# Patient Record
Sex: FEMALE | Race: OTHER | Employment: OTHER | URBAN - METROPOLITAN AREA
[De-identification: names, ages, dates, MRNs, and addresses within clinical notes are randomized per-mention and may not be internally consistent; named-entity substitution may affect disease eponyms.]

---

## 2017-11-07 ENCOUNTER — PATIENT OUTREACH (OUTPATIENT)
Dept: CASE MANAGEMENT | Age: 72
End: 2017-11-07
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
## 2017-11-08 ENCOUNTER — PATIENT OUTREACH (OUTPATIENT)
Dept: CASE MANAGEMENT | Age: 72
End: 2017-11-08

## 2017-11-08 NOTE — PROGRESS NOTES
Ambulatory Care Coordination  Social Work Assessment   Referral from which TIKA CM: January Zafar   Previously referred? If so, reason and brief outcome No   Reason for current referral: Any assistance on diabetes medication costs   Income information (if needed): Pt reports personally making $4,100/month with her half-way/SSI but also lives with her daughter and son-in-law who work as well. Unsure of collective income but pt did not want to apply for rx assistance as most want the household income amount and pt felt this was too intrusive. Sources of Support: Daughter, sister, son in law; very active in grandchildrens lives as well   ACP set up? Needs information? Did not assess   Medication Cost assistance needed? See above. Pt reports the most expensive are Novolog, Eliquis, and newly prescribed Soliqua. Pt has samples for Soliqua, plans to start Eliquis back in January but has stopped now b/c of price as is in donut hole. Pt is paying for novolog regardless b/c she feels she will die if she stops this. Novolog is costing her $215/month due to donut hole. Referral to [facility]TC/Medicaid needed? Would not qualify due to income levels   Referral to Medicare Extra Help/LIS program needed? n/a   Small home repair needed? no   MOW referral? No   Any other concerns/questions? Not at this time. Next steps: Pt does not wish to pursue any assistance programs, and may not qualify based on her and her childrens collective income levels. Pt assists in paying for the household bills and feels she shouldnt have to spend so much on her medication. Pt did save JEFFERSON Shankar contact number and was encouraged to call if changes mind. JEFFERSON MADRID updated RN [facility] as well. This note will not be viewable in [address]

## 2017-11-14 ENCOUNTER — PATIENT OUTREACH (OUTPATIENT)
Dept: CASE MANAGEMENT | Age: 72
End: 2017-11-14

## 2017-11-14 NOTE — PROGRESS NOTES
This note will not be viewable in [address] RNCM attempted to f/u w/ pt regarding CCM services. Pt didn't wish to pursue  medication assistance, per SW \"Unsure of collective income but pt did not want to apply for rx assistance as most want the household income amount and pt felt this was too intrusive\". Pt states she will continue paying for insulin out of pocket. Pt and daughter also decline home visits from [place] [address] for education. RNCM will continue attempts to f/u and offer visits at PCP office if pt is agreeable.

## 2017-11-20 ENCOUNTER — TELEPHONE (OUTPATIENT)
Dept: DIABETES SERVICES | Age: 72
End: 2017-11-20

## 2017-11-21 ENCOUNTER — PATIENT OUTREACH (OUTPATIENT)
Dept: CASE MANAGEMENT | Age: 72
End: 2017-11-21

## 2017-11-21 NOTE — Clinical Note
ERICA for Dr. Ashtyn Galarza, Ms. Sage declined needs including DM education. I encouraged her to save our contact information in case anything changes in the future.   Thank you, Erica

## 2017-11-21 NOTE — PROGRESS NOTES
This note will not be viewable in [address] RNCM spoke w/ pt regarding CCM services, pt declines need for services at this time, including DM education. RNCM encouraged pt to save contact information for CCMgrs in the event we are needed in the future. Pt agrees to do so. Will notify PCP.

## 2018-01-12 PROBLEM — F33.9 RECURRENT DEPRESSION (HCC): Status: ACTIVE | Noted: 2018-01-12

## 2018-01-12 PROBLEM — E11.40 TYPE 2 DIABETES MELLITUS WITH DIABETIC NEUROPATHY (HCC): Status: ACTIVE | Noted: 2018-01-12

## 2018-08-06 PROBLEM — E66.01 SEVERE OBESITY (BMI 35.0-39.9): Status: ACTIVE | Noted: 2018-08-06

## 2018-08-31 PROCEDURE — 99283 EMERGENCY DEPT VISIT LOW MDM: CPT | Performed by: EMERGENCY MEDICINE

## 2018-08-31 PROCEDURE — 96375 TX/PRO/DX INJ NEW DRUG ADDON: CPT | Performed by: EMERGENCY MEDICINE

## 2018-08-31 PROCEDURE — 96374 THER/PROPH/DIAG INJ IV PUSH: CPT | Performed by: EMERGENCY MEDICINE

## 2018-08-31 PROCEDURE — 94762 N-INVAS EAR/PLS OXIMTRY CONT: CPT | Performed by: EMERGENCY MEDICINE

## 2018-09-01 ENCOUNTER — HOSPITAL ENCOUNTER (EMERGENCY)
Age: 73
Discharge: HOME | End: 2018-09-01
Attending: EMERGENCY MEDICINE
Payer: MEDICARE

## 2018-09-01 ENCOUNTER — APPOINTMENT (OUTPATIENT)
Dept: GENERAL RADIOLOGY | Age: 73
End: 2018-09-01
Attending: EMERGENCY MEDICINE
Payer: MEDICARE

## 2018-09-01 ENCOUNTER — APPOINTMENT (OUTPATIENT)
Dept: ULTRASOUND IMAGING | Age: 73
End: 2018-09-01
Attending: EMERGENCY MEDICINE
Payer: MEDICARE

## 2018-09-01 VITALS
OXYGEN SATURATION: 98 % | RESPIRATION RATE: 16 BRPM | SYSTOLIC BLOOD PRESSURE: 156 MMHG | TEMPERATURE: 98.2 F | BODY MASS INDEX: 36.32 KG/M2 | DIASTOLIC BLOOD PRESSURE: 88 MMHG | WEIGHT: 205 LBS | HEART RATE: 81 BPM | HEIGHT: 63 IN

## 2018-09-01 DIAGNOSIS — M79.605 LEFT LEG PAIN: Primary | ICD-10-CM

## 2018-09-01 LAB
ANION GAP SERPL CALC-SCNC: 8 MMOL/L (ref 7–16)
BASOPHILS # BLD: 0 K/UL (ref 0–0.2)
BASOPHILS NFR BLD: 1 % (ref 0–2)
BUN SERPL-MCNC: 20 MG/DL (ref 8–23)
CALCIUM SERPL-MCNC: 9 MG/DL (ref 8.3–10.4)
CHLORIDE SERPL-SCNC: 103 MMOL/L (ref 98–107)
CO2 SERPL-SCNC: 30 MMOL/L (ref 21–32)
CREAT SERPL-MCNC: 0.87 MG/DL (ref 0.6–1)
DIFFERENTIAL METHOD BLD: ABNORMAL
EOSINOPHIL # BLD: 0.1 K/UL (ref 0–0.8)
EOSINOPHIL NFR BLD: 1 % (ref 0.5–7.8)
ERYTHROCYTE [DISTWIDTH] IN BLOOD BY AUTOMATED COUNT: 13.6 %
GLUCOSE SERPL-MCNC: 295 MG/DL (ref 65–100)
HCT VFR BLD AUTO: 41.4 % (ref 35.8–46.3)
HGB BLD-MCNC: 12.6 G/DL (ref 11.7–15.4)
IMM GRANULOCYTES # BLD: 0 K/UL (ref 0–0.5)
IMM GRANULOCYTES NFR BLD AUTO: 1 % (ref 0–5)
LYMPHOCYTES # BLD: 1.7 K/UL (ref 0.5–4.6)
LYMPHOCYTES NFR BLD: 33 % (ref 13–44)
MCH RBC QN AUTO: 25 PG (ref 26.1–32.9)
MCHC RBC AUTO-ENTMCNC: 30.4 G/DL (ref 31.4–35)
MCV RBC AUTO: 82.1 FL (ref 79.6–97.8)
MONOCYTES # BLD: 0.4 K/UL (ref 0.1–1.3)
MONOCYTES NFR BLD: 8 % (ref 4–12)
NEUTS SEG # BLD: 2.9 K/UL (ref 1.7–8.2)
NEUTS SEG NFR BLD: 56 % (ref 43–78)
NRBC # BLD: 0 K/UL (ref 0–0.2)
PLATELET # BLD AUTO: 293 K/UL (ref 150–450)
PMV BLD AUTO: 9.2 FL (ref 9.4–12.3)
POTASSIUM SERPL-SCNC: 3.9 MMOL/L (ref 3.5–5.1)
RBC # BLD AUTO: 5.04 M/UL (ref 4.05–5.2)
SODIUM SERPL-SCNC: 141 MMOL/L (ref 136–145)
WBC # BLD AUTO: 5.1 K/UL (ref 4.3–11.1)

## 2018-09-01 PROCEDURE — 73502 X-RAY EXAM HIP UNI 2-3 VIEWS: CPT

## 2018-09-01 PROCEDURE — 96374 THER/PROPH/DIAG INJ IV PUSH: CPT | Performed by: EMERGENCY MEDICINE

## 2018-09-01 PROCEDURE — 74011250636 HC RX REV CODE- 250/636: Performed by: EMERGENCY MEDICINE

## 2018-09-01 PROCEDURE — 93971 EXTREMITY STUDY: CPT

## 2018-09-01 PROCEDURE — 80048 BASIC METABOLIC PNL TOTAL CA: CPT

## 2018-09-01 PROCEDURE — 96375 TX/PRO/DX INJ NEW DRUG ADDON: CPT | Performed by: EMERGENCY MEDICINE

## 2018-09-01 PROCEDURE — 72100 X-RAY EXAM L-S SPINE 2/3 VWS: CPT

## 2018-09-01 PROCEDURE — 85025 COMPLETE CBC W/AUTO DIFF WBC: CPT

## 2018-09-01 RX ORDER — HYDROMORPHONE HYDROCHLORIDE 2 MG/ML
0.5 INJECTION, SOLUTION INTRAMUSCULAR; INTRAVENOUS; SUBCUTANEOUS
Status: DISCONTINUED | OUTPATIENT
Start: 2018-09-01 | End: 2018-09-01

## 2018-09-01 RX ORDER — ONDANSETRON 2 MG/ML
4 INJECTION INTRAMUSCULAR; INTRAVENOUS
Status: DISCONTINUED | OUTPATIENT
Start: 2018-09-01 | End: 2018-09-01

## 2018-09-01 RX ORDER — HYDROMORPHONE HYDROCHLORIDE 2 MG/ML
0.5 INJECTION, SOLUTION INTRAMUSCULAR; INTRAVENOUS; SUBCUTANEOUS
Status: COMPLETED | OUTPATIENT
Start: 2018-09-01 | End: 2018-09-01

## 2018-09-01 RX ORDER — ONDANSETRON 2 MG/ML
4 INJECTION INTRAMUSCULAR; INTRAVENOUS
Status: COMPLETED | OUTPATIENT
Start: 2018-09-01 | End: 2018-09-01

## 2018-09-01 RX ADMIN — ONDANSETRON 4 MG: 2 INJECTION INTRAMUSCULAR; INTRAVENOUS at 02:29

## 2018-09-01 RX ADMIN — HYDROMORPHONE HYDROCHLORIDE 0.5 MG: 2 INJECTION, SOLUTION INTRAMUSCULAR; INTRAVENOUS; SUBCUTANEOUS at 02:29

## 2018-09-01 NOTE — ED TRIAGE NOTES
Pt reports mild lower back pain since last evening,  Pt states that she is now having L LE pain, poss ganglion  cyst to posterior knee.

## 2018-09-01 NOTE — ED PROVIDER NOTES
HPI Comments: Patient is a 67 yo female who presents with left leg pain and \"swelling\" behind her left upper leg. States pain for the past 2 days. States history of blood clots and concerned about one in her leg due to the lump. She is on eloquis from prior DVT to upper extremity and lower extremities. No chest pain, no SOB, no nausea or vomiting. States pain radiates up her left leg and to her left hip. No back pain, no weakness of her legs, no fevers or chills, no further concerns. Patient is a 68 y.o. female presenting with leg pain. The history is provided by the patient. No  was used. Leg Pain Pertinent negatives include no back pain and no neck pain. Past Medical History:  
Diagnosis Date  Allergic rhinitis  Anemia  Anxiety  Asthma  Cataracts, bilateral   
 Depression  Diabetes ([address]  GERD (gastroesophageal reflux disease)  History of DVT (deep vein thrombosis)  History of pulmonary embolus (PE)  Hypercholesterolemia  Hyperlipidemia  Hypertension  Insomnia  Internal hemorrhoids without mention of complication  Osteoarthritis  Peripheral neuropathy  Personal history of colonic polyps  Rectocele  Stroke ([address] Past Surgical History:  
Procedure Laterality Date  ENDOSCOPY, COLON, DIAGNOSTIC  [address]--no polyps--5 year recall  HX APPENDECTOMY    
 OPEN  
 HX  SECTION    
 x2  
 HX CHOLECYSTECTOMY    
 OPEN  
 HX ORTHOPAEDIC    
 BACK X3  
 HX PARTIAL HYSTERECTOMY Family History:  
Problem Relation Age of Onset  Heart Disease Mother  Diabetes Mother  Heart Disease Father  Diabetes Brother Social History Social History  Marital status:  Spouse name: N/A  
 Number of children: N/A  
 Years of education: N/A Occupational History  Not on file. Social History Main Topics  Smoking status: Never Smoker  Smokeless tobacco: Never Used  Alcohol use No  
 Drug use: No  
 Sexual activity: Not on file Other Topics Concern  Not on file Social History Narrative ALLERGIES: Codeine and Morphine Review of Systems Constitutional: Negative for chills and fever. HENT: Negative for rhinorrhea and sore throat. Eyes: Negative for visual disturbance. Respiratory: Negative for cough and shortness of breath. Cardiovascular: Negative for chest pain and leg swelling. Gastrointestinal: Negative for abdominal pain, diarrhea, nausea and vomiting. Genitourinary: Negative for dysuria. Musculoskeletal: Positive for arthralgias and myalgias. Negative for back pain and neck pain. Skin: Negative for rash. Neurological: Negative for weakness and headaches. Psychiatric/Behavioral: The patient is not nervous/anxious. Vitals:  
 08/31/18 2346 09/01/18 0033 BP: 140/72 Pulse: 81 Resp: 18 Temp:  98.2 °F (36.8 °C) SpO2: 96% Weight: 93 kg (205 lb) Height: 5' 3\" (1.6 m) Physical Exam  
Constitutional: She is oriented to person, place, and time. She appears well-developed and well-nourished. HENT:  
Head: Normocephalic. Right Ear: External ear normal.  
Left Ear: External ear normal.  
Eyes: Conjunctivae and EOM are normal. Pupils are equal, round, and reactive to light. Neck: Normal range of motion. Neck supple. No tracheal deviation present. Cardiovascular: Normal rate, regular rhythm, normal heart sounds and intact distal pulses. No murmur heard. Pulmonary/Chest: Effort normal and breath sounds normal. No respiratory distress. Abdominal: Soft. There is no tenderness. Musculoskeletal: Normal range of motion. She exhibits tenderness (to palpation of posterior leg just above the knee. No rash noted. ). She exhibits no edema. Non-tender to palpation of C, T and L spine.   No stepoffs or deformities noted.  Strength 5/5 in all extremities, pulses intact in all extremities distally Neurological: She is alert and oriented to person, place, and time. No cranial nerve deficit. Skin: No rash noted. Nursing note and vitals reviewed. MDM Number of Diagnoses or Management Options Left leg pain: new and requires workup Amount and/or Complexity of Data Reviewed Clinical lab tests: ordered and reviewed Tests in the radiology section of CPT®: reviewed and ordered Review and summarize past medical records: yes Risk of Complications, Morbidity, and/or Mortality Presenting problems: high Diagnostic procedures: high Management options: high Patient Progress Patient progress: stable ED Course Procedures Recent Results (from the past 12 hour(s)) CBC WITH AUTOMATED DIFF Collection Time: 09/01/18  1:39 AM  
Result Value Ref Range WBC 5.1 4.3 - 11.1 K/uL  
 RBC 5.04 4.05 - 5.2 M/uL  
 HGB 12.6 11.7 - 15.4 g/dL HCT 41.4 35.8 - 46.3 % MCV 82.1 79.6 - 97.8 FL  
 MCH 25.0 (L) 26.1 - 32.9 PG  
 MCHC 30.4 (L) 31.4 - 35.0 g/dL  
 RDW 13.6 % PLATELET 783 364 - 475 K/uL MPV 9.2 (L) 9.4 - 12.3 FL ABSOLUTE NRBC 0.00 0.0 - 0.2 K/uL  
 DF AUTOMATED NEUTROPHILS 56 43 - 78 % LYMPHOCYTES 33 13 - 44 % MONOCYTES 8 4.0 - 12.0 % EOSINOPHILS 1 0.5 - 7.8 % BASOPHILS 1 0.0 - 2.0 % IMMATURE GRANULOCYTES 1 0.0 - 5.0 %  
 ABS. NEUTROPHILS 2.9 1.7 - 8.2 K/UL  
 ABS. LYMPHOCYTES 1.7 0.5 - 4.6 K/UL  
 ABS. MONOCYTES 0.4 0.1 - 1.3 K/UL  
 ABS. EOSINOPHILS 0.1 0.0 - 0.8 K/UL  
 ABS. BASOPHILS 0.0 0.0 - 0.2 K/UL  
 ABS. IMM. GRANS. 0.0 0.0 - 0.5 K/UL METABOLIC PANEL, BASIC Collection Time: 09/01/18  1:39 AM  
Result Value Ref Range Sodium 141 136 - 145 mmol/L Potassium 3.9 3.5 - 5.1 mmol/L Chloride 103 98 - 107 mmol/L  
 CO2 30 21 - 32 mmol/L Anion gap 8 7 - 16 mmol/L Glucose 295 (H) 65 - 100 mg/dL  BUN 20 8 - 23 MG/DL  
 Creatinine 0.87 0.6 - 1.0 MG/DL  
 GFR est AA >60 >60 ml/min/1.73m2 GFR est non-AA >60 >60 ml/min/1.73m2 Calcium 9.0 8.3 - 10.4 MG/DL Xr Spine Lumb 2 Or 3 V Result Date: 9/1/2018 EXAM:  XR SPINE LUMB 2 OR 3 V INDICATION:   LEFT LEG PAIN COMPARISON: None. FINDINGS: AP, lateral and spot lateral views of the lumbar spine demonstrate normal alignment. Diffuse spondylosis with diffuse disc space narrowing. Posterior bone stimulator at L3-4. Facet joints are intact. There is no fracture, subluxation or other abnormality. IMPRESSION:  Diffuse spondylosis. Xr Hip Lt W Or Wo Pelv 2-3 Vws Result Date: 9/1/2018 EXAM:  XR HIP LT W OR WO PELV 2-3 VWS INDICATION:   leg pain COMPARISON: None. FINDINGS: An AP view of the pelvis and a frogleg lateral view of the left hip demonstrate no fracture, dislocation or other abnormality. IMPRESSION:  Normal left hip. Duplex Lower Ext Venous Left Result Date: 9/1/2018 LEFT LOWER EXTREMITY VENOUS DUPLEX ULTRASOUND. HISTORY:  Pain. TECHNIQUE: Direct skin-contact high resolution grayscale images, color-flow Doppler and duplex waveform analysis. FINDINGS: There is compressibility, color-flow assignment and augmentation of the venous waveform with calf compression in the common femoral, superficial femoral and popliteal veins. Upper calf veins are unremarkable. In the posterior thigh there is a well-defined hypoechoic subcutaneous mass measuring 1.7 is 1.6 x 0.3 cm. With central vascularity. IMPRESSION: Negative for sonographic evidence of deep venous thrombosis left lower extremity. Nonspecific posterior thigh subcutaneous mass. This is nonspecific in its appearance. Clinical follow-up and possible biopsy recommended if it does not resolve. 67 yo female with leg pain: 
 
  
Patient is VERY well appearing, pain resolved in ED with pain medications.   Does have non-specific mass noted on US which we discussed importance of follow up for further workup however this appears to be subcutaneous and not related to bone on US and not visualized on xray. She is to return with any return or worsening pain or swelling, any fevers or chills or any further concerns.

## 2018-09-01 NOTE — DISCHARGE INSTRUCTIONS
AS WE DISCUSSED, YOU NEED TO SEE YOUR PRIMARY CARE PROVIDER FOR FURTHER IMAGING OF A MASS IN YOUR LEG. IF YOUR PAIN WORSENS OR IF YOU DEVELOP ANY FEVERS OR CHILLS, NAUSEA OR VOMITING OR ANY FURTHER CONCERNS THEN PLEASE RETURN IMMEDIATELY TO THE EMERGENCY DEPARTMENT. Leg Pain: Care Instructions  Your Care Instructions  Many things can cause leg pain. Too much exercise or overuse can cause a muscle cramp (or charley horse). You can get leg cramps from not eating a balanced diet that has enough potassium, calcium, and other minerals. If you do not drink enough fluids or are taking certain medicines, you may develop leg cramps. Other causes of leg pain include injuries, blood flow problems, nerve damage, and twisted and enlarged veins (varicose veins). You can usually ease pain with self-care. Your doctor may recommend that you rest your leg and keep it elevated. Follow-up care is a key part of your treatment and safety. Be sure to make and go to all appointments, and call your doctor if you are having problems. It's also a good idea to know your test results and keep a list of the medicines you take. How can you care for yourself at home? · Take pain medicines exactly as directed. ¨ If the doctor gave you a prescription medicine for pain, take it as prescribed. ¨ If you are not taking a prescription pain medicine, ask your doctor if you can take an over-the-counter medicine. · Take any other medicines exactly as prescribed. Call your doctor if you think you are having a problem with your medicine. · Rest your leg while you have pain, and avoid standing for long periods of time. · Prop up your leg at or above the level of your heart when possible. · Make sure you are eating a balanced diet that is rich in calcium, potassium, and magnesium, especially if you are pregnant. · If directed by your doctor, put ice or a cold pack on the area for 10 to 20 minutes at a time.  Put a thin cloth between the ice and your skin. · Your leg may be in a splint, a brace, or an elastic bandage, and you may have crutches to help you walk. Follow your doctor's directions about how long to wear supports and how to use the crutches. When should you call for help? Call 911 anytime you think you may need emergency care. For example, call if:    · You have sudden chest pain and shortness of breath, or you cough up blood.     · Your leg is cool or pale or changes color.    Call your doctor now or seek immediate medical care if:    · You have increasing or severe pain.     · Your leg suddenly feels weak and you cannot move it.     · You have signs of a blood clot, such as:  ¨ Pain in your calf, back of the knee, thigh, or groin. ¨ Redness and swelling in your leg or groin.     · You have signs of infection, such as:  ¨ Increased pain, swelling, warmth, or redness. ¨ Red streaks leading from the sore area. ¨ Pus draining from a place on your leg. ¨ A fever.     · You cannot bear weight on your leg.    Watch closely for changes in your health, and be sure to contact your doctor if:    · You do not get better as expected. Where can you learn more? Go to http://nila-alyssa.info/. Enter Y021 in the search box to learn more about \"Leg Pain: Care Instructions. \"  Current as of: November 20, 2017  Content Version: 11.7  © 1855-7923 [employer]. Care instructions adapted under license by [employer] (which disclaims liability or warranty for this information). If you have questions about a medical condition or this instruction, always ask your healthcare professional. [place] [address] any warranty or liability for your use of this information.

## 2018-09-01 NOTE — ED NOTES
I have reviewed discharge instructions with the patient. The patient verbalized understanding. Patient left ED via Discharge Method: ambulatory to Home with (insert name of family/friend, self, transport Son). Opportunity for questions and clarification provided. Patient given 0 scripts. To continue your aftercare when you leave the hospital, you may receive an automated call from our care team to check in on how you are doing. This is a free service and part of our promise to provide the best care and service to meet your aftercare needs.  If you have questions, or wish to unsubscribe from this service please call [phone]. Thank you for Choosing our [facility] [employer] [facility] Emergency Department.

## 2018-09-17 ENCOUNTER — HOSPITAL ENCOUNTER (OUTPATIENT)
Dept: MRI IMAGING | Age: 73
Discharge: HOME | End: 2018-09-17
Attending: NURSE PRACTITIONER

## 2018-09-17 DIAGNOSIS — R22.42 MASS OF LEFT LOWER EXTREMITY: ICD-10-CM

## 2018-09-17 DIAGNOSIS — M79.605 PAIN OF LEFT LOWER EXTREMITY: ICD-10-CM

## 2018-09-17 NOTE — PROGRESS NOTES
Pt has some sort of wire (stimulator?) that she did not know about. Pt has had lumbar surgeries in the past but no stimulators. Per radiologist recommendation, the patient can not have an MRI due to the risk of heating. We have no information on the wires and can not safely scan the patient. Will notify office.

## 2018-09-19 PROBLEM — R22.42 MASS OF LEFT LOWER EXTREMITY: Status: ACTIVE | Noted: 2018-09-19

## 2019-03-08 ENCOUNTER — APPOINTMENT (OUTPATIENT)
Dept: CT IMAGING | Age: 74
DRG: 066 | End: 2019-03-08
Attending: EMERGENCY MEDICINE
Payer: MEDICARE

## 2019-03-08 ENCOUNTER — HOSPITAL ENCOUNTER (INPATIENT)
Age: 74
LOS: 1 days | Discharge: HOME | DRG: 066 | End: 2019-03-12
Attending: EMERGENCY MEDICINE | Admitting: HOSPITALIST
Payer: MEDICARE

## 2019-03-08 DIAGNOSIS — H49.02 THIRD NERVE PALSY, LEFT: Primary | ICD-10-CM

## 2019-03-08 DIAGNOSIS — R51.9 HEADACHE ABOVE THE EYE REGION: ICD-10-CM

## 2019-03-08 PROBLEM — H49.00 THIRD NERVE PALSY: Status: ACTIVE | Noted: 2019-03-08

## 2019-03-08 PROBLEM — I63.9 CVA (CEREBRAL VASCULAR ACCIDENT) (HCC): Status: ACTIVE | Noted: 2019-03-08

## 2019-03-08 LAB
ALBUMIN SERPL-MCNC: 3.6 G/DL (ref 3.2–4.6)
ALBUMIN/GLOB SERPL: 0.8 {RATIO} (ref 1.2–3.5)
ALP SERPL-CCNC: 127 U/L (ref 50–136)
ALT SERPL-CCNC: 34 U/L (ref 12–65)
ANION GAP SERPL CALC-SCNC: 6 MMOL/L (ref 7–16)
AST SERPL-CCNC: 25 U/L (ref 15–37)
BASOPHILS # BLD: 0.1 K/UL (ref 0–0.2)
BASOPHILS NFR BLD: 1 % (ref 0–2)
BILIRUB SERPL-MCNC: 0.3 MG/DL (ref 0.2–1.1)
BUN SERPL-MCNC: 19 MG/DL (ref 8–23)
CALCIUM SERPL-MCNC: 9.2 MG/DL (ref 8.3–10.4)
CHLORIDE SERPL-SCNC: 104 MMOL/L (ref 98–107)
CO2 SERPL-SCNC: 31 MMOL/L (ref 21–32)
CREAT SERPL-MCNC: 0.84 MG/DL (ref 0.6–1)
DIFFERENTIAL METHOD BLD: ABNORMAL
EOSINOPHIL # BLD: 0.1 K/UL (ref 0–0.8)
EOSINOPHIL NFR BLD: 1 % (ref 0.5–7.8)
ERYTHROCYTE [DISTWIDTH] IN BLOOD BY AUTOMATED COUNT: 13.9 % (ref 11.9–14.6)
GLOBULIN SER CALC-MCNC: 4.3 G/DL (ref 2.3–3.5)
GLUCOSE BLD STRIP.AUTO-MCNC: 142 MG/DL (ref 65–100)
GLUCOSE SERPL-MCNC: 59 MG/DL (ref 65–100)
HCT VFR BLD AUTO: 44.7 % (ref 35.8–46.3)
HGB BLD-MCNC: 13.8 G/DL (ref 11.7–15.4)
IMM GRANULOCYTES # BLD AUTO: 0 K/UL (ref 0–0.5)
IMM GRANULOCYTES NFR BLD AUTO: 1 % (ref 0–5)
LYMPHOCYTES # BLD: 3.3 K/UL (ref 0.5–4.6)
LYMPHOCYTES NFR BLD: 40 % (ref 13–44)
MCH RBC QN AUTO: 25.6 PG (ref 26.1–32.9)
MCHC RBC AUTO-ENTMCNC: 30.9 G/DL (ref 31.4–35)
MCV RBC AUTO: 82.8 FL (ref 79.6–97.8)
MONOCYTES # BLD: 0.5 K/UL (ref 0.1–1.3)
MONOCYTES NFR BLD: 6 % (ref 4–12)
NEUTS SEG # BLD: 4.2 K/UL (ref 1.7–8.2)
NEUTS SEG NFR BLD: 52 % (ref 43–78)
NRBC # BLD: 0 K/UL (ref 0–0.2)
PLATELET # BLD AUTO: 393 K/UL (ref 150–450)
PMV BLD AUTO: 9.2 FL (ref 9.4–12.3)
POTASSIUM SERPL-SCNC: 3.9 MMOL/L (ref 3.5–5.1)
PROT SERPL-MCNC: 7.9 G/DL (ref 6.3–8.2)
RBC # BLD AUTO: 5.4 M/UL (ref 4.05–5.2)
SODIUM SERPL-SCNC: 141 MMOL/L (ref 136–145)
WBC # BLD AUTO: 8.2 K/UL (ref 4.3–11.1)

## 2019-03-08 PROCEDURE — 74011636320 HC RX REV CODE- 636/320: Performed by: EMERGENCY MEDICINE

## 2019-03-08 PROCEDURE — 82962 GLUCOSE BLOOD TEST: CPT

## 2019-03-08 PROCEDURE — 80053 COMPREHEN METABOLIC PANEL: CPT

## 2019-03-08 PROCEDURE — 74011000250 HC RX REV CODE- 250: Performed by: EMERGENCY MEDICINE

## 2019-03-08 PROCEDURE — 96374 THER/PROPH/DIAG INJ IV PUSH: CPT | Performed by: EMERGENCY MEDICINE

## 2019-03-08 PROCEDURE — 70496 CT ANGIOGRAPHY HEAD: CPT

## 2019-03-08 PROCEDURE — 85025 COMPLETE CBC W/AUTO DIFF WBC: CPT

## 2019-03-08 PROCEDURE — 70450 CT HEAD/BRAIN W/O DYE: CPT

## 2019-03-08 PROCEDURE — 65270000029 HC RM PRIVATE

## 2019-03-08 PROCEDURE — 74011000258 HC RX REV CODE- 258: Performed by: EMERGENCY MEDICINE

## 2019-03-08 PROCEDURE — 99284 EMERGENCY DEPT VISIT MOD MDM: CPT | Performed by: EMERGENCY MEDICINE

## 2019-03-08 RX ORDER — SODIUM CHLORIDE 0.9 % (FLUSH) 0.9 %
5-40 SYRINGE (ML) INJECTION AS NEEDED
Status: DISCONTINUED | OUTPATIENT
Start: 2019-03-08 | End: 2019-03-12 | Stop reason: HOSPADM

## 2019-03-08 RX ORDER — DEXTROSE 50 % IN WATER (D50W) INTRAVENOUS SYRINGE
50
Status: COMPLETED | OUTPATIENT
Start: 2019-03-08 | End: 2019-03-08

## 2019-03-08 RX ORDER — SODIUM CHLORIDE 0.9 % (FLUSH) 0.9 %
5-40 SYRINGE (ML) INJECTION EVERY 8 HOURS
Status: DISCONTINUED | OUTPATIENT
Start: 2019-03-08 | End: 2019-03-12 | Stop reason: HOSPADM

## 2019-03-08 RX ORDER — SODIUM CHLORIDE 0.9 % (FLUSH) 0.9 %
10 SYRINGE (ML) INJECTION
Status: COMPLETED | OUTPATIENT
Start: 2019-03-08 | End: 2019-03-08

## 2019-03-08 RX ADMIN — Medication 5 ML: at 21:59

## 2019-03-08 RX ADMIN — SODIUM CHLORIDE 100 ML: 900 INJECTION, SOLUTION INTRAVENOUS at 23:06

## 2019-03-08 RX ADMIN — DEXTROSE MONOHYDRATE 25 G: 25 INJECTION, SOLUTION INTRAVENOUS at 21:59

## 2019-03-08 RX ADMIN — Medication 10 ML: at 23:06

## 2019-03-08 RX ADMIN — IOPAMIDOL 80 ML: 755 INJECTION, SOLUTION INTRAVENOUS at 23:06

## 2019-03-09 ENCOUNTER — APPOINTMENT (OUTPATIENT)
Dept: CT IMAGING | Age: 74
DRG: 066 | End: 2019-03-09
Attending: PSYCHIATRY & NEUROLOGY
Payer: MEDICARE

## 2019-03-09 PROBLEM — R51.9 HEADACHE ABOVE THE EYE REGION: Status: ACTIVE | Noted: 2019-03-09

## 2019-03-09 LAB
APPEARANCE UR: CLEAR
BACTERIA URNS QL MICRO: ABNORMAL /HPF
BILIRUB UR QL: NEGATIVE
CASTS URNS QL MICRO: 3 /LPF
COLOR UR: YELLOW
CRYSTALS URNS QL MICRO: 0 /LPF
EPI CELLS #/AREA URNS HPF: 9 /HPF
GLUCOSE BLD STRIP.AUTO-MCNC: 108 MG/DL (ref 65–100)
GLUCOSE BLD STRIP.AUTO-MCNC: 218 MG/DL (ref 65–100)
GLUCOSE BLD STRIP.AUTO-MCNC: 243 MG/DL (ref 65–100)
GLUCOSE BLD STRIP.AUTO-MCNC: 487 MG/DL (ref 65–100)
GLUCOSE BLD STRIP.AUTO-MCNC: 60 MG/DL (ref 65–100)
GLUCOSE BLD STRIP.AUTO-MCNC: 95 MG/DL (ref 65–100)
GLUCOSE UR STRIP.AUTO-MCNC: 100 MG/DL
HGB UR QL STRIP: NEGATIVE
KETONES UR QL STRIP.AUTO: NEGATIVE MG/DL
LEUKOCYTE ESTERASE UR QL STRIP.AUTO: ABNORMAL
NITRITE UR QL STRIP.AUTO: NEGATIVE
PH UR STRIP: 7 [PH] (ref 5–9)
PROT UR STRIP-MCNC: NEGATIVE MG/DL
RBC #/AREA URNS HPF: 2 /HPF
SP GR UR REFRACTOMETRY: 1.01 (ref 1–1.02)
UROBILINOGEN UR QL STRIP.AUTO: 0.2 EU/DL (ref 0.2–1)
WBC URNS QL MICRO: 8 /HPF

## 2019-03-09 PROCEDURE — 74011636637 HC RX REV CODE- 636/637: Performed by: HOSPITALIST

## 2019-03-09 PROCEDURE — 82962 GLUCOSE BLOOD TEST: CPT

## 2019-03-09 PROCEDURE — 77030020263 HC SOL INJ SOD CL0.9% LFCR 1000ML

## 2019-03-09 PROCEDURE — 97165 OT EVAL LOW COMPLEX 30 MIN: CPT

## 2019-03-09 PROCEDURE — 74011250637 HC RX REV CODE- 250/637: Performed by: NURSE PRACTITIONER

## 2019-03-09 PROCEDURE — 70450 CT HEAD/BRAIN W/O DYE: CPT

## 2019-03-09 PROCEDURE — 81003 URINALYSIS AUTO W/O SCOPE: CPT

## 2019-03-09 PROCEDURE — 74011000250 HC RX REV CODE- 250: Performed by: HOSPITALIST

## 2019-03-09 PROCEDURE — 81015 MICROSCOPIC EXAM OF URINE: CPT

## 2019-03-09 PROCEDURE — 99218 HC RM OBSERVATION: CPT

## 2019-03-09 PROCEDURE — 74011250637 HC RX REV CODE- 250/637: Performed by: HOSPITALIST

## 2019-03-09 PROCEDURE — 74011250636 HC RX REV CODE- 250/636: Performed by: HOSPITALIST

## 2019-03-09 PROCEDURE — 92610 EVALUATE SWALLOWING FUNCTION: CPT

## 2019-03-09 PROCEDURE — C8929 TTE W OR WO FOL WCON,DOPPLER: HCPCS

## 2019-03-09 PROCEDURE — 99223 1ST HOSP IP/OBS HIGH 75: CPT | Performed by: PSYCHIATRY & NEUROLOGY

## 2019-03-09 PROCEDURE — 97162 PT EVAL MOD COMPLEX 30 MIN: CPT

## 2019-03-09 RX ORDER — ASPIRIN 325 MG
325 TABLET ORAL DAILY
Status: DISCONTINUED | OUTPATIENT
Start: 2019-03-09 | End: 2019-03-10

## 2019-03-09 RX ORDER — INSULIN GLARGINE 100 [IU]/ML
30 INJECTION, SOLUTION SUBCUTANEOUS
Status: DISCONTINUED | OUTPATIENT
Start: 2019-03-09 | End: 2019-03-10

## 2019-03-09 RX ORDER — TRAMADOL HYDROCHLORIDE 50 MG/1
50 TABLET ORAL
Status: DISCONTINUED | OUTPATIENT
Start: 2019-03-09 | End: 2019-03-09

## 2019-03-09 RX ORDER — DEXTROSE 50 % IN WATER (D50W) INTRAVENOUS SYRINGE
25-50 AS NEEDED
Status: DISCONTINUED | OUTPATIENT
Start: 2019-03-09 | End: 2019-03-12 | Stop reason: HOSPADM

## 2019-03-09 RX ORDER — LEVOTHYROXINE SODIUM 50 UG/1
25 TABLET ORAL
Status: DISCONTINUED | OUTPATIENT
Start: 2019-03-09 | End: 2019-03-12 | Stop reason: HOSPADM

## 2019-03-09 RX ORDER — SODIUM CHLORIDE 9 MG/ML
100 INJECTION, SOLUTION INTRAVENOUS CONTINUOUS
Status: DISPENSED | OUTPATIENT
Start: 2019-03-09 | End: 2019-03-10

## 2019-03-09 RX ORDER — KETOROLAC TROMETHAMINE 30 MG/ML
15 INJECTION, SOLUTION INTRAMUSCULAR; INTRAVENOUS
Status: COMPLETED | OUTPATIENT
Start: 2019-03-09 | End: 2019-03-09

## 2019-03-09 RX ORDER — LORAZEPAM 1 MG/1
1 TABLET ORAL
Status: DISCONTINUED | OUTPATIENT
Start: 2019-03-09 | End: 2019-03-10

## 2019-03-09 RX ORDER — ACETAMINOPHEN 325 MG/1
650 TABLET ORAL
Status: DISCONTINUED | OUTPATIENT
Start: 2019-03-09 | End: 2019-03-12 | Stop reason: HOSPADM

## 2019-03-09 RX ORDER — ALBUTEROL SULFATE 0.83 MG/ML
2.5 SOLUTION RESPIRATORY (INHALATION)
Status: DISCONTINUED | OUTPATIENT
Start: 2019-03-09 | End: 2019-03-12 | Stop reason: HOSPADM

## 2019-03-09 RX ORDER — ATORVASTATIN CALCIUM 10 MG/1
20 TABLET, FILM COATED ORAL DAILY
Status: DISCONTINUED | OUTPATIENT
Start: 2019-03-09 | End: 2019-03-10

## 2019-03-09 RX ORDER — SODIUM CHLORIDE 0.9 % (FLUSH) 0.9 %
5-40 SYRINGE (ML) INJECTION EVERY 8 HOURS
Status: DISCONTINUED | OUTPATIENT
Start: 2019-03-09 | End: 2019-03-10

## 2019-03-09 RX ORDER — INSULIN LISPRO 100 [IU]/ML
INJECTION, SOLUTION INTRAVENOUS; SUBCUTANEOUS
Status: DISCONTINUED | OUTPATIENT
Start: 2019-03-09 | End: 2019-03-12 | Stop reason: HOSPADM

## 2019-03-09 RX ORDER — FLUTICASONE PROPIONATE 50 MCG
2 SPRAY, SUSPENSION (ML) NASAL DAILY
Status: DISCONTINUED | OUTPATIENT
Start: 2019-03-09 | End: 2019-03-12 | Stop reason: HOSPADM

## 2019-03-09 RX ORDER — DEXTROSE 40 %
15 GEL (GRAM) ORAL AS NEEDED
Status: DISCONTINUED | OUTPATIENT
Start: 2019-03-09 | End: 2019-03-12 | Stop reason: HOSPADM

## 2019-03-09 RX ORDER — POLYETHYLENE GLYCOL 3350 17 G/17G
17 POWDER, FOR SOLUTION ORAL DAILY
Status: DISCONTINUED | OUTPATIENT
Start: 2019-03-09 | End: 2019-03-11 | Stop reason: SDUPTHER

## 2019-03-09 RX ORDER — SODIUM CHLORIDE 0.9 % (FLUSH) 0.9 %
5-40 SYRINGE (ML) INJECTION AS NEEDED
Status: DISCONTINUED | OUTPATIENT
Start: 2019-03-09 | End: 2019-03-11 | Stop reason: SDUPTHER

## 2019-03-09 RX ORDER — INSULIN LISPRO 100 [IU]/ML
20 INJECTION, SOLUTION INTRAVENOUS; SUBCUTANEOUS
Status: DISCONTINUED | OUTPATIENT
Start: 2019-03-09 | End: 2019-03-10

## 2019-03-09 RX ORDER — SAME BUTANEDISULFONATE/BETAINE 400-600 MG
250 POWDER IN PACKET (EA) ORAL 2 TIMES DAILY
Status: DISCONTINUED | OUTPATIENT
Start: 2019-03-09 | End: 2019-03-09

## 2019-03-09 RX ORDER — ONDANSETRON 2 MG/ML
4 INJECTION INTRAMUSCULAR; INTRAVENOUS
Status: DISCONTINUED | OUTPATIENT
Start: 2019-03-09 | End: 2019-03-12 | Stop reason: HOSPADM

## 2019-03-09 RX ORDER — OXYCODONE HYDROCHLORIDE 5 MG/1
10 TABLET ORAL
Status: DISCONTINUED | OUTPATIENT
Start: 2019-03-09 | End: 2019-03-12 | Stop reason: HOSPADM

## 2019-03-09 RX ORDER — TRAMADOL HYDROCHLORIDE 50 MG/1
50-100 TABLET ORAL
Status: DISCONTINUED | OUTPATIENT
Start: 2019-03-09 | End: 2019-03-12 | Stop reason: HOSPADM

## 2019-03-09 RX ADMIN — INSULIN GLARGINE 30 UNITS: 100 INJECTION, SOLUTION SUBCUTANEOUS at 16:42

## 2019-03-09 RX ADMIN — INSULIN LISPRO 20 UNITS: 100 INJECTION, SOLUTION INTRAVENOUS; SUBCUTANEOUS at 16:44

## 2019-03-09 RX ADMIN — KETOROLAC TROMETHAMINE 15 MG: 30 INJECTION, SOLUTION INTRAMUSCULAR; INTRAVENOUS at 02:53

## 2019-03-09 RX ADMIN — ACETAMINOPHEN 650 MG: 325 TABLET, FILM COATED ORAL at 02:53

## 2019-03-09 RX ADMIN — Medication 5 ML: at 15:35

## 2019-03-09 RX ADMIN — INSULIN LISPRO 4 UNITS: 100 INJECTION, SOLUTION INTRAVENOUS; SUBCUTANEOUS at 11:59

## 2019-03-09 RX ADMIN — APIXABAN 5 MG: 5 TABLET, FILM COATED ORAL at 08:37

## 2019-03-09 RX ADMIN — LEVOTHYROXINE SODIUM 25 MCG: 50 TABLET ORAL at 05:21

## 2019-03-09 RX ADMIN — INSULIN LISPRO 10 UNITS: 100 INJECTION, SOLUTION INTRAVENOUS; SUBCUTANEOUS at 16:43

## 2019-03-09 RX ADMIN — TRAMADOL HYDROCHLORIDE 50 MG: 50 TABLET, FILM COATED ORAL at 15:35

## 2019-03-09 RX ADMIN — LORAZEPAM 1 MG: 1 TABLET ORAL at 18:26

## 2019-03-09 RX ADMIN — FLUTICASONE PROPIONATE 2 SPRAY: 50 SPRAY, METERED NASAL at 08:37

## 2019-03-09 RX ADMIN — MULTIPLE VITAMINS W/ MINERALS TAB 1 TABLET: TAB at 08:37

## 2019-03-09 RX ADMIN — PERFLUTREN 1 ML: 6.52 INJECTION, SUSPENSION INTRAVENOUS at 15:00

## 2019-03-09 RX ADMIN — Medication 10 ML: at 01:50

## 2019-03-09 RX ADMIN — ACETAMINOPHEN 650 MG: 325 TABLET, FILM COATED ORAL at 08:37

## 2019-03-09 RX ADMIN — INSULIN LISPRO 4 UNITS: 100 INJECTION, SOLUTION INTRAVENOUS; SUBCUTANEOUS at 08:07

## 2019-03-09 RX ADMIN — INSULIN GLARGINE 30 UNITS: 100 INJECTION, SOLUTION SUBCUTANEOUS at 08:06

## 2019-03-09 RX ADMIN — ACETAMINOPHEN 650 MG: 325 TABLET, FILM COATED ORAL at 19:32

## 2019-03-09 RX ADMIN — POLYETHYLENE GLYCOL 3350 17 G: 17 POWDER, FOR SOLUTION ORAL at 08:37

## 2019-03-09 RX ADMIN — SODIUM CHLORIDE 100 ML/HR: 900 INJECTION, SOLUTION INTRAVENOUS at 02:00

## 2019-03-09 RX ADMIN — ACETAMINOPHEN 650 MG: 325 TABLET, FILM COATED ORAL at 15:35

## 2019-03-09 RX ADMIN — TRAMADOL HYDROCHLORIDE 50 MG: 50 TABLET, FILM COATED ORAL at 22:42

## 2019-03-09 RX ADMIN — Medication 10 ML: at 21:55

## 2019-03-09 RX ADMIN — APIXABAN 5 MG: 5 TABLET, FILM COATED ORAL at 18:24

## 2019-03-09 RX ADMIN — ASPIRIN 325 MG ORAL TABLET 325 MG: 325 PILL ORAL at 08:37

## 2019-03-09 RX ADMIN — INSULIN LISPRO 20 UNITS: 100 INJECTION, SOLUTION INTRAVENOUS; SUBCUTANEOUS at 08:09

## 2019-03-09 RX ADMIN — ATORVASTATIN CALCIUM 20 MG: 10 TABLET, FILM COATED ORAL at 08:37

## 2019-03-09 RX ADMIN — ONDANSETRON 4 MG: 2 INJECTION INTRAMUSCULAR; INTRAVENOUS at 19:32

## 2019-03-09 RX ADMIN — INSULIN LISPRO 20 UNITS: 100 INJECTION, SOLUTION INTRAVENOUS; SUBCUTANEOUS at 11:59

## 2019-03-09 NOTE — ED TRIAGE NOTES
Taken off blood thinner for dental work. Blood thinner taken for clots and CVA x 2. Headache x 1 week. Seen by NP 3/4/2019. Left eye drooping at 1030 when waking on 3/7/2019. Woke today and unable to open left eye.    Dr. Iron Rubalcava in triage on arrival.

## 2019-03-09 NOTE — PROGRESS NOTES
Problem: Falls - Risk of  Goal: *Absence of Falls  Document Howie Fall Risk and appropriate interventions in the flowsheet.   Outcome: Progressing Towards Goal  Fall Risk Interventions:  Mobility Interventions: Bed/chair exit alarm, Communicate number of staff needed for ambulation/transfer, OT consult for ADLs, Patient to call before getting OOB, PT Consult for mobility concerns, PT Consult for assist device competence         Medication Interventions: Bed/chair exit alarm, Evaluate medications/consider consulting pharmacy, Patient to call before getting OOB    Elimination Interventions: Bed/chair exit alarm, Call light in reach, Patient to call for help with toileting needs

## 2019-03-09 NOTE — ED NOTES
TRANSFER - OUT REPORT:    Verbal report given to Juan Whitaker RN(name) on Wei Corea  being transferred to 1(unit) for routine progression of care       Report consisted of patients Situation, Background, Assessment and   Recommendations(SBAR). Information from the following report(s) SBAR and ED Summary was reveiwed with the receiving nurse. Opportunity for questions and clarification was provided.       Ischemic Stroke without Activase/TIA    BP Parameters: Less Than 220/120 for 24 hours, then consult MD for parameters    Controlled With: None    Dysphagia Screen Completed: Yes: Pass        NIH Stroke Scale Complete: Yes: 1    Frequency of Vital Signs: Every 15 minutes for 2 hours    Frequency of Neuro Checks: Every 15 minutes for 2 hours

## 2019-03-09 NOTE — PROGRESS NOTES
TRANSFER - IN REPORT:    Verbal report received from TIKA Delgadillo on [address]  being received from ED for routine progression of care      Report consisted of patients Situation, Background, Assessment and   Recommendations(SBAR). Information from the following report(s) SBAR, ED Summary and MAR was reviewed with the receiving nurse. Opportunity for questions and clarification was provided. Assessment completed upon patients arrival to unit and care assumed.

## 2019-03-09 NOTE — PROGRESS NOTES
Hospitalist Progress Note    Subjective:   Daily Progress Note: 3/9/2019 1650    Patient with constant left side headache with varying intensity x 1 week accompanied by facial edema presented to ER 3/8 for same. She also describes pressure behind her left eye with ptosis also beginning a couple of days ago. Seen by PHP prior to onset of secondary symptoms. Reports double vision if both eyes open without change in acuity. No pupillary abnormality. She is a diabetic with an A1C of 10.2, was 14. On eliquis for history of DVT and PE, had stopped eliquis for dental procedure, but restarted 3/6/19. Admitted with stroke workup. 3/9:   SLP in, recommendations for regular diet with thin liquids, speech normal. Dr Román Fraser in for Neurology consult, agrees with diabetic third nerve palsy. Concern for temporal arteritis, inflammatory markers pending, may need bx. Unable to have MRI due to retained internal wires. Repeat Head CT is WNL, anticoagulation resumed per Dr Román Fraser. PT/OT in. Cane and furniture walking at baseline.      ADDITIONAL HISTORY:  IDDM II with A1c:  10.2, anemia, anxiety, cataracts, depression, GERD, DVT and PE on eliquis, hyperlipidemia, hypertension, peripheral neuropathy, stroke    Current Facility-Administered Medications   Medication Dose Route Frequency    albuterol (PROVENTIL VENTOLIN) nebulizer solution 2.5 mg  2.5 mg Inhalation Q6H PRN    aspirin tablet 325 mg  325 mg Oral DAILY    atorvastatin (LIPITOR) tablet 20 mg  20 mg Oral DAILY    fluticasone (FLONASE) 50 mcg/actuation nasal spray 2 Spray  2 Spray Both Nostrils DAILY    insulin lispro (HUMALOG) injection 20 Units  20 Units SubCUTAneous AC&HS    insulin glargine (LANTUS) injection 30 Units  30 Units SubCUTAneous ACB&D    levothyroxine (SYNTHROID) tablet 25 mcg  25 mcg Oral ACB    LORazepam (ATIVAN) tablet 1 mg  1 mg Oral QHS PRN    multivitamin, tx-iron-ca-min (THERA-M w/ IRON) tablet 1 Tab  1 Tab Oral DAILY    dextrose 40% (GLUTOSE) oral gel 1 Tube  15 g Oral PRN    glucagon (GLUCAGEN) injection 1 mg  1 mg IntraMUSCular PRN    dextrose (D50W) injection syrg 12.5-25 g  25-50 mL IntraVENous PRN    insulin lispro (HUMALOG) injection   SubCUTAneous AC&HS    sodium chloride (NS) flush 5-40 mL  5-40 mL IntraVENous Q8H    sodium chloride (NS) flush 5-40 mL  5-40 mL IntraVENous PRN    0.9% sodium chloride infusion  100 mL/hr IntraVENous CONTINUOUS    ondansetron (ZOFRAN) injection 4 mg  4 mg IntraVENous Q6H PRN    acetaminophen (TYLENOL) tablet 650 mg  650 mg Oral Q4H PRN    polyethylene glycol (MIRALAX) packet 17 g  17 g Oral DAILY    apixaban (ELIQUIS) tablet 5 mg  5 mg Oral BID    sodium chloride (NS) flush 5-40 mL  5-40 mL IntraVENous Q8H    sodium chloride (NS) flush 5-40 mL  5-40 mL IntraVENous PRN        Review of Systems  A comprehensive review of systems was negative except for that written in the HPI. Objective:     Visit Vitals  /79 (BP 1 Location: Right arm, BP Patient Position: At rest)   Pulse 78   Temp 98.1 °F (36.7 °C)   Resp 16   Ht 5' 3\" (1.6 m)   Wt 96.6 kg (213 lb)   SpO2 93%   BMI 37.73 kg/m²      O2 Device: Room air    Temp (24hrs), Av.9 °F (36.6 °C), Min:97.7 °F (36.5 °C), Max:98.1 °F (36.7 °C)    General appearance: Oriented and alert. Obese. Continued left eyelid droop. Head: Normocephalic, without obvious abnormality, atraumatic. Left cheek edematous and with mild erythema. Left temple tender to to touch. Eyes: Right conjunctivae/cornea clear. Left eye with edema, excessive tearing. Lid swollen almost shut. Denies visual loss, but has double vision when both eyes open. .   Throat: Lips, mucosa, and tongue normal. Teeth and gums normal  Neck: supple, symmetrical, trachea midline, no adenopathy, thyroid: not enlarged, symmetric, no tenderness/mass/nodules, no carotid bruit and no JVD  Lungs: clear to auscultation bilaterally  Heart: regular rate and rhythm, S1, S2 normal, no murmur, click, rub or gallop  Abdomen: soft, non-tender. Bowel sounds normal. No masses,  no organomegaly  Extremities: extremities normal, atraumatic, no cyanosis or edema  Skin: Skin color, texture, turgor normal. No rashes or lesions  Neurologic: Grossly normal    Additional comments: Notes,orders, test results, vitals reviewed    Data Review  Recent Results (from the past 24 hour(s))   CBC WITH AUTOMATED DIFF    Collection Time: 03/08/19  8:25 PM   Result Value Ref Range    WBC 8.2 4.3 - 11.1 K/uL    RBC 5.40 (H) 4.05 - 5.2 M/uL    HGB 13.8 11.7 - 15.4 g/dL    HCT 44.7 35.8 - 46.3 %    MCV 82.8 79.6 - 97.8 FL    MCH 25.6 (L) 26.1 - 32.9 PG    MCHC 30.9 (L) 31.4 - 35.0 g/dL    RDW 13.9 11.9 - 14.6 %    PLATELET 632 191 - 349 K/uL    MPV 9.2 (L) 9.4 - 12.3 FL    ABSOLUTE NRBC 0.00 0.0 - 0.2 K/uL    DF AUTOMATED      NEUTROPHILS 52 43 - 78 %    LYMPHOCYTES 40 13 - 44 %    MONOCYTES 6 4.0 - 12.0 %    EOSINOPHILS 1 0.5 - 7.8 %    BASOPHILS 1 0.0 - 2.0 %    IMMATURE GRANULOCYTES 1 0.0 - 5.0 %    ABS. NEUTROPHILS 4.2 1.7 - 8.2 K/UL    ABS. LYMPHOCYTES 3.3 0.5 - 4.6 K/UL    ABS. MONOCYTES 0.5 0.1 - 1.3 K/UL    ABS. EOSINOPHILS 0.1 0.0 - 0.8 K/UL    ABS. BASOPHILS 0.1 0.0 - 0.2 K/UL    ABS. IMM. GRANS. 0.0 0.0 - 0.5 K/UL   METABOLIC PANEL, COMPREHENSIVE    Collection Time: 03/08/19  8:25 PM   Result Value Ref Range    Sodium 141 136 - 145 mmol/L    Potassium 3.9 3.5 - 5.1 mmol/L    Chloride 104 98 - 107 mmol/L    CO2 31 21 - 32 mmol/L    Anion gap 6 (L) 7 - 16 mmol/L    Glucose 59 (L) 65 - 100 mg/dL    BUN 19 8 - 23 MG/DL    Creatinine 0.84 0.6 - 1.0 MG/DL    GFR est AA >60 >60 ml/min/1.73m2    GFR est non-AA >60 >60 ml/min/1.73m2    Calcium 9.2 8.3 - 10.4 MG/DL    Bilirubin, total 0.3 0.2 - 1.1 MG/DL    ALT (SGPT) 34 12 - 65 U/L    AST (SGOT) 25 15 - 37 U/L    Alk.  phosphatase 127 50 - 136 U/L    Protein, total 7.9 6.3 - 8.2 g/dL    Albumin 3.6 3.2 - 4.6 g/dL    Globulin 4.3 (H) 2.3 - 3.5 g/dL    A-G Ratio 0.8 (L) 1.2 - 3.5     GLUCOSE, POC    Collection Time: 03/08/19 10:43 PM   Result Value Ref Range    Glucose (POC) 142 (H) 65 - 100 mg/dL   URINALYSIS W/ RFLX MICROSCOPIC    Collection Time: 03/09/19 12:26 AM   Result Value Ref Range    Color YELLOW      Appearance CLEAR      Specific gravity 1.015 1.001 - 1.023      pH (UA) 7.0 5.0 - 9.0      Protein NEGATIVE  NEG mg/dL    Glucose 100 mg/dL    Ketone NEGATIVE  NEG mg/dL    Bilirubin NEGATIVE  NEG      Blood NEGATIVE  NEG      Urobilinogen 0.2 0.2 - 1.0 EU/dL    Nitrites NEGATIVE  NEG      Leukocyte Esterase SMALL AMOUNT (A) NEG     URINE MICROSCOPIC    Collection Time: 03/09/19 12:26 AM   Result Value Ref Range    WBC 8 (H) 0 /hpf    RBC 2 (H) 0 /hpf    Epithelial cells 9 (H) 0 /hpf    Bacteria TRACE 0 /hpf    Casts 3 (H) 0 /lpf    Crystals, urine 0 0 /LPF   GLUCOSE, POC    Collection Time: 03/09/19  1:32 AM   Result Value Ref Range    Glucose (POC) 108 (H) 65 - 100 mg/dL   GLUCOSE, POC    Collection Time: 03/09/19  6:57 AM   Result Value Ref Range    Glucose (POC) 218 (H) 65 - 100 mg/dL     3/8:  CT HEAD:   *BRAIN:  -  There are no early signs of territorial or lacunar infarction by CT.   -  Symmetric supratentorial atrophy. -  For patient's age, the scattered areas of white matter hypodensities may represent a chronic small vessel white matter ischemia. However this is nonspecific. *VISUALIZED PARANASAL SINUSES: Well aerated. *MASTOIDS:  Clear. *CALVARIUM AND SCALP: Unremarkable. IMPRESSION:  No acute intracranial abnormalities. 3/8:  CTA HEAD:  The vertebral arteries are codominant. Moderate-to-severe severe stenosis of the V4 segment on the left. The basilar artery and its branches are normal. Extensive atherosclerotic changes of the right ICA cavernous sinus region. Severe stenosis of the suprasellar distal ICA. The right MCA appears patent. 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 At least a moderate stenosis of the distal left ICA.  The MCA and DELMER are patent. . . . Fetal origin of the left PCA. There is also high-grade stenosis of the proximal left PCA at the origin. Carleenanisceli Lobstein IMPRESSION:  Extensive atherosclerotic changes of the intracranial vessels, notably and the cavernous right ICA and also the proximal left PCA. No obvious acute large vessel occlusion. .    3/9:  CT HEAD:  FINDINGS: No areas of abnormal attenuation are seen in the brain. There is no CT evidence of acute hemorrhage or infarction. The ventricles are normal in size. There are no extra-axial fluid collections. No masses are seen. The sinuses are clear. There are no bony lesions. MPRESSION: No CT evidence of acute intracranial abnormality. Assessment/Plan: Third cranial nerve palsy   Neurology on board   PT/OT   Will need Opthomology follow up on discharge  IDDM:  A1C;  10.4   Will get DM edu and management on Monday if            patient still here.  IF not, needs OP teaching   Could benefit from Endocrine follow up   Continue lantus 30 units bid   Continue SSI   Continue ac 20 units humalog ac and HS  Chronic, recurrent  DVT on eliquis  Obesity  HTN:  Old records indicate patient has not been on blood pressure meds in the past.   Will need to begin po antihypertensives  Ativan dependent:  Do not stop abruptly   Wean to different antianxiety med  Hypothyroidism   Check TSH, T4              Continue synthroid  History of noncompliance: Need to investigate  Recurrent depression : as above  Headache above the eye region: Ultram prn    Care Plan discussed with: Patient and Nurse    Signed By: Arie Monte NP     March 9, 2019

## 2019-03-09 NOTE — PROGRESS NOTES
Problem: Self Care Deficits Care Plan (Adult)  Goal: *Acute Goals and Plan of Care (Insert Text)  1. Patient will complete total body bathing and dressing with supervision and adaptive equipment as needed. 2. Patient will complete toileting with supervision. 3. Patient will tolerate 30 minutes of OT treatment with 2-3 rest breaks to increase activity tolerance for ADLs. 4. Patient will complete functional transfers with supervision and adaptive equipment as needed. 5. Patient will complete functional mobility for household distances with appropriate head turns and ability to maneuver through environment with supervision and minimal verbal cues. 6. Patient will participate in 10 minutes of visual exercises/activities to improve awareness of her environment for ADL/functional transfers. Timeframe: 7 visits        OCCUPATIONAL THERAPY: Initial Assessment and AM 3/9/2019  INPATIENT:    [place] / Plan[place] / Product Type: Medicare /      NAME/AGE/GENDER: Delight Hodgkin is a 68 y.o. female   PRIMARY DIAGNOSIS:  CVA (cerebral vascular accident) ([facility] [address] [I63.9]  Third nerve palsy [H49.00] CVA (cerebral vascular accident) ([facility] [address] CVA (cerebral vascular accident) ([facility] [address]       ICD-10: Treatment Diagnosis:    · Generalized Muscle Weakness (M62.81)  · Other lack of cordination (R27.8)  · History of falling (Z91.81)   Precautions/Allergies:     Codeine and Morphine      ASSESSMENT:     Ms. Bernie Cardenas presents to the hospital with L sided headache, facial droop, and L ptosis due to third nerve palsy. Pt has hx of multiple CVAs in the past. Pt reports that typically she lives with multiple family members and is home alone from 7:30-3:30. Pt uses a cane or a rollator for functional mobility. Pt states she loses her balance posteriorly when standing up. Pt completes all ADL with modified independence.   Pt is sitting on the edge of the bed upon arrival. Pt is very pleasant and talkative this am. Pt has c/o headache along the L side of her neck and head. Pt is alert and oriented x 4. Pt has swelling to L side of the face and drooping of L eye. Pt c/o double vision when opening the L eye. Pt has limited visual fields bilaterally and has difficulty tracking medially with the L eye. Pt normally wears reading glasses at baseline but does not have them bedside. Pt's upper body AROM/strength appear functional with some slight decreased strength/coordination in her L non-dominant UE. Pt stood with CGA with initial lean posteriorly. Pt completed functional mobility in the room with minimal assistance using a SPC. Pt has some difficulty mobilizing in environment due to visual deficits. Overall pt demonstrates deficits in vision and dynamic balance impacting her independence with ADL/functional mobility. Pt will benefit from OT services to address stated goals and plan of care. This section established at most recent assessment   PROBLEM LIST (Impairments causing functional limitations):  1. Decreased ADL/Functional Activities  2. Decreased Transfer Abilities  3. Decreased Ambulation Ability/Technique  4. Decreased Balance  5. Decreased Activity Tolerance  6. Decreased [place] with Home Exercise Program  7. Visual deficits   INTERVENTIONS PLANNED: (Benefits and precautions of occupational therapy have been discussed with the patient.)  1. Activities of daily living training  2. Adaptive equipment training  3. Balance training  4. Clothing management  5. Cognitive training  6. Neuromuscular re-eduation  7. Therapeutic activity  8. Therapeutic exercise     TREATMENT PLAN: Frequency/Duration: Follow patient 3 times per week to address above goals.   Rehabilitation Potential For Stated Goals: Excellent     RECOMMENDED REHABILITATION/EQUIPMENT: (at time of discharge pending progress): Due to the probability of continued deficits (see above) this patient will likely need continued skilled occupational therapy after discharge. Equipment:    TBD              OCCUPATIONAL PROFILE AND HISTORY:   History of Present Injury/Illness (Reason for Referral):  See H&P  Past Medical History/Comorbidities:   Ms. Monae Mosquera  has a past medical history of Allergic rhinitis, Anemia, Anxiety, Asthma, Cataracts, bilateral, Depression, Diabetes ([facility] [address] GERD (gastroesophageal reflux disease), History of DVT (deep vein thrombosis), History of pulmonary embolus (PE), Hypercholesterolemia, Hyperlipidemia, Hypertension, Insomnia, Internal hemorrhoids without mention of complication, Osteoarthritis, Peripheral neuropathy, Personal history of colonic polyps, Rectocele, and Stroke ([facility] [address] Ms. Monae Mosquera  has a past surgical history that includes hx orthopaedic; hx  section; hx appendectomy; hx cholecystectomy; hx partial hysterectomy; and endoscopy, colon, diagnostic (13). Social History/Living Environment:   Home Environment: Private residence  # Steps to Enter: 3  One/Two Story Residence: One story  Living Alone: Yes  Support Systems: Child(kanchan), Family member(s)  Patient Expects to be Discharged to[de-identified] Patient room  Current DME Used/Available at Home: Cane, quad, Walker  Tub or Shower Type: Shower  Prior Level of Function/Work/Activity:  Pt reports that typically she lives with multiple family members and is home alone from 7:30-3:30. Pt uses a cane or a rollator for functional mobility. Pt states she loses her balance posteriorly when standing up. Pt completes all ADL with modified independence.   Personal Factors:          Social Background:  Home alone during the day        Other factors that influence how disability is experienced by the patient:  Multiple co-morbidities   Number of Personal Factors/Comorbidities that affect the Plan of Care: Expanded review of therapy/medical records (1-2):  MODERATE COMPLEXITY   ASSESSMENT OF OCCUPATIONAL PERFORMANCE[de-identified]   Activities of Daily Living:   Basic ADLs (From Assessment) Complex ADLs (From Assessment)   Feeding: Stand-by assistance  Oral Facial Hygiene/Grooming: Stand-by assistance  Bathing: Moderate assistance  Upper Body Dressing: Minimum assistance  Lower Body Dressing: Moderate assistance  Toileting: Minimum assistance Instrumental ADL  Meal Preparation: Maximum assistance  Homemaking: Maximum assistance   Grooming/Bathing/Dressing Activities of Daily Living     Cognitive Retraining  Safety/Judgement: Fall prevention;Home safety                       Bed/Mat Mobility  Sit to Stand: Contact guard assistance  Bed to Chair: Contact guard assistance       Most Recent Physical Functioning:   Gross Assessment:  AROM: Within functional limits  Strength: Generally decreased, functional(4+/5 L non-dominant UE; R UE 5/5)  Coordination: Generally decreased, functional(slowed on L side )  Tone: Normal  Sensation: Intact(B UE)               Posture:     Balance:  Sitting: Intact  Standing: Impaired  Standing - Static: Fair  Standing - Dynamic : Fair Bed Mobility:     Wheelchair Mobility:     Transfers:  Sit to Stand: Contact guard assistance  Stand to Sit: Contact guard assistance  Bed to Chair: Contact guard assistance            Patient Vitals for the past 6 hrs:   BP BP Patient Position SpO2 Pulse   03/09/19 0800 153/88 At rest 96 % 79   03/09/19 1200 155/82 At rest 93 % 73       Mental Status  Neurologic State: Alert  Orientation Level: Oriented X4  Cognition: Follows commands  Perception: Appears intact  Perseveration: No perseveration noted  Safety/Judgement: Fall prevention, Home safety                          Physical Skills Involved:  1. Balance  2. Strength  3. Activity Tolerance  4. Vision  5. Pain (acute) Cognitive Skills Affected (resulting in the inability to perform in a timely and safe manner):  1. Perception Psychosocial Skills Affected:  1.  Habits/Routines   Number of elements that affect the Plan of Care: 5+:  HIGH COMPLEXITY   CLINICAL DECISION MAKING:   [employer] AM-PAC 3 Clicks   Daily Activity Inpatient Short Form  How much help from another person does the patient currently need. .. Total A Lot A Little None   1. Putting on and taking off regular lower body clothing? [] 1   [x] 2   [] 3   [] 4   2. Bathing (including washing, rinsing, drying)? [] 1   [x] 2   [] 3   [] 4   3. Toileting, which includes using toilet, bedpan or urinal?   [] 1   [] 2   [x] 3   [] 4   4. Putting on and taking off regular upper body clothing? [] 1   [] 2   [x] 3   [] 4   5. Taking care of personal grooming such as brushing teeth? [] 1   [] 2   [x] 3   [] 4   6. Eating meals? [] 1   [] 2   [x] 3   [] 4   © 2007, Trustees of [address] [place] [address], under license to [employer]. All rights reserved      Score:  Initial: 16 Most Recent: X (Date: -- )    Interpretation of Tool:  Represents activities that are increasingly more difficult (i.e. Bed mobility, Transfers, Gait). Medical Necessity:     · Patient demonstrates good rehab potential due to higher previous functional level. Reason for Services/Other Comments:  · Patient continues to require skilled intervention due to decreased independence and safety with ADL/functional transfers. Use of outcome tool(s) and clinical judgement create a POC that gives a: LOW COMPLEXITY         TREATMENT:   (In addition to Assessment/Re-Assessment sessions the following treatments were rendered)     Pre-treatment Symptoms/Complaints:    Pain: Initial:   Pain Intensity 1: 5  Pain Location 1: Head  Pain Intervention(s) 1: Repositioned  Post Session:  same     Assessment/Reassessment only, no treatment provided today    Braces/Orthotics/Lines/Etc:   · O2 Device: Room air  Treatment/Session Assessment:    · Response to Treatment:  Eval only  · Interdisciplinary Collaboration:   o Physical Therapist  o Occupational Therapist  o Registered Nurse  · After treatment position/precautions:   o Working with PT   · Compliance with Program/Exercises:  Will assess as treatment progresses. · Recommendations/Intent for next treatment session: \"Next visit will focus on advancements to more challenging activities and reduction in assistance provided\".   Total Treatment Duration:  OT Patient Time In/Time Out  Time In: 1113  Time Out: 8077 NANCY Joe

## 2019-03-09 NOTE — PROGRESS NOTES
CM met with patient to discuss consult. Patient alert and oriented x4. Patient verified information no changes needed. Patient states she lives in a one level home with her family and three steps to enter into the home. Patient states she's independent with her ADL's and do have DME's in the home. Patient states she do plan on returning back home with no needs identified at this time but is willing to do whatever the MD recommend. Patient has declined [facility] services at this time. CM will revisit [facility] with patient again before d/c because patient could benefit form the service. CM will continue to follow.     Care Management Interventions  PCP Verified by CM: Yes(Joan, Reny Meth,)  Mode of Transport at Discharge: (Family)  Transition of Care Consult (CM Consult): Discharge Planning  Discharge Durable Medical Equipment: No  Physical Therapy Consult: Yes  Occupational Therapy Consult: Yes  Speech Therapy Consult: Yes  Current Support Network: Own Home(Patient children and grandchildren lives with her)  Confirm Follow Up Transport: Family  Plan discussed with Pt/Family/Caregiver: Yes  Freedom of Choice Offered: Yes  [place] Resource Information Provided?: No  Discharge Location  Discharge Placement: Home

## 2019-03-09 NOTE — H&P
Hospitalist H&P/Consult Note     Admit Date:  3/8/2019  9:36 PM   Name:  Kannan Cabrera   Age:  68 y.o.  :  1945   MRN:  [id number]   PCP:  Deena Perez NP  Treatment Team: Attending Provider: Mac Mcbride MD    HPI:   Patient is a very pleasant 69 y/o female with hx diabetes, stroke, HTN, HLD, hypercoagulable state, DVT and PE, on eliquis who presents to ED with diplopia, left eye ptosis, left temporal headache. She has a third nerve palsy. Has had headache for about a week and she is not prone to headaches. Had stopped her eliquis for about a week because of a dental procedure she had but restarted it 2 days ago. Saw her PCP a few days ago and had the headache then but not the eyelid droop. Has no pain in her eye. Initial head CT is negative. CTA \"wet read\" with stenosis of origin of left PCOM but no aneurysm. Moderate stenosis of intracavernous right ICA. She states she cannot have an MRI due to retained wires from a previous procedure. Will admit for further workup for possible CVA. 10 systems reviewed and negative except as noted in HPI.   Past Medical History:   Diagnosis Date    Allergic rhinitis     Anemia     Anxiety     Asthma     Cataracts, bilateral     Depression     Diabetes (HCC)     GERD (gastroesophageal reflux disease)     History of DVT (deep vein thrombosis)     History of pulmonary embolus (PE)     Hypercholesterolemia     Hyperlipidemia     Hypertension     Insomnia     Internal hemorrhoids without mention of complication     Osteoarthritis     Peripheral neuropathy     Personal history of colonic polyps     Rectocele     Stroke [facility])       Past Surgical History:   Procedure Laterality Date    ENDOSCOPY, COLON, DIAGNOSTIC  13    Laurent--no polyps--5 year recall    HX APPENDECTOMY      OPEN    HX  SECTION      x2    HX CHOLECYSTECTOMY      OPEN    HX ORTHOPAEDIC      BACK X3    HX PARTIAL HYSTERECTOMY        Prior to Admission Medications   Prescriptions Last Dose Informant Patient Reported? Taking? B.infantis-B.ani-B.long-B.bifi (PROBIOTIC 4X) 10-15 mg TbEC   Yes No   Sig: Take  by mouth. Blood-Glucose Meter monitoring kit   No No   Sig: Use as directed   Insulin Needles, Disposable, (NOVOFINE 30) 30 gauge x 1/3\"   No No   Sig: Pen needle to test 6 times daily with insulin   Insulin Syringe-Needle U-100 (BD INSULIN SYRINGE ULTRA-FINE) 1 mL 31 gauge x 5/16 syrg   No No   Sig: Use 6 times daily  DXE11.9   LORazepam (ATIVAN) 1 mg tablet   No No   Sig: Take 1 Tab by mouth nightly as needed for Anxiety. Max Daily Amount: 1 mg. Syringe with Needle, Disp, (Comenta TV ALLERGY SYRINGE) 1 mL 27 x 1/2\" syrg   No No   Sicc 27G x 1/2\" qod #1 box   albuterol (PROVENTIL HFA, VENTOLIN HFA, PROAIR HFA) 90 mcg/actuation inhaler   No No   Sig: Take 1 Puff by inhalation every six (6) hours as needed for Wheezing. apixaban (ELIQUIS) 5 mg tablet   No No   Sig: Take 1 Tab by mouth two (2) times a day. aspirin (ASPIRIN) 325 mg tablet   Yes No   Sig: Take 325 mg by mouth daily. atorvastatin (LIPITOR) 20 mg tablet   No No   Sig: Take 1 Tab by mouth daily. fluticasone (FLONASE) 50 mcg/actuation nasal spray   No No   Si puff in each nostril twice daily   glucose blood VI test strips (ONETOUCH VERIO) strip   No No   Sig: Check blood sugar BID Dx:E11.65   insulin aspart U-100 (NOVOLOG FLEXPEN U-100 INSULIN) 100 unit/mL inpn   No No   Si Units by SubCUTAneous route Before breakfast, lunch, dinner and at bedtime. insulin detemir U-100 (LEVEMIR FLEXTOUCH U-100 INSULN) 100 unit/mL (3 mL) inpn   No No   Si Units by SubCUTAneous route Before breakfast and dinner. levothyroxine (SYNTHROID) 25 mcg tablet   No No   Sig: Take 1 Tab by mouth Daily (before breakfast). multivitamin (ONE A DAY) tablet   Yes No   Sig: Take 1 Tab by mouth daily. omega-3 fatty acids-vitamin e (FISH OIL) 1,000 mg cap   Yes No   Sig: Take 1 Cap by mouth. Facility-Administered Medications: None     Allergies   Allergen Reactions    Codeine Nausea and Vomiting    Morphine Nausea and Vomiting      Social History     Tobacco Use    Smoking status: Never Smoker    Smokeless tobacco: Never Used   Substance Use Topics    Alcohol use: No      Family History   Problem Relation Age of Onset    Heart Disease Mother     Diabetes Mother     Heart Disease Father     Diabetes Brother       Immunization History   Administered Date(s) Administered    Influenza High Dose Vaccine PF 11/13/2018    Influenza Vaccine 09/25/2014, 10/22/2015    Influenza Vaccine (Quad) Mdck Pf 11/03/2017    Influenza Vaccine (Quad) PF 10/11/2016    Pneumococcal Conjugate (PCV-13) 01/24/2017    Pneumococcal Polysaccharide (PPSV-23) 03/04/2019    Tdap 03/04/2019       Objective:     Patient Vitals for the past 24 hrs:   Temp Pulse Resp BP SpO2   03/09/19 0111 98 °F (36.7 °C) 67 16 155/80 96 %   03/09/19 0027 -- -- -- 135/61 --   03/08/19 2258 -- -- -- 166/77 98 %   03/08/19 2157 -- 82 -- 170/84 98 %   03/08/19 2011 97.7 °F (36.5 °C) 95 16 148/78 96 %     Oxygen Therapy  O2 Sat (%): 96 % (03/09/19 0111)  Pulse via Oximetry: 72 beats per minute (03/08/19 2258)  O2 Device: Room air (03/08/19 2200)  No intake or output data in the 24 hours ending 03/09/19 0355    Physical Exam:  General:    Well nourished. Alert. Speech clear   Eyes:   Left cheek puffy. Left eyelid ptosis. No facial droop. Tender left temporal area. Tearing left eye  Diplopia when both eyes open. Anisocoria. ENT:  Normocephalic, atraumatic. Moist mucous membranes  CV:   RRR. No murmur, rub, or gallop. Lungs:  CTAB. No wheezing, rhonchi, or rales. Abdomen: Soft, nontender, nondistended. Bowel sounds normal.   Extremities: Warm and dry. No cyanosis or edema. Neurologic: CN II-XII grossly intact. Sensation intact. Skin:     No rashes or jaundice. No wounds. Psych:  Normal mood and affect.     I reviewed the labs, imaging, EKGs, telemetry, and other studies done this admission. Data Review:   Recent Results (from the past 24 hour(s))   CBC WITH AUTOMATED DIFF    Collection Time: 03/08/19  8:25 PM   Result Value Ref Range    WBC 8.2 4.3 - 11.1 K/uL    RBC 5.40 (H) 4.05 - 5.2 M/uL    HGB 13.8 11.7 - 15.4 g/dL    HCT 44.7 35.8 - 46.3 %    MCV 82.8 79.6 - 97.8 FL    MCH 25.6 (L) 26.1 - 32.9 PG    MCHC 30.9 (L) 31.4 - 35.0 g/dL    RDW 13.9 11.9 - 14.6 %    PLATELET 938 456 - 594 K/uL    MPV 9.2 (L) 9.4 - 12.3 FL    ABSOLUTE NRBC 0.00 0.0 - 0.2 K/uL    DF AUTOMATED      NEUTROPHILS 52 43 - 78 %    LYMPHOCYTES 40 13 - 44 %    MONOCYTES 6 4.0 - 12.0 %    EOSINOPHILS 1 0.5 - 7.8 %    BASOPHILS 1 0.0 - 2.0 %    IMMATURE GRANULOCYTES 1 0.0 - 5.0 %    ABS. NEUTROPHILS 4.2 1.7 - 8.2 K/UL    ABS. LYMPHOCYTES 3.3 0.5 - 4.6 K/UL    ABS. MONOCYTES 0.5 0.1 - 1.3 K/UL    ABS. EOSINOPHILS 0.1 0.0 - 0.8 K/UL    ABS. BASOPHILS 0.1 0.0 - 0.2 K/UL    ABS. IMM. GRANS. 0.0 0.0 - 0.5 K/UL   METABOLIC PANEL, COMPREHENSIVE    Collection Time: 03/08/19  8:25 PM   Result Value Ref Range    Sodium 141 136 - 145 mmol/L    Potassium 3.9 3.5 - 5.1 mmol/L    Chloride 104 98 - 107 mmol/L    CO2 31 21 - 32 mmol/L    Anion gap 6 (L) 7 - 16 mmol/L    Glucose 59 (L) 65 - 100 mg/dL    BUN 19 8 - 23 MG/DL    Creatinine 0.84 0.6 - 1.0 MG/DL    GFR est AA >60 >60 ml/min/1.73m2    GFR est non-AA >60 >60 ml/min/1.73m2    Calcium 9.2 8.3 - 10.4 MG/DL    Bilirubin, total 0.3 0.2 - 1.1 MG/DL    ALT (SGPT) 34 12 - 65 U/L    AST (SGOT) 25 15 - 37 U/L    Alk.  phosphatase 127 50 - 136 U/L    Protein, total 7.9 6.3 - 8.2 g/dL    Albumin 3.6 3.2 - 4.6 g/dL    Globulin 4.3 (H) 2.3 - 3.5 g/dL    A-G Ratio 0.8 (L) 1.2 - 3.5     GLUCOSE, POC    Collection Time: 03/08/19 10:43 PM   Result Value Ref Range    Glucose (POC) 142 (H) 65 - 100 mg/dL   URINALYSIS W/ RFLX MICROSCOPIC    Collection Time: 03/09/19 12:26 AM   Result Value Ref Range    Color YELLOW      Appearance CLEAR Specific gravity 1.015 1.001 - 1.023      pH (UA) 7.0 5.0 - 9.0      Protein NEGATIVE  NEG mg/dL    Glucose 100 mg/dL    Ketone NEGATIVE  NEG mg/dL    Bilirubin NEGATIVE  NEG      Blood NEGATIVE  NEG      Urobilinogen 0.2 0.2 - 1.0 EU/dL    Nitrites NEGATIVE  NEG      Leukocyte Esterase SMALL AMOUNT (A) NEG     URINE MICROSCOPIC    Collection Time: 03/09/19 12:26 AM   Result Value Ref Range    WBC 8 (H) 0 /hpf    RBC 2 (H) 0 /hpf    Epithelial cells 9 (H) 0 /hpf    Bacteria TRACE 0 /hpf    Casts 3 (H) 0 /lpf    Crystals, urine 0 0 /LPF   GLUCOSE, POC    Collection Time: 03/09/19  1:32 AM   Result Value Ref Range    Glucose (POC) 108 (H) 65 - 100 mg/dL       Imaging Studies:  CXR Results  (Last 48 hours)    None        CT Results  (Last 48 hours)               03/08/19 2311  CTA HEAD Preliminary result    03/08/19 2137  CT HEAD WO CONT Final result    Impression:  IMPRESSION:       No acute intracranial abnormalities. Date of Dictation: 3/8/2019 9:43 PM           Narrative:  CT HEAD WITHOUT CONTRAST        HISTORY:  Headache. COMPARISON: None       TECHNIQUE: Axial imaging was performed without intravenous contrast utilizing   5mm slice thickness. Sagittal and coronal reformats were performed. Radiation   dose reduction techniques were used for this study. Our CT scanner uses one or   all of the following:       Automated exposure control, adjustment of the MAS or KUB according to patient's   size and iterative reconstruction. FINDINGS:           *BRAIN:       -  There are no early signs of territorial or lacunar infarction by CT.      -  Symmetric supratentorial atrophy. -  For patient's age, the scattered areas of white matter hypodensities may   represent a chronic small vessel white matter ischemia. However this is   nonspecific. *VISUALIZED PARANASAL SINUSES: Well aerated. *MASTOIDS:  Clear. *CALVARIUM AND SCALP: Unremarkable.                      Assessment and Plan: Hospital Problems as of 3/9/2019 Date Reviewed: 3/4/2019          Codes Class Noted - Resolved POA    * (Principal) CVA (cerebral vascular accident) ([facility] ICD-10-CM: I63.9  ICD-9-CM: 434.91  3/8/2019 - Present Yes        Third nerve palsy ICD-10-CM: H49.00  ICD-9-CM: 378.51  3/8/2019 - Present Yes        Headache above the eye region ICD-10-CM: R51  ICD-9-CM: 784.0  3/9/2019 - Present Yes        Severe obesity with body mass index (BMI) of 35.0 to 39.9 with serious comorbidity ([facility] ICD-10-CM: E66.01  ICD-9-CM: 278.01  8/6/2018 - Present Yes        Recurrent depression ([facility] ICD-10-CM: F33.9  ICD-9-CM: 296.30  1/12/2018 - Present Yes        DM (diabetes mellitus) ([facility] ICD-10-CM: E11.9  ICD-9-CM: 250.00  11/21/2013 - Present Yes        DVT, recurrent, lower extremity, chronic ([facility] ICD-10-CM: I82.509  ICD-9-CM: 453.50  11/21/2013 - Present Yes        HTN (hypertension) ICD-10-CM: I10  ICD-9-CM: 401.9  11/21/2013 - Present Yes        Anxiety ICD-10-CM: F41.9  ICD-9-CM: 300.00  11/21/2013 - Present Yes              PLAN:  · Admit to remote telemetry with possible CVA as cause for third nerve palsy  · She had been off her eliquis for about a week prior to a dental procedure but restarted it 2 days ago. She tells me she has a hypercoagulable state  · \"wet read\" of CTA head with stenosis of origin of left PCOM but no aneurysm. Moderate stenosis of intracavernous right ICA  · She states she cannot have an MRI due to residual wires in her back from a prior procedure  · Echocardiogram in am  · Will need to optimize her glucose control, BP and lipids  · Neurology consult in am or teleneurology  · Check sed rate as she has tenderness in left temporal area  · Trial toradol x 1 IV for headache  · PT/OT and speech evaluations  · eliquis for anticoagulation.  Continue asa  · Discussed with patients daughter at bedside      Estimated LOS: 2 midnights     Signed:  Juanpablo James MD

## 2019-03-09 NOTE — ED PROVIDER NOTES
Antonella [place] is a 68 y.o. female seen on 3/8/2019 at 8:15 PM in the [facility] EMERGENCY DEPT in room Room/bed info not found. Chief Complaint   Patient presents with    Facial Droop       HPI:  52-year-old female presenting to the emergency department for left eyelid droop. She states her symptoms began about 5 days ago. She has a history of\"blood clots\" as well as 2 previous strokes and she takes aliquots. She has a significant past medical history notable forHaving stopped her eliquis in anticipation of a dental procedure/surgery. She states that she's been having left-sided headaches and that she began having weakness of her left eyelid. She was seen by her primary care doctor about 3 days ago while she was having the pain but the eyelid droop was not significantly is significant at this time. She also denies any blurred vision, pain in her eye. She has not had any drooling she has not noticed any facial asymmetry or slurred speech. No weakness in extremities. She does note that in the past when she's had strokes, she's had weakness on the left side of her face. The patient also notes that she is not able to have an MRI of the brain due to old leads from a previous device. Historian: Patient    REVIEW OF SYSTEMS     Review of Systems   Constitutional: Negative for fever. HENT: Negative. Eyes: Negative. Respiratory: Negative for cough, chest tightness, shortness of breath and wheezing. Cardiovascular: Negative for chest pain. Gastrointestinal: Negative for abdominal distention, abdominal pain, constipation, diarrhea and vomiting. Endocrine: Negative. Genitourinary: Negative for dysuria, flank pain, frequency and urgency. Neurological: Positive for headaches. Negative for dizziness and syncope. Psychiatric/Behavioral: Negative. All other systems reviewed and are negative.       PAST MEDICAL HISTORY     Past Medical History: Diagnosis Date    Allergic rhinitis     Anemia     Anxiety     Asthma     Cataracts, bilateral     Depression     Diabetes (HCC)     GERD (gastroesophageal reflux disease)     History of DVT (deep vein thrombosis)     History of pulmonary embolus (PE)     Hypercholesterolemia     Hyperlipidemia     Hypertension     Insomnia     Internal hemorrhoids without mention of complication     Osteoarthritis     Peripheral neuropathy     Personal history of colonic polyps     Rectocele     Stroke [facility])      Past Surgical History:   Procedure Laterality Date    ENDOSCOPY, COLON, DIAGNOSTIC  13    [facility]rent--no polyps--5 year recall    HX APPENDECTOMY      OPEN    HX  SECTION      x2    HX CHOLECYSTECTOMY      OPEN    HX ORTHOPAEDIC      BACK X3    HX PARTIAL HYSTERECTOMY       Social History     Socioeconomic History    Marital status:      Spouse name: Not on file    Number of children: Not on file    Years of education: Not on file    Highest education level: Not on file   Tobacco Use    Smoking status: Never Smoker    Smokeless tobacco: Never Used   Substance and Sexual Activity    Alcohol use: No    Drug use: No     Cannot display prior to admission medications because the patient has not been admitted in this contact. Allergies   Allergen Reactions    Codeine Nausea and Vomiting    Morphine Nausea and Vomiting        PHYSICAL EXAM       Vitals:    19   BP: 148/78   Pulse: 95   Resp: 16   Temp: 97.7 °F (36.5 °C)   SpO2: 96%    Vital signs were reviewed. Physical Exam   Constitutional: She is oriented to person, place, and time. She appears well-developed and well-nourished. No distress. HENT:   Head: Normocephalic and atraumatic. Eyes: Pupils are equal, round, and reactive to light.    Complete ptosis of L eyelid, tearing of left eye is present, R pupil is 2-3 cm, left pupil is 4-5mm, equal, reactive, L eye rests abducted and slightly downward, unable to gaze medially (adduction) and gaze superiorly   Neck: Normal range of motion. Neck supple. Cardiovascular: Normal rate, regular rhythm, normal heart sounds and intact distal pulses. Exam reveals no gallop and no friction rub. No murmur heard. Pulmonary/Chest: Effort normal and breath sounds normal. No stridor. No respiratory distress. She has no wheezes. Abdominal: Soft. Bowel sounds are normal. She exhibits no distension and no mass. There is no tenderness. There is no rebound and no guarding. Musculoskeletal: Normal range of motion. She exhibits no edema, tenderness or deformity. Neurological: She is alert and oriented to person, place, and time. No sensory deficit. Coordination normal.   L eye ptosis, no asymmetry of frontalis muscles, no facial asymmetry, sensation of face is equal and symmetric, equal  strength bilaterally   Skin: Skin is warm and dry. Capillary refill takes less than 2 seconds. No rash noted. She is not diaphoretic. No erythema. Psychiatric: She has a normal mood and affect. Her behavior is normal.   Vitals reviewed. MEDICAL DECISION MAKING     Differential Diagnosis: Stroke, third nerve palsy, Moraima syndrome, Myasthenia gravis    MDM  Procedures    ED Course: During exam, pt does note diplopia when her L eyelid is retracted. Pt exam consistent with 3rd nerve palsy. Will obtain CTA head (pt unable to have MRI) to evaluate for aneurysmal lesion    11:48 PM  CT wo neg for cva. CTA shows no aneurysm. Will admit to hospitalist service. Disposition:  Admit to hospitalist service. Diagnosis:  Third nerve palsy  ____________________________________________________________________  A portion of this note was generated using voice recognition dictation software. While the note has been reviewed for accuracy, please note certain words and phrasses may not be transcribed as intended that some grammatical and/or typographical errors may be present.

## 2019-03-09 NOTE — PROGRESS NOTES
Ischemic Stroke without Activase/TIA    VTE Prophylaxis: No    Antiplatelet: Yes: Eliquis    Statin if LDL Greater Than or Equal to100: Yes: Lipitor    BP Parameters: Less Than 220/120 for 24 hours, then consult MD for parameters    Controlled With: None    Dysphagia Screen Completed: Yes: Pass  Dysphagia Screening  Vocal Quality/Secretions: Normal  History of Dysphagia: No  O2 Saturation: Normal  Alertness: Normal  Pre-Swallow Assessment Score: 0  Purees: No difficulty noted  Water by Cup: No difficulty noted  Water by Straw: No difficulty noted    Patient has PEG, NG Tube, Feeding Tube: No    Medication orders per above route: Yes    Nutrition Status: PO    NIH Stroke Scale Complete: Yes: 1    Frequency of Vital Signs: Every 4 hours     Frequency of Neuro Checks: Every 4 hours    Daily Education/Care Plan Updated: Yes    Sanam Moran

## 2019-03-09 NOTE — CONSULTS
Consult    Patient: Misty Garcia MRN: [id number]     YOB: 1945  Age: 68 y.o. Sex: female      Subjective:      Misty Garcia is a 68 y.o. female who is being seen for third nerve palsy. The patient states that she has had a headache for approximately 1 week. It is left-sided. It is associated with some facial swelling. Severity is at times 5/6. Headache is described as constant but has episodes of worsening. She feels as though something is behind her eye putting pressure on her eye. It was temporarily made better with Toradol but this only lasted about 1 hour. The patient developed an acute third nerve palsy. She has drastic ptosis on the left side and her eyes down and out. This progressed over a period of about 48 hours. She now has difficulty opening her eyelids. If both eyes are open and she has double vision. No change in visual acuity. No pain with extraocular movements. No contralateral weakness. No sensory changes. No changes in speech. No involvement of other cranial nerves. She has a history of diabetes with her last A1c greater than 10.   Her pupil is normal.    Past Medical History:   Diagnosis Date    Allergic rhinitis     Anemia     Anxiety     Asthma     Cataracts, bilateral     Depression     Diabetes (HCC)     GERD (gastroesophageal reflux disease)     History of DVT (deep vein thrombosis)     History of pulmonary embolus (PE)     Hypercholesterolemia     Hyperlipidemia     Hypertension     Insomnia     Internal hemorrhoids without mention of complication     Osteoarthritis     Peripheral neuropathy     Personal history of colonic polyps     Rectocele     Stroke [facility])      Past Surgical History:   Procedure Laterality Date    ENDOSCOPY, COLON, DIAGNOSTIC  13    Laurent--no polyps--5 year recall    HX APPENDECTOMY      OPEN    HX  SECTION      x2    HX CHOLECYSTECTOMY      OPEN    HX ORTHOPAEDIC      BACK X3    HX PARTIAL HYSTERECTOMY        Family History   Problem Relation Age of Onset    Heart Disease Mother     Diabetes Mother     Heart Disease Father     Diabetes Brother      Social History     Tobacco Use    Smoking status: Never Smoker    Smokeless tobacco: Never Used   Substance Use Topics    Alcohol use: No      Current Facility-Administered Medications   Medication Dose Route Frequency Provider Last Rate Last Dose    albuterol (PROVENTIL VENTOLIN) nebulizer solution 2.5 mg  2.5 mg Inhalation Q6H PRN Juvencio Nieves MD        aspirin tablet 325 mg  325 mg Oral DAILY Juvencio Nieves MD   325 mg at 03/09/19 8351    atorvastatin (LIPITOR) tablet 20 mg  20 mg Oral DAILY Juvencio Nieves MD   20 mg at 03/09/19 0837    fluticasone (FLONASE) 50 mcg/actuation nasal spray 2 Spray  2 Spray Both Nostrils DAILY Juvencio Nieves MD   2 [place] at 03/09/19 0837    insulin lispro (HUMALOG) injection 20 Units  20 Units SubCUTAneous AC&HS Juvencio Nieves MD   20 Units at 03/09/19 0809    insulin glargine (LANTUS) injection 30 Units  30 Units SubCUTAneous ACB&D Juvenico Nieves MD   30 Units at 03/09/19 9434    levothyroxine (SYNTHROID) tablet 25 mcg  25 mcg Oral ACB Juvencio Nieves MD   25 mcg at 03/09/19 2844    LORazepam (ATIVAN) tablet 1 mg  1 mg Oral QHS PRN Juvencio Nieves MD        multivitamin, tx-iron-ca-min (THERA-M w/ IRON) tablet 1 Tab  1 Tab Oral DAILY Juvencio Nieves MD   1 Tab at 03/09/19 9633    dextrose 40% (GLUTOSE) oral gel 1 Tube  15 g Oral PRN Juvencio Nieves MD        glucagon [facility]) injection 1 mg  1 mg IntraMUSCular PRN Juvencio Nieves MD        dextrose (D50W) injection syrg 12.5-25 g  25-50 mL IntraVENous PRN Juvencio Nieves MD        insulin lispro (HUMALOG) injection   SubCUTAneous AC&HS Juvencio Nieves MD   4 Units at 03/09/19 0807    sodium chloride (NS) flush 5-40 mL  5-40 mL IntraVENous Rosa Evans MD   Stopped at 03/09/19 0600    sodium chloride (NS) flush 5-40 mL  5-40 mL IntraVENous PRN Desi Novoa MD        0.9% sodium chloride infusion  100 mL/hr IntraVENous CONTINUOUS Desi Novoa  mL/hr at 03/09/19 0200 100 mL/hr at 03/09/19 0200    ondansetron (ZOFRAN) injection 4 mg  4 mg IntraVENous Q6H PRN Desi Novoa MD        acetaminophen (TYLENOL) tablet 650 mg  650 mg Oral Q4H PRN Desi Novoa MD   650 mg at 03/09/19 5229    polyethylene glycol (MIRALAX) packet 17 g  17 g Oral DAILY Desi Novoa MD   17 g at 03/09/19 7967    apixaban (ELIQUIS) tablet 5 mg  5 mg Oral BID Desi Novoa MD   5 mg at 03/09/19 1134    sodium chloride (NS) flush 5-40 mL  5-40 mL IntraVENous Q8H Ashley Mckeon MD   5 mL at 03/08/19 2159    sodium chloride (NS) flush 5-40 mL  5-40 mL IntraVENous PRN Ashley Mckeon MD            Allergies   Allergen Reactions    Codeine Nausea and Vomiting    Morphine Nausea and Vomiting       Review of Systems:  CONSTITUTIONAL: No weight loss, fever, chills, weakness or fatigue. HEENT: Eyes: No visual loss, blurred vision, but positive double vision. Ears, Nose, Throat: No hearing loss, sneezing, congestion, runny nose or sore throat. SKIN: No rash or itching. CARDIOVASCULAR: No chest pain, chest pressure or chest discomfort. No palpitations or edema. RESPIRATORY: No shortness of breath, cough or sputum. GASTROINTESTINAL: No anorexia, nausea, vomiting or diarrhea. No abdominal pain or blood. GENITOURINARY: no burning with urination. NEUROLOGICAL: positive headache, but no dizziness, syncope, paralysis, ataxia, numbness or tingling in the extremities. No change in bowel or bladder control. MUSCULOSKELETAL: No muscle, back pain, joint pain or stiffness. HEMATOLOGIC: No anemia, bleeding or bruising. LYMPHATICS: No enlarged nodes. No history of splenectomy. PSYCHIATRIC: No history of depression or anxiety.   ENDOCRINOLOGIC: No reports of sweating, cold or heat intolerance. No polyuria or polydipsia. ALLERGIES: No history of asthma, hives, eczema or rhinitis. Objective:     Vitals:    03/09/19 0027 03/09/19 0111 03/09/19 0400 03/09/19 0800   BP: 135/61 155/80 168/79 153/88   Pulse:  67 78 79   Resp:  16 16 17   Temp:  98 °F (36.7 °C) 98.1 °F (36.7 °C) 98.3 °F (36.8 °C)   SpO2:  96% 93% 96%   Weight:       Height:            Physical Exam:  General - Well developed, well nourished, in no apparent distress. Pleasant and conversant. HEENT - Normocephalic, atraumatic. Conjunctiva, tympanic membranes, and oropharynx are clear. Neck - Supple without masses, no bruits   Cardiovascular - Regular rate and rhythm. Normal S1, S2 without murmurs, rubs, or gallops. Lungs - Clear to auscultation. Abdomen - Soft, nontender with normal bowel sounds. Extremities - Peripheral pulses intact. No edema and no rashes. Neurological examination - Comprehension, attention , memory and reasoning are intact. Language and speech are normal. On cranial nerve examination pupils are equal round and reactive to light. She has ptosis of the left eyelid. Fundoscopic examination is normal. Visual acuity is adequate. Visual fields are full to finger confrontation. Extraocular motility is limited in the left eye with upgaze and abduction, consistent with a third nerve palsy. Eyes are disconjugate at rest. Face is symmetric and sensation is intact to light touch. Hearing is intact to finger rustle bilaterally. Motor examination - There is normal muscle tone and bulk. Power is full throughout. Muscle stretch reflexes 2+ at the brachial radialis bilaterally and 0+ at the patella and Achilles. Sensation his decreased intact to light touch, pinprick, vibration and proprioception in the distal bilateral lower limbs. Sensation is normal in bilateral upper limbs. . Cerebellar examination is normal. Gait and stance not assessed.       Lab Results   Component Value Date/Time    Cholesterol, total 154 03/04/2019 11:43 AM    Cholesterol (POC) 257 05/29/2014 10:00 AM    HDL Cholesterol 43 03/04/2019 11:43 AM    HDL Cholesterol (POC) 47.7 05/29/2014 10:00 AM    LDL Cholesterol (POC) 181 05/29/2014 10:00 AM    LDL, calculated 86 03/04/2019 11:43 AM    VLDL, calculated 25 03/04/2019 11:43 AM    Triglyceride 127 03/04/2019 11:43 AM    Triglycerides (POC) 141 05/29/2014 10:00 AM        Lab Results   Component Value Date/Time    Hemoglobin A1c 10.2 (H) 03/04/2019 11:43 AM    Hemoglobin A1c (POC) 14 (A) 11/03/2017 02:13 PM            Assessment:     Third nerve palsy on the left: 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 The pupil is spared so there is little concern for compression of the third nerve such as from tumors or aneurysms. This is likely ischemia to the small vessels supplying the third nerve. A stroke is very unlikely. The motor nucleus of the third nerve is in close proximity to the cortical spinal tract so typically patients get contralateral weakness with an ipsilateral third nerve palsy (Schroeder syndrome) or involvement of other cranial nerves. Headache: This is not a migraine. Given her age, temporal arteritis will need to be ruled out. Inflammatory markers are pending. If she has a significant elevation in CRP or sedimentation rate she will likely need a temporal artery biopsy. She may need to be started on steroids; however, this will not be easy in the setting of uncontrolled diabetes, especially with a new diagnosis of a diabetic third nerve palsy. She does not have other classic symptoms of temporal arteritis such as jaw claudication or pain with palpating the temporal region; however, these signs are not overly reliable. Other considerations for headache include occipital neuralgia. She has reproducible pain with palpating the occipital nerve as it exits the occipital ridge.   If inflammatory markers are not elevated and I will perform a nerve block of the left occipital nerve.  Otherwise, hemicrania continua or other trigeminal autonomic cephalgia as can be considered. In regards to anticoagulation. I will repeat a CT scan of the head as the sensitivity is low within the first 24 hours. If CT scan of the head looks okay then resume anticoagulation at once. Plan:     As above    Signed By: Celi Kimble, DO     March 9, 2019      I spent 70 minutes with this patient, over 50% of that time was counseling the patient on his or her medical condition/neurological condition.

## 2019-03-09 NOTE — PROGRESS NOTES
Problem: Mobility Impaired (Adult and Pediatric)  Goal: *Acute Goals and Plan of Care (Insert Text)    LTG:  (1.)Ms. Sage will move from supine to sit and sit to supine , scoot up and down and roll side to side in bed with STAND BY ASSIST within 7 treatment day(s). (2.)Ms. Sage will transfer from bed to chair and chair to bed with SUPERVISION using the least restrictive device within 7 treatment day(s). (3.)Ms. Sage will ambulate with SUPERVISION for 150+ feet with the least restrictive device within 7 treatment day(s). ________________________________________________________________________________________________      PHYSICAL THERAPY: Initial Assessment and AM 3/9/2019  INPATIENT:    [place] / [place] / Product Type: Medicare /       NAME/AGE/GENDER: Francine Diehl is a 68 y.o. female   PRIMARY DIAGNOSIS: CVA (cerebral vascular accident) ([facility] [address] [I63.9]  Third nerve palsy [H49.00] CVA (cerebral vascular accident) ([facility] [address] CVA (cerebral vascular accident) ([facility] [address]       ICD-10: Treatment Diagnosis:    · Generalized Muscle Weakness (M62.81)  · Difficulty in walking, Not elsewhere classified (R26.2)   Precaution/Allergies:  Codeine and Morphine      ASSESSMENT:     Ms. Dada Cervantes presents sitting at EOB at arrival finishing with OT. She has L eye lid closed leaving vision thru R eye only. Her SPC she uses she reports is too tall because she likes it that way. She also states deficits with balance. Romberg stance indicates weak balance post/L. She is able to ambulate and when she uses her cane she also furniture walks. She used RW for 40ft and initially she was uncomfortable, but adjusted with progress. She has rollator and it was suggested that she is safer using RW she might want to use rollator rather than SPC to improve safety. Her impairments include decreased functional mobility, decreased balance, decreased strength, decreased safety awareness, increased risk for falls.  Pt would benefit from further PT while in house to address these impairments to help improve to prior level of independence. This section established at most recent assessment   PROBLEM LIST (Impairments causing functional limitations):  1. Decreased Strength  2. Decreased ADL/Functional Activities  3. Decreased Transfer Abilities  4. Decreased Ambulation Ability/Technique  5. Decreased Balance  6. Decreased Activity Tolerance  7. Decreased Pacing Skills  8. decreased vsion   INTERVENTIONS PLANNED: (Benefits and precautions of physical therapy have been discussed with the patient.)  1. Balance Exercise  2. Bed Mobility  3. Gait Training  4. Neuromuscular Re-education/Strengthening  5. Range of Motion (ROM)  6. Therapeutic Activites  7. Therapeutic Exercise/Strengthening  8. Transfer Training     TREATMENT PLAN: Frequency/Duration: 3 times a week for duration of hospital stay  Rehabilitation Potential For Stated Goals: Good     RECOMMENDED REHABILITATION/EQUIPMENT: (at time of discharge pending progress): Due to the probability of continued deficits (see above) this patient will likely need continued skilled physical therapy after discharge. Equipment:    None at this time              HISTORY:   History of Present Injury/Illness (Reason for Referral):  68 y.o. female who is being seen for third nerve palsy.     The patient states that she has had a headache for approximately 1 week. It is left-sided. It is associated with some facial swelling. Severity is at times 5/6. Headache is described as constant but has episodes of worsening. She feels as though something is behind her eye putting pressure on her eye. It was temporarily made better with Toradol but this only lasted about 1 hour.     The patient developed an acute third nerve palsy. She has drastic ptosis on the left side and her eyes down and out. This progressed over a period of about 48 hours. She now has difficulty opening her eyelids.   If both eyes are open and she has double vision. No change in visual acuity. No pain with extraocular movements. No contralateral weakness. No sensory changes. No changes in speech. No involvement of other cranial nerves. She has a history of diabetes with her last A1c greater than 10      Past Medical History/Comorbidities:   Ms. Yelitza Belcher  has a past medical history of Allergic rhinitis, Anemia, Anxiety, Asthma, Cataracts, bilateral, Depression, Diabetes ([address] GERD (gastroesophageal reflux disease), History of DVT (deep vein thrombosis), History of pulmonary embolus (PE), Hypercholesterolemia, Hyperlipidemia, Hypertension, Insomnia, Internal hemorrhoids without mention of complication, Osteoarthritis, Peripheral neuropathy, Personal history of colonic polyps, Rectocele, and Stroke ([address] Ms. Yelitza Belcher  has a past surgical history that includes hx orthopaedic; hx  section; hx appendectomy; hx cholecystectomy; hx partial hysterectomy; and endoscopy, colon, diagnostic (13). Social History/Living Environment:   Home Environment: Private residence  # Steps to Enter: 3  One/Two Story Residence: One story  Living Alone: Yes  Support Systems: Child(kanchan)  Patient Expects to be Discharged to[de-identified] Private residence  Current DME Used/Available at Home: Bisi Hedges, straight, Walker, rollator  Tub or Shower Type: Shower  Prior Level of Function/Work/Activity:  Pt lives with multiple family members and 3 small dogs. She mostly uses tall SPC, but has rollator she rarely uses. She does not drive.  ADLs are IND     Number of Personal Factors/Comorbidities that affect the Plan of Care: 1-2: MODERATE COMPLEXITY   EXAMINATION:   Most Recent Physical Functioning:   Gross Assessment:  AROM: Generally decreased, functional  Strength: Generally decreased, functional  Coordination: Within functional limits  Tone: Normal  Sensation: Impaired               Posture:     Balance:  Sitting: Intact  Standing: Impaired  Standing - Static: Fair  Standing - Dynamic : Fair Bed Mobility:     Wheelchair Mobility:     Transfers:  Sit to Stand: Contact guard assistance  Stand to Sit: Contact guard assistance  Gait:     Distance (ft): 40 Feet (ft)  Assistive Device: Walker, rolling  Ambulation - Level of Assistance: Minimal assistance      Body Structures Involved:  1. Nerves  2. Eyes and Ears  3. Muscles Body Functions Affected:  1. Mental  2. Sensory/Pain  3. Voice and Speech  4. Neuromusculoskeletal  5. Movement Related Activities and Participation Affected:  1. General Tasks and Demands  2. Mobility  3. Self Care  4. Domestic Life  5. Community, Social and [place]   Number of elements that affect the Plan of Care: 3: MODERATE COMPLEXITY   CLINICAL PRESENTATION:   Presentation: Evolving clinical presentation with changing clinical characteristics: MODERATE COMPLEXITY   CLINICAL DECISION MAKIN [facility] Inpatient Short Form  How much difficulty does the patient currently have. .. Unable A Lot A Little None   1. Turning over in bed (including adjusting bedclothes, sheets and blankets)? [] 1   [] 2   [x] 3   [] 4   2. Sitting down on and standing up from a chair with arms ( e.g., wheelchair, bedside commode, etc.)   [] 1   [] 2   [x] 3   [] 4   3. Moving from lying on back to sitting on the side of the bed? [] 1   [] 2   [x] 3   [] 4   How much help from another person does the patient currently need. .. Total A Lot A Little None   4. Moving to and from a bed to a chair (including a wheelchair)? [] 1   [] 2   [x] 3   [] 4   5. Need to walk in hospital room? [] 1   [] 2   [x] 3   [] 4   6. Climbing 3-5 steps with a railing? [] 1   [] 2   [x] 3   [] 4   © , Trustees of [facility] [employer], under license to [employer].  All rights reserved      Score:  Initial: 18 Most Recent: X (Date: -- )    Interpretation of Tool:  Represents activities that are increasingly more difficult (i.e. Bed mobility, Transfers, Gait). Medical Necessity:     · Skilled intervention continues to be required due to decreased function. Reason for Services/Other Comments:  · Patient continues to require skilled intervention due to medical complications and patient unable to attend/participate in therapy as expected. Use of outcome tool(s) and clinical judgement create a POC that gives a: Questionable prediction of patient's progress: MODERATE COMPLEXITY            TREATMENT:   (In addition to Assessment/Re-Assessment sessions the following treatments were rendered)   Pre-treatment Symptoms/Complaints:  none  Pain: Initial:   Pain Intensity 1: 0  Post Session:  0     Assessment/Reassessment only, no treatment provided today    Braces/Orthotics/Lines/Etc:   · IV  · O2 Device: Room air  Treatment/Session Assessment:    · Response to Treatment:  See above  · Interdisciplinary Collaboration:   o Physical Therapist  o Registered Nurse  · After treatment position/precautions:   o Up in chair  o Call light within reach  o RN notified   · Compliance with Program/Exercises: Will assess as treatment progresses  · Recommendations/Intent for next treatment session: \"Next visit will focus on advancements to more challenging activities and reduction in assistance provided\".   Total Treatment Duration:  PT Patient Time In/Time Out  Time In: 1130  Time Out: [facility]

## 2019-03-09 NOTE — PROGRESS NOTES
03/09/19 0118   Dual Skin Pressure Injury Assessment   Dual Skin Pressure Injury Assessment WDL   Second Care Provider (Based on [address] [place] [address]) Bam, RN   Skin Integumentary   Skin Integumentary (WDL) X   Skin Color Flushed  (L side of face)   Skin Condition/Temp Warm;Dry   Skin Integrity Scars (comment)  (abdomen)   Turgor Non-tenting     Dual skin assessment performed by primary RN and BAM RN. Scars noted to abdomen. Heels slightly boggy. Redness to left side of face. No other skin abnormalities noted.

## 2019-03-09 NOTE — PROGRESS NOTES
Problem: Dysphagia (Adult)  Goal: *Acute Goals and Plan of Care (Insert Text)  Goals    None         Outcome: Resolved/Met Date Met: 03/09/19  Speech language pathology: bedside swallow note: Initial Assessment and Discharge    NAME/AGE/GENDER: Elda Rubalcava is a 68 y.o. female  DATE: 3/9/2019  PRIMARY DIAGNOSIS: CVA (cerebral vascular accident) (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 Umm Garcia presents with speech and swallow function at baseline. Mildly slowed mastication due to pain on left side of face. Speech WNL. Recommend continue regular diet/thin liquids, medication whole with thin liquids. No further acute skilled speech therapy indicated at this time. Re-consult as indicated. ?????? ? ? This section established at most recent assessment??????????  PROBLEM LIST (Impairments causing functional limitations):  1. ?CVA    PLAN OF CARE:   Patient will benefit from skilled intervention to address the following impairments. RECOMMENDATIONS AND PLANNED INTERVENTIONS (Benefits and precautions of therapy have been discussed with the patient.):  · continue prescribed diet  ·    MEDICATIONS:  · With liquid  ASPIRATION PRECAUTIONS:  · Slow rate of intake  · Small bites/sips  · Upright at 90 degrees during meal  COMPENSATORY STRATEGIES/MODIFICATIONS INCLUDING:  · Alternate liquids/solids  OTHER RECOMMENDATIONS (including follow up treatment recommendations):   · Patient education  RECOMMENDED DIET MODIFICATIONS DISCUSSED WITH:  · Nursing  · Patient  FREQUENCY/DURATION: No further speech therapy indicated at this time as oropharyngeal swallow function is within normal limits. RECOMMENDED REHABILITATION/EQUIPMENT: (at time of discharge pending progress): Due to the probability of continued deficits (see above) this patient will not likely need continued skilled speech therapy after discharge. SUBJECTIVE:   \"If I can get this headache to go away I will be ok\"  History of Present Injury/Illness: Ms. Sunday Larkin  has a past medical history of Allergic rhinitis, Anemia, Anxiety, Asthma, Cataracts, bilateral, Depression, Diabetes ([address] GERD (gastroesophageal reflux disease), History of DVT (deep vein thrombosis), History of pulmonary embolus (PE), Hypercholesterolemia, Hyperlipidemia, Hypertension, Insomnia, Internal hemorrhoids without mention of complication, Osteoarthritis, Peripheral neuropathy, Personal history of colonic polyps, Rectocele, and Stroke ([address] .  She also  has a past surgical history that includes hx orthopaedic; hx  section; hx appendectomy; hx cholecystectomy; hx partial hysterectomy; and endoscopy, colon, diagnostic (13). Present Symptoms:    Pain Scale 1: Numeric (0 - 10)  Pain Intensity 1: 6  Pain Location 1: Head  Pain Intervention(s) 1: Nurse notified  Current Medications:   No current facility-administered medications on file prior to encounter. Current Outpatient Medications on File Prior to Encounter   Medication Sig Dispense Refill    levothyroxine (SYNTHROID) 25 mcg tablet Take 1 Tab by mouth Daily (before breakfast). 90 Tab 1    insulin detemir U-100 (LEVEMIR FLEXTOUCH U-100 INSULN) 100 unit/mL (3 mL) inpn 30 Units by SubCUTAneous route Before breakfast and dinner. 18 Adjustable Dose Pre-filled Pen Syringe 0    insulin aspart U-100 (NOVOLOG FLEXPEN U-100 INSULIN) 100 unit/mL inpn 20 Units by SubCUTAneous route Before breakfast, lunch, dinner and at bedtime. 24 Adjustable Dose Pre-filled Pen Syringe 1    fluticasone (FLONASE) 50 mcg/actuation nasal spray 1 puff in each nostril twice daily 1 Bottle 4    atorvastatin (LIPITOR) 20 mg tablet Take 1 Tab by mouth daily. 90 Tab 3    apixaban (ELIQUIS) 5 mg tablet Take 1 Tab by mouth two (2) times a day.  180 Tab 1    LORazepam (ATIVAN) 1 mg tablet Take 1 Tab by mouth nightly as needed for Anxiety. Max Daily Amount: 1 mg. 30 Tab 2    albuterol (PROVENTIL HFA, VENTOLIN HFA, PROAIR HFA) 90 mcg/actuation inhaler Take 1 Puff by inhalation every six (6) hours as needed for Wheezing. 1 Inhaler 1    Insulin Needles, Disposable, (NOVOFINE 30) 30 gauge x 1/3\" Pen needle to test 6 times daily with insulin 1 Package 11    glucose blood VI test strips (ONETOUCH VERIO) strip Check blood sugar BID Dx:E11.65 200 Strip 6    Blood-Glucose Meter monitoring kit Use as directed 1 Kit 0    Insulin Syringe-Needle U-100 (BD INSULIN SYRINGE ULTRA-FINE) 1 mL 31 gauge x 5/16 syrg Use 6 times daily  DXE11.9 200 Syringe 5    Syringe with Needle, Disp, ([employer] ALLERGY SYRINGE) 1 mL 27 x 1/2\" syrg 1cc 27G x 1/2\" qod #1 box 100 Each 3    multivitamin (ONE A DAY) tablet Take 1 Tab by mouth daily.  aspirin (ASPIRIN) 325 mg tablet Take 325 mg by mouth daily.  B.infantis-B.ani-B.long-B.bifi (PROBIOTIC 4X) 10-15 mg TbEC Take  by mouth.  omega-3 fatty acids-vitamin e (FISH OIL) 1,000 mg cap Take 1 Cap by mouth.        Current Dietary Status:     Regular/thin  History of reflux:  NO    Reflux medication:n/a  Social History/Home Situation: n/a      OBJECTIVE:   Respiratory Status:  Room air     CXR Results:n/a  CT Results:no acute intracranial abnormality  Oral Motor Structure/Speech:  Oral-Motor Structure/Motor Speech  Labial: Decreased seal(on left from swelling)  Dentition: Intact  Oral Hygiene: good  Lingual: No impairment    Cognitive and Communication Status:  Neurologic State: Alert  Orientation Level: Oriented X4  Cognition: Follows commands  Perception: Appears intact  Perseveration: No perseveration noted  Safety/Judgement: Awareness of environment    BEDSIDE SWALLOW EVALUATION  Oral Assessment:  Oral Assessment  Labial: Decreased seal(on left from swelling)  Dentition: Intact  Oral Hygiene: good  Lingual: No impairment  P.O. Trials:  Patient Position: upright in bed    The patient was given bite/sip amounts of the following:   Consistency Presented: All consistencies; Thin liquid; Solid;Puree;Mixed consistency; Ice chips  How Presented: Self-fed/presented;SLP-fed/presented;Cup/sip;Spoon;Straw;Successive swallows    ORAL PHASE:  Bolus Acceptance: No impairment  Bolus Formation/Control: No impairment  Propulsion: No impairment     Oral Residue: None    PHARYNGEAL PHASE:  Initiation of Swallow: No impairment  Laryngeal Elevation: Functional  Aspiration Signs/Symptoms: None  Vocal Quality: No impairment     Effective Modifications: Alternate liquids/solids     Pharyngeal Phase Characteristics: No impairment, issues, or problems     OTHER OBSERVATIONS:  Rate/bite size: WNL   Endurance: WNL   Comments: Tool Used: Dysphagia Outcome and Severity Scale (NENITA)    Score Comments   Normal Diet  [] 7 With no strategies or extra time needed   Functional Swallow  [x] 6 May have mild oral or pharyngeal delay       Mild Dysphagia    [] 5 Which may require one diet consistency restricted (those who demonstrate penetration which is entirely cleared on MBS would be included)   Mild-Moderate Dysphagia  [] 4 With 1-2 diet consistencies restricted       Moderate Dysphagia  [] 3 With 2 or more diet consistencies restricted       Moderately Severe Dysphagia  [] 2 With partial PO strategies (trials with ST only)       Severe Dysphagia  [] 1 With inability to tolerate any PO safely          Score:  Initial: 6 Most Recent: X (Date: --)   Interpretation of Tool: The Dysphagia Outcome and Severity Scale (NENITA) is a simple, easy-to-use, 7-point scale developed to systematically rate the functional severity of dysphagia based on objective assessment and make recommendations for diet level, independence level, and type of nutrition.        [place] / [place] / Product Type: Medicare /     TREATMENT:    (In addition to Assessment/Re-Assessment sessions the following treatments were rendered)  Assessment/Reassessment only, no treatment provided today  MODALITIES:                                                                    ORAL MOTOR  EXERCISES:                                                                                                                                                                      LARYNGEAL / PHARYNGEAL EXERCISES:                                                                                                                                     __________________________________________________________________________________________________  Safety:   After treatment position/precautions:  · Call light within reach  · RN notified  · Upright in Bed  Treatment Assessment:  Patient actively participated in evaluation. Progression/Medical Necessity:   · discontinue  Compliance with Program/Exercises: Compliant all of the time, Will assess as treatment progresses  Reason for Continuation of Services/Other Comments:  · discontinue  Recommendations/Intent for next treatment session: Discontinue  Total Treatment Duration:  Time In: 0719  Time Out: Saurabh Patiño 1428.  MS Marcelino, CCC-SLP

## 2019-03-10 ENCOUNTER — APPOINTMENT (OUTPATIENT)
Dept: GENERAL RADIOLOGY | Age: 74
DRG: 066 | End: 2019-03-10
Payer: MEDICARE

## 2019-03-10 LAB
CRP SERPL HS-MCNC: 1.8 MG/L
ERYTHROCYTE [SEDIMENTATION RATE] IN BLOOD: 30 MM/HR (ref 0–30)
GLUCOSE BLD STRIP.AUTO-MCNC: 126 MG/DL (ref 65–100)
GLUCOSE BLD STRIP.AUTO-MCNC: 165 MG/DL (ref 65–100)
GLUCOSE BLD STRIP.AUTO-MCNC: 216 MG/DL (ref 65–100)
GLUCOSE BLD STRIP.AUTO-MCNC: 272 MG/DL (ref 65–100)
GLUCOSE BLD STRIP.AUTO-MCNC: 97 MG/DL (ref 65–100)
MAGNESIUM SERPL-MCNC: 2.2 MG/DL (ref 1.8–2.4)
PHOSPHATE SERPL-MCNC: 2.4 MG/DL (ref 2.3–3.7)
T4 FREE SERPL-MCNC: 1.1 NG/DL (ref 0.78–1.46)
TSH SERPL DL<=0.005 MIU/L-ACNC: 2.6 UIU/ML (ref 0.36–3.74)

## 2019-03-10 PROCEDURE — 84100 ASSAY OF PHOSPHORUS: CPT

## 2019-03-10 PROCEDURE — 74011636637 HC RX REV CODE- 636/637: Performed by: HOSPITALIST

## 2019-03-10 PROCEDURE — 74018 RADEX ABDOMEN 1 VIEW: CPT

## 2019-03-10 PROCEDURE — 84439 ASSAY OF FREE THYROXINE: CPT

## 2019-03-10 PROCEDURE — 87086 URINE CULTURE/COLONY COUNT: CPT

## 2019-03-10 PROCEDURE — 77030020263 HC SOL INJ SOD CL0.9% LFCR 1000ML

## 2019-03-10 PROCEDURE — 74011250637 HC RX REV CODE- 250/637: Performed by: INTERNAL MEDICINE

## 2019-03-10 PROCEDURE — 36415 COLL VENOUS BLD VENIPUNCTURE: CPT

## 2019-03-10 PROCEDURE — 74011250636 HC RX REV CODE- 250/636: Performed by: INTERNAL MEDICINE

## 2019-03-10 PROCEDURE — 84443 ASSAY THYROID STIM HORMONE: CPT

## 2019-03-10 PROCEDURE — 86141 C-REACTIVE PROTEIN HS: CPT

## 2019-03-10 PROCEDURE — 82962 GLUCOSE BLOOD TEST: CPT

## 2019-03-10 PROCEDURE — 74011250637 HC RX REV CODE- 250/637: Performed by: HOSPITALIST

## 2019-03-10 PROCEDURE — 85652 RBC SED RATE AUTOMATED: CPT

## 2019-03-10 PROCEDURE — 74011000250 HC RX REV CODE- 250: Performed by: NURSE PRACTITIONER

## 2019-03-10 PROCEDURE — 74011636637 HC RX REV CODE- 636/637: Performed by: NURSE PRACTITIONER

## 2019-03-10 PROCEDURE — 74011250636 HC RX REV CODE- 250/636: Performed by: HOSPITALIST

## 2019-03-10 PROCEDURE — 83735 ASSAY OF MAGNESIUM: CPT

## 2019-03-10 PROCEDURE — 74011250637 HC RX REV CODE- 250/637: Performed by: NURSE PRACTITIONER

## 2019-03-10 PROCEDURE — 99218 HC RM OBSERVATION: CPT

## 2019-03-10 PROCEDURE — 74011250636 HC RX REV CODE- 250/636: Performed by: NURSE PRACTITIONER

## 2019-03-10 RX ORDER — DIPHENHYDRAMINE HCL 25 MG
25 CAPSULE ORAL
Status: DISCONTINUED | OUTPATIENT
Start: 2019-03-10 | End: 2019-03-12 | Stop reason: HOSPADM

## 2019-03-10 RX ORDER — POLYETHYLENE GLYCOL 3350 17 G/17G
17 POWDER, FOR SOLUTION ORAL DAILY
Status: DISCONTINUED | OUTPATIENT
Start: 2019-03-11 | End: 2019-03-12 | Stop reason: HOSPADM

## 2019-03-10 RX ORDER — INSULIN LISPRO 100 [IU]/ML
25 INJECTION, SOLUTION INTRAVENOUS; SUBCUTANEOUS
Status: DISCONTINUED | OUTPATIENT
Start: 2019-03-10 | End: 2019-03-11

## 2019-03-10 RX ORDER — MAGNESIUM CITRATE
296 SOLUTION, ORAL ORAL
Status: DISCONTINUED | OUTPATIENT
Start: 2019-03-10 | End: 2019-03-12 | Stop reason: HOSPADM

## 2019-03-10 RX ORDER — FACIAL-BODY WIPES
10 EACH TOPICAL DAILY PRN
Status: DISCONTINUED | OUTPATIENT
Start: 2019-03-10 | End: 2019-03-12 | Stop reason: HOSPADM

## 2019-03-10 RX ORDER — LORAZEPAM 2 MG/ML
1 INJECTION INTRAMUSCULAR
Status: DISCONTINUED | OUTPATIENT
Start: 2019-03-10 | End: 2019-03-12 | Stop reason: HOSPADM

## 2019-03-10 RX ORDER — SODIUM CHLORIDE 9 MG/ML
100 INJECTION, SOLUTION INTRAVENOUS CONTINUOUS
Status: DISCONTINUED | OUTPATIENT
Start: 2019-03-10 | End: 2019-03-12 | Stop reason: HOSPADM

## 2019-03-10 RX ORDER — HYDROMORPHONE HYDROCHLORIDE 1 MG/ML
0.5 INJECTION, SOLUTION INTRAMUSCULAR; INTRAVENOUS; SUBCUTANEOUS
Status: DISCONTINUED | OUTPATIENT
Start: 2019-03-10 | End: 2019-03-12 | Stop reason: HOSPADM

## 2019-03-10 RX ORDER — INSULIN GLARGINE 100 [IU]/ML
32 INJECTION, SOLUTION SUBCUTANEOUS
Status: DISCONTINUED | OUTPATIENT
Start: 2019-03-10 | End: 2019-03-12 | Stop reason: HOSPADM

## 2019-03-10 RX ORDER — ATORVASTATIN CALCIUM 40 MG/1
80 TABLET, FILM COATED ORAL DAILY
Status: DISCONTINUED | OUTPATIENT
Start: 2019-03-10 | End: 2019-03-11

## 2019-03-10 RX ORDER — GUAIFENESIN 100 MG/5ML
81 LIQUID (ML) ORAL DAILY
Status: DISCONTINUED | OUTPATIENT
Start: 2019-03-10 | End: 2019-03-12 | Stop reason: HOSPADM

## 2019-03-10 RX ORDER — HYDRALAZINE HYDROCHLORIDE 20 MG/ML
20 INJECTION INTRAMUSCULAR; INTRAVENOUS ONCE
Status: COMPLETED | OUTPATIENT
Start: 2019-03-10 | End: 2019-03-10

## 2019-03-10 RX ORDER — LORAZEPAM 1 MG/1
1 TABLET ORAL
Status: DISCONTINUED | OUTPATIENT
Start: 2019-03-10 | End: 2019-03-12 | Stop reason: HOSPADM

## 2019-03-10 RX ORDER — KETOROLAC TROMETHAMINE 30 MG/ML
15 INJECTION, SOLUTION INTRAMUSCULAR; INTRAVENOUS
Status: DISCONTINUED | OUTPATIENT
Start: 2019-03-10 | End: 2019-03-12 | Stop reason: HOSPADM

## 2019-03-10 RX ADMIN — POLYETHYLENE GLYCOL 3350 17 G: 17 POWDER, FOR SOLUTION ORAL at 08:08

## 2019-03-10 RX ADMIN — LORAZEPAM 1 MG: 1 TABLET ORAL at 03:38

## 2019-03-10 RX ADMIN — INSULIN GLARGINE 32 UNITS: 100 INJECTION, SOLUTION SUBCUTANEOUS at 18:01

## 2019-03-10 RX ADMIN — SODIUM CHLORIDE 100 ML/HR: 900 INJECTION, SOLUTION INTRAVENOUS at 22:05

## 2019-03-10 RX ADMIN — ACETAMINOPHEN 650 MG: 325 TABLET, FILM COATED ORAL at 08:08

## 2019-03-10 RX ADMIN — Medication 5 ML: at 15:53

## 2019-03-10 RX ADMIN — LEVOTHYROXINE SODIUM 25 MCG: 50 TABLET ORAL at 05:57

## 2019-03-10 RX ADMIN — ATORVASTATIN CALCIUM 20 MG: 10 TABLET, FILM COATED ORAL at 08:08

## 2019-03-10 RX ADMIN — Medication 5 ML: at 22:39

## 2019-03-10 RX ADMIN — ACETAMINOPHEN 650 MG: 325 TABLET, FILM COATED ORAL at 12:33

## 2019-03-10 RX ADMIN — ACETAMINOPHEN 650 MG: 325 TABLET, FILM COATED ORAL at 18:01

## 2019-03-10 RX ADMIN — Medication 10 ML: at 05:57

## 2019-03-10 RX ADMIN — ONDANSETRON 4 MG: 2 INJECTION INTRAMUSCULAR; INTRAVENOUS at 03:15

## 2019-03-10 RX ADMIN — HYDROMORPHONE HYDROCHLORIDE 0.5 MG: 1 INJECTION, SOLUTION INTRAMUSCULAR; INTRAVENOUS; SUBCUTANEOUS at 12:33

## 2019-03-10 RX ADMIN — ACETAMINOPHEN 650 MG: 325 TABLET, FILM COATED ORAL at 03:38

## 2019-03-10 RX ADMIN — ASPIRIN 325 MG ORAL TABLET 325 MG: 325 PILL ORAL at 08:08

## 2019-03-10 RX ADMIN — INSULIN GLARGINE 30 UNITS: 100 INJECTION, SOLUTION SUBCUTANEOUS at 08:00

## 2019-03-10 RX ADMIN — TRAMADOL HYDROCHLORIDE 50 MG: 50 TABLET, FILM COATED ORAL at 15:52

## 2019-03-10 RX ADMIN — DIPHENHYDRAMINE HYDROCHLORIDE 25 MG: 25 CAPSULE ORAL at 00:47

## 2019-03-10 RX ADMIN — OXYCODONE HYDROCHLORIDE 5 MG: 5 TABLET ORAL at 22:04

## 2019-03-10 RX ADMIN — INSULIN LISPRO 6 UNITS: 100 INJECTION, SOLUTION INTRAVENOUS; SUBCUTANEOUS at 07:56

## 2019-03-10 RX ADMIN — INSULIN LISPRO 25 UNITS: 100 INJECTION, SOLUTION INTRAVENOUS; SUBCUTANEOUS at 12:00

## 2019-03-10 RX ADMIN — SODIUM CHLORIDE 100 ML/HR: 900 INJECTION, SOLUTION INTRAVENOUS at 12:33

## 2019-03-10 RX ADMIN — ACETAMINOPHEN 650 MG: 325 TABLET, FILM COATED ORAL at 22:04

## 2019-03-10 RX ADMIN — INSULIN LISPRO 20 UNITS: 100 INJECTION, SOLUTION INTRAVENOUS; SUBCUTANEOUS at 07:56

## 2019-03-10 RX ADMIN — APIXABAN 5 MG: 5 TABLET, FILM COATED ORAL at 08:08

## 2019-03-10 RX ADMIN — LORAZEPAM 1 MG: 1 TABLET ORAL at 15:52

## 2019-03-10 RX ADMIN — APIXABAN 5 MG: 5 TABLET, FILM COATED ORAL at 18:01

## 2019-03-10 RX ADMIN — PROMETHAZINE HYDROCHLORIDE 12.5 MG: 25 INJECTION INTRAMUSCULAR; INTRAVENOUS at 22:39

## 2019-03-10 RX ADMIN — ONDANSETRON 4 MG: 2 INJECTION INTRAMUSCULAR; INTRAVENOUS at 11:01

## 2019-03-10 RX ADMIN — ONDANSETRON 4 MG: 2 INJECTION INTRAMUSCULAR; INTRAVENOUS at 15:53

## 2019-03-10 RX ADMIN — MULTIPLE VITAMINS W/ MINERALS TAB 1 TABLET: TAB at 08:08

## 2019-03-10 RX ADMIN — FLUTICASONE PROPIONATE 2 SPRAY: 50 SPRAY, METERED NASAL at 11:03

## 2019-03-10 RX ADMIN — LORAZEPAM 1 MG: 1 TABLET ORAL at 22:04

## 2019-03-10 RX ADMIN — INSULIN LISPRO 6 UNITS: 100 INJECTION, SOLUTION INTRAVENOUS; SUBCUTANEOUS at 12:00

## 2019-03-10 RX ADMIN — HYDRALAZINE HYDROCHLORIDE 20 MG: 20 INJECTION INTRAMUSCULAR; INTRAVENOUS at 03:08

## 2019-03-10 NOTE — PROGRESS NOTES
Problem: Falls - Risk of  Goal: *Absence of Falls  Document Howie Fall Risk and appropriate interventions in the flowsheet.   Outcome: Progressing Towards Goal  Fall Risk Interventions:  Mobility Interventions: Communicate number of staff needed for ambulation/transfer, OT consult for ADLs, Patient to call before getting OOB, PT Consult for mobility concerns         Medication Interventions: Bed/chair exit alarm, Patient to call before getting OOB, Teach patient to arise slowly    Elimination Interventions: Bed/chair exit alarm, Call light in reach, Patient to call for help with toileting needs, Toilet paper/wipes in reach, Toileting schedule/hourly rounds

## 2019-03-10 NOTE — PROGRESS NOTES
Notified Dr Rudy Hernandez that pt bp is 191/93. Orders for hydralazine. Also notified that pt is having feelings of impending doom and heaviness on chest. Orders to change PRN bedtime ativan to q4 hrs as needed. Will continue to monitor.

## 2019-03-10 NOTE — PROGRESS NOTES
Ischemic Stroke without Activase/TIA    VTE Prophylaxis: Yes: lovenox    Antiplatelet: Yes: elquis    Statin if LDL Greater Than or Equal to100: Yes: lipitor    BP Parameters: Less Than 220/120 for 24 hours, then consult MD for parameters    Controlled With: PRN - PO    Dysphagia Screen Completed: Yes: Pass  Dysphagia Screening  Vocal Quality/Secretions: Normal  History of Dysphagia: No  O2 Saturation: Normal  Alertness: Normal  Pre-Swallow Assessment Score: 0  Purees: No difficulty noted  Water by Cup: No difficulty noted  Water by Straw: No difficulty noted    Patient has PEG, NG Tube, Feeding Tube: No    Medication orders per above route: Yes    Nutrition Status: PO    NIH Stroke Scale Complete: Yes: done    Frequency of Vital Signs: Every 4 hours     Frequency of Neuro Checks: Every 4 hours    Daily Education/Care Plan Updated: No    Ronda Vargas RN

## 2019-03-11 LAB
GLUCOSE BLD STRIP.AUTO-MCNC: 143 MG/DL (ref 65–100)
GLUCOSE BLD STRIP.AUTO-MCNC: 79 MG/DL (ref 65–100)
GLUCOSE BLD STRIP.AUTO-MCNC: 85 MG/DL (ref 65–100)
GLUCOSE BLD STRIP.AUTO-MCNC: 97 MG/DL (ref 65–100)

## 2019-03-11 PROCEDURE — 65660000000 HC RM CCU STEPDOWN

## 2019-03-11 PROCEDURE — 74011250637 HC RX REV CODE- 250/637: Performed by: NURSE PRACTITIONER

## 2019-03-11 PROCEDURE — 74011250637 HC RX REV CODE- 250/637: Performed by: HOSPITALIST

## 2019-03-11 PROCEDURE — 99218 HC RM OBSERVATION: CPT

## 2019-03-11 PROCEDURE — 74011250637 HC RX REV CODE- 250/637: Performed by: PSYCHIATRY & NEUROLOGY

## 2019-03-11 PROCEDURE — 82962 GLUCOSE BLOOD TEST: CPT

## 2019-03-11 PROCEDURE — 74011636637 HC RX REV CODE- 636/637: Performed by: NURSE PRACTITIONER

## 2019-03-11 PROCEDURE — 74011250637 HC RX REV CODE- 250/637: Performed by: INTERNAL MEDICINE

## 2019-03-11 PROCEDURE — 74011250636 HC RX REV CODE- 250/636: Performed by: NURSE PRACTITIONER

## 2019-03-11 RX ORDER — ATORVASTATIN CALCIUM 40 MG/1
40 TABLET, FILM COATED ORAL DAILY
Status: DISCONTINUED | OUTPATIENT
Start: 2019-03-12 | End: 2019-03-12 | Stop reason: HOSPADM

## 2019-03-11 RX ORDER — INSULIN LISPRO 100 [IU]/ML
18 INJECTION, SOLUTION INTRAVENOUS; SUBCUTANEOUS
Status: DISCONTINUED | OUTPATIENT
Start: 2019-03-11 | End: 2019-03-12 | Stop reason: HOSPADM

## 2019-03-11 RX ADMIN — ATORVASTATIN CALCIUM 80 MG: 40 TABLET, FILM COATED ORAL at 08:38

## 2019-03-11 RX ADMIN — APIXABAN 5 MG: 5 TABLET, FILM COATED ORAL at 08:38

## 2019-03-11 RX ADMIN — OXYCODONE HYDROCHLORIDE 10 MG: 5 TABLET ORAL at 12:23

## 2019-03-11 RX ADMIN — ACETAMINOPHEN 650 MG: 325 TABLET, FILM COATED ORAL at 21:37

## 2019-03-11 RX ADMIN — INSULIN GLARGINE 32 UNITS: 100 INJECTION, SOLUTION SUBCUTANEOUS at 08:39

## 2019-03-11 RX ADMIN — POLYETHYLENE GLYCOL 3350 17 G: 17 POWDER, FOR SOLUTION ORAL at 08:40

## 2019-03-11 RX ADMIN — INSULIN GLARGINE 32 UNITS: 100 INJECTION, SOLUTION SUBCUTANEOUS at 17:03

## 2019-03-11 RX ADMIN — POLYETHYLENE GLYCOL 3350 17 G: 17 POWDER, FOR SOLUTION ORAL at 08:43

## 2019-03-11 RX ADMIN — FLUTICASONE PROPIONATE 2 SPRAY: 50 SPRAY, METERED NASAL at 08:51

## 2019-03-11 RX ADMIN — DIPHENHYDRAMINE HYDROCHLORIDE 25 MG: 25 CAPSULE ORAL at 21:37

## 2019-03-11 RX ADMIN — LORAZEPAM 1 MG: 1 TABLET ORAL at 21:37

## 2019-03-11 RX ADMIN — APIXABAN 5 MG: 5 TABLET, FILM COATED ORAL at 17:06

## 2019-03-11 RX ADMIN — OXYCODONE HYDROCHLORIDE 10 MG: 5 TABLET ORAL at 21:37

## 2019-03-11 RX ADMIN — LEVOTHYROXINE SODIUM 25 MCG: 50 TABLET ORAL at 04:26

## 2019-03-11 RX ADMIN — SODIUM CHLORIDE 100 ML/HR: 900 INJECTION, SOLUTION INTRAVENOUS at 21:37

## 2019-03-11 RX ADMIN — MULTIPLE VITAMINS W/ MINERALS TAB 1 TABLET: TAB at 08:38

## 2019-03-11 RX ADMIN — INSULIN LISPRO 18 UNITS: 100 INJECTION, SOLUTION INTRAVENOUS; SUBCUTANEOUS at 17:04

## 2019-03-11 RX ADMIN — INSULIN LISPRO 25 UNITS: 100 INJECTION, SOLUTION INTRAVENOUS; SUBCUTANEOUS at 08:39

## 2019-03-11 RX ADMIN — ASPIRIN 81 MG 81 MG: 81 TABLET ORAL at 08:38

## 2019-03-11 RX ADMIN — Medication 10 ML: at 04:24

## 2019-03-11 RX ADMIN — Medication 10 ML: at 14:00

## 2019-03-11 RX ADMIN — SODIUM CHLORIDE 100 ML/HR: 900 INJECTION, SOLUTION INTRAVENOUS at 04:28

## 2019-03-11 RX ADMIN — INSULIN LISPRO 25 UNITS: 100 INJECTION, SOLUTION INTRAVENOUS; SUBCUTANEOUS at 12:13

## 2019-03-11 NOTE — PROGRESS NOTES
Problem: Interdisciplinary Rounds  Goal: Interdisciplinary Rounds  Outcome: Progressing Towards Goal  Interdisciplinary team rounds were held 3/11/2019 with the following team members:Care Management, Nursing, Nurse Practitioner, Physical Therapy and . Anticipate discharge home today or tomorrow. Plan of care discussed. See clinical pathway and/or care plan for interventions and desired outcomes.

## 2019-03-11 NOTE — PROGRESS NOTES
Progress Note    3/11/2019  Admit Date: 3/8/2019  9:36 PM   NAME: Elda Rubalcava   :  1945   MRN:  [id number]   Attending: Mohan Ny MD  PCP:  Jose Gill NP  Treatment Team: Attending Provider: Mohan Ny MD; Consulting Provider: Chan Garcia DO; Utilization Review: Jhoan Jennings RN; Charge Nurse: Travis Mccall; Occupational Therapist: Odilia Kirk OTR/L; Physical Therapy Assistant: Sandrine Powers PTA; Care Manager: Eleonora Lara    Full Code   SUBJECTIVE:   Mr. Juan Luis Fowler is a 67 yo female with PMH of DM II A1C 10.2, anemia, anxiety, cataracts, depression, GERD, DVT/PE on eliquis, hyperlipidemia, HTN, peripheral neuropathy, CVA who presented with c/o left sided HA X1 week with facial edema and pressure behind left eye with ptosis a few days prior to admission. Was evaluated by PCP. Had stopped eliquis for dental procedure however restarted 3/6/19. Neurology consulted, pt dx with 3rd nerve palsy secondary to uncontrolled DM. Inflammatory markers normal to r/o temporal arteritis. CT head without acute findings. Unable to have MRI due to retained internal wires. Had repeat head CT 3/9 without acute findings. CTA head/neck showed extensive atherosclerotic changes of the intracranial vessels, notably and the cavernous right ICA and also the proximal left PCA. Echo with EF 55-60%, appeared to be no shunt. Diabetes educator met with pt today, pt not following diabetic diet and non compliant at times with insulin. Left facial edema improved, still unable to open left eye. Continued double vision with both eyes open. Denies CP, SOB, n/v/d, abd pain.          Past Medical History:   Diagnosis Date    Allergic rhinitis     Anemia     Anxiety     Asthma     Cataracts, bilateral     Depression     Diabetes (HCC)     GERD (gastroesophageal reflux disease)     History of DVT (deep vein thrombosis)     History of pulmonary embolus (PE)     Hypercholesterolemia  Hyperlipidemia     Hypertension     Insomnia     Internal hemorrhoids without mention of complication     Osteoarthritis     Peripheral neuropathy     Personal history of colonic polyps     Rectocele     Stroke [facility])      Recent Results (from the past 24 hour(s))   GLUCOSE, POC    Collection Time: 03/10/19  3:31 PM   Result Value Ref Range    Glucose (POC) 126 (H) 65 - 100 mg/dL   GLUCOSE, POC    Collection Time: 03/10/19 10:01 PM   Result Value Ref Range    Glucose (POC) 97 65 - 100 mg/dL   GLUCOSE, POC    Collection Time: 03/11/19  7:28 AM   Result Value Ref Range    Glucose (POC) 97 65 - 100 mg/dL   GLUCOSE, POC    Collection Time: 03/11/19 11:55 AM   Result Value Ref Range    Glucose (POC) 79 65 - 100 mg/dL     Allergies   Allergen Reactions    Codeine Nausea and Vomiting    Morphine Nausea and Vomiting     Current Facility-Administered Medications   Medication Dose Route Frequency Provider Last Rate Last Dose    insulin lispro (HUMALOG) injection 18 Units  18 Units SubCUTAneous TIDAC Kaz BASHIR NP        diphenhydrAMINE (BENADRYL) capsule 25 mg  25 mg Oral QHS PRN Lizz Jolly MD   25 mg at 03/10/19 0047    LORazepam (ATIVAN) tablet 1 mg  1 mg Oral Q4H PRN Lizz Jolly MD   1 mg at 03/10/19 1552    aspirin chewable tablet 81 mg  81 mg Oral DAILY Satish Menendez A, DO   81 mg at 03/11/19 0838    atorvastatin (LIPITOR) tablet 80 mg  80 mg Oral DAILY Jared Frank, DO   80 mg at 03/11/19 0838    ketorolac (TORADOL) injection 15 mg  15 mg IntraVENous Q6H PRN Blas Hinton NP        promethazine (PHENERGAN) with saline injection 12.5 mg  12.5 mg IntraVENous Q4H PRN Ana Hinton NP   12.5 mg at 03/10/19 2239    HYDROmorphone (PF) (DILAUDID) injection 0.5 mg  0.5 mg IntraVENous Q4H PRN Ana Hinton NP   0.5 mg at 03/10/19 1233    LORazepam (ATIVAN) tablet 1 mg  1 mg Oral QHS Ana Hinton NP   1 mg at 03/10/19 2204    LORazepam (ATIVAN) injection 1 mg  1 mg IntraVENous Q4H PRN Carlton Campos NP        0.9% sodium chloride infusion  100 mL/hr IntraVENous CONTINUOUS Ana Hinton  mL/hr at 03/11/19 0428 100 mL/hr at 03/11/19 0428    insulin glargine (LANTUS) injection 32 Units  32 Units SubCUTAneous ACB&D Misbah Hinton NP   32 Units at 03/11/19 0839    polyethylene glycol (MIRALAX) packet 17 g  17 g Oral DAILY Ana Hinton NP   17 g at 03/11/19 0843    magnesium citrate solution 296 mL  296 mL Oral DAILY PRN Misbah Hinton NP        bisacodyl (DULCOLAX) suppository 10 mg  10 mg Rectal DAILY PRN Misbah Hinton NP        albuterol (PROVENTIL VENTOLIN) nebulizer solution 2.5 mg  2.5 mg Inhalation Q6H PRN Juvencio Nieves MD        fluticasone [facility]) 50 mcg/actuation nasal spray 2 Spray  2 Spray Both Nostrils DAILY Juvencio Nieves MD   2 [place] at 03/11/19 2532    levothyroxine (SYNTHROID) tablet 25 mcg  25 mcg Oral ACB Juvencio Nieves MD   25 mcg at 03/11/19 0426    multivitamin, tx-iron-ca-min (THERA-M w/ IRON) tablet 1 Tab  1 Tab Oral DAILY Juvencio Nieves MD   1 Tab at 03/11/19 0409    dextrose 40% (GLUTOSE) oral gel 1 Tube  15 g Oral PRN Juvencio Nieves MD        glucagon [facility]) injection 1 mg  1 mg IntraMUSCular PRN Juvencio Nieves MD        dextrose (D50W) injection syrg 12.5-25 g  25-50 mL IntraVENous PRN Juvencio Nieves MD        insulin lispro (HUMALOG) injection   SubCUTAneous AC&HS Juvencio Nieves MD   Stopped at 03/10/19 1630    ondansetron (ZOFRAN) injection 4 mg  4 mg IntraVENous Q6H PRN Juvencio Nieves MD   4 mg at 03/10/19 1553    acetaminophen (TYLENOL) tablet 650 mg  650 mg Oral Q4H PRN Juvencio Nieves MD   650 mg at 03/10/19 2204    apixaban (ELIQUIS) tablet 5 mg  5 mg Oral BID Juvencio Nieves MD   5 mg at 03/11/19 4228    oxyCODONE IR (ROXICODONE) tablet 10 mg  10 mg Oral Q4H PRN Misbah Hinton NP   10 mg at 03/11/19 122    traMADol (ULTRAM) tablet  mg   mg Oral Q6H PRN [place], Rollo Cranker, NP   50 mg at 03/10/19 1552    sodium chloride (NS) flush 5-40 mL  5-40 mL IntraVENous Q8H Simeon Khan MD   10 mL at 19 0424    sodium chloride (NS) flush 5-40 mL  5-40 mL IntraVENous PRN Simeon Khan MD           Review of Systems negative with exception of pertinent positives noted above  PHYSICAL EXAM     Visit Vitals  /74 (BP 1 Location: Right arm, BP Patient Position: At rest)   Pulse 73   Temp 98.9 °F (37.2 °C)   Resp 18   Ht 5' 3\" (1.6 m)   Wt 96.6 kg (213 lb)   SpO2 98%   BMI 37.73 kg/m²      Temp (24hrs), Av.3 °F (36.8 °C), Min:96.5 °F (35.8 °C), Max:99.3 °F (37.4 °C)    Oxygen Therapy  O2 Sat (%): 98 % (19 1200)  Pulse via Oximetry: 72 beats per minute (19 2258)  O2 Device: Room air (19 2200)    Intake/Output Summary (Last 24 hours) at 3/11/2019 1345  Last data filed at 3/11/2019 3039  Gross per 24 hour   Intake 240 ml   Output 900 ml   Net -660 ml      General: No acute distress    Eyes:  Left eye with: ptosis, PERRLA, excessive tearing, edema. Right eye without edema, able to open, PERRLA. Lungs: CTA bilaterally. Resp even and nonlabored  Heart:  S1S2 present without murmurs rubs gallops. RRR. No edema  Abdomen: Soft, non tender, non distended. BS present  Extremities: Moves ext spontaneously. No cyanosis. Neurologic:  A/O X4. Sensation intact. Facial expressions symmetrical. Bilateral UE/LE strength 5/5 no drift.       Results summary of Diagnostic Studies/Procedures copied from within [facility] EMR:        Sam Castellanos 96 Problems    Diagnosis Date Noted    Headache above the eye region 2019    Third nerve palsy 2019    CVA (cerebral vascular accident) ([address] 2019    Severe obesity with body mass index (BMI) of 35.0 to 39.9 with serious comorbidity ([facility] 2018    Recurrent depression ([facility] 2018    DM (diabetes mellitus) ([facility] 11/21/2013    DVT, recurrent, lower extremity, chronic ([facility] 11/21/2013    HTN (hypertension) 11/21/2013    Anxiety 11/21/2013     Plan:    3rd nerve palsy  Secondary to uncontrolled DM  Neurology following  CT head X2 without acute findings  Unable to have MRI due to retained wires  Echo with EF 55-60%, no shunt  Will need Ophthalmology on DC  No driving until cleared by Neurology/Opthalmology  CRP normal, no need for steroids currently    DM II, uncontrolled  A1C 10.2  Diabetes educator following  BGL more controlled  Decrease premeal insulin  Continue Lantus  Continue SSI    Recurrent DVT/PE  Continue eliquis    Hypothyroidism  TSH 1.1  Continue synthroid    Hyperlipidemia  Decrease lipitor from 80 mg to 40 mg  Lipid panel ok on home dose of 20 mg. No evidence of CVA.       Notes, labs, VS, diagnostic testing reviewed  Time spent with pt 20 min      DVT Prophylaxis: SCDs  Plan of Care Discussed with: Supervising MD Dr. Abdirashid Meng, care team, pt      Armando Wallace, TENISHA

## 2019-03-11 NOTE — PROGRESS NOTES
Ischemic Stroke without Activase/TIA    VTE Prophylaxis: Yes: Eliquis    Antiplatelet: Yes: Aspirin    Statin if LDL Greater Than or Equal to100: Yes: Lipitor    BP Parameters: Less Than 220/120     Controlled With: None    Dysphagia Screen Completed: Yes: Pass  Dysphagia Screening  Vocal Quality/Secretions: Normal  History of Dysphagia: No  O2 Saturation: Normal  Alertness: Normal  Pre-Swallow Assessment Score: 0  Purees: No difficulty noted  Water by Cup: No difficulty noted  Water by Straw: No difficulty noted    Patient has PEG, NG Tube, Feeding Tube: No    Medication orders per above route: Yes    Nutrition Status: PO    NIH Stroke Scale Complete: Yes: 1    Frequency of Vital Signs: Every 4 hours     Frequency of Neuro Checks: Every 4 hours    Daily Education/Care Plan Updated: Yes     Bedside Report to Noelle Comer RN

## 2019-03-11 NOTE — PROGRESS NOTES
Very pleasant lady to visit with  Provided help with IV beeping - said it was causing a headache  Encouraged  No family present    Elizabeth Ying, staff [address], [id number] [facility]  /   [email or web address]

## 2019-03-11 NOTE — PROGRESS NOTES
Problem: Falls - Risk of  Goal: *Absence of Falls  Document Howie Fall Risk and appropriate interventions in the flowsheet.   Outcome: Progressing Towards Goal  Fall Risk Interventions:  Mobility Interventions: OT consult for ADLs, Patient to call before getting OOB, PT Consult for mobility concerns, PT Consult for assist device competence         Medication Interventions: Bed/chair exit alarm, Evaluate medications/consider consulting pharmacy, Patient to call before getting OOB    Elimination Interventions: Call light in reach, Patient to call for help with toileting needs

## 2019-03-11 NOTE — PROGRESS NOTES
Ischemic Stroke without Activase/TIA    VTE Prophylaxis: Yes: eliquis    Antiplatelet: yes    Statin if LDL Greater Than or Equal to100: Yes: luiz    BP Parameters: Less Than 220/120 for 24 hours, then consult MD for parameters    Controlled With: None    Dysphagia Screen Completed: Yes: Pass  Dysphagia Screening  Vocal Quality/Secretions: Normal  History of Dysphagia: No  O2 Saturation: Normal  Alertness: Normal  Pre-Swallow Assessment Score: 0  Purees: No difficulty noted  Water by Cup: No difficulty noted  Water by Straw: No difficulty noted    Patient has PEG, NG Tube, Feeding Tube: No    Medication orders per above route: Yes    Nutrition Status: PO    NIH Stroke Scale Complete: Yes: yes    Frequency of Vital Signs: Every 4 hours     Frequency of Neuro Checks: Every 4 hours    Daily Education/Care Plan Updated: Yes    Noelle Brewer

## 2019-03-11 NOTE — DIABETES MGMT
Patient admitted with CVA, seen by diabetes educator. Patient voices a positive family history of diabetes. Patient reports she had gestational diabetes and was diagnosed as a type 2 about 35 years ago. Patient has a history of uncontrolled diabetes. 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 Patient reports she lives with her daughter who is a nurse, and does light house work as well as caring for her [facility]. \" I get up at seven and see everybody off, I eat some breakfast and then I take a nap. \" Patient reports that she has been on insulin for about three years. Patient states she uses insulin pens and her daughter often gives her her insulin. Patient states she is compliant with her regimen of Levemir 30 units BID and Novolog 20 units with meals. Patient states she loves her daughter's cooking and may not always eat like she should. Patient reports she checks her blood glucose BID and more if she is on the low side. Patient reports hypoglycemia about three times a month on her current regimen that usually occurs when she doesn't eat a big enough breakfast. \" I can tell if it's low because I get all wobbly. \" Patient was able to verbalize adequate treatment of hypoglycemia. Patient voices that she often uses Atkins products and keto shakes to \"normalize her blood sugar at home\". Blood glucose 59 on admission. A1c 10.2 (eA). Blood glucose ranged  yesterday with patient receiving Lantus 62 units and Humalog 65 units. Patient did not receive her prandial dose with supper. Patient reports poor appetite yesterday especially with supper. Blood glucose 97 this morning. Reviewed with patient. Patient states she has n/v over the weekend and that this morning is her first meal and she seems to be tolerating it so far. Reviewed current regimen of Lantus 32 units BID with meals, Humalog 25 units with meals, and Humalog SSI.  Patient may benefit from slight reduction in Lantus dosing to avoid AM hypoglycemia risk given reported decreased appetite. Patient voices dissatisfaction with meal trays. \"They have too many carbs on them and they mess up my sugar. I don't eat much off of them. \" Educated patient that if she does not like the options offered she can ask to see what else is available. \" Jasmine Cordova the girl over the weekend was helping me with that some. \" Patient also reports that she sleep well for the first time in two nights. Patient reports she still has the headache that began last week but that it stays a dull ache around one and a half on a 1-10 pain scale. \"Tylenol doesn't touch it, but I don't want anything stronger and get addicted to it I have enough problems. \"    Patient reports she has attended diabetic classes in the past. Patient voices no further questions regarding diabetes management at this time.

## 2019-03-11 NOTE — PHYSICIAN ADVISORY
Letter of Determination: Upgrade from Observation to Inpatient Status    This patient was originally hospitalized as Outpatient Status with Observation Services on 3/8/2019 for evaluation of cerebral vascular accident. This patient now meets for Inpatient Admission in accordance with CMS regulation Section 43 .3. Specifically, patient's stay is now over Two Midnights and was medically necessary. The patient's stay was medically necessary based on blood pressure of 191/93 mmHg, with continued neurologic symptoms after observation services. Consistent with CMS guidelines, patient meets for inpatient status. It is our recommendation that this patient's hospitalization status should be upgraded from OBSERVATION to INPATIENT status.      The final decision regarding the patient's hospitalization status depends on the attending physician's judgement.     Evelyn Pena MD, DOC,   Physician Abdirizak Mendieta.

## 2019-03-11 NOTE — PROGRESS NOTES
Hospitalist Progress Note    Subjective:   Daily Progress Note: 3/10/2019 8:21 PM    Patient with constant left side headache with varying intensity x 1 week accompanied by facial edema presented to ER 3/8 for same. She also describes pressure behind her left eye with ptosis also beginning a couple of days ago. Seen by PHP prior to onset of secondary symptoms. Reports double vision if both eyes open without change in acuity. No pupillary abnormality. She is a diabetic with an A1C of 10.2, was 14. On eliquis for history of DVT and PE, had stopped eliquis for dental procedure, but restarted 3/6/19. Admitted with stroke workup. 3/9:   SLP in, recommendations for regular diet with thin liquids, speech normal. Dr Manuel Berry in for Neurology consult, agrees with diabetic third nerve palsy. Concern for temporal arteritis, inflammatory markers pending, may need bx. Unable to have MRI due to retained internal wires. Repeat Head CT is WNL, anticoagulation resumed per Dr Manuel Berry. PT/OT in. No steroids for now. Cane and furniture walking at baseline.      3/10:  Improved left facial edema and eyelid edema, continued droop. Feeling better in general.  Glucose remains erratic. Will get DM edu and management, nutritionist in am. Continued double vision when left eye open. Complains of nausea, vomited x 1, bloating and constipation. No BM x 3 days. History of ileus. KUB as below.     ADDITIONAL HISTORY:  IDDM II with A1c:  10.2, anemia, anxiety, cataracts, depression, GERD, DVT and PE on eliquis, hyperlipidemia, hypertension, peripheral neuropathy, stroke     Current Facility-Administered Medications   Medication Dose Route Frequency    diphenhydrAMINE (BENADRYL) capsule 25 mg  25 mg Oral QHS PRN    LORazepam (ATIVAN) tablet 1 mg  1 mg Oral Q4H PRN    aspirin chewable tablet 81 mg  81 mg Oral DAILY    atorvastatin (LIPITOR) tablet 80 mg  80 mg Oral DAILY    ketorolac (TORADOL) injection 15 mg  15 mg IntraVENous Q6H PRN    promethazine (PHENERGAN) with saline injection 12.5 mg  12.5 mg IntraVENous Q4H PRN    HYDROmorphone (PF) (DILAUDID) injection 0.5 mg  0.5 mg IntraVENous Q4H PRN    LORazepam (ATIVAN) tablet 1 mg  1 mg Oral QHS    LORazepam (ATIVAN) injection 1 mg  1 mg IntraVENous Q4H PRN    0.9% sodium chloride infusion  100 mL/hr IntraVENous CONTINUOUS    insulin lispro (HUMALOG) injection 25 Units  25 Units SubCUTAneous TIDAC    insulin glargine (LANTUS) injection 32 Units  32 Units SubCUTAneous ACB&D    albuterol (PROVENTIL VENTOLIN) nebulizer solution 2.5 mg  2.5 mg Inhalation Q6H PRN    fluticasone (FLONASE) 50 mcg/actuation nasal spray 2 Spray  2 Spray Both Nostrils DAILY    levothyroxine (SYNTHROID) tablet 25 mcg  25 mcg Oral ACB    multivitamin, tx-iron-ca-min (THERA-M w/ IRON) tablet 1 Tab  1 Tab Oral DAILY    dextrose 40% (GLUTOSE) oral gel 1 Tube  15 g Oral PRN    glucagon (GLUCAGEN) injection 1 mg  1 mg IntraMUSCular PRN    dextrose (D50W) injection syrg 12.5-25 g  25-50 mL IntraVENous PRN    insulin lispro (HUMALOG) injection   SubCUTAneous AC&HS    sodium chloride (NS) flush 5-40 mL  5-40 mL IntraVENous Q8H    sodium chloride (NS) flush 5-40 mL  5-40 mL IntraVENous PRN    ondansetron (ZOFRAN) injection 4 mg  4 mg IntraVENous Q6H PRN    acetaminophen (TYLENOL) tablet 650 mg  650 mg Oral Q4H PRN    polyethylene glycol (MIRALAX) packet 17 g  17 g Oral DAILY    apixaban (ELIQUIS) tablet 5 mg  5 mg Oral BID    oxyCODONE IR (ROXICODONE) tablet 10 mg  10 mg Oral Q4H PRN    traMADol (ULTRAM) tablet  mg   mg Oral Q6H PRN    sodium chloride (NS) flush 5-40 mL  5-40 mL IntraVENous Q8H    sodium chloride (NS) flush 5-40 mL  5-40 mL IntraVENous PRN        Review of Systems  A comprehensive review of systems was negative except for that written in the HPI.     Objective:     Visit Vitals  /64 (BP 1 Location: Right arm, BP Patient Position: At rest)   Pulse 70   Temp 98.2 °F (36.8 °C)   Resp 16   Ht 5' 3\" (1.6 m)   Wt 96.6 kg (213 lb)   SpO2 95%   BMI 37.73 kg/m²      O2 Device: Room air    Temp (24hrs), Av.1 °F (36.7 °C), Min:97.9 °F (36.6 °C), Max:98.2 °F (36.8 °C)    701 - 03/10 1900  In: 360 [P.O.:360]  Out: -     General appearance: Oriented and alert. Feeling better. Continued left eyelid droop. Head: Normocephalic, without obvious abnormality, atraumatic. Left cheek edematous and with mild erythema, however improved from yesterday. Left temple tender to to touch. Eyes: Right conjunctivae/cornea clear. Left eye with edema, excessive tearing, however is improved. Lid remains edematous. Denies visual loss, but has double vision when both eyes open. .   Throat: Lips, mucosa, and tongue normal. Teeth and gums normal  Neck: supple, symmetrical, trachea midline, no adenopathy, thyroid: not enlarged, symmetric, no tenderness/mass/nodules, no carotid bruit and no JVD  Lungs: clear to auscultation bilaterally  Heart: regular rate and rhythm, S1, S2 normal, no murmur, click, rub or gallop  Abdomen: soft, non-tender.  Bowel sounds normal. No masses,  no organomegaly  Extremities: extremities normal, atraumatic, no cyanosis or edema  Skin: Skin color, texture, turgor normal. No rashes or lesions  Neurologic: Grossly normal     Additional comments: Notes,orders, test results, vitals reviewed    Data Review  Recent Results (from the past 24 hour(s))   GLUCOSE, POC    Collection Time: 19  9:19 PM   Result Value Ref Range    Glucose (POC) 60 (L) 65 - 100 mg/dL   GLUCOSE, POC    Collection Time: 19 10:32 PM   Result Value Ref Range    Glucose (POC) 95 65 - 100 mg/dL   GLUCOSE, POC    Collection Time: 03/10/19  3:46 AM   Result Value Ref Range    Glucose (POC) 165 (H) 65 - 100 mg/dL   SED RATE, AUTOMATED    Collection Time: 03/10/19  5:00 AM   Result Value Ref Range    Sed rate, automated 30 0 - 30 mm/hr   CRP, HIGH SENSITIVITY    Collection Time: 03/10/19  5:00 AM Result Value Ref Range    CRP, High sensitivity 1.8 mg/L   TSH 3RD GENERATION    Collection Time: 03/10/19  5:00 AM   Result Value Ref Range    TSH 2.600 0.358 - 3.740 uIU/mL   T4, FREE    Collection Time: 03/10/19  5:00 AM   Result Value Ref Range    T4, Free 1.1 0.78 - 1.46 NG/DL   MAGNESIUM    Collection Time: 03/10/19  5:00 AM   Result Value Ref Range    Magnesium 2.2 1.8 - 2.4 mg/dL   PHOSPHORUS    Collection Time: 03/10/19  5:00 AM   Result Value Ref Range    Phosphorus 2.4 2.3 - 3.7 MG/DL   GLUCOSE, POC    Collection Time: 03/10/19  7:05 AM   Result Value Ref Range    Glucose (POC) 272 (H) 65 - 100 mg/dL   GLUCOSE, POC    Collection Time: 03/10/19 11:49 AM   Result Value Ref Range    Glucose (POC) 216 (H) 65 - 100 mg/dL   GLUCOSE, POC    Collection Time: 03/10/19  3:31 PM   Result Value Ref Range    Glucose (POC) 126 (H) 65 - 100 mg/dL      3/8:  CT HEAD:   *BRAIN:  -  There are no early signs of territorial or lacunar infarction by CT.   -  Symmetric supratentorial atrophy. -  For patient's age, the scattered areas of white matter hypodensities may represent a chronic small vessel white matter ischemia.  However this is nonspecific. *VISUALIZED PARANASAL SINUSES: Well aerated. *MASTOIDS:  Clear. *CALVARIUM AND SCALP: Unremarkable. IMPRESSION:  No acute intracranial abnormalities.    3/8:  CTA HEAD:  The vertebral arteries are codominant. Moderate-to-severe severe stenosis of the V4 segment on the left. The basilar artery and its branches are normal. Extensive atherosclerotic changes of the right ICA cavernous sinus region. Severe stenosis of the suprasellar distal ICA. The right MCA appears patent. 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 At least a moderate stenosis of the distal left ICA. The MCA and DELMER are patent. . . . Fetal origin of the left PCA. There is also high-grade stenosis of the proximal left PCA at the origin. Peggy Salmeron   IMPRESSION:  Extensive atherosclerotic changes of the intracranial vessels, notably and the cavernous right ICA and also the proximal left PCA. No obvious acute large vessel occlusion. .     3/9:  CT HEAD:  FINDINGS: No areas of abnormal attenuation are seen in the brain. There is no CT evidence of acute hemorrhage or infarction. The ventricles are normal in size. There are no extra-axial fluid collections. No masses are seen. The sinuses are clear. There are no bony lesions. MPRESSION: No CT evidence of acute intracranial abnormality. 3/10:  KUB: Supine views of the abdomen were obtained. There is diffuse prominence of stool pattern. There is no small bowel distention. There are no kidney stones. There are cholecystectomy changes. 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              Will need to begin po antihypertensives  Ativan dependent:  Do not stop abruptly              Wean to different antianxiety med  Hypothyroidism              TSH, T4 WNL              Continue synthroid  History of noncompliance: Need to investigate  Recurrent depression : as above  Headache above the eye region: Ultram prn  Nausea, vomited x 1, bloating and Constipation with               History of ileus                Restart normal saline   KUB with constipation only   miralax daily   Mag citrate and dulcolax R/S prn    Discharge when ok with Dr Ramona Harris and stable with HH    No labs needed tomorrow.     Care Plan discussed with: Patient and Nurse    Signed By: Shamir Lynne NP     March 10, 2019

## 2019-03-12 ENCOUNTER — PATIENT OUTREACH (OUTPATIENT)
Dept: CASE MANAGEMENT | Age: 74
End: 2019-03-12

## 2019-03-12 VITALS
SYSTOLIC BLOOD PRESSURE: 191 MMHG | DIASTOLIC BLOOD PRESSURE: 75 MMHG | TEMPERATURE: 97.8 F | RESPIRATION RATE: 20 BRPM | WEIGHT: 213 LBS | OXYGEN SATURATION: 97 % | BODY MASS INDEX: 37.74 KG/M2 | HEART RATE: 64 BPM | HEIGHT: 63 IN

## 2019-03-12 LAB
GLUCOSE BLD STRIP.AUTO-MCNC: 129 MG/DL (ref 65–100)
GLUCOSE BLD STRIP.AUTO-MCNC: 86 MG/DL (ref 65–100)

## 2019-03-12 PROCEDURE — 74011636637 HC RX REV CODE- 636/637: Performed by: NURSE PRACTITIONER

## 2019-03-12 PROCEDURE — 74011250637 HC RX REV CODE- 250/637: Performed by: INTERNAL MEDICINE

## 2019-03-12 PROCEDURE — 82962 GLUCOSE BLOOD TEST: CPT

## 2019-03-12 PROCEDURE — 74011250637 HC RX REV CODE- 250/637: Performed by: HOSPITALIST

## 2019-03-12 PROCEDURE — 74011250637 HC RX REV CODE- 250/637: Performed by: PSYCHIATRY & NEUROLOGY

## 2019-03-12 PROCEDURE — 74011250637 HC RX REV CODE- 250/637: Performed by: NURSE PRACTITIONER

## 2019-03-12 PROCEDURE — 97530 THERAPEUTIC ACTIVITIES: CPT

## 2019-03-12 RX ORDER — AMLODIPINE BESYLATE 5 MG/1
5 TABLET ORAL DAILY
Qty: 30 TAB | Refills: 0 | Status: SHIPPED | OUTPATIENT
Start: 2019-03-13 | End: 2020-11-09 | Stop reason: SDUPTHER

## 2019-03-12 RX ORDER — AMLODIPINE BESYLATE 5 MG/1
5 TABLET ORAL DAILY
Status: DISCONTINUED | OUTPATIENT
Start: 2019-03-12 | End: 2019-03-12 | Stop reason: HOSPADM

## 2019-03-12 RX ORDER — NALOXONE HYDROCHLORIDE 4 MG/.1ML
SPRAY NASAL
Qty: 1 EACH | Refills: 1 | Status: SHIPPED | OUTPATIENT
Start: 2019-03-12 | End: 2021-04-28

## 2019-03-12 RX ORDER — OXYCODONE HYDROCHLORIDE 10 MG/1
10 TABLET ORAL
Qty: 12 TAB | Refills: 0 | Status: SHIPPED | OUTPATIENT
Start: 2019-03-12 | End: 2019-03-15

## 2019-03-12 RX ORDER — INSULIN LISPRO 100 [IU]/ML
18 INJECTION, SOLUTION INTRAVENOUS; SUBCUTANEOUS
Qty: 1 VIAL | Refills: 0 | Status: SHIPPED
Start: 2019-03-12 | End: 2019-04-01

## 2019-03-12 RX ORDER — INSULIN GLARGINE 100 [IU]/ML
32 INJECTION, SOLUTION SUBCUTANEOUS 2 TIMES DAILY
Qty: 1 VIAL | Refills: 0 | Status: SHIPPED | OUTPATIENT
Start: 2019-03-12 | End: 2019-04-01

## 2019-03-12 RX ADMIN — INSULIN LISPRO 18 UNITS: 100 INJECTION, SOLUTION INTRAVENOUS; SUBCUTANEOUS at 08:26

## 2019-03-12 RX ADMIN — AMLODIPINE BESYLATE 5 MG: 5 TABLET ORAL at 08:28

## 2019-03-12 RX ADMIN — LORAZEPAM 1 MG: 1 TABLET ORAL at 06:02

## 2019-03-12 RX ADMIN — FLUTICASONE PROPIONATE 2 SPRAY: 50 SPRAY, METERED NASAL at 08:28

## 2019-03-12 RX ADMIN — ATORVASTATIN CALCIUM 40 MG: 40 TABLET, FILM COATED ORAL at 08:27

## 2019-03-12 RX ADMIN — POLYETHYLENE GLYCOL 3350 17 G: 17 POWDER, FOR SOLUTION ORAL at 08:27

## 2019-03-12 RX ADMIN — OXYCODONE HYDROCHLORIDE 10 MG: 5 TABLET ORAL at 06:02

## 2019-03-12 RX ADMIN — INSULIN LISPRO 18 UNITS: 100 INJECTION, SOLUTION INTRAVENOUS; SUBCUTANEOUS at 12:01

## 2019-03-12 RX ADMIN — OXYCODONE HYDROCHLORIDE 10 MG: 5 TABLET ORAL at 11:56

## 2019-03-12 RX ADMIN — ASPIRIN 81 MG 81 MG: 81 TABLET ORAL at 08:28

## 2019-03-12 RX ADMIN — APIXABAN 5 MG: 5 TABLET, FILM COATED ORAL at 08:28

## 2019-03-12 RX ADMIN — ACETAMINOPHEN 650 MG: 325 TABLET, FILM COATED ORAL at 06:02

## 2019-03-12 RX ADMIN — LEVOTHYROXINE SODIUM 25 MCG: 50 TABLET ORAL at 06:02

## 2019-03-12 RX ADMIN — MULTIPLE VITAMINS W/ MINERALS TAB 1 TABLET: TAB at 08:27

## 2019-03-12 RX ADMIN — INSULIN GLARGINE 32 UNITS: 100 INJECTION, SOLUTION SUBCUTANEOUS at 08:26

## 2019-03-12 NOTE — PROGRESS NOTES
Ischemic Stroke without Activase/TIA     VTE Prophylaxis: Yes: Eliquis     Antiplatelet: Yes: Aspirin     Statin if LDL Greater Than or Equal to100: Yes: Lipitor     BP Parameters: Less Than 220/120      Controlled With: None     Dysphagia Screen Completed: Yes: Pass  Dysphagia Screening  Vocal Quality/Secretions: Normal  History of Dysphagia: No  O2 Saturation: Normal  Alertness: Normal  Pre-Swallow Assessment Score: 0  Purees: No difficulty noted  Water by Cup: No difficulty noted  Water by Straw: No difficulty noted     Patient has PEG, NG Tube, Feeding Tube: No     Medication orders per above route: Yes     Nutrition Status: PO     NIH Stroke Scale Complete: Yes: 1     Frequency of Vital Signs: Every 4 hours     Frequency of Neuro Checks: Every 4 hours     Daily Education/Care Plan Updated:  Yes      Bedside Report to [facility]

## 2019-03-12 NOTE — DISCHARGE INSTRUCTIONS
No driving until cleared by Neurology/Opthalmology    Diamonder blood pressure twice daily and keep a log and take to follow up appointment    Kath Pope blood sugars three times daily, keep a log and take to follow up appointment      Stroke: After Your Visit     Your Care Instructions     You have had a stroke. Risk factors for stroke include being overweight, smoking, and sedentary lifestyle. This means that the blood flow to a part of your brain was blocked for some time, which damages the nerve cells in that part of the brain. The part of your body controlled by that part of your brain may not function properly now. The brain is an amazing organ that can heal itself to some degree. The stroke you had damaged part of your brain, but other parts of your brain may take over in some way for the damaged areas. You have already started this process. Going home may be hard for you and your family. The more you can try to do for yourself, the better. Remember to take each day one at a time. Follow-up care is a key part of your treatment and safety. Be sure to make and go to all appointments, and call your doctor if you are having problems. Its also a good idea to know your test results and keep a list of the medicines you take. How can you care for yourself at home? Enter a stroke rehabilitation (rehab) program, if your doctor recommends it. Physical, speech, and occupational therapies can help you manage bathing, dressing, eating, and other basics of daily living. Eat a heart-healthy diet that is low in cholesterol, saturated fat, and salt. Eat lots of fresh fruits and vegetables and foods high in fiber. Increase your activities slowly. Take short rest breaks when you get tired. Gradually increase the amount you walk. Start out by walking a little more than you did the day before. Do not drive until your doctor says it is okay. It is normal to feel sad or depressed after a stroke.  If the blues last, talk to your doctor. If you are having problems with urine leakage, go to the bathroom at regular times, including when you first wake up and at bedtime. Also, limit fluids after dinner. If you are constipated, drink plenty of fluids, enough so that your urine is light yellow or clear like water. If you have kidney, heart, or liver disease and have to limit fluids, talk with your doctor before you increase the amount of fluids you drink. Set up a regular time for using the toilet. If you continue to have constipation, your doctor may suggest using a bulking agent, such as Metamucil, or a stool softener, laxative, or enema. Medicines  Take your medicines exactly as prescribed. Call your doctor if you think you are having a problem with your medicine. You may be taking several medicines. ACE (angiotensin-converting enzyme) inhibitors, angiotensin II receptor blockers (ARBs), beta-blockers, diuretics (water pills), and calcium channel blockers control your blood pressure. Statins help lower cholesterol. Your doctor may also prescribe medicines for depression, pain, sleep problems, anxiety, or agitation. If your doctor has given you medicine that prevents blood clots, such as warfarin (Coumadin), aspirin combined with extended-release dipyridamole (Aggrenox), clopidogrel (Plavix), or aspirin to prevent another stroke, you should:  Tell your dentist, pharmacist, and other health professionals that you take these medicines. Watch for unusual bruising or bleeding, such as blood in your urine, red or black stools, or bleeding from your nose or gums. Get regular blood tests to check your clotting time if you are taking Coumadin. Wear medical alert jewelry that says you take blood thinners. You can buy this at most [place].   Do not take any over-the-counter medicines or herbal products without talking to your doctor first.  If you take birth control pills or hormone replacement therapy, talk to your doctor about whether they are right for you. For family members and caregivers  Make the home safe. Set up a room so that your loved one does not have to climb stairs. Be sure the bathroom is on the same floor. Move throw rugs and furniture that could cause falls, and make sure that the lighting is good. Put grab bars and seats in tubs and showers. Find out what your loved one can do and what he or she needs help with. Try not to do things for your loved one that your loved one can do on his or her own. Help him or her learn and practice new skills. Visit and talk with your loved one often. Try doing activities together that you both enjoy, such as playing cards or board games. Keep in touch with your loved one's friends as much as you can, and encourage them to visit. Take care of yourself. Do not try to do everything yourself. Ask other family members to help. Eat well, get enough rest, and take time to do things that you enjoy. Keep up with your own doctor visits, and make sure to take your medicines regularly. Get out of the house as much as you can. Join a local support group. Find out if you qualify for home health care visits to help with rehab or for adult day care. When should you call for help? Call 911 anytime you think you may need emergency care. For example, call if:  You have signs of another stroke. These may include:  Sudden numbness, paralysis, or weakness in your face, arm, or leg, especially on only one side of your body. New problems with walking or balance. Sudden vision changes. Drooling or slurred speech. New problems speaking or understanding simple statements, or you feel confused. A sudden, severe headache that is different from past headaches. Call 911 even if these symptoms go away in a few minutes. You cough up blood. You vomit blood or what looks like coffee grounds. You pass maroon or very bloody stools.     Call your doctor now or seek immediate medical care if:  You have new bruises or blood spots under your skin. You have a nosebleed. Your gums bleed when you brush your teeth. You have blood in your urine. Your stools are black and tarlike or have streaks of blood. You have vaginal bleeding when you are not having your period, or heavy period bleeding. You have new symptoms that may be related to your stroke, such as falls or trouble swallowing. Watch closely for changes in your health, and be sure to contact your doctor if you have any problems. Where can you learn more? Go to "[employer].be    Enter C294  in the search box to learn more about \"Stroke: After Your Visit\". © 5367-0816 [employer]wise, [employer]. Care instructions adapted under license by [employer] (which disclaims liability or warranty for this information). This care instruction is for use with your licensed healthcare professional. If you have questions about a medical condition or this instruction, always ask your healthcare professional. [place] any warranty or liability for your use of this information. Patient Education        High Blood Pressure: Care Instructions  Overview    It's normal for blood pressure to go up and down throughout the day. But if it stays up, you have high blood pressure. Another name for high blood pressure is hypertension. Despite what a lot of people think, high blood pressure usually doesn't cause headaches or make you feel dizzy or lightheaded. It usually has no symptoms. But it does increase your risk of stroke, heart attack, and other problems. You and your doctor will talk about your risks of these problems based on your blood pressure. Your doctor will give you a goal for your blood pressure. Your goal will be based on your health and your age. Lifestyle changes, such as eating healthy and being active, are always important to help lower blood pressure.  You might also take medicine to reach your blood pressure goal.  Follow-up care is a key part of your treatment and safety. Be sure to make and go to all appointments, and call your doctor if you are having problems. It's also a good idea to know your test results and keep a list of the medicines you take. How can you care for yourself at home? Medical treatment  · If you stop taking your medicine, your blood pressure will go back up. You may take one or more types of medicine to lower your blood pressure. Be safe with medicines. Take your medicine exactly as prescribed. Call your doctor if you think you are having a problem with your medicine. · Talk to your doctor before you start taking aspirin every day. Aspirin can help certain people lower their risk of a heart attack or stroke. But taking aspirin isn't right for everyone, because it can cause serious bleeding. · See your doctor regularly. You may need to see the doctor more often at first or until your blood pressure comes down. · If you are taking blood pressure medicine, talk to your doctor before you take decongestants or anti-inflammatory medicine, such as ibuprofen. Some of these medicines can raise blood pressure. · Learn how to check your blood pressure at home. Lifestyle changes  · Stay at a healthy weight. This is especially important if you put on weight around the waist. Losing even 10 pounds can help you lower your blood pressure. · If your doctor recommends it, get more exercise. Walking is a good choice. Bit by bit, increase the amount you walk every day. Try for at least 30 minutes on most days of the week. You also may want to swim, bike, or do other activities. · Avoid or limit alcohol. Talk to your doctor about whether you can drink any alcohol. · Try to limit how much sodium you eat to less than 2,300 milligrams (mg) a day. Your doctor may ask you to try to eat less than 1,500 mg a day.   · Eat plenty of fruits (such as bananas and oranges), vegetables, legumes, whole grains, and low-fat dairy products. · Lower the amount of saturated fat in your diet. Saturated fat is found in animal products such as milk, cheese, and meat. Limiting these foods may help you lose weight and also lower your risk for heart disease. · Do not smoke. Smoking increases your risk for heart attack and stroke. If you need help quitting, talk to your doctor about stop-smoking programs and medicines. These can increase your chances of quitting for good. When should you call for help? Call 911 anytime you think you may need emergency care. This may mean having symptoms that suggest that your blood pressure is causing a serious heart or blood vessel problem. Your blood pressure may be over 180/120.   For example, call 911 if:    · You have symptoms of a heart attack. These may include:  ? Chest pain or pressure, or a strange feeling in the chest.  ? Sweating. ? Shortness of breath. ? Nausea or vomiting. ? Pain, pressure, or a strange feeling in the back, neck, jaw, or upper belly or in one or both shoulders or arms. ? Lightheadedness or sudden weakness. ? A fast or irregular heartbeat.     · You have symptoms of a stroke. These may include:  ? Sudden numbness, tingling, weakness, or loss of movement in your face, arm, or leg, especially on only one side of your body. ? Sudden vision changes. ? Sudden trouble speaking. ? Sudden confusion or trouble understanding simple statements. ? Sudden problems with walking or balance. ? A sudden, severe headache that is different from past headaches.     · You have severe back or belly pain.    Do not wait until your blood pressure comes down on its own.  Get help right away.   Call your doctor now or seek immediate care if:    · Your blood pressure is much higher than normal (such as 180/120 or higher), but you don't have symptoms.     · You think high blood pressure is causing symptoms, such as:  ? Severe headache.  ? Blurry vision.    Watch closely for changes in your health, and be sure to contact your doctor if:    · Your blood pressure measures higher than your doctor recommends at least 2 times. That means the top number is higher or the bottom number is higher, or both.     · You think you may be having side effects from your blood pressure medicine. Where can you learn more? Go to http://nila-alyssa.info/. Enter P990 in the search box to learn more about \"High Blood Pressure: Care Instructions. \"  Current as of: July 22, 2018  Content Version: 11.9  © 0458-2534 [employer]. Care instructions adapted under license by [employer] (which disclaims liability or warranty for this information). If you have questions about a medical condition or this instruction, always ask your healthcare professional. [address] any warranty or liability for your use of this information. Patient Education        Learning About Type 2 Diabetes  What is type 2 diabetes? Insulin is a hormone that helps your body use sugar from your food as energy. Type 2 diabetes happens when your body can't use insulin the right way. Over time, the pancreas can't make enough insulin. If you don't have enough insulin, too much sugar stays in your blood. If you are overweight, get little or no exercise, or have type 2 diabetes in your family, you are more likely to have problems with the way insulin works in your body.  Americans, Hispanics, Native Americans,  Americans, and Pacific Islanders have a higher risk for type 2 diabetes. Type 2 diabetes can be prevented or delayed with a healthy lifestyle, which includes staying at a healthy weight, making smart food choices, and getting regular exercise. What can you expect with type 2 diabetes? Cindy Solares keep hearing about how important it is to keep your blood sugar within a target range. That's because over time, high blood sugar can lead to serious problems.  It can:  · Harm your eyes, nerves, and kidneys. · Damage your blood vessels, leading to heart disease and stroke. · Reduce blood flow and cause nerve damage to parts of your body, especially your feet. This can cause slow healing and pain when you walk. · Make your immune system weak and less able to fight infections. When people hear the word \"diabetes,\" they often think of problems like these. But daily care and treatment can help prevent or delay these problems. The goal is to keep your blood sugar in a target range. That's the best way to reduce your chance of having more problems from diabetes. What are the symptoms? Some people who have type 2 diabetes may not have any symptoms early on. Many people with the disease don't even know they have it at first. But with time, diabetes starts to cause symptoms. You experience most symptoms of type 2 diabetes when your blood sugar is either too high or too low. The most common symptoms of high blood sugar include:  · Thirst.  · Frequent urination. · Weight loss. · Blurry vision. The symptoms of low blood sugar include:  · Sweating. · Shakiness. · Weakness. · Hunger. · Confusion. How can you prevent type 2 diabetes? The best way to prevent or delay type 2 diabetes is to adopt healthy habits, which include:  · Staying at a healthy weight. · Exercising regularly. · Eating healthy foods. How is type 2 diabetes treated? If you have type 2 diabetes, here are the most important things you can do. · Take your diabetes medicines. · Check your blood sugar as often as your doctor recommends. Also, get a hemoglobin A1c test at least every 6 months. · Try to eat a variety of foods and to spread carbohydrate throughout the day. Carbohydrate raises blood sugar higher and more quickly than any other nutrient does. Carbohydrate is found in sugar, breads and cereals, fruit, starchy vegetables such as potatoes and corn, and milk and yogurt.   · Get at least 30 minutes of exercise on most days of the week. Walking is a good choice. You also may want to do other activities, such as running, swimming, cycling, or playing tennis or team sports. If your doctor says it's okay, do muscle-strengthening exercises at least 2 times a week. · See your doctor for checkups and tests on a regular schedule. · If you have high blood pressure or high cholesterol, take the medicines as prescribed by your doctor. · Do not smoke. Smoking can make health problems worse. This includes problems you might have with type 2 diabetes. If you need help quitting, talk to your doctor about stop-smoking programs and medicines. These can increase your chances of quitting for good. Follow-up care is a key part of your treatment and safety. Be sure to make and go to all appointments, and call your doctor if you are having problems. It's also a good idea to know your test results and keep a list of the medicines you take. Where can you learn more? Go to http://nila-alyssa.info/. Enter C702 in the search box to learn more about \"Learning About Type 2 Diabetes. \"  Current as of: July 25, 2018  Content Version: 11.9  © 0809-1715 [employer], [employer]. Care instructions adapted under license by [employer] (which disclaims liability or warranty for this information). If you have questions about a medical condition or this instruction, always ask your healthcare professional. [address] any warranty or liability for your use of this information. DISCHARGE SUMMARY from Nurse    PATIENT INSTRUCTIONS:    After general anesthesia or intravenous sedation, for 24 hours or while taking prescription Narcotics:  · Limit your activities  · Do not drive and operate hazardous machinery  · Do not make important personal or business decisions  · Do  not drink alcoholic beverages  · If you have not urinated within 8 hours after discharge, please contact your surgeon on call.     Report the following to your surgeon:  · Excessive pain, swelling, redness or odor of or around the surgical area  · Temperature over 100.5  · Nausea and vomiting lasting longer than 4 hours or if unable to take medications  · Any signs of decreased circulation or nerve impairment to extremity: change in color, persistent  numbness, tingling, coldness or increase pain  · Any questions    What to do at Home:  Recommended activity: Activity as tolerated,     If you experience any of the following symptoms see discharge instructions, please follow up with primary care. *  Please give a list of your current medications to your Primary Care Provider. *  Please update this list whenever your medications are discontinued, doses are      changed, or new medications (including over-the-counter products) are added. *  Please carry medication information at all times in case of emergency situations. These are general instructions for a healthy lifestyle:    No smoking/ No tobacco products/ Avoid exposure to second hand smoke  Surgeon General's Warning:  Quitting smoking now greatly reduces serious risk to your health. Obesity, smoking, and sedentary lifestyle greatly increases your risk for illness    A healthy diet, regular physical exercise & weight monitoring are important for maintaining a healthy lifestyle    You may be retaining fluid if you have a history of heart failure or if you experience any of the following symptoms:  Weight gain of 3 pounds or more overnight or 5 pounds in a week, increased swelling in our hands or feet or shortness of breath while lying flat in bed. Please call your doctor as soon as you notice any of these symptoms; do not wait until your next office visit.     Recognize signs and symptoms of STROKE:    F-face looks uneven    A-arms unable to move or move unevenly    S-speech slurred or non-existent    T-time-call 911 as soon as signs and symptoms begin-DO NOT go       Back to bed or wait to see if you get better-TIME IS BRAIN. Warning Signs of HEART ATTACK     Call 911 if you have these symptoms:   Chest discomfort. Most heart attacks involve discomfort in the center of the chest that lasts more than a few minutes, or that goes away and comes back. It can feel like uncomfortable pressure, squeezing, fullness, or pain.  Discomfort in other areas of the upper body. Symptoms can include pain or discomfort in one or both arms, the back, neck, jaw, or stomach.  Shortness of breath with or without chest discomfort.  Other signs may include breaking out in a cold sweat, nausea, or lightheadedness. Don't wait more than five minutes to call 911 - MINUTES MATTER! Fast action can save your life. Calling 911 is almost always the fastest way to get lifesaving treatment. Emergency Medical Services staff can begin treatment when they arrive -- up to an hour sooner than if someone gets to the hospital by car. The discharge information has been reviewed with the patient. The patient verbalized understanding. Discharge medications reviewed with the patient and appropriate educational materials and side effects teaching were provided.   ___________________________________________________________________________________________________________________________________

## 2019-03-12 NOTE — PROGRESS NOTES
CM notified by treating OT of patient's need for swivel wheel rolling walker. DME referral submitted to [facility] with request for delivery to patient's home as she is discharging today. Also completed referral to community care manager, Spike Mcgarry, to follow patient's care after discharge.      Care Management Interventions  PCP Verified by CM: Yes(Bill Franco,)  Mode of Transport at Discharge: (Family)  Transition of Care Consult (CM Consult): Discharge Planning  Discharge Durable Medical Equipment: No  Physical Therapy Consult: Yes  Occupational Therapy Consult: Yes  Speech Therapy Consult: Yes  Current Support Network: Own Home(Patient children and grandchildren lives with her)  Confirm Follow Up Transport: Family  Plan discussed with Pt/Family/Caregiver: Yes  Freedom of Choice Offered: Yes   Resource Information Provided?: No  Discharge Location  Discharge Placement: Home

## 2019-03-12 NOTE — PROGRESS NOTES
Discharge instructions  all new medications,medication side effects sheet, follow up appointment and  prescriptions reviewed and explained to the daughter over the phone and also reviewed with pateint in room. Patient/daughter verbalizes understanding of instructions. A copy of discharge instructions and prescriptions  have been given to patient. Opportunity for questions provided.  Daughter states she will be arranging a ride for pateint this afternoon as she is at work

## 2019-03-12 NOTE — PROGRESS NOTES
Patient referred to [facility] for RN CCM services. Per chart patient  Has declined services in the past. In hospital visit with patient this am. Patient resting in bed alert and oriented. RN CCM services discussed with patient. Patient in agreement for RN to outreach upon discharge. RRAT: 26-1 admit and 1 ED since 9/1/18. PCP- Dr. Sandy Luna, NP- [facility]  PMH: CVA, DM, DVT, Obesity, Depression  Plan: Patient to discharge home today with family. Link with RN CCM, Jackson Angela for NOAH LOWRY call and follow up. This note will not be viewable in [address]

## 2019-03-12 NOTE — DISCHARGE SUMMARY
Discharge Summary   Patient ID:  Ivan Linda  [id number]  12 y.o.  1945  Admit date: 3/8/2019  9:36 PM  Discharge date and time: 3/12/2019  Attending: Charlie Saunders MD  PCP:  Marizol Hutchison NP  Treatment Team: Attending Provider: Charlie Saunders MD; Consulting Provider: Whitney Saunders DO; Utilization Review: Rosa Strange RN; Care Manager: Yulisa Eli; Physical Therapist: Ambreen Lemus PTA; Occupational Therapy Assistant: Tariq Holder; Charge Nurse: Diane Pisano  Principal Diagnosis CVA (cerebral vascular accident) [facility])   Principal Problem:    CVA (cerebral vascular accident) ([facility] [address] (3/8/2019)    Active Problems:    DM (diabetes mellitus) ([address] (11/21/2013)      DVT, recurrent, lower extremity, chronic ([address] (11/21/2013)      HTN (hypertension) (11/21/2013)      Anxiety (11/21/2013)      Recurrent depression ([address] (1/12/2018)      Severe obesity with body mass index (BMI) of 35.0 to 39.9 with serious comorbidity ([address] (8/6/2018)      Third nerve palsy (3/8/2019)      Headache above the eye region (3/9/2019)       * Admission Diagnoses: CVA (cerebral vascular accident) ([address] [I63.9]  Third nerve palsy [H49.00]  CVA (cerebral vascular accident) ([address] [I63.9]  CVA (cerebral vascular accident) ([address] [I63.9]  * Discharge Diagnoses:    Hospital Problems as of 3/12/2019 Date Reviewed: 3/4/2019          Codes Class Noted - Resolved POA    Headache above the eye region ICD-10-CM: R51  ICD-9-CM: 784.0  3/9/2019 - Present Yes        Third nerve palsy ICD-10-CM: H49.00  ICD-9-CM: 378.51  3/8/2019 - Present Yes        * (Principal) CVA (cerebral vascular accident) [facility]) ICD-10-CM: I63.9  ICD-9-CM: 434.91  3/8/2019 - Present Yes        Severe obesity with body mass index (BMI) of 35.0 to 39.9 with serious comorbidity ([facility] ICD-10-CM: E66.01  ICD-9-CM: 278.01  8/6/2018 - Present Yes        Recurrent depression ([facility] ICD-10-CM: F33.9  ICD-9-CM: 296.30  1/12/2018 - Present Yes DM (diabetes mellitus) ([facility] ICD-10-CM: E11.9  ICD-9-CM: 250.00  11/21/2013 - Present Yes        DVT, recurrent, lower extremity, chronic ([facility] ICD-10-CM: I82.509  ICD-9-CM: 453.50  11/21/2013 - Present Yes        HTN (hypertension) ICD-10-CM: I10  ICD-9-CM: 401.9  11/21/2013 - Present Yes        Anxiety ICD-10-CM: F41.9  ICD-9-CM: 300.00  11/21/2013 - Present Yes                Hospital Course:    Mr. Taylor Payne is a 67 yo female with PMH of DM II A1C 10.2, anemia, anxiety, cataracts, depression, GERD, DVT/PE on eliquis, hyperlipidemia, HTN, peripheral neuropathy, CVA who presented with c/o left sided HA X1 week with facial edema and pressure behind left eye with ptosis a few days prior to admission. She reports she was evaluated by PCP, however I do not see documentation of this. Had stopped eliquis for dental procedure however restarted 3/6/19. Neurology consulted, pt dx with 3rd nerve palsy secondary to uncontrolled DM. Inflammatory markers normal to r/o temporal arteritis. CT head without acute findings. Unable to have MRI due to retained internal wires. Had repeat head CT 3/9 without acute findings. CTA head/neck showed extensive atherosclerotic changes of the intracranial vessels, notably and the cavernous right ICA and also the proximal left PCA. Echo with EF 55-60%, appeared to be no shunt. Diabetes educator met with pt, pt not following diabetic diet and non compliant at times with insulin. Left facial edema resolved. Continues to c/o HA behind left eye. Denies CP, SOB, n/v/d, abd pain.          Diagnostic Study/Procedure results summary copied from within [facility] EMR:    CT HEAD WITHOUT CONTRAST      HISTORY:  Headache.     COMPARISON: None     TECHNIQUE: Axial imaging was performed without intravenous contrast utilizing  5mm slice thickness. Sagittal and coronal reformats were performed. Radiation  dose reduction techniques were used for this study.  Our CT scanner uses one or  all of the following:     Automated exposure control, adjustment of the MAS or KUB according to patient's  size and iterative reconstruction.     FINDINGS:         *BRAIN:      -  There are no early signs of territorial or lacunar infarction by CT.     -  Symmetric supratentorial atrophy. -  For patient's age, the scattered areas of white matter hypodensities may  represent a chronic small vessel white matter ischemia. However this is  nonspecific.     *VISUALIZED PARANASAL SINUSES: Well aerated. *MASTOIDS:  Clear. *CALVARIUM AND SCALP: Unremarkable.        IMPRESSION  IMPRESSION:     No acute intracranial abnormalities.     Date of Dictation: 3/8/2019 9:43 PM       EXAM:  CTA HEAD     INDICATION:   third nerve palsy. Headache     COMPARISON:  None        TECHNIQUE:   Multislice helical CT angiography of the head was performed during uneventful  rapid bolus intravenous administration of contrast. Coronal and sagittal  reformations and MIP images and 3D shaded surface display renderings were  generated. CT dose reduction was achieved through use of a standardized  protocol tailored for this examination and automatic exposure control for dose  modulation.      FINDINGS:  CTA:     The vertebral arteries are codominant. Moderate-to-severe severe stenosis of the  V4 segment on the left. The basilar artery and its branches are normal.  Extensive atherosclerotic changes of the right ICA cavernous sinus region. Severe stenosis of the suprasellar distal ICA. The right MCA appears patent. 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 At least a moderate stenosis of  the distal left ICA. The MCA and DELMER are patent. . . . Fetal origin of the left  PCA. There is also high-grade stenosis of the proximal left PCA at the origin. .     IMPRESSION  IMPRESSION:  1. Extensive atherosclerotic changes of the intracranial vessels, notably and  the cavernous right ICA and also the proximal left PCA.    2. No obvious acute large vessel occlusion. .           Head CT     INDICATION: 3rd nerve palsy     Multiple axial images obtained through the brain without intravenous contrast.   Radiation dose reduction techniques were used for this study: All CT scans  performed at this facility use one or all of the following: Automated exposure  control, adjustment of the mA and/or kVp according to patient's size, iterative  reconstruction.     FINDINGS: No areas of abnormal attenuation are seen in the brain. There is no CT  evidence of acute hemorrhage or infarction. The ventricles are normal in size. There are no extra-axial fluid collections. No masses are seen. The sinuses are  clear. There are no bony lesions.     IMPRESSION  IMPRESSION: No CT evidence of acute intracranial abnormality.           Labs: Results:       Chemistry No results for input(s): GLU, NA, K, CL, CO2, BUN, CREA, CA, AGAP, BUCR, TBIL, TBILI, GPT, ALT, AP, TP, ALB, GLOB, AGRAT in the last 72 hours. CBC w/Diff No results for input(s): WBC, RBC, HGB, HCT, PLT, GRANS, LYMPH, EOS, HGBEXT, HCTEXT, PLTEXT in the last 72 hours. Cardiac Enzymes No results for input(s): CPK, CKND1, LETICIA in the last 72 hours. No lab exists for component: CKRMB, TROIP   Coagulation No results for input(s): PTP, INR, APTT in the last 72 hours. No lab exists for component: INREXT    Lipid Panel @BRIEFLAB(CHOL,CHOLPOCT,148630,[id number],CHOLX,CHOLP,CHLST,CHOLV,026522,HDL,HDLPOC,HDLPOCT,981399,NHDLCT,[id number],HDLC,HDLP,LDL,LDLPOCT,LDLCPOC,408498,NLDLCT,DLDL,LDLC,DLDLP,267749,VLDLC,VLDL,TGL,TGLX,TRIGL,[id number],TRIGP,TGLPOCT,991690,452188,CHHD,CHHDX)@   BNP No results for input(s): BNPP in the last 72 hours. Liver Enzymes No results for input(s): TP, ALB, TBIL, AP, SGOT, GPT in the last 72 hours.     No lab exists for component: DBIL   Thyroid Studies Lab Results   Component Value Date/Time    T4, Total 6.6 11/03/2017 11:47 AM    TSH 2.600 03/10/2019 05:00 AM            Discharge Exam:  Visit Vitals  /75 (BP 1 Location: Right arm, BP Patient Position: At rest)   Pulse 72   Temp 97.9 °F (36.6 °C)   Resp 18   Ht 5' 3\" (1.6 m)   Wt 96.6 kg (213 lb)   SpO2 96%   BMI 37.73 kg/m²       General:       No acute distress    Eyes:             Left eye with: ptosis, PERRLA, excessive tearing, no edema. Right eye without edema, able to open, PERRLA. Lungs:          CTA bilaterally. Resp even and nonlabored  Heart:            S1S2 present without murmurs rubs gallops. RRR. No edema  Abdomen:    Soft, non tender, non distended. BS present  Extremities: Moves ext spontaneously. No cyanosis. Neurologic:  A/O X4. Sensation intact. Facial expressions symmetrical. Bilateral UE/LE strength 5/5 no drift. Disposition: home  Discharge Condition: stable  Patient Instructions:   Current Discharge Medication List      START taking these medications    Details   insulin lispro (HUMALOG) 100 unit/mL injection 18 Units by SubCUTAneous route Before breakfast, lunch, and dinner. Qty: 1 Vial, Refills: 0      insulin glargine (LANTUS) 100 unit/mL injection 32 Units by SubCUTAneous route two (2) times a day. Qty: 1 Vial, Refills: 0      amLODIPine (NORVASC) 5 mg tablet Take 1 Tab by mouth daily. Qty: 30 Tab, Refills: 0      oxyCODONE IR (ROXICODONE) 10 mg tab immediate release tablet Take 1 Tab by mouth every six (6) hours as needed for Pain for up to 3 days. Max Daily Amount: 40 mg.  Qty: 12 Tab, Refills: 0    Associated Diagnoses: Headache above the eye region      naloxone [facility]) 4 mg/actuation nasal spray Use 1 spray intranasally, then discard. Repeat with new spray every 2 min as needed for opioid overdose symptoms, alternating nostrils. Qty: 1 Each, Refills: 1         CONTINUE these medications which have NOT CHANGED    Details   levothyroxine (SYNTHROID) 25 mcg tablet Take 1 Tab by mouth Daily (before breakfast).   Qty: 90 Tab, Refills: 1    Associated Diagnoses: Acquired hypothyroidism      fluticasone (FLONASE) 50 mcg/actuation nasal spray 1 puff in each nostril twice daily  Qty: 1 Bottle, Refills: 4    Associated Diagnoses: Environmental and seasonal allergies      atorvastatin (LIPITOR) 20 mg tablet Take 1 Tab by mouth daily. Qty: 90 Tab, Refills: 3    Associated Diagnoses: Uncontrolled type 2 diabetes mellitus with hyperglycemia, with long-term current use of insulin ([facility])      apixaban (ELIQUIS) 5 mg tablet Take 1 Tab by mouth two (2) times a day. Qty: 180 Tab, Refills: 1    Associated Diagnoses: Chronic deep vein thrombosis (DVT) of axillary vein of right upper extremity ([facility])      LORazepam (ATIVAN) 1 mg tablet Take 1 Tab by mouth nightly as needed for Anxiety. Max Daily Amount: 1 mg. Qty: 30 Tab, Refills: 2    Associated Diagnoses: Anxiety; Primary insomnia      albuterol (PROVENTIL HFA, VENTOLIN HFA, PROAIR HFA) 90 mcg/actuation inhaler Take 1 Puff by inhalation every six (6) hours as needed for Wheezing. Qty: 1 Inhaler, Refills: 1    Associated Diagnoses: Cough      glucose blood VI test strips (ONETOUCH VERIO) strip Check blood sugar BID Dx:E11.65  Qty: 200 Strip, Refills: 6    Associated Diagnoses: Uncontrolled type 2 diabetes mellitus with hyperglycemia, with long-term current use of insulin ([facility])      Blood-Glucose Meter monitoring kit Use as directed  Qty: 1 Kit, Refills: 0      Syringe with Needle, Disp, (TERUMO ALLERGY SYRINGE) 1 mL 27 x 1/2\" syrg 1cc 27G x 1/2\" qod #1 box  Qty: 100 Each, Refills: 3    Associated Diagnoses: Allergic rhinitis due to pollen; Allergic rhinitis due to animal (cat) (dog) hair and dander      multivitamin (ONE A DAY) tablet Take 1 Tab by mouth daily. B.infantis-B.ani-B.long-B.bifi (PROBIOTIC 4X) 10-15 mg TbEC Take  by mouth.          STOP taking these medications       insulin detemir U-100 (LEVEMIR FLEXTOUCH U-100 INSULN) 100 unit/mL (3 mL) inpn Comments:   Reason for Stopping:         insulin aspart U-100 (NOVOLOG FLEXPEN U-100 INSULIN) 100 unit/mL inpn Comments:   Reason for Stopping:         Insulin Needles, Disposable, (NOVOFINE 30) 30 gauge x 1/3\" Comments:   Reason for Stopping:         Insulin Syringe-Needle U-100 (BD INSULIN SYRINGE ULTRA-FINE) 1 mL 31 gauge x 5/16 syrg Comments:   Reason for Stopping:         aspirin (ASPIRIN) 325 mg tablet Comments:   Reason for Stopping:         omega-3 fatty acids-vitamin e (FISH OIL) 1,000 mg cap Comments:   Reason for Stopping:               Activity: No driving until cleared by Ophthalmology and Neurology  Diet: Cardiac Diet and Diabetic Diet  Wound Care: None needed      Full Code   Surrogate decision maker:  Daughter    Pneumonia and flu vaccine to be administered at discharge per hospital protocol     Follow-up  ·   DC with FU PCP 2-3 days  ·  DC with FU Neuro, spoke to NP and the office will call pt with appt  · Would benefit from Endocrine f/u on DC due to uncontrolled diabetes  · DC with FU with Opthalmology  · Discussed in depth compliance with insulin, diabetic diet  · DC with new rx of lantus and humalog, has had good BGL control while inpatient, pt does not want to change insulin regimen  · DC on norvasc for newly dx HTN  · DC with percocet for pain.  aware search indicated last controlled substance filled was ativan 3/4/19.   Narcan rx given as well  · Discussed no driving until cleared by Ophthalmology/Neurology      Notes, labs, VS, diagnostic testing reviewed  Case discussed with pt, care team, Dr. Tejinder Lozano      Time spent to discharge patient  45 min    Signed:  Salbador Amaya NP  3/12/2019  8:59 AM

## 2019-03-12 NOTE — PROGRESS NOTES
Problem: Patient Education: Go to Patient Education Activity  Goal: Patient/Family Education  Nutrition   Received consult for Diet Education \"diabetic diet education\" (Ciro Almazan NP)    Assessment:  Diet order: CCHO cardiac 2 gm Na  Food/Nutrition and Pertinent History: Pt with h/o DM, prior CVA, and HTN. Pt reports that she recently went on a ketogenic diet to help control her blood sugar at home. She has had some prior CCHO diet education in the past.  We briefly discussed diet guidelines of CCHO, DASH, and mediterranean diet. Pt reports a headache this morning. A1C 10.2 (3/4). Anthropometrics:Height: 5' 3\" (160 cm),  Weight: 96.6 kg (213 lb), Weight Source: Patient stated, Body mass index is 37.73 kg/m². BMI class of overweight for age >65 years. Macronutrient needs:  EER:  0182-8238 kcal /day (15-18 kcal/kg Actual BW)  EPR:   grams protein/day (1-1.2 grams/kg Actual BW)                                                                           Nutrition Diagnosis: Food and nutrition knowledge deficit r/t lack of understanding of previous education and/or food preferences as evidenced by pt with h/o DM, report of attempting a keto diet,and A1C >10. Intervention:   Meals and snacks: continue current diet  Education: Provided patient with written and verbal instruction on CCHO diet. Handouts provided: My Meal Plan CCHO diet, carbohydrate counting, My Plate, quick starts to [facility] diet. I discussed the benefits and risks of a Ketogenic diet. Discussed importance of a CCHO diet for blood sugar control and verbally education pt on benefits of DASH and mediterranean diet. Patient verbalizes understanding of diet. Expect fair compliance.      Katie Awan [address], [address] [place] [address], [address] [place] [address], [phone]

## 2019-03-12 NOTE — PROGRESS NOTES
Problem: Self Care Deficits Care Plan (Adult)  Goal: *Acute Goals and Plan of Care (Insert Text)  1. Patient will complete total body bathing and dressing with supervision and adaptive equipment as needed. 2. Patient will complete toileting with supervision. 3. Patient will tolerate 30 minutes of OT treatment with 2-3 rest breaks to increase activity tolerance for ADLs. 4. Patient will complete functional transfers with supervision and adaptive equipment as needed. 5. Patient will complete functional mobility for household distances with appropriate head turns and ability to maneuver through environment with supervision and minimal verbal cues. 6. Patient will participate in 10 minutes of visual exercises/activities to improve awareness of her environment for ADL/functional transfers. Timeframe: 7 visits        OCCUPATIONAL THERAPY: Daily Note and AM    3/12/2019  INPATIENT: OT Visit Days: 1  [place] / [place] / Product Type: Medicare /      NAME/AGE/GENDER: Lesley Mckoy is a 68 y.o. 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 Ca Cloud presents to the hospital with L sided headache, facial droop, and L ptosis due to third nerve palsy. Pt has hx of multiple CVAs in the past. Pt reports that typically she lives with multiple family members and is home alone from 7:30-3:30. Pt uses a cane or a rollator for functional mobility. Pt states she loses her balance posteriorly when standing up.  Pt completes all ADL with modified independence. 3/12/2019 Pt presents in supine upon arrival and agreeable to treatment. Pt transferred from supine to sit with SBA. Pt began to stand and started losing her balance posteriorly, but able to self correct. Therapist discussed with pt that since this is a pattern that she has as stated by her that she pace herself better during transitional movements. Pt completed functional mobility in the room and in the hallway with a rolling walker and SBA. Pt was somewhat short of breath after above and stated that she needed to rest and is not very active at baseline. Pt returned to supine with SBA. Pt is suppose to d/c today. Therapist discussed with CM ordering pt a swivel wheeled walker. Pt does not want any HH therapies. This section established at most recent assessment   PROBLEM LIST (Impairments causing functional limitations):  1. Decreased ADL/Functional Activities  2. Decreased Transfer Abilities  3. Decreased Ambulation Ability/Technique  4. Decreased Balance  5. Decreased Activity Tolerance  6. Decreased [place] with Home Exercise Program  7. Visual deficits   INTERVENTIONS PLANNED: (Benefits and precautions of occupational therapy have been discussed with the patient.)  1. Activities of daily living training  2. Adaptive equipment training  3. Balance training  4. Clothing management  5. Cognitive training  6. Neuromuscular re-eduation  7. Therapeutic activity  8. Therapeutic exercise     TREATMENT PLAN: Frequency/Duration: Follow patient 3 times per week to address above goals. Rehabilitation Potential For Stated Goals: Excellent     RECOMMENDED REHABILITATION/EQUIPMENT: (at time of discharge pending progress): Due to the probability of continued deficits (see above) this patient will likely need continued skilled occupational therapy after discharge.   Equipment:    TBD              OCCUPATIONAL PROFILE AND HISTORY:   History of Present Injury/Illness (Reason for Referral):  See H&P  Past Medical History/Comorbidities:   Ms. Sunday Larkin  has a past medical history of Allergic rhinitis, Anemia, Anxiety, Asthma, Cataracts, bilateral, Depression, Diabetes ([facility] [address] GERD (gastroesophageal reflux disease), History of DVT (deep vein thrombosis), History of pulmonary embolus (PE), Hypercholesterolemia, Hyperlipidemia, Hypertension, Insomnia, Internal hemorrhoids without mention of complication, Osteoarthritis, Peripheral neuropathy, Personal history of colonic polyps, Rectocele, and Stroke ([facility] [address] Ms. Sunday Larkin  has a past surgical history that includes hx orthopaedic; hx  section; hx appendectomy; hx cholecystectomy; hx partial hysterectomy; and endoscopy, colon, diagnostic (13). Social History/Living Environment:   Home Environment: Private residence  # Steps to Enter: 3  One/Two Story Residence: One story  Living Alone: Yes  Support Systems: Child(kanchan)  Patient Expects to be Discharged to[de-identified] Private residence  Current DME Used/Available at Home: Candelario Michael, straight, Walker, rollator  Tub or Shower Type: Shower  Prior Level of Function/Work/Activity:  Pt reports that typically she lives with multiple family members and is home alone from 7:30-3:30. Pt uses a cane or a rollator for functional mobility. Pt states she loses her balance posteriorly when standing up. Pt completes all ADL with modified independence. Personal Factors:          Social Background:  Home alone during the day        Other factors that influence how disability is experienced by the patient:  Multiple co-morbidities   Number of Personal Factors/Comorbidities that affect the Plan of Care: Expanded review of therapy/medical records (1-2):  MODERATE COMPLEXITY   ASSESSMENT OF OCCUPATIONAL PERFORMANCE[de-identified]   Activities of Daily Living:   Basic ADLs (From Assessment) Complex ADLs (From Assessment)   Feeding: Stand-by assistance  Oral Facial Hygiene/Grooming: Stand-by assistance  Bathing:  Moderate assistance  Upper Body Dressing: Minimum assistance  Lower Body Dressing: Moderate assistance  Toileting: Minimum assistance Instrumental ADL  Meal Preparation: Maximum assistance  Homemaking: Maximum assistance   Grooming/Bathing/Dressing Activities of Daily Living                             Bed/Mat Mobility  Sit to Stand: Contact guard assistance       Most Recent Physical Functioning:   Gross Assessment:                  Posture:     Balance:  Sitting: Intact  Standing: Impaired  Standing - Static: Fair  Standing - Dynamic : Fair Bed Mobility:     Wheelchair Mobility:     Transfers:  Sit to Stand: Contact guard assistance  Stand to Sit: Contact guard assistance            Patient Vitals for the past 6 hrs:   BP BP Patient Position SpO2 Pulse   03/12/19 0800 184/75 At rest 96 % 72       Mental Status  Neurologic State: Alert  Orientation Level: Oriented X4  Cognition: Follows commands  Perception: Appears intact  Perseveration: No perseveration noted  Safety/Judgement: Fall prevention, Home safety                          Physical Skills Involved:  1. Balance  2. Strength  3. Activity Tolerance  4. Vision  5. Pain (acute) Cognitive Skills Affected (resulting in the inability to perform in a timely and safe manner):  1. Perception Psychosocial Skills Affected:  1. Habits/Routines   Number of elements that affect the Plan of Care: 5+:  HIGH COMPLEXITY   CLINICAL DECISION MAKING:   [facility] -[facility]AC[facility] 6 Clicks   Daily Activity Inpatient Short Form  How much help from another person does the patient currently need. .. Total A Lot A Little None   1. Putting on and taking off regular lower body clothing? [] 1   [x] 2   [] 3   [] 4   2. Bathing (including washing, rinsing, drying)? [] 1   [x] 2   [] 3   [] 4   3. Toileting, which includes using toilet, bedpan or urinal?   [] 1   [] 2   [x] 3   [] 4   4. Putting on and taking off regular upper body clothing? [] 1   [] 2   [x] 3   [] 4   5.   Taking care of personal grooming such as brushing teeth? [] 1   [] 2   [x] 3   [] 4   6. Eating meals? [] 1   [] 2   [x] 3   [] 4   © 2007, Trustees of [address] [place] [address] [address] [address], under license to [employer]. All rights reserved      Score:  Initial: 16 Most Recent: X (Date: -- )    Interpretation of Tool:  Represents activities that are increasingly more difficult (i.e. Bed mobility, Transfers, Gait). Medical Necessity:     · Patient demonstrates good rehab potential due to higher previous functional level. Reason for Services/Other Comments:  · Patient continues to require skilled intervention due to decreased independence and safety with ADL/functional transfers. Use of outcome tool(s) and clinical judgement create a POC that gives a: LOW COMPLEXITY         TREATMENT:   (In addition to Assessment/Re-Assessment sessions the following treatments were rendered)     Pre-treatment Symptoms/Complaints:    Pain: Initial:   Pain Location 1: Head  Pain Intervention(s) 1: (RN administered pain medication)  Post Session:  same     Therapeutic Activity: (15 minutes): Therapeutic activities including Bed transfers and static/dynamic standing  to improve mobility, strength and balance. Required SBA to promote static and dynamic balance in standing. Braces/Orthotics/Lines/Etc:   · O2 Device: Room air  Treatment/Session Assessment:    · Response to Treatment:  Decreased activity tolerance  · Interdisciplinary Collaboration:   o Certified Occupational Therapy Assistant  o Registered Nurse  · After treatment position/precautions:   o Supine in bed  o Bed alarm/tab alert on  o Bed/Chair-wheels locked  o Bed in low position  o Call light within reach  o RN notified  o Side rails x 2   · Compliance with Program/Exercises: Will assess as treatment progresses. · Recommendations/Intent for next treatment session: \"Next visit will focus on advancements to more challenging activities and reduction in assistance provided\".   Total Treatment Duration:  OT Patient Time In/Time Out  Time In: 1018  Time Out: 107 [facility]

## 2019-03-12 NOTE — PROGRESS NOTES
CM followed up with patient regarding [facility] recommendations for PT/OT follow up after discharge. Patient declined these services. She reports she will go to stay in her daughter's home after discharge. She also declined OP therapy services stating she does not drive, and does not have anyone available to provide her with consistent transportation. No follow up services have been arranged. CM staffed case with hospitalist, TENISHA Garzon, who informed patient will discharge today.      Care Management Interventions  PCP Verified by CM: Yes(Parveen Franco,)  Mode of Transport at Discharge: (Family)  Transition of Care Consult (CM Consult): Discharge Planning  Discharge Durable Medical Equipment: No  Physical Therapy Consult: Yes  Occupational Therapy Consult: Yes  Speech Therapy Consult: Yes  Current Support Network: Own Home(Patient children and grandchildren lives with her)  Confirm Follow Up Transport: Family  Plan discussed with Pt/Family/Caregiver: Yes  Freedom of Choice Offered: Yes  [place] Resource Information Provided?: No  Discharge Location  Discharge Placement: Home

## 2019-03-12 NOTE — PROGRESS NOTES
Problem: Falls - Risk of  Goal: *Absence of Falls  Document Howie Fall Risk and appropriate interventions in the flowsheet.   Outcome: Progressing Towards Goal  Fall Risk Interventions:  Mobility Interventions: Bed/chair exit alarm, Patient to call before getting OOB, PT Consult for mobility concerns, PT Consult for assist device competence         Medication Interventions: Bed/chair exit alarm, Evaluate medications/consider consulting pharmacy, Patient to call before getting OOB    Elimination Interventions: Call light in reach, Patient to call for help with toileting needs

## 2019-03-13 ENCOUNTER — PATIENT OUTREACH (OUTPATIENT)
Dept: CASE MANAGEMENT | Age: 74
End: 2019-03-13

## 2019-03-13 LAB
BACTERIA SPEC CULT: NORMAL
BACTERIA SPEC CULT: NORMAL
SERVICE CMNT-IMP: NORMAL

## 2019-03-20 ENCOUNTER — PATIENT OUTREACH (OUTPATIENT)
Dept: CASE MANAGEMENT | Age: 74
End: 2019-03-20

## 2019-03-20 NOTE — PROGRESS NOTES
this am. She has kept her BG below 150. A few low readings reported; she is aware of 15-15 rule and uses banana for her \"fast carb. \" Using walker to walk inside the home. She reports being tired frequently, not able to resume normal activities as yet. Headaches continue, now unable to take oxycodone due to nausea. She is now taking Excedrin once a day. Encouraged her to use this with a full stomach. Daughter is assisting her as needed. No falls. Appetite better since stopping oxycodone. Plan: Outreach for [facility] one week.

## 2019-03-27 ENCOUNTER — PATIENT OUTREACH (OUTPATIENT)
Dept: CASE MANAGEMENT | Age: 74
End: 2019-03-27

## 2019-03-27 NOTE — PROGRESS NOTES
[facility] outreach: Mrs. Le reports her headaches are improved. No longer sharp pain, but dull now. Usually not requiring medication. She reports her vision has worsened in one eye. Has an appointment with eye clinic tomorrow which she will attend. Has transportation. She is also aware of her upcoming appointment with PCP. SMBG 114-130 fasting; usually >160 after meals. Plan: Continue to follow for [facility] 60 days. Next outreach one week.

## 2019-04-03 ENCOUNTER — PATIENT OUTREACH (OUTPATIENT)
Dept: CASE MANAGEMENT | Age: 74
End: 2019-04-03

## 2019-04-03 NOTE — PROGRESS NOTES
[facility] outreach post D/C for DKA. Ms. Angel Slater has seen PCP for follow-up. BG readings continue to fluctuate: after meals usually >160, but also running lows occasionally in 60s. She is checking her BG 4x daily and taking 2 basal and 3 correction injections daily. She reports eye sight with on improvement. No new concerns. Plan: follow for [facility] x 60 days through 5/12/19.

## 2019-04-16 ENCOUNTER — PATIENT OUTREACH (OUTPATIENT)
Dept: CASE MANAGEMENT | Age: 74
End: 2019-04-16

## 2019-04-16 NOTE — PROGRESS NOTES
[facility]RP outreach post D/C for DKA. Ms. Maryse Villalba has seen PCP for follow-up. 
  
BG readings continue to fluctuate: after meals usually >160, but also running lows occasionally in 60s. She has a 64 reading this morning following a breakfast of cheese and crackers, and taking her correction dose. 
  
She is checking her BG 4x daily and taking 2 basal and 3 correction injections daily. 
  
She reports her headaches are improved, eye sight with no improvement. 
  
No new concerns. 
  
Plan: follow for [facility] x 60 days through 5/12/19.

## 2019-05-01 ENCOUNTER — PATIENT OUTREACH (OUTPATIENT)
Dept: CASE MANAGEMENT | Age: 74
End: 2019-05-01

## 2019-05-01 NOTE — PROGRESS NOTES
Call returned by Mrs. Sage. She reports her left eye has begun to open some. She has an appointment with neuro tomorrow. Reports cold s/s this week which has increased her BG a bit. Taking mucinix. BG fasting this am 125. Post meals and HS always below 200, but a bit higher this week. Plan: continue to f/u for education on insulins, diet, monitoring. Through 5/27.

## 2019-05-29 ENCOUNTER — PATIENT OUTREACH (OUTPATIENT)
Dept: CASE MANAGEMENT | Age: 74
End: 2019-05-29

## 2019-05-29 NOTE — PROGRESS NOTES
SMBG: fasting range: 107-199  Lows before lunch 68, 69.twice in the last two weeks. None above 200. Knows to eat protein/carb with breakfast.  Approved for CGM    Double vision is improved. PLan: follow-up two weeks; look to discharge soon.

## 2019-06-11 ENCOUNTER — PATIENT OUTREACH (OUTPATIENT)
Dept: CASE MANAGEMENT | Age: 74
End: 2019-06-11

## 2019-06-18 ENCOUNTER — PATIENT OUTREACH (OUTPATIENT)
Dept: CASE MANAGEMENT | Age: 74
End: 2019-06-18

## 2019-06-18 NOTE — Clinical Note
Ernie England,Ms. Sage is running out of strips and hasn't heard anything about her CGM.  How can I assist?Regina

## 2019-06-18 NOTE — PROGRESS NOTES
SMBG: fasting range: 125-160  Lows before lunch 60 today, but she states she didn't eat enough breakfast this am.  None above 200. Knows to eat protein/carb with breakfast.  Approved for CGM but has not heard anything since; note to PCP       Plan: note to PCP to follow-up on CGM. F/U once this is received.

## 2019-08-19 ENCOUNTER — PATIENT OUTREACH (OUTPATIENT)
Dept: CASE MANAGEMENT | Age: 74
End: 2019-08-19

## 2019-08-19 NOTE — Clinical Note
[facility] was unable to process the order for Dexcom 5. Have you tried to send it to [facility] at [facility]?

## 2019-08-19 NOTE — PROGRESS NOTES
HRRP outreach to Ms. Sage. She reports her BG is better controlled. Fasting usually below 100  Afternoon usually around 140-160  Evening: between 100-150    She is using her insulins as ordered. Has not received her CGM as yet. Call placed to [facility] pharmacy, where order was sent. They are unable to process this order. Note sent to PCP.

## 2019-08-30 ENCOUNTER — PATIENT OUTREACH (OUTPATIENT)
Dept: CASE MANAGEMENT | Age: 74
End: 2019-08-30

## 2019-08-30 NOTE — PROGRESS NOTES
CCM follow-up. Chart review of new labs shows A1C reduction from 10.2% to 8.0%. Phone outreach; no answer. Will attempt again one week. Plan: Encourage for better glycemic control, check on CGM.

## 2019-09-01 ENCOUNTER — HOSPITAL ENCOUNTER (INPATIENT)
Age: 74
LOS: 2 days | Discharge: HOME | DRG: 123 | End: 2019-09-05
Attending: EMERGENCY MEDICINE | Admitting: INTERNAL MEDICINE
Payer: MEDICARE

## 2019-09-01 ENCOUNTER — APPOINTMENT (OUTPATIENT)
Dept: CT IMAGING | Age: 74
DRG: 123 | End: 2019-09-01
Attending: EMERGENCY MEDICINE
Payer: MEDICARE

## 2019-09-01 ENCOUNTER — APPOINTMENT (OUTPATIENT)
Dept: CT IMAGING | Age: 74
DRG: 123 | End: 2019-09-01
Attending: INTERNAL MEDICINE
Payer: MEDICARE

## 2019-09-01 DIAGNOSIS — Z79.4 UNCONTROLLED TYPE 2 DIABETES MELLITUS WITH HYPERGLYCEMIA, WITH LONG-TERM CURRENT USE OF INSULIN (HCC): ICD-10-CM

## 2019-09-01 DIAGNOSIS — R51.9 ACUTE FACIAL PAIN: ICD-10-CM

## 2019-09-01 DIAGNOSIS — H49.01 RIGHT OCULOMOTOR NERVE PALSY: ICD-10-CM

## 2019-09-01 DIAGNOSIS — H53.2 MONOCULAR DIPLOPIA OF RIGHT EYE: Primary | ICD-10-CM

## 2019-09-01 DIAGNOSIS — E11.65 UNCONTROLLED TYPE 2 DIABETES MELLITUS WITH HYPERGLYCEMIA, WITH LONG-TERM CURRENT USE OF INSULIN (HCC): ICD-10-CM

## 2019-09-01 PROBLEM — H02.843 SWELLING OF RIGHT EYELID: Status: ACTIVE | Noted: 2019-09-01

## 2019-09-01 LAB
ALBUMIN SERPL-MCNC: 3.5 G/DL (ref 3.2–4.6)
ALBUMIN/GLOB SERPL: 0.8 {RATIO} (ref 1.2–3.5)
ALP SERPL-CCNC: 133 U/L (ref 50–136)
ALT SERPL-CCNC: 31 U/L (ref 12–65)
ANION GAP SERPL CALC-SCNC: 5 MMOL/L (ref 7–16)
AST SERPL-CCNC: 24 U/L (ref 15–37)
ATRIAL RATE: 85 BPM
BASOPHILS # BLD: 0 K/UL (ref 0–0.2)
BASOPHILS NFR BLD: 1 % (ref 0–2)
BILIRUB SERPL-MCNC: 0.6 MG/DL (ref 0.2–1.1)
BUN SERPL-MCNC: 18 MG/DL (ref 8–23)
CALCIUM SERPL-MCNC: 8.9 MG/DL (ref 8.3–10.4)
CALCULATED P AXIS, ECG09: 65 DEGREES
CALCULATED R AXIS, ECG10: -153 DEGREES
CALCULATED T AXIS, ECG11: 54 DEGREES
CHLORIDE SERPL-SCNC: 107 MMOL/L (ref 98–107)
CO2 SERPL-SCNC: 29 MMOL/L (ref 21–32)
CREAT SERPL-MCNC: 0.91 MG/DL (ref 0.6–1)
CRP SERPL-MCNC: <0.3 MG/DL (ref 0–0.9)
DIAGNOSIS, 93000: NORMAL
DIFFERENTIAL METHOD BLD: ABNORMAL
EOSINOPHIL # BLD: 0.1 K/UL (ref 0–0.8)
EOSINOPHIL NFR BLD: 2 % (ref 0.5–7.8)
ERYTHROCYTE [DISTWIDTH] IN BLOOD BY AUTOMATED COUNT: 14.6 % (ref 11.9–14.6)
ERYTHROCYTE [SEDIMENTATION RATE] IN BLOOD: 26 MM/HR (ref 0–30)
EST. AVERAGE GLUCOSE BLD GHB EST-MCNC: 174 MG/DL
GLOBULIN SER CALC-MCNC: 4.3 G/DL (ref 2.3–3.5)
GLUCOSE BLD STRIP.AUTO-MCNC: 114 MG/DL (ref 65–100)
GLUCOSE BLD STRIP.AUTO-MCNC: 94 MG/DL (ref 65–100)
GLUCOSE SERPL-MCNC: 202 MG/DL (ref 65–100)
HBA1C MFR BLD: 7.7 % (ref 4.8–6)
HCT VFR BLD AUTO: 46.4 % (ref 35.8–46.3)
HGB BLD-MCNC: 14.3 G/DL (ref 11.7–15.4)
IMM GRANULOCYTES # BLD AUTO: 0 K/UL (ref 0–0.5)
IMM GRANULOCYTES NFR BLD AUTO: 1 % (ref 0–5)
INR PPP: 1.2
LYMPHOCYTES # BLD: 2.3 K/UL (ref 0.5–4.6)
LYMPHOCYTES NFR BLD: 40 % (ref 13–44)
MCH RBC QN AUTO: 25.7 PG (ref 26.1–32.9)
MCHC RBC AUTO-ENTMCNC: 30.8 G/DL (ref 31.4–35)
MCV RBC AUTO: 83.5 FL (ref 79.6–97.8)
MONOCYTES # BLD: 0.4 K/UL (ref 0.1–1.3)
MONOCYTES NFR BLD: 6 % (ref 4–12)
NEUTS SEG # BLD: 3 K/UL (ref 1.7–8.2)
NEUTS SEG NFR BLD: 51 % (ref 43–78)
NRBC # BLD: 0 K/UL (ref 0–0.2)
P-R INTERVAL, ECG05: 154 MS
PLATELET # BLD AUTO: 344 K/UL (ref 150–450)
PMV BLD AUTO: 9.1 FL (ref 9.4–12.3)
POTASSIUM SERPL-SCNC: 3.9 MMOL/L (ref 3.5–5.1)
PROT SERPL-MCNC: 7.8 G/DL (ref 6.3–8.2)
PROTHROMBIN TIME: 15.1 SEC (ref 11.7–14.5)
Q-T INTERVAL, ECG07: 372 MS
QRS DURATION, ECG06: 82 MS
QTC CALCULATION (BEZET), ECG08: 442 MS
RBC # BLD AUTO: 5.56 M/UL (ref 4.05–5.2)
SODIUM SERPL-SCNC: 141 MMOL/L (ref 136–145)
TROPONIN I SERPL-MCNC: <0.02 NG/ML (ref 0.02–0.05)
VENTRICULAR RATE, ECG03: 85 BPM
WBC # BLD AUTO: 5.8 K/UL (ref 4.3–11.1)

## 2019-09-01 PROCEDURE — 80053 COMPREHEN METABOLIC PANEL: CPT

## 2019-09-01 PROCEDURE — 74011250636 HC RX REV CODE- 250/636: Performed by: HOSPITALIST

## 2019-09-01 PROCEDURE — 74011636637 HC RX REV CODE- 636/637: Performed by: INTERNAL MEDICINE

## 2019-09-01 PROCEDURE — 74011636320 HC RX REV CODE- 636/320: Performed by: INTERNAL MEDICINE

## 2019-09-01 PROCEDURE — 74011000258 HC RX REV CODE- 258: Performed by: INTERNAL MEDICINE

## 2019-09-01 PROCEDURE — 70450 CT HEAD/BRAIN W/O DYE: CPT

## 2019-09-01 PROCEDURE — 83036 HEMOGLOBIN GLYCOSYLATED A1C: CPT

## 2019-09-01 PROCEDURE — 74011250636 HC RX REV CODE- 250/636: Performed by: INTERNAL MEDICINE

## 2019-09-01 PROCEDURE — 84484 ASSAY OF TROPONIN QUANT: CPT

## 2019-09-01 PROCEDURE — 85652 RBC SED RATE AUTOMATED: CPT

## 2019-09-01 PROCEDURE — 77030020263 HC SOL INJ SOD CL0.9% LFCR 1000ML

## 2019-09-01 PROCEDURE — 85025 COMPLETE CBC W/AUTO DIFF WBC: CPT

## 2019-09-01 PROCEDURE — 85610 PROTHROMBIN TIME: CPT

## 2019-09-01 PROCEDURE — 99285 EMERGENCY DEPT VISIT HI MDM: CPT | Performed by: EMERGENCY MEDICINE

## 2019-09-01 PROCEDURE — 93005 ELECTROCARDIOGRAM TRACING: CPT | Performed by: EMERGENCY MEDICINE

## 2019-09-01 PROCEDURE — 74011250637 HC RX REV CODE- 250/637: Performed by: INTERNAL MEDICINE

## 2019-09-01 PROCEDURE — 77030040361 HC SLV COMPR DVT MDII -B

## 2019-09-01 PROCEDURE — 82962 GLUCOSE BLOOD TEST: CPT

## 2019-09-01 PROCEDURE — 99218 HC RM OBSERVATION: CPT

## 2019-09-01 PROCEDURE — 86140 C-REACTIVE PROTEIN: CPT

## 2019-09-01 PROCEDURE — 70496 CT ANGIOGRAPHY HEAD: CPT

## 2019-09-01 RX ORDER — ATORVASTATIN CALCIUM 40 MG/1
40 TABLET, FILM COATED ORAL
Status: DISCONTINUED | OUTPATIENT
Start: 2019-09-01 | End: 2019-09-05 | Stop reason: HOSPADM

## 2019-09-01 RX ORDER — LEVOTHYROXINE SODIUM 50 UG/1
25 TABLET ORAL
Status: DISCONTINUED | OUTPATIENT
Start: 2019-09-02 | End: 2019-09-05 | Stop reason: HOSPADM

## 2019-09-01 RX ORDER — INSULIN GLARGINE 100 [IU]/ML
25 INJECTION, SOLUTION SUBCUTANEOUS
Status: DISCONTINUED | OUTPATIENT
Start: 2019-09-01 | End: 2019-09-05 | Stop reason: HOSPADM

## 2019-09-01 RX ORDER — INSULIN LISPRO 100 [IU]/ML
20 INJECTION, SOLUTION INTRAVENOUS; SUBCUTANEOUS
Status: DISCONTINUED | OUTPATIENT
Start: 2019-09-01 | End: 2019-09-05 | Stop reason: HOSPADM

## 2019-09-01 RX ORDER — FLUTICASONE PROPIONATE 50 MCG
2 SPRAY, SUSPENSION (ML) NASAL DAILY
Status: DISCONTINUED | OUTPATIENT
Start: 2019-09-02 | End: 2019-09-05 | Stop reason: HOSPADM

## 2019-09-01 RX ORDER — GUAIFENESIN 100 MG/5ML
81 LIQUID (ML) ORAL DAILY
Status: DISCONTINUED | OUTPATIENT
Start: 2019-09-02 | End: 2019-09-05 | Stop reason: HOSPADM

## 2019-09-01 RX ORDER — ATORVASTATIN CALCIUM 10 MG/1
20 TABLET, FILM COATED ORAL DAILY
Status: DISCONTINUED | OUTPATIENT
Start: 2019-09-02 | End: 2019-09-01

## 2019-09-01 RX ORDER — SODIUM CHLORIDE 0.9 % (FLUSH) 0.9 %
5-40 SYRINGE (ML) INJECTION AS NEEDED
Status: DISCONTINUED | OUTPATIENT
Start: 2019-09-01 | End: 2019-09-05 | Stop reason: HOSPADM

## 2019-09-01 RX ORDER — ALBUTEROL SULFATE 0.83 MG/ML
2.5 SOLUTION RESPIRATORY (INHALATION)
Status: DISCONTINUED | OUTPATIENT
Start: 2019-09-01 | End: 2019-09-05 | Stop reason: HOSPADM

## 2019-09-01 RX ORDER — VANCOMYCIN/0.9 % SOD CHLORIDE 1.5G/250ML
1500 PLASTIC BAG, INJECTION (ML) INTRAVENOUS EVERY 24 HOURS
Status: DISCONTINUED | OUTPATIENT
Start: 2019-09-01 | End: 2019-09-01

## 2019-09-01 RX ORDER — SODIUM CHLORIDE 0.9 % (FLUSH) 0.9 %
5-40 SYRINGE (ML) INJECTION EVERY 8 HOURS
Status: DISCONTINUED | OUTPATIENT
Start: 2019-09-01 | End: 2019-09-05 | Stop reason: HOSPADM

## 2019-09-01 RX ORDER — ONDANSETRON 2 MG/ML
4 INJECTION INTRAMUSCULAR; INTRAVENOUS
Status: DISCONTINUED | OUTPATIENT
Start: 2019-09-01 | End: 2019-09-05 | Stop reason: HOSPADM

## 2019-09-01 RX ORDER — ACETAMINOPHEN 500 MG
500 TABLET ORAL
Status: DISCONTINUED | OUTPATIENT
Start: 2019-09-01 | End: 2019-09-05 | Stop reason: HOSPADM

## 2019-09-01 RX ORDER — SODIUM CHLORIDE 0.9 % (FLUSH) 0.9 %
10 SYRINGE (ML) INJECTION
Status: COMPLETED | OUTPATIENT
Start: 2019-09-01 | End: 2019-09-01

## 2019-09-01 RX ORDER — VANCOMYCIN/0.9 % SOD CHLORIDE 1.5G/250ML
1500 PLASTIC BAG, INJECTION (ML) INTRAVENOUS EVERY 24 HOURS
Status: DISCONTINUED | OUTPATIENT
Start: 2019-09-02 | End: 2019-09-02

## 2019-09-01 RX ORDER — AMLODIPINE BESYLATE 5 MG/1
5 TABLET ORAL DAILY
Status: DISCONTINUED | OUTPATIENT
Start: 2019-09-02 | End: 2019-09-05 | Stop reason: HOSPADM

## 2019-09-01 RX ORDER — SODIUM CHLORIDE 9 MG/ML
75 INJECTION, SOLUTION INTRAVENOUS CONTINUOUS
Status: DISCONTINUED | OUTPATIENT
Start: 2019-09-01 | End: 2019-09-02

## 2019-09-01 RX ORDER — INSULIN GLARGINE 100 [IU]/ML
35 INJECTION, SOLUTION SUBCUTANEOUS DAILY
Status: DISCONTINUED | OUTPATIENT
Start: 2019-09-02 | End: 2019-09-05 | Stop reason: HOSPADM

## 2019-09-01 RX ORDER — INSULIN LISPRO 100 [IU]/ML
INJECTION, SOLUTION INTRAVENOUS; SUBCUTANEOUS
Status: DISCONTINUED | OUTPATIENT
Start: 2019-09-01 | End: 2019-09-05 | Stop reason: HOSPADM

## 2019-09-01 RX ORDER — TRAMADOL HYDROCHLORIDE 50 MG/1
50 TABLET ORAL
Status: DISCONTINUED | OUTPATIENT
Start: 2019-09-01 | End: 2019-09-05 | Stop reason: HOSPADM

## 2019-09-01 RX ADMIN — ONDANSETRON 4 MG: 2 INJECTION INTRAMUSCULAR; INTRAVENOUS at 22:20

## 2019-09-01 RX ADMIN — VANCOMYCIN HYDROCHLORIDE 2500 MG: 10 INJECTION, POWDER, LYOPHILIZED, FOR SOLUTION INTRAVENOUS at 17:19

## 2019-09-01 RX ADMIN — PIPERACILLIN SODIUM,TAZOBACTAM SODIUM 3.38 G: 3; .375 INJECTION, POWDER, FOR SOLUTION INTRAVENOUS at 22:28

## 2019-09-01 RX ADMIN — IOPAMIDOL 100 ML: 755 INJECTION, SOLUTION INTRAVENOUS at 15:34

## 2019-09-01 RX ADMIN — PIPERACILLIN SODIUM,TAZOBACTAM SODIUM 3.38 G: 3; .375 INJECTION, POWDER, FOR SOLUTION INTRAVENOUS at 16:14

## 2019-09-01 RX ADMIN — APIXABAN 5 MG: 5 TABLET, FILM COATED ORAL at 17:14

## 2019-09-01 RX ADMIN — TRAMADOL HYDROCHLORIDE 50 MG: 50 TABLET, FILM COATED ORAL at 16:14

## 2019-09-01 RX ADMIN — Medication 5 ML: at 22:28

## 2019-09-01 RX ADMIN — Medication 10 ML: at 15:34

## 2019-09-01 RX ADMIN — Medication 10 ML: at 15:01

## 2019-09-01 RX ADMIN — SODIUM CHLORIDE 100 ML: 900 INJECTION, SOLUTION INTRAVENOUS at 15:34

## 2019-09-01 RX ADMIN — INSULIN GLARGINE 25 UNITS: 100 INJECTION, SOLUTION SUBCUTANEOUS at 22:25

## 2019-09-01 RX ADMIN — SODIUM CHLORIDE 75 ML/HR: 900 INJECTION, SOLUTION INTRAVENOUS at 15:01

## 2019-09-01 RX ADMIN — INSULIN LISPRO 20 UNITS: 100 INJECTION, SOLUTION INTRAVENOUS; SUBCUTANEOUS at 19:18

## 2019-09-01 RX ADMIN — ATORVASTATIN CALCIUM 40 MG: 40 TABLET, FILM COATED ORAL at 22:27

## 2019-09-01 NOTE — ED PROVIDER NOTES
Several days she has had a headache. Yesterday she noticed some right eye abnormalities. This is progressed since yesterday morning. She states that she sees double with right eye. Is also some progressive droop to the right eye. On further review she actually feels better when she keeps her right eye closed. She has had a stroke within the last 12 months. Noticed no facial rash. She has no vertigo. No fever with this. No sudden or severe onset of symptoms. The history is provided by the patient. Headache    This is a new problem. The current episode started more than 2 days ago. The problem occurs constantly. The problem has been gradually worsening. The pain is located in the right unilateral region. The pain is moderate. Pertinent negatives include no fever. She has tried nothing for the symptoms.         Past Medical History:   Diagnosis Date    Allergic rhinitis     Anemia     Anxiety     Asthma     Cataracts, bilateral     Depression     Diabetes (HCC)     GERD (gastroesophageal reflux disease)     History of DVT (deep vein thrombosis)     History of pulmonary embolus (PE)     Hypercholesterolemia     Hyperlipidemia     Hypertension     Insomnia     Internal hemorrhoids without mention of complication     Osteoarthritis     Peripheral neuropathy     Personal history of colonic polyps     Rectocele     Stroke [facility] [address]     Stroke [facility])        Past Surgical History:   Procedure Laterality Date    ENDOSCOPY, COLON, DIAGNOSTIC  13    Laurent--no polyps--5 year recall    HX APPENDECTOMY      OPEN    HX  SECTION      x2    HX CHOLECYSTECTOMY      OPEN    HX ORTHOPAEDIC      BACK X3    HX PARTIAL HYSTERECTOMY           Family History:   Problem Relation Age of Onset    Heart Disease Mother     Diabetes Mother     Heart Disease Father     Diabetes Brother        Social History     Socioeconomic History    Marital status:      Spouse name: Not on file    Number of children: Not on file    Years of education: Not on file    Highest education level: Not on file   Occupational History    Not on file   Social Needs    Financial resource strain: Not on file    Food insecurity:     Worry: Not on file     Inability: Not on file    Transportation needs:     Medical: Not on file     Non-medical: Not on file   Tobacco Use    Smoking status: Never Smoker    Smokeless tobacco: Never Used   Substance and Sexual Activity    Alcohol use: No    Drug use: No    Sexual activity: Not on file   Lifestyle    Physical activity:     Days per week: Not on file     Minutes per session: Not on file    Stress: Not on file   Relationships    Social connections:     Talks on phone: Not on file     Gets together: Not on file     Attends Quaker service: Not on file     Active member of club or organization: Not on file     Attends meetings of clubs or organizations: Not on file     Relationship status: Not on file    Intimate partner violence:     Fear of current or ex partner: Not on file     Emotionally abused: Not on file     Physically abused: Not on file     Forced sexual activity: Not on file   Other Topics Concern    Not on file   Social History Narrative    Not on file         ALLERGIES: Codeine; Morphine; and Oxycodone    Review of Systems   Constitutional: Negative for fever. HENT: Negative for sinus pressure and sinus pain. Eyes: Positive for pain and visual disturbance. Negative for discharge and redness. Gastrointestinal: Negative. Neurological: Positive for headaches. All other systems reviewed and are negative. Vitals:    09/01/19 0842   BP: 168/77   Pulse: 98   Resp: 16   Temp: 98.1 °F (36.7 °C)   SpO2: 96%   Weight: 95.3 kg (210 lb)   Height: 5' 3\" (1.6 m)            Physical Exam   Constitutional: She appears well-developed and well-nourished. No distress. HENT:   Head: Atraumatic.    Moderate reactive skin but no overt shingle-like changes Eyes: Pupils are equal, round, and reactive to light. EOM are normal. No scleral icterus. Changes from prior cataract surgery, tends to keep right eye closed but able to open and take through normal range of motion. Most minimally puffy upper lid on the right   Neck: Neck supple. Cardiovascular: Normal rate. Pulmonary/Chest: Effort normal. No respiratory distress. Abdominal: Soft. Musculoskeletal: Normal range of motion. Neurological: She is alert. No sensory deficit. She exhibits normal muscle tone. Skin: Skin is warm and dry. No facial rash   Psychiatric: Thought content normal.   Nursing note and vitals reviewed. MDM  Number of Diagnoses or Management Options  Diagnosis management comments: Unusual case. Right eye abnl and pain V1 no rash or redness but sore to touch there. Will seek admission as not clear and need more complete rule out of neuro. Possible anti-virals in addition to antibiotics       Amount and/or Complexity of Data Reviewed  Clinical lab tests: reviewed and ordered  Tests in the radiology section of CPT®: reviewed  Discuss the patient with other providers: yes  Independent visualization of images, tracings, or specimens: yes    Risk of Complications, Morbidity, and/or Mortality  Presenting problems: high  Diagnostic procedures: low  Management options: moderate           Procedures    Signed Date/Time  Phone Pager   Lillian Going 9/01/2019 10:05 [phone]    Exam Information     Status Exam Begun  Exam Ended    Final [99] 9/01/2019 10:00 9/01/2019 10:03   Study Result     CT HEAD WITHOUT CONTRAST      HISTORY:  Headache.     COMPARISON: 3/9/2019     TECHNIQUE: Axial imaging was performed without intravenous contrast utilizing  5mm slice thickness. Sagittal and coronal reformats were performed. Radiation  dose reduction techniques were used for this study.  Our CT scanner uses one or  all of the following:     Automated exposure control, adjustment of the MAS or KUB according to patient's  size and iterative reconstruction.     FINDINGS:         *BRAIN:      -  There are no early signs of territorial or lacunar infarction by CT.     -  No intracranial mass, hematoma, or hydrocephalus. -  For patient's age, the scattered areas of white matter hypodensities may  represent a chronic small vessel white matter ischemia. However this is  nonspecific.     *VISUALIZED PARANASAL SINUSES: Well aerated. *MASTOIDS:  Clear. *CALVARIUM AND SCALP: Right preseptal orbital soft tissue swelling.        IMPRESSION  IMPRESSION:     No acute intracranial abnormalities.     Right preseptal orbital soft tissue swelling.     Date of Dictation: 9/1/2019 10:04 AM        Recent Results (from the past 12 hour(s))   CBC WITH AUTOMATED DIFF    Collection Time: 09/01/19  8:44 AM   Result Value Ref Range    WBC 5.8 4.3 - 11.1 K/uL    RBC 5.56 (H) 4.05 - 5.2 M/uL    HGB 14.3 11.7 - 15.4 g/dL    HCT 46.4 (H) 35.8 - 46.3 %    MCV 83.5 79.6 - 97.8 FL    MCH 25.7 (L) 26.1 - 32.9 PG    MCHC 30.8 (L) 31.4 - 35.0 g/dL    RDW 14.6 11.9 - 14.6 %    PLATELET 419 766 - 443 K/uL    MPV 9.1 (L) 9.4 - 12.3 FL    ABSOLUTE NRBC 0.00 0.0 - 0.2 K/uL    DF AUTOMATED      NEUTROPHILS 51 43 - 78 %    LYMPHOCYTES 40 13 - 44 %    MONOCYTES 6 4.0 - 12.0 %    EOSINOPHILS 2 0.5 - 7.8 %    BASOPHILS 1 0.0 - 2.0 %    IMMATURE GRANULOCYTES 1 0.0 - 5.0 %    ABS. NEUTROPHILS 3.0 1.7 - 8.2 K/UL    ABS. LYMPHOCYTES 2.3 0.5 - 4.6 K/UL    ABS. MONOCYTES 0.4 0.1 - 1.3 K/UL    ABS. EOSINOPHILS 0.1 0.0 - 0.8 K/UL    ABS. BASOPHILS 0.0 0.0 - 0.2 K/UL    ABS. IMM.  GRANS. 0.0 0.0 - 0.5 K/UL   METABOLIC PANEL, COMPREHENSIVE    Collection Time: 09/01/19  8:44 AM   Result Value Ref Range    Sodium 141 136 - 145 mmol/L    Potassium 3.9 3.5 - 5.1 mmol/L    Chloride 107 98 - 107 mmol/L    CO2 29 21 - 32 mmol/L    Anion gap 5 (L) 7 - 16 mmol/L    Glucose 202 (H) 65 - 100 mg/dL    BUN 18 8 - 23 MG/DL    Creatinine 0.91 0.6 - 1.0 MG/DL    GFR est AA >60 >60 ml/min/1.73m2    GFR est non-AA >60 >60 ml/min/1.73m2    Calcium 8.9 8.3 - 10.4 MG/DL    Bilirubin, total 0.6 0.2 - 1.1 MG/DL    ALT (SGPT) 31 12 - 65 U/L    AST (SGOT) 24 15 - 37 U/L    Alk.  phosphatase 133 50 - 136 U/L    Protein, total 7.8 6.3 - 8.2 g/dL    Albumin 3.5 3.2 - 4.6 g/dL    Globulin 4.3 (H) 2.3 - 3.5 g/dL    A-G Ratio 0.8 (L) 1.2 - 3.5     TROPONIN I    Collection Time: 09/01/19  8:44 AM   Result Value Ref Range    Troponin-I, Qt. <0.02 (L) 0.02 - 0.05 NG/ML   PROTHROMBIN TIME + INR    Collection Time: 09/01/19  8:44 AM   Result Value Ref Range    Prothrombin time 15.1 (H) 11.7 - 14.5 sec    INR 1.2

## 2019-09-01 NOTE — PROGRESS NOTES
Pharmacokinetic Consult to Pharmacist    Mikael Sebastian is a 69 y.o. female being treated for ? R eyelid infection with vancomycin and zosyn. Height: 5' 3\" (160 cm)  Weight: 95.3 kg (210 lb)  Lab Results   Component Value Date/Time    BUN 18 09/01/2019 08:44 AM    Creatinine 0.91 09/01/2019 08:44 AM    WBC 5.8 09/01/2019 08:44 AM      Estimated Creatinine Clearance: 60.5 mL/min (based on SCr of 0.91 mg/dL). Day 1 of vancomycin. Goal trough is 10-20. Dosing started with 2.5g x 1 and then 1.5g q24h  Will continue to follow patient. Thank you,  Thuy Garnica, Pharm. D.   Clinical Pharmacist  [phone]

## 2019-09-01 NOTE — PROGRESS NOTES
TRANSFER - IN REPORT:    Verbal report received from Evan Medina RN(name) on Shira Flynn  being received from ER(unit) for routine progression of care      Report consisted of patients Situation, Background, Assessment and   Recommendations(SBAR). Information from the following report(s) SBAR, Kardex, ED Summary, [facility] and Recent Results was reviewed with the receiving nurse. Opportunity for questions and clarification was provided. Assessment completed upon patients arrival to unit and care assumed.

## 2019-09-01 NOTE — ED NOTES
TRANSFER - OUT REPORT:    Verbal report given to Paulette(name) on Mayte Wang  being transferred to 220(unit) for routine progression of care       Report consisted of patients Situation, Background, Assessment and   Recommendations(SBAR). Information from the following report(s) ED Summary was reviewed with the receiving nurse. Lines:       Opportunity for questions and clarification was provided.       Patient transported with:  Transport

## 2019-09-01 NOTE — H&P
Hospitalist H&P/Consult Note     Admit Date:  2019  9:21 AM   Name:  Edison Boland   Age:  69 y.o.  :  1945   MRN:  [id number]   PCP:  Sonia Meraz NP  Treatment Team: Attending Provider: Renetta Reese MD; Primary Nurse: Vicki Neal RN    HPI:   69years old F with PMH of DM, CVA 10 years ago presented to the hospital complaining of swelling of R eyelid and HA started yesterday. Patient reported HA is on R forehead, non radiated, 5/10 intensity, on and off, alleviated by pain medication. Patient reported having associated R eyelid swelling and drop. Patient also having double vision. Patient denies fever or pain with eye movement. Patient reported having a similar presentation on her L eye early this year. Patient went to eye doctor who told her condition will improve by itself. It took two months for her L eye to be back baseline. Patient reported she has \"Nerve stimulator for back pain\", She was told cannot have MRI. In the ED CT brain showing Right preseptal orbital soft tissue swelling. 10 systems reviewed and negative except as noted in HPI.   Past Medical History:   Diagnosis Date    Allergic rhinitis     Anemia     Anxiety     Asthma     Cataracts, bilateral     Depression     Diabetes (HCC)     GERD (gastroesophageal reflux disease)     History of DVT (deep vein thrombosis)     History of pulmonary embolus (PE)     Hypercholesterolemia     Hyperlipidemia     Hypertension     Insomnia     Internal hemorrhoids without mention of complication     Osteoarthritis     Peripheral neuropathy     Personal history of colonic polyps     Rectocele     Stroke ([facility] [address]     Stroke [facility])       Past Surgical History:   Procedure Laterality Date    ENDOSCOPY, COLON, DIAGNOSTIC  13    Laurent--no polyps--5 year recall    HX APPENDECTOMY      OPEN    HX  SECTION      x2    HX CHOLECYSTECTOMY      OPEN    HX ORTHOPAEDIC      BACK X3    HX PARTIAL HYSTERECTOMY        Prior to Admission Medications   Prescriptions Last Dose Informant Patient Reported? Taking? B.infantis-B.ani-B.long-B.bifi (PROBIOTIC 4X) 10-15 mg TbEC   Yes No   Sig: Take  by mouth. Blood-Glucose Meter monitoring kit   No No   Sig: Use as directed   Blood-Glucose Sensor (DEXCOM G5-G4 SENSOR) jacob   No No   Sig: Change sensor every 7 days   Blood-Glucose Transmitter (DEXCOM G5 TRANSMITTER) jacob   No No   Sig: To monitor bgl continuously to adjust insulin and monitor for hypoglycemia   Insulin Needles, Disposable, 31 gauge x 5/16\" ndle   No No   Si Pen Needle by SubCUTAneous route four (4) times daily. LORazepam (ATIVAN) 1 mg tablet   No No   Sig: Take 1 Tab by mouth nightly as needed for Anxiety. Max Daily Amount: 1 mg. NOVOLOG FLEXPEN U-100 INSULIN 100 unit/mL (3 mL) inpn   No No   Si Units by SubCUTAneous route Before breakfast, lunch, dinner and at bedtime. Adjust per sliding scale as appropriate   Syringe with Needle, Disp, ([employer] ALLERGY SYRINGE) 1 mL 27 x 1/2\" syrg   No No   Sicc 27G x 1/2\" qod #1 box   albuterol (PROVENTIL HFA, VENTOLIN HFA, PROAIR HFA) 90 mcg/actuation inhaler   No No   Sig: Take 1 Puff by inhalation every six (6) hours as needed for Wheezing. amLODIPine (NORVASC) 5 mg tablet   No No   Sig: Take 1 Tab by mouth daily. apixaban (ELIQUIS) 5 mg tablet   No No   Sig: Take 1 Tab by mouth two (2) times a day. atorvastatin (LIPITOR) 20 mg tablet   No No   Sig: Take 1 Tab by mouth daily. fluticasone propionate (FLONASE) 50 mcg/actuation nasal spray   No No   Si puff in each nostril twice daily   glucose blood VI test strips (ONETOUCH VERIO) strip   No No   Sig: Check blood sugar 4 times daily  Dx:E11.65   insulin glargine (LANTUS,BASAGLAR) 100 unit/mL (3 mL) inpn   No No   Si units am; 25 units pm   levothyroxine (SYNTHROID) 25 mcg tablet   No No   Sig: Take 1 Tab by mouth Daily (before breakfast).    multivitamin (ONE A DAY) tablet   Yes No   Sig: Take 1 Tab by mouth daily. naloxone (NARCAN) 4 mg/actuation nasal spray   No No   Sig: Use 1 spray intranasally, then discard. Repeat with new spray every 2 min as needed for opioid overdose symptoms, alternating nostrils. Facility-Administered Medications: None     Allergies   Allergen Reactions    Codeine Nausea and Vomiting    Morphine Nausea and Vomiting    Oxycodone Nausea and Vomiting      Social History     Tobacco Use    Smoking status: Never Smoker    Smokeless tobacco: Never Used   Substance Use Topics    Alcohol use: No      Family History   Problem Relation Age of Onset    Heart Disease Mother     Diabetes Mother     Heart Disease Father     Diabetes Brother       Immunization History   Administered Date(s) Administered    Influenza High Dose Vaccine PF 11/13/2018    Influenza Vaccine 09/25/2014, 10/22/2015    Influenza Vaccine (Quad) Mdck Pf 11/03/2017    Influenza Vaccine (Quad) PF 10/11/2016    Pneumococcal Conjugate (PCV-13) 01/24/2017    Pneumococcal Polysaccharide (PPSV-23) 03/04/2019    Tdap 03/04/2019       Objective:     Patient Vitals for the past 24 hrs:   Temp Pulse Resp BP SpO2   09/01/19 1127  74  151/64 96 %   09/01/19 0842 98.1 °F (36.7 °C) 98 16 168/77 96 %     Oxygen Therapy  O2 Sat (%): 96 % (09/01/19 1127)  Pulse via Oximetry: 74 beats per minute (09/01/19 1127)  O2 Device: Room air (09/01/19 0842)  No intake or output data in the 24 hours ending 09/01/19 1244    Physical Exam:  General:    Well nourished. Alert. Eyes:   R eyelid with minimal swelling, no drainage or drainage no tenderness. ENT:   Moist mucous membranes  CV:   RRR. No murmur, rub, or gallop. Lungs:  CTAB. No wheezing, rhonchi, or rales. Abdomen: Soft, nontender, nondistended. Bowel sounds normal.   Extremities: Warm and dry. No cyanosis or edema. Neurologic: Patient is having diplopia. No loss of vision noted. Sensation intact. Strength 5/5 upper and lower extremities. Skin:     No rashes or jaundice. No wounds. Psych:  Normal mood and affect. I reviewed the labs and other studies done this admission. Data Review:   Recent Results (from the past 24 hour(s))   CBC WITH AUTOMATED DIFF    Collection Time: 09/01/19  8:44 AM   Result Value Ref Range    WBC 5.8 4.3 - 11.1 K/uL    RBC 5.56 (H) 4.05 - 5.2 M/uL    HGB 14.3 11.7 - 15.4 g/dL    HCT 46.4 (H) 35.8 - 46.3 %    MCV 83.5 79.6 - 97.8 FL    MCH 25.7 (L) 26.1 - 32.9 PG    MCHC 30.8 (L) 31.4 - 35.0 g/dL    RDW 14.6 11.9 - 14.6 %    PLATELET 333 243 - 995 K/uL    MPV 9.1 (L) 9.4 - 12.3 FL    ABSOLUTE NRBC 0.00 0.0 - 0.2 K/uL    DF AUTOMATED      NEUTROPHILS 51 43 - 78 %    LYMPHOCYTES 40 13 - 44 %    MONOCYTES 6 4.0 - 12.0 %    EOSINOPHILS 2 0.5 - 7.8 %    BASOPHILS 1 0.0 - 2.0 %    IMMATURE GRANULOCYTES 1 0.0 - 5.0 %    ABS. NEUTROPHILS 3.0 1.7 - 8.2 K/UL    ABS. LYMPHOCYTES 2.3 0.5 - 4.6 K/UL    ABS. MONOCYTES 0.4 0.1 - 1.3 K/UL    ABS. EOSINOPHILS 0.1 0.0 - 0.8 K/UL    ABS. BASOPHILS 0.0 0.0 - 0.2 K/UL    ABS. IMM. GRANS. 0.0 0.0 - 0.5 K/UL   METABOLIC PANEL, COMPREHENSIVE    Collection Time: 09/01/19  8:44 AM   Result Value Ref Range    Sodium 141 136 - 145 mmol/L    Potassium 3.9 3.5 - 5.1 mmol/L    Chloride 107 98 - 107 mmol/L    CO2 29 21 - 32 mmol/L    Anion gap 5 (L) 7 - 16 mmol/L    Glucose 202 (H) 65 - 100 mg/dL    BUN 18 8 - 23 MG/DL    Creatinine 0.91 0.6 - 1.0 MG/DL    GFR est AA >60 >60 ml/min/1.73m2    GFR est non-AA >60 >60 ml/min/1.73m2    Calcium 8.9 8.3 - 10.4 MG/DL    Bilirubin, total 0.6 0.2 - 1.1 MG/DL    ALT (SGPT) 31 12 - 65 U/L    AST (SGOT) 24 15 - 37 U/L    Alk.  phosphatase 133 50 - 136 U/L    Protein, total 7.8 6.3 - 8.2 g/dL    Albumin 3.5 3.2 - 4.6 g/dL    Globulin 4.3 (H) 2.3 - 3.5 g/dL    A-G Ratio 0.8 (L) 1.2 - 3.5     TROPONIN I    Collection Time: 09/01/19  8:44 AM   Result Value Ref Range    Troponin-I, Qt. <0.02 (L) 0.02 - 0.05 NG/ML   PROTHROMBIN TIME + INR    Collection Time: 09/01/19 8:44 AM   Result Value Ref Range    Prothrombin time 15.1 (H) 11.7 - 14.5 sec    INR 1.2         Imaging Studies:  CXR Results  (Last 48 hours)    None        CT Results  (Last 48 hours)               09/01/19 1003  CT HEAD WO CONT Final result    Impression:  IMPRESSION:       No acute intracranial abnormalities. Right preseptal orbital soft tissue swelling. Date of Dictation: 9/1/2019 10:04 AM           Narrative:  CT HEAD WITHOUT CONTRAST        HISTORY:  Headache. COMPARISON: 3/9/2019       TECHNIQUE: Axial imaging was performed without intravenous contrast utilizing   5mm slice thickness. Sagittal and coronal reformats were performed. Radiation   dose reduction techniques were used for this study. Our CT scanner uses one or   all of the following:       Automated exposure control, adjustment of the MAS or KUB according to patient's   size and iterative reconstruction. FINDINGS:           *BRAIN:       -  There are no early signs of territorial or lacunar infarction by CT.      -  No intracranial mass, hematoma, or hydrocephalus. -  For patient's age, the scattered areas of white matter hypodensities may   represent a chronic small vessel white matter ischemia. However this is   nonspecific. *VISUALIZED PARANASAL SINUSES: Well aerated. *MASTOIDS:  Clear. *CALVARIUM AND SCALP: Right preseptal orbital soft tissue swelling.                      Assessment and Plan:     Hospital Problems as of 9/1/2019 Date Reviewed: 8/28/2019          Codes Class Noted - Resolved POA    Swelling of right eyelid ICD-10-CM: H02.843  ICD-9-CM: 374.82  9/1/2019 - Present Unknown        DVT, recurrent, lower extremity, chronic ([facility] ICD-10-CM: I82.509  ICD-9-CM: 453.50  11/21/2013 - Present Yes        Anxiety ICD-10-CM: F41.9  ICD-9-CM: 300.00  11/21/2013 - Present Yes        Diabetes mellitus ([facility] ICD-10-CM: E11.9  ICD-9-CM: 250.00  4/30/2012 - Present Yes              A/P:      -Swelling/drop of R eyelid  Could be related to preseptal cellulitis vs Stroke  Less likely stroke as patient was having similar presentation on L eye  CT Head showing Right preseptal orbital soft tissue swelling  Patient is afebrile with no WBC elevation  (+) double vision    Plan  Get CTA head/neck, echo and monitor remote tele  Unable to have MRI due to nerve stimulator  Start vanco and zosyn for now for suspected infection  Patient will benefit from Ophthalmology evaluation    -DM  Restart Lantus and add ISS    -hx of Chronic DVT LE  Restart elioquis    DVT ppx: eliquis  Code status:DNR        Signed:   Elizabeth Carrillo MD

## 2019-09-01 NOTE — ED TRIAGE NOTES
Patient complains of headache behind right eye with dizziness and ataxia for 2 days, double vision in right eye that she woke up with yesterday and right eye drooping that she woke up with today. Patient has no focal weakness or numbness on exam and speech is clear with no facial droop. Spoke with Dr Cinthya Pope regarding patient symptoms, verbal orders given at this time.

## 2019-09-02 ENCOUNTER — PATIENT OUTREACH (OUTPATIENT)
Dept: CASE MANAGEMENT | Age: 74
End: 2019-09-02

## 2019-09-02 LAB
ANION GAP SERPL CALC-SCNC: 6 MMOL/L (ref 7–16)
BASOPHILS # BLD: 0 K/UL (ref 0–0.2)
BASOPHILS NFR BLD: 1 % (ref 0–2)
BUN SERPL-MCNC: 12 MG/DL (ref 8–23)
CALCIUM SERPL-MCNC: 8.5 MG/DL (ref 8.3–10.4)
CHLORIDE SERPL-SCNC: 106 MMOL/L (ref 98–107)
CHOLEST SERPL-MCNC: 177 MG/DL
CO2 SERPL-SCNC: 28 MMOL/L (ref 21–32)
CREAT SERPL-MCNC: 0.79 MG/DL (ref 0.6–1)
DIFFERENTIAL METHOD BLD: ABNORMAL
EOSINOPHIL # BLD: 0 K/UL (ref 0–0.8)
EOSINOPHIL NFR BLD: 1 % (ref 0.5–7.8)
ERYTHROCYTE [DISTWIDTH] IN BLOOD BY AUTOMATED COUNT: 14.6 % (ref 11.9–14.6)
EST. AVERAGE GLUCOSE BLD GHB EST-MCNC: 183 MG/DL
GLUCOSE BLD STRIP.AUTO-MCNC: 104 MG/DL (ref 65–100)
GLUCOSE BLD STRIP.AUTO-MCNC: 138 MG/DL (ref 65–100)
GLUCOSE BLD STRIP.AUTO-MCNC: 157 MG/DL (ref 65–100)
GLUCOSE BLD STRIP.AUTO-MCNC: 193 MG/DL (ref 65–100)
GLUCOSE BLD STRIP.AUTO-MCNC: 83 MG/DL (ref 65–100)
GLUCOSE BLD STRIP.AUTO-MCNC: 90 MG/DL (ref 65–100)
GLUCOSE SERPL-MCNC: 224 MG/DL (ref 65–100)
HBA1C MFR BLD: 8 % (ref 4.8–6)
HCT VFR BLD AUTO: 40.1 % (ref 35.8–46.3)
HDLC SERPL-MCNC: 40 MG/DL (ref 40–60)
HDLC SERPL: 4.4 {RATIO}
HGB BLD-MCNC: 12.5 G/DL (ref 11.7–15.4)
IMM GRANULOCYTES # BLD AUTO: 0 K/UL (ref 0–0.5)
IMM GRANULOCYTES NFR BLD AUTO: 0 % (ref 0–5)
LDLC SERPL CALC-MCNC: 110.2 MG/DL
LIPID PROFILE,FLP: ABNORMAL
LYMPHOCYTES # BLD: 1 K/UL (ref 0.5–4.6)
LYMPHOCYTES NFR BLD: 15 % (ref 13–44)
MCH RBC QN AUTO: 25.9 PG (ref 26.1–32.9)
MCHC RBC AUTO-ENTMCNC: 31.2 G/DL (ref 31.4–35)
MCV RBC AUTO: 83.2 FL (ref 79.6–97.8)
MONOCYTES # BLD: 0.4 K/UL (ref 0.1–1.3)
MONOCYTES NFR BLD: 6 % (ref 4–12)
NEUTS SEG # BLD: 4.8 K/UL (ref 1.7–8.2)
NEUTS SEG NFR BLD: 77 % (ref 43–78)
NRBC # BLD: 0 K/UL (ref 0–0.2)
PLATELET # BLD AUTO: 287 K/UL (ref 150–450)
PMV BLD AUTO: 9.6 FL (ref 9.4–12.3)
POTASSIUM SERPL-SCNC: 4.5 MMOL/L (ref 3.5–5.1)
RBC # BLD AUTO: 4.82 M/UL (ref 4.05–5.2)
SODIUM SERPL-SCNC: 140 MMOL/L (ref 136–145)
TRIGL SERPL-MCNC: 134 MG/DL (ref 35–150)
VLDLC SERPL CALC-MCNC: 26.8 MG/DL (ref 6–23)
WBC # BLD AUTO: 6.2 K/UL (ref 4.3–11.1)

## 2019-09-02 PROCEDURE — 80048 BASIC METABOLIC PNL TOTAL CA: CPT

## 2019-09-02 PROCEDURE — C8929 TTE W OR WO FOL WCON,DOPPLER: HCPCS

## 2019-09-02 PROCEDURE — 97535 SELF CARE MNGMENT TRAINING: CPT

## 2019-09-02 PROCEDURE — 77030020263 HC SOL INJ SOD CL0.9% LFCR 1000ML

## 2019-09-02 PROCEDURE — 74011000258 HC RX REV CODE- 258: Performed by: INTERNAL MEDICINE

## 2019-09-02 PROCEDURE — 82962 GLUCOSE BLOOD TEST: CPT

## 2019-09-02 PROCEDURE — 97110 THERAPEUTIC EXERCISES: CPT

## 2019-09-02 PROCEDURE — 74011636637 HC RX REV CODE- 636/637: Performed by: INTERNAL MEDICINE

## 2019-09-02 PROCEDURE — 74011000250 HC RX REV CODE- 250: Performed by: INTERNAL MEDICINE

## 2019-09-02 PROCEDURE — 92610 EVALUATE SWALLOWING FUNCTION: CPT

## 2019-09-02 PROCEDURE — 74011250637 HC RX REV CODE- 250/637: Performed by: INTERNAL MEDICINE

## 2019-09-02 PROCEDURE — 36415 COLL VENOUS BLD VENIPUNCTURE: CPT

## 2019-09-02 PROCEDURE — 97165 OT EVAL LOW COMPLEX 30 MIN: CPT

## 2019-09-02 PROCEDURE — 99218 HC RM OBSERVATION: CPT

## 2019-09-02 PROCEDURE — 83036 HEMOGLOBIN GLYCOSYLATED A1C: CPT

## 2019-09-02 PROCEDURE — 97161 PT EVAL LOW COMPLEX 20 MIN: CPT

## 2019-09-02 PROCEDURE — 80061 LIPID PANEL: CPT

## 2019-09-02 PROCEDURE — 85025 COMPLETE CBC W/AUTO DIFF WBC: CPT

## 2019-09-02 PROCEDURE — 74011250636 HC RX REV CODE- 250/636: Performed by: INTERNAL MEDICINE

## 2019-09-02 RX ORDER — LORAZEPAM 0.5 MG/1
1 TABLET ORAL
Status: DISCONTINUED | OUTPATIENT
Start: 2019-09-02 | End: 2019-09-03

## 2019-09-02 RX ADMIN — INSULIN GLARGINE 35 UNITS: 100 INJECTION, SOLUTION SUBCUTANEOUS at 08:28

## 2019-09-02 RX ADMIN — Medication 10 ML: at 22:13

## 2019-09-02 RX ADMIN — INSULIN GLARGINE 25 UNITS: 100 INJECTION, SOLUTION SUBCUTANEOUS at 22:13

## 2019-09-02 RX ADMIN — TRAMADOL HYDROCHLORIDE 50 MG: 50 TABLET, FILM COATED ORAL at 22:24

## 2019-09-02 RX ADMIN — ACETAMINOPHEN 500 MG: 500 TABLET, FILM COATED ORAL at 06:07

## 2019-09-02 RX ADMIN — PIPERACILLIN SODIUM,TAZOBACTAM SODIUM 3.38 G: 3; .375 INJECTION, POWDER, FOR SOLUTION INTRAVENOUS at 06:05

## 2019-09-02 RX ADMIN — ASPIRIN 81 MG 81 MG: 81 TABLET ORAL at 08:30

## 2019-09-02 RX ADMIN — PIPERACILLIN SODIUM,TAZOBACTAM SODIUM 3.38 G: 3; .375 INJECTION, POWDER, FOR SOLUTION INTRAVENOUS at 14:58

## 2019-09-02 RX ADMIN — INSULIN LISPRO 20 UNITS: 100 INJECTION, SOLUTION INTRAVENOUS; SUBCUTANEOUS at 07:37

## 2019-09-02 RX ADMIN — ATORVASTATIN CALCIUM 40 MG: 40 TABLET, FILM COATED ORAL at 22:08

## 2019-09-02 RX ADMIN — LORAZEPAM 1 MG: 0.5 TABLET ORAL at 23:35

## 2019-09-02 RX ADMIN — PERFLUTREN 1 ML: 6.52 INJECTION, SUSPENSION INTRAVENOUS at 07:00

## 2019-09-02 RX ADMIN — APIXABAN 5 MG: 5 TABLET, FILM COATED ORAL at 08:29

## 2019-09-02 RX ADMIN — APIXABAN 5 MG: 5 TABLET, FILM COATED ORAL at 17:21

## 2019-09-02 RX ADMIN — INSULIN LISPRO 2 UNITS: 100 INJECTION, SOLUTION INTRAVENOUS; SUBCUTANEOUS at 22:12

## 2019-09-02 RX ADMIN — LEVOTHYROXINE SODIUM 25 MCG: 50 TABLET ORAL at 06:08

## 2019-09-02 RX ADMIN — INSULIN LISPRO 20 UNITS: 100 INJECTION, SOLUTION INTRAVENOUS; SUBCUTANEOUS at 11:35

## 2019-09-02 RX ADMIN — FLUTICASONE PROPIONATE 2 SPRAY: 50 SPRAY, METERED NASAL at 08:33

## 2019-09-02 RX ADMIN — AMLODIPINE BESYLATE 5 MG: 5 TABLET ORAL at 08:29

## 2019-09-02 RX ADMIN — VANCOMYCIN HYDROCHLORIDE 1500 MG: 10 INJECTION, POWDER, LYOPHILIZED, FOR SOLUTION INTRAVENOUS at 15:09

## 2019-09-02 RX ADMIN — ACETAMINOPHEN 500 MG: 500 TABLET, FILM COATED ORAL at 19:38

## 2019-09-02 RX ADMIN — INSULIN LISPRO 2 UNITS: 100 INJECTION, SOLUTION INTRAVENOUS; SUBCUTANEOUS at 07:36

## 2019-09-02 RX ADMIN — Medication 10 ML: at 13:12

## 2019-09-02 RX ADMIN — SODIUM CHLORIDE 75 ML/HR: 900 INJECTION, SOLUTION INTRAVENOUS at 08:04

## 2019-09-02 NOTE — PROGRESS NOTES
Problem: Mobility Impaired (Adult and Pediatric)  Goal: *Acute Goals and Plan of Care (Insert Text)  Description  LTG:  (1.)Ms. Sage will move from supine to sit and sit to supine , scoot up and down and roll side to side in bed with INDEPENDENT within 7 treatment day(s). (2.)Ms. Sage will transfer from bed to chair and chair to bed with MODIFIED INDEPENDENCE using the least restrictive device within 7 treatment day(s). (3.)Ms. Sage will ambulate with MODIFIED INDEPENDENCE for 250+ feet with the least restrictive device within 7 treatment day(s). ________________________________________________________________________________________________     Outcome: Progressing Towards Goal     PHYSICAL THERAPY: Initial Assessment and Daily Note 9/2/2019  OBSERVATION: PT Visit Days : 1  [place] / [place] / Product Type: Medicare /       NAME/AGE/GENDER: Edgardo Perez is a 69 y.o. female   PRIMARY DIAGNOSIS: Swelling of right eyelid [H02.843] Swelling of right eyelid   Swelling of right eyelid         ICD-10: Treatment Diagnosis:    · Difficulty in walking, Not elsewhere classified (R26.2)   Precaution/Allergies:  Codeine; Morphine; and Oxycodone      ASSESSMENT:     Ms. Mariana Ferrara presents sitting in recliner pleasant and agreeable for PT assessment. At bseline she is independent with RW in home and community. Patient's LE strength grossly equal and WFL. She reports peripheral neuropathy. Patient stood with SBA. She ambulated 100' with RW and SB to CGA. Her R LE began bothering her during ambulation and she had difficulty with heel strike on R. Ms. Mariana Ferrara would benefit from skilled physical therapy (medically necessary) to address her deficits and maximize her function. Initiated tx to include LE exercises. Patient reported increased R hip pain during exercises.     This section established at most recent assessment   PROBLEM LIST (Impairments causing functional limitations):  1. Decreased Transfer Abilities  2. Decreased Ambulation Ability/Technique  3. Decreased Balance  4. Increased Pain   INTERVENTIONS PLANNED: (Benefits and precautions of physical therapy have been discussed with the patient.)  1. Bed Mobility  2. Gait Training  3. Therapeutic Activites  4. Therapeutic Exercise/Strengthening  5. Transfer Training  6. education      TREATMENT PLAN: Frequency/Duration: 3 times a week for 1 week  Rehabilitation Potential For Stated Goals: Excellent     REHAB RECOMMENDATIONS (at time of discharge pending progress):    Placement: It is my opinion, based on this patient's performance to date, that Ms. Sage may benefit from [address] after discharge due to the functional deficits listed above that are likely to improve with skilled rehabilitation because patient has experienced a  . Equipment:    None at this time              HISTORY:   History of Present Injury/Illness (Reason for Referral):  Per MD note, \"70 years old F with PMH of DM, CVA 10 years ago presented to the hospital complaining of swelling of R eyelid and HA started yesterday. Patient reported HA is on R forehead, non radiated, 5/10 intensity, on and off, alleviated by pain medication. Patient reported having associated R eyelid swelling and drop. Patient also having double vision. Patient denies fever or pain with eye movement. Patient reported having a similar presentation on her L eye early this year. Patient went to eye doctor who told her condition will improve by itself. It took two months for her L eye to be back baseline. Patient reported she has \"Nerve stimulator for back pain\", She was told cannot have MRI. In the ED CT brain showing Right preseptal orbital soft tissue swelling. \"  Past Medical History/Comorbidities:   Ms. Thea Gonzalez  has a past medical history of Allergic rhinitis, Anemia, Anxiety, Asthma, Cataracts, bilateral, Depression, Diabetes ([address] GERD (gastroesophageal reflux disease), History of DVT (deep vein thrombosis), History of pulmonary embolus (PE), Hypercholesterolemia, Hyperlipidemia, Hypertension, Insomnia, Internal hemorrhoids without mention of complication, Osteoarthritis, Peripheral neuropathy, Personal history of colonic polyps, Rectocele, Stroke ([facility] and Stroke ([facility] Ms. Homero Reynoso  has a past surgical history that includes hx orthopaedic; hx  section; hx appendectomy; hx cholecystectomy; hx partial hysterectomy; and endoscopy, colon, diagnostic (13). Social History/Living Environment:   Home Environment: Private residence  # Steps to Enter: 0  One/Two Story Residence: One story  Living Alone: No  Support Systems: Family member(s)  Patient Expects to be Discharged to[de-identified] Private residence  Current DME Used/Available at Home: Tara Carpenter, rolling, Walker, rollator  Tub or Shower Type: Shower  Prior Level of Function/Work/Activity:  Lives with family, ambulates with RW independently in home and community. Number of Personal Factors/Comorbidities that affect the Plan of Care: 3+: HIGH COMPLEXITY   EXAMINATION:   Most Recent Physical Functioning:   Gross Assessment:  AROM: Generally decreased, functional  Strength: Generally decreased, functional  Tone: Normal  Sensation: Impaired               Posture:  Posture (WDL): Exceptions to WDL  Posture Assessment: Forward head, Rounded shoulders  Balance:  Sitting: Intact  Standing: Impaired  Standing - Static: Fair  Standing - Dynamic : Fair Bed Mobility:  Sit to Supine: Stand-by assistance  Wheelchair Mobility:     Transfers:  Sit to Stand: Stand-by assistance  Stand to Sit: Stand-by assistance  Gait:     Base of Support: Center of gravity altered  Speed/Alaina: Slow  Step Length: Right shortened;Left shortened  Distance (ft): 100 Feet (ft)  Assistive Device: Walker, rolling;Gait belt  Ambulation - Level of Assistance: Stand-by assistance;Contact guard assistance      Body Structures Involved:  1.  Eyes and Ears  2. Muscles Body Functions Affected:  1. Sensory/Pain  2. Neuromusculoskeletal  3. Movement Related Activities and Participation Affected:  1. Mobility  2. Self Care  3. Domestic Life  4. Interpersonal Interactions and Relationships   Number of elements that affect the Plan of Care: 4+: HIGH COMPLEXITY   CLINICAL PRESENTATION:   Presentation: Evolving clinical presentation with changing clinical characteristics: MODERATE COMPLEXITY   CLINICAL DECISION MAKIN [facility] Mobility Inpatient Short Form  How much difficulty does the patient currently have. .. Unable A Lot A Little None   1. Turning over in bed (including adjusting bedclothes, sheets and blankets)? ? 1   ? 2   ? 3   ? 4   2. Sitting down on and standing up from a chair with arms ( e.g., wheelchair, bedside commode, etc.)   ? 1   ? 2   ? 3   ? 4   3. Moving from lying on back to sitting on the side of the bed?   ? 1   ? 2   ? 3   ? 4   How much help from another person does the patient currently need. .. Total A Lot A Little None   4. Moving to and from a bed to a chair (including a wheelchair)? ? 1   ? 2   ? 3   ? 4   5. Need to walk in hospital room? ? 1   ? 2   ? 3   ? 4   6. Climbing 3-5 steps with a railing? ? 1   ? 2   ? 3   ? 4   © , Trustees of [address] [place] [address] [address] [id number], under license to [employer]. All rights reserved      Score:  Initial: 20 Most Recent: X (Date: -- )    Interpretation of Tool:  Represents activities that are increasingly more difficult (i.e. Bed mobility, Transfers, Gait). Medical Necessity:     · Patient is expected to demonstrate progress in strength, range of motion, balance and functional technique  ·  to increase independence with   and improve safety during all functional mobility. · .  Reason for Services/Other Comments:  · Patient continues to require skilled intervention due to decline in functional mobility.    · .   Use of outcome tool(s) and clinical judgement create a POC that gives a: Clear prediction of patient's progress: LOW COMPLEXITY            TREATMENT:   (In addition to Assessment/Re-Assessment sessions the following treatments were rendered)   Pre-treatment Symptoms/Complaints:  none. Pain: Initial:   Pain Intensity 1: 1  Pain Location 1: Head  Post Session:  1     Therapeutic Exercise: ( 8 minutes):  Exercises per grid below to improve strength and coordination. Required minimal visual and verbal cues to promote proper body alignment. Progressed complexity of movement as indicated. DATE: 9/2/19       Ambulation        Hip Flexion        Long Arc Quads X20 AB       Knee Squeezes        Ankle DF/PF X20 AB                                        Key:  A=active, AA=active assisted, P=passive, B=bilaterally, R=right, L=left   DF=dorsiflexion, PF=plantarflexion          Braces/Orthotics/Lines/Etc:   · IV  · O2 Device: Room air  Treatment/Session Assessment:    · Response to Treatment:  pleasant and cooperative. · Interdisciplinary Collaboration:   o Physical Therapist  o Registered Nurse  · After treatment position/precautions:   o Supine in bed  o Bed/Chair-wheels locked  o Bed in low position  o Call light within reach  o RN notified   · Compliance with Program/Exercises: Will assess as treatment progresses  · Recommendations/Intent for next treatment session: \"Next visit will focus on advancements to more challenging activities and reduction in assistance provided\".   Total Treatment Duration:  PT Patient Time In/Time Out  Time In: 1014  Time Out: JONI Hyde [address], PT, DPT

## 2019-09-02 NOTE — PROGRESS NOTES
Hospitalist Progress Note     Admit Date:  2019  9:21 AM   Name:  Kaur Anderson   Age:  69 y.o.  :  1945   MRN:  [id number]   PCP:  Tangela Guzman NP  Treatment Team: Attending Provider: Karma Tomas MD; Primary Nurse: Gabo Alfaro RN; Consulting Provider: Kyra Hendrix MD; Utilization Review: Aspen Nur RN; Care Manager: Es Monk RN    Subjective:   CC:    From Admission HPI:      Objective:     Patient Vitals for the past 24 hrs:   Temp Pulse Resp BP SpO2   19 1606 98.8 °F (37.1 °C) 84 20 156/67 94 %   19 1440 98.1 °F (36.7 °C) 86 20 165/84 98 %   19 1126 98 °F (36.7 °C) 82 16 136/64 96 %   19 0759 98 °F (36.7 °C) 90 20 156/78 96 %   19 0424 98.3 °F (36.8 °C) 89 19 158/82 96 %   19 0021 98.7 °F (37.1 °C) 85 18 146/80 95 %   19 2035 98 °F (36.7 °C) 80 18 145/72 96 %     Oxygen Therapy  O2 Sat (%): 94 % (19 1606)  Pulse via Oximetry: 84 beats per minute (19 1359)  O2 Device: Room air (19 1606)    Intake/Output Summary (Last 24 hours) at 2019 1824  Last data filed at 2019 1502  Gross per 24 hour   Intake 2258 ml   Output 1450 ml   Net 808 ml         REVIEW OF SYSTEMS: Comprehensive ROS performed and negative except as stated in HPI. Physical Examination:  General:    Well nourished. Awake and alert. Head:  Normocephalic, atraumatic  Eyes:  Extraocular movements intact, normal sclera  CV:   RRR. No  Murmurs, clicks, or gallops  Lungs:   Unlabored, no cyanosis  Abdomen:   Soft, nondistended, nontender. Extremities: Warm and dry. No cyanosis or edema. Skin:     No rashes or jaundice. Neuro:  No gross focal deficits  Psych:  Mood and affect appropriate    Data Review:  I have reviewed all labs, meds, telemetry events, and studies from the last 24 hours.     Recent Results (from the past 24 hour(s))   GLUCOSE, POC    Collection Time: 19 10:23 PM   Result Value Ref Range Glucose (POC) 94 65 - 100 mg/dL   LIPID PANEL    Collection Time: 09/02/19  6:05 AM   Result Value Ref Range    LIPID PROFILE          Cholesterol, total 177 <200 MG/DL    Triglyceride 134 35 - 150 MG/DL    HDL Cholesterol 40 40 - 60 MG/DL    LDL, calculated 110.2 (H) <100 MG/DL    VLDL, calculated 26.8 (H) 6.0 - 23.0 MG/DL    CHOL/HDL Ratio 4.4     HEMOGLOBIN A1C WITH EAG    Collection Time: 09/02/19  6:05 AM   Result Value Ref Range    Hemoglobin A1c 8.0 (H) 4.8 - 6.0 %    Est. average glucose 183 mg/dL   CBC WITH AUTOMATED DIFF    Collection Time: 09/02/19  6:05 AM   Result Value Ref Range    WBC 6.2 4.3 - 11.1 K/uL    RBC 4.82 4.05 - 5.2 M/uL    HGB 12.5 11.7 - 15.4 g/dL    HCT 40.1 35.8 - 46.3 %    MCV 83.2 79.6 - 97.8 FL    MCH 25.9 (L) 26.1 - 32.9 PG    MCHC 31.2 (L) 31.4 - 35.0 g/dL    RDW 14.6 11.9 - 14.6 %    PLATELET 389 645 - 879 K/uL    MPV 9.6 9.4 - 12.3 FL    ABSOLUTE NRBC 0.00 0.0 - 0.2 K/uL    DF AUTOMATED      NEUTROPHILS 77 43 - 78 %    LYMPHOCYTES 15 13 - 44 %    MONOCYTES 6 4.0 - 12.0 %    EOSINOPHILS 1 0.5 - 7.8 %    BASOPHILS 1 0.0 - 2.0 %    IMMATURE GRANULOCYTES 0 0.0 - 5.0 %    ABS. NEUTROPHILS 4.8 1.7 - 8.2 K/UL    ABS. LYMPHOCYTES 1.0 0.5 - 4.6 K/UL    ABS. MONOCYTES 0.4 0.1 - 1.3 K/UL    ABS. EOSINOPHILS 0.0 0.0 - 0.8 K/UL    ABS. BASOPHILS 0.0 0.0 - 0.2 K/UL    ABS. IMM.  GRANS. 0.0 0.0 - 0.5 K/UL   METABOLIC PANEL, BASIC    Collection Time: 09/02/19  6:05 AM   Result Value Ref Range    Sodium 140 136 - 145 mmol/L    Potassium 4.5 3.5 - 5.1 mmol/L    Chloride 106 98 - 107 mmol/L    CO2 28 21 - 32 mmol/L    Anion gap 6 (L) 7 - 16 mmol/L    Glucose 224 (H) 65 - 100 mg/dL    BUN 12 8 - 23 MG/DL    Creatinine 0.79 0.6 - 1.0 MG/DL    GFR est AA >60 >60 ml/min/1.73m2    GFR est non-AA >60 >60 ml/min/1.73m2    Calcium 8.5 8.3 - 10.4 MG/DL   GLUCOSE, POC    Collection Time: 09/02/19  7:23 AM   Result Value Ref Range    Glucose (POC) 193 (H) 65 - 100 mg/dL   GLUCOSE, POC    Collection Time: 09/02/19 11:29 AM   Result Value Ref Range    Glucose (POC) 138 (H) 65 - 100 mg/dL   GLUCOSE, POC    Collection Time: 09/02/19  2:39 PM   Result Value Ref Range    Glucose (POC) 83 65 - 100 mg/dL   GLUCOSE, POC    Collection Time: 09/02/19  4:09 PM   Result Value Ref Range    Glucose (POC) 104 (H) 65 - 100 mg/dL   GLUCOSE, POC    Collection Time: 09/02/19  4:40 PM   Result Value Ref Range    Glucose (POC) 90 65 - 100 mg/dL        All Micro Results     None          Current Meds:  Current Facility-Administered Medications   Medication Dose Route Frequency    albuterol (PROVENTIL VENTOLIN) nebulizer solution 2.5 mg  2.5 mg Nebulization Q6H PRN    amLODIPine (NORVASC) tablet 5 mg  5 mg Oral DAILY    apixaban (ELIQUIS) tablet 5 mg (Patient Supplied)  5 mg Oral BID    fluticasone propionate (FLONASE) 50 mcg/actuation nasal spray 2 Spray  2 Spray Both Nostrils DAILY    insulin glargine (LANTUS) injection 35 Units (Patient Supplied)  35 Units SubCUTAneous DAILY    levothyroxine (SYNTHROID) tablet 25 mcg  25 mcg Oral ACB    insulin lispro (HUMALOG) injection   SubCUTAneous AC&HS    sodium chloride (NS) flush 5-40 mL  5-40 mL IntraVENous Q8H    sodium chloride (NS) flush 5-40 mL  5-40 mL IntraVENous PRN    aspirin chewable tablet 81 mg  81 mg Oral DAILY    atorvastatin (LIPITOR) tablet 40 mg  40 mg Oral QHS    piperacillin-tazobactam (ZOSYN) 3.375 g in 0.9% sodium chloride (MBP/ADV) 100 mL  3.375 g IntraVENous Q8H    insulin glargine (LANTUS) injection 25 Units (Patient Supplied)  25 Units SubCUTAneous QHS    acetaminophen (TYLENOL) tablet 500 mg  500 mg Oral Q6H PRN    traMADol (ULTRAM) tablet 50 mg  50 mg Oral Q6H PRN    vancomycin (VANCOCIN) 1500 mg in  ml infusion  1,500 mg IntraVENous Q24H    insulin lispro (HUMALOG) injection 20 Units  20 Units SubCUTAneous TIDAC    influenza vaccine 2019-20 (6 mos+)(PF) (FLUARIX/FLULAVAL/FLUZONE QUAD) injection 0.5 mL  0.5 mL IntraMUSCular PRIOR TO DISCHARGE  ondansetron (ZOFRAN) injection 4 mg  4 mg IntraVENous Q4H PRN       Diet:  DIET DIABETIC CONSISTENT CARB    Other Studies (last 24 hours):  No results found.     Assessment and Plan:     Hospital Problems as of 9/2/2019 Date Reviewed: 8/28/2019          Codes Class Noted - Resolved POA    * (Principal) Swelling of right eyelid ICD-10-CM: H02.843  ICD-9-CM: 374.82  9/1/2019 - Present Unknown        DVT, recurrent, lower extremity, chronic ([facility] ICD-10-CM: I82.509  ICD-9-CM: 453.50  11/21/2013 - Present Yes        Anxiety ICD-10-CM: F41.9  ICD-9-CM: 300.00  11/21/2013 - Present Yes        Diabetes mellitus ([facility] ICD-10-CM: E11.9  ICD-9-CM: 250.00  4/30/2012 - Present Yes              A/P:       -Swelling/drop of R eyelid  Could be related to preseptal cellulitis vs Stroke  Less likely stroke as patient was having similar presentation on L eye  CT Head showing Right preseptal orbital soft tissue swelling  Patient is afebrile with no WBC elevation  (+) double vision     Plan  Get CTA head/neck, echo and monitor remote tele  Unable to have MRI due to nerve stimulator  Patient will benefit from Ophthalmology evaluation  Antibiotics stopped as infection unlikely  Neuro consult - per ophthalmology feel brain stem stroke possible     -DM  Restart Lantus and add ISS     -hx of Chronic DVT LE  Restart elioquis     DVT ppx: eliquis  Code status:DNR    Case reviewed with supervising physician Hillary Orr MD    Signed:  EMILIA GreenC

## 2019-09-02 NOTE — PROGRESS NOTES
END OF SHIFT NOTE:    INTAKE/OUTPUT  09/01 0701 - 09/02 0700  In: -   Out: 1450 [Urine:1450]  Voiding: YES  Catheter: NO  Drain:              Flatus: Patient does have flatus present. Stool:  0 occurrences. Characteristics:       Emesis: 0 occurrences. Characteristics:        VITAL SIGNS  Patient Vitals for the past 12 hrs:   Temp Pulse Resp BP SpO2   09/02/19 0424 98.3 °F (36.8 °C) 89 19 158/82 96 %   09/02/19 0021 98.7 °F (37.1 °C) 85 18 146/80 95 %   09/01/19 2035 98 °F (36.7 °C) 80 18 145/72 96 %       Pain Assessment  Pain Intensity 1: 4 (09/01/19 1441)  Pain Location 1: Head  Pain Intervention(s) 1: Emotional support, MD notified (comment)  Patient Stated Pain Goal: 0    Ambulating  Yes with walker   Facial skin remains pink. Reye remains swollen and is covered with 2x2 gauze. Shift report given to oncoming nurse at the bedside.     Anjelica Medina RN

## 2019-09-02 NOTE — PROGRESS NOTES
Patient hospitalized for (per CT) Right Preseptal Orbital soft tissue swelling  Patient is current with [address] chart to [facility] for support through Transitions of Care and continued outreach for DSME

## 2019-09-02 NOTE — PROGRESS NOTES
Patient was up to bathroom with assist to void. Upon returning to bed, patient was diaphoretic, dyspneic. Denied pain. When questioned she stated a little short of breath and a little dizzy. VS:  98.1- 86-20  O2 sat 98% on room air. /84. Skin warm and moist.  Speech clear. Moves all extremities. BS 83, per patient usually runs 125. Franki Coronado NP present. Patient request the yogurt her family brought in to eat.   Will monitor

## 2019-09-02 NOTE — PROGRESS NOTES
No dysphagia goals identified   OBSERVATION STATUS  SPEECH LANGUAGE PATHOLOGY: DYSPHAGIA- Initial Assessment and Discharge    NAME/AGE/GENDER: Maxwell Ayala is a 69 y.o. female  DATE: 2019  PRIMARY DIAGNOSIS: Swelling of right eyelid [H02.843]      ICD-10: Treatment Diagnosis: R13.12 Dysphagia, Oropharyngeal Phase    INTERDISCIPLINARY COLLABORATION: n/a  PRECAUTIONS/ALLERGIES: Codeine; Morphine; and Oxycodone       SUBJECTIVE   Patient reports occasional coughing while eating, primarily \"chunky food\". Diet Prior to Evaluation: diabetic regular/thin liquids      History of Present Injury/Illness: Ms. Gema Marino  has a past medical history of Allergic rhinitis, Anemia, Anxiety, Asthma, Cataracts, bilateral, Depression, Diabetes ([facility] [address] GERD (gastroesophageal reflux disease), History of DVT (deep vein thrombosis), History of pulmonary embolus (PE), Hypercholesterolemia, Hyperlipidemia, Hypertension, Insomnia, Internal hemorrhoids without mention of complication, Osteoarthritis, Peripheral neuropathy, Personal history of colonic polyps, Rectocele, Stroke ([facility] [address] and Stroke ([facility] [address] . She also  has a past surgical history that includes hx orthopaedic; hx  section; hx appendectomy; hx cholecystectomy; hx partial hysterectomy; and endoscopy, colon, diagnostic (13). Previous Dysphagia: NONE REPORTED  Bedside evaluation and discharge 3/9/19: regular/thin liquids  Problem List:  (Impairments causing functional limitations):  1. Oropharyngeal dysphagia- No symptoms identified      Orientation:   Person  Place  Time  Situation     Pain: Pain Scale 1: Numeric (0 - 10)  Pain Intensity 1: 0         OBJECTIVE   Oral Motor Assessment:  · Labial: No impairment  · Dentition: Intact  · Oral Hygiene: Adequate  · Lingual: No impairment     Swallow assessment:  Patient presented with thin liquid via cup and straw, puree, mixed, and solid consistencies. Appropriate oral prep with all textures.  Timely swallow initiation, and single swallows upon palpation. Adequate oral clearing. No overt signs or symptoms of airway compromise observed with liquid or solid textures. ASSESSMENT   No oropharyngeal dysphagia identified. Patient's complaints may be related to esophageal component given reports of sticking sensation with solids. Reports it helps her to eat slow and cut food up well. Recommend continue current diet: regular/thin liquids. Medications 1 at a time with liquid wash. Educated patient on safe swallowing recommendations. No dysphagia treatment indicated at this time. Tool Used: Dysphagia Outcome and Severity Scale (NENITA)    Score Comments   Normal Diet  [x] 7 With no strategies or extra time needed   Functional Swallow  [] 6 May have mild oral or pharyngeal delay   Mild Dysphagia  [] 5 Which may require one diet consistency restricted    Mild-Moderate Dysphagia  [] 4 With 1-2 diet consistencies restricted   Moderate Dysphagia  [] 3 With 2 or more diet consistencies restricted   Moderate-Severe Dysphagia  [] 2 With partial PO strategies (trials with ST only)   Severe Dysphagia  [] 1 With inability to tolerate any PO safely      Score:  Initial: 6 Most Recent:  (Date 09/02/19 )   Interpretation of Tool: The Dysphagia Outcome and Severity Scale (NENITA) is a simple, easy-to-use, 7-point scale developed to systematically rate the functional severity of dysphagia based on objective assessment and make recommendations for diet level, independence level, and type of nutrition. Current Medications:   No current facility-administered medications on file prior to encounter. Current Outpatient Medications on File Prior to Encounter   Medication Sig Dispense Refill    glucose blood VI test strips (ONETOUCH VERIO) strip Check blood sugar 4 times daily  Dx:E11.65 400 Strip 3    Insulin Needles, Disposable, 31 gauge x 5/16\" ndle 500 Pen Needle by SubCUTAneous route four (4) times daily.  400 Pen Needle 4    Blood-Glucose Sensor (DEXCOM G5-G4 SENSOR) jacob Change sensor every 7 days 12 Device 4    Blood-Glucose Transmitter (DEXCOM G5 TRANSMITTER) jacob To monitor bgl continuously to adjust insulin and monitor for hypoglycemia 1 Device 0    insulin glargine (LANTUS,BASAGLAR) 100 unit/mL (3 mL) inpn 35 units am; 25 units pm 19 Adjustable Dose Pre-filled Pen Syringe 3    NOVOLOG FLEXPEN U-100 INSULIN 100 unit/mL (3 mL) inpn 20 Units by SubCUTAneous route Before breakfast, lunch, dinner and at bedtime. Adjust per sliding scale as appropriate 24 Adjustable Dose Pre-filled Pen Syringe 3    LORazepam (ATIVAN) 1 mg tablet Take 1 Tab by mouth nightly as needed for Anxiety. Max Daily Amount: 1 mg. 30 Tab 2    atorvastatin (LIPITOR) 20 mg tablet Take 1 Tab by mouth daily. 90 Tab 3    albuterol (PROVENTIL HFA, VENTOLIN HFA, PROAIR HFA) 90 mcg/actuation inhaler Take 1 Puff by inhalation every six (6) hours as needed for Wheezing. 1 Inhaler 1    apixaban (ELIQUIS) 5 mg tablet Take 1 Tab by mouth two (2) times a day. 180 Tab 1    fluticasone propionate (FLONASE) 50 mcg/actuation nasal spray 1 puff in each nostril twice daily 1 Bottle 4    levothyroxine (SYNTHROID) 25 mcg tablet Take 1 Tab by mouth Daily (before breakfast). 90 Tab 1    amLODIPine (NORVASC) 5 mg tablet Take 1 Tab by mouth daily. 30 Tab 0    naloxone (NARCAN) 4 mg/actuation nasal spray Use 1 spray intranasally, then discard. Repeat with new spray every 2 min as needed for opioid overdose symptoms, alternating nostrils. 1 Each 1    Blood-Glucose Meter monitoring kit Use as directed 1 Kit 0    Syringe with Needle, Disp, (TERUMO ALLERGY SYRINGE) 1 mL 27 x 1/2\" syrg 1cc 27G x 1/2\" qod #1 box 100 Each 3    multivitamin (ONE A DAY) tablet Take 1 Tab by mouth daily.  B.infantis-B.ani-B.long-B.bifi (PROBIOTIC 4X) 10-15 mg TbEC Take  by mouth.            PLAN    FREQUENCY/DURATION: No further speech therapy indicated at this time as oropharyngeal swallow function is within normal limits. - Recommendations for next treatment session: No additional speech therapy indicated at this time. REHABILITATION POTENTIAL FOR STATED GOALS: no goals identified     COMPLIANCE WITH PROGRAM/EXERCISES:n/a    CONTINUATION OF SKILLED SERVICES/MEDICAL NECESSITY:   No dysphagia treatment indicated. RECOMMENDATIONS   DIET:    continue prescribed diet   PO:  Regular   Liquids:  regular thin    MEDICATIONS: With liquid     ASPIRATION PRECAUTIONS  · Slow rate of intake  · Small bites/sips  · Upright at 90 degrees during meal     COMPENSATORY STRATEGIES/MODIFICATIONS  · Small sips and bites  · Slow rate      EDUCATION:  · Recommendations discussed with Patient     RECOMMENDATIONS for CONTINUED SPEECH THERAPY:   No further speech therapy indicated at this time. Please reconsult if new concerns arise.       SAFETY:  After treatment position/precautions:  · Up in chair      Total Treatment Duration:   Time In: 1002  Time Out: [address] [facility], [facility], [facility] ANNEMARIE SHARMA CCC-SLP

## 2019-09-02 NOTE — PROGRESS NOTES
Problem: Self Care Deficits Care Plan (Adult)  Goal: *Acute Goals and Plan of Care (Insert Text)  Description  1. Pt will toilet with SBA   2. Pt will complete functional mobility for ADLs with SBA  3. Pt will complete lower body dressing with SBA using AE as needed  4. Pt will complete grooming and hygiene at sink with SBA  5. Pt will demonstrate independence with HEP to promote increased BUE strength and functional use for ADLs  6. Pt will tolerate 23 minutes functional activity with min or fewer rest breaks to promote increased endurance for ADLs  7. Pt will demonstrate improved safety to maintain without cues    Timeframe: 7 days     Outcome: Progressing Towards Goal     OCCUPATIONAL THERAPY: Initial Assessment and Daily Note 9/2/2019  OBSERVATION: OT Visit Days: 1  [place] / [place] / Product Type: Medicare /      NAME/AGE/GENDER: Naomi Garcia is a 69 y.o. female   PRIMARY DIAGNOSIS:  Swelling of right eyelid [H02.843] Swelling of right eyelid   Swelling of right eyelid          ICD-10: Treatment Diagnosis:    Unspecified Lack of Coordination (R27.9)   Precautions/Allergies:    falls Codeine; Morphine; and Oxycodone      ASSESSMENT:     Ms. Melani Linda was admitted from home with R eye swelling and droop, diplopia, and headache. Pt reports similar symptoms with her L eye, symptoms resolved within 2 months. Pt lives with her family members who work, is independent with ADLs and uses a RW (home) or rollator (community) for mobility. Pt denies having any recent falls. This session, pt presented with deficits in vision, endurance, balance, and mobility impacting ADLs. Pt w/ patch over R eye, reports diplopia unless R eye is occluded, visual assessment  L eye WNL. Pt completed bed mobility with SBA, required CGA-min A for mobility in room and bathroom with RW.  Pt demonstrated decreased safety awareness and required min A and mod cues for fall prevention during toilet and chair transfers to ensure that she did not miss sitting on proper surface. Pt completed ADLs at sink with CGA, required min A to don socks. Pt fatigued and became SOB quickly with min exertion, endorsed feeling much weaker than her baseline. SpO2 96%. BUE assessment slightly decreased, WNL. Pt is below her functional baseline and would benefit from skilled OT services to address deficits. Recommend d/c home with home health; pt will likely decline home health services d/t being \"nervous\" and worried about her dogs. This section established at most recent assessment   PROBLEM LIST (Impairments causing functional limitations):  Decreased Strength  Decreased ADL/Functional Activities  Decreased Transfer Abilities  Decreased Balance  Decreased Activity Tolerance  Increased Fatigue  Increased Shortness of Breath   INTERVENTIONS PLANNED: (Benefits and precautions of occupational therapy have been discussed with the patient.)  Activities of daily living training  Adaptive equipment training  Balance training  Therapeutic activity  Therapeutic exercise     TREATMENT PLAN: Frequency/Duration: Follow patient 3 times/ week to address above goals. Rehabilitation Potential For Stated Goals: Good     REHAB RECOMMENDATIONS (at time of discharge pending progress):    Placement: It is my opinion, based on this patient's performance to date, that Ms. Sage may benefit from d/c home with home health.   Equipment:   3:1 [facility]               OCCUPATIONAL PROFILE AND HISTORY:   History of Present Injury/Illness (Reason for Referral):  See H&P  Past Medical History/Comorbidities:   Ms. Rachel Monsivais  has a past medical history of Allergic rhinitis, Anemia, Anxiety, Asthma, Cataracts, bilateral, Depression, Diabetes ([address] GERD (gastroesophageal reflux disease), History of DVT (deep vein thrombosis), History of pulmonary embolus (PE), Hypercholesterolemia, Hyperlipidemia, Hypertension, Insomnia, Internal hemorrhoids without mention of complication, Osteoarthritis, Peripheral neuropathy, Personal history of colonic polyps, Rectocele, Stroke ([facility] [address] and Stroke ([facility] [address] Ms. Fatoumata Rae  has a past surgical history that includes hx orthopaedic; hx  section; hx appendectomy; hx cholecystectomy; hx partial hysterectomy; and endoscopy, colon, diagnostic (13). Social History/Living Environment:   Home Environment: Private residence  # Steps to Enter: 0  One/Two Story Residence: One story  Living Alone: No  Support Systems: Family member(s)  Patient Expects to be Discharged to[de-identified] Private residence  Current DME Used/Available at Home: Francyne Boots, rolling, Walker, rollator  Tub or Shower Type: Shower  Prior Level of Function/Work/Activity:  Independent, lives w/ family, RW or rollator, does not drive     Number of Personal Factors/Comorbidities that affect the Plan of Care: Expanded review of therapy/medical records (1-2):  MODERATE COMPLEXITY   ASSESSMENT OF OCCUPATIONAL PERFORMANCE[de-identified]   Activities of Daily Living:   Basic ADLs (From Assessment) Complex ADLs (From Assessment)   Feeding: Setup  Oral Facial Hygiene/Grooming: Contact guard assistance  Bathing: Minimum assistance  Upper Body Dressing: Contact guard assistance  Lower Body Dressing: Minimum assistance  Toileting: Contact guard assistance Instrumental ADL  Meal Preparation: Maximum assistance  Homemaking:  Moderate assistance   Grooming/Bathing/Dressing Activities of Daily Living   Grooming  Grooming Assistance: Contact guard assistance Cognitive Retraining  Safety/Judgement: Fall prevention           Toileting  Toileting Assistance: Contact guard assistance     Functional Transfers  Bathroom Mobility: Contact guard assistance;Minimum assistance  Toilet Transfer : Minimum assistance   Lower Body Dressing Assistance  Socks: Minimum assistance Bed/Mat Mobility  Supine to Sit: Stand-by assistance  Sit to Supine: Stand-by assistance  Sit to Stand: Stand-by assistance  Stand to Sit: Stand-by assistance  Bed to Chair: Contact guard assistance     Most Recent Physical Functioning:   Gross Assessment:  AROM: Within functional limits  Strength: Generally decreased, functional  Coordination: Within functional limits  Tone: Normal  Sensation: Intact               Posture:  Posture (WDL): Exceptions to WDL  Posture Assessment: Forward head, Rounded shoulders  Balance:  Sitting: Intact  Standing: Impaired  Standing - Static: Fair  Standing - Dynamic : Fair Bed Mobility:  Supine to Sit: Stand-by assistance  Sit to Supine: Stand-by assistance  Wheelchair Mobility:     Transfers:  Sit to Stand: Stand-by assistance  Stand to Sit: Stand-by assistance  Bed to Chair: Contact guard assistance            Patient Vitals for the past 6 hrs:   BP SpO2 Pulse   19 [phone]/78 96 % 90       Mental Status  Neurologic State: Alert  Orientation Level: Oriented X4  Cognition: Follows commands  Perception: Appears intact  Perseveration: No perseveration noted  Safety/Judgement: Fall prevention                          Physical Skills Involved:  Balance  Strength  Activity Tolerance Cognitive Skills Affected (resulting in the inability to perform in a timely and safe manner):  Executive Function Psychosocial Skills Affected:  Habits/Routines  Environmental Adaptation   Number of elements that affect the Plan of Care: 3-5:  MODERATE COMPLEXITY   CLINICAL DECISION MAKIN[address] [place] [address] [address] [id number] -PAC 6 Clicks   Daily Activity Inpatient Short Form  How much help from another person does the patient currently need. .. Total A Lot A Little None   1. Putting on and taking off regular lower body clothing? ? 1   ? 2   ? 3   ? 4   2. Bathing (including washing, rinsing, drying)? ? 1   ? 2   ? 3   ? 4   3. Toileting, which includes using toilet, bedpan or urinal?   ? 1   ? 2   ? 3   ? 4   4. Putting on and taking off regular upper body clothing? ? 1   ? 2   ? 3   ? 4   5. Taking care of personal grooming such as brushing teeth? ? 1   ? 2   ? 3   ? 4   6. Eating meals? ? 1   ? 2   ? 3   ? 4   © 2007, Trustees of [facility] [employer], under license to [employer]. All rights reserved      Score:  Initial: 19 Most Recent: X (Date: -- )    Interpretation of Tool:  Represents activities that are increasingly more difficult (i.e. Bed mobility, Transfers, Gait). Medical Necessity:     Patient demonstrates good   rehab potential due to higher previous functional level. Reason for Services/Other Comments:  Patient continues to require skilled intervention due to decreased ADLs and functional performance from baseline   . Use of outcome tool(s) and clinical judgement create a POC that gives a: LOW COMPLEXITY         TREATMENT:   (In addition to Assessment/Re-Assessment sessions the following treatments were rendered)     Pre-treatment Symptoms/Complaints:    Pain: Initial:   Pain Intensity 1: 1  Pain Location 1: Head  Pain Intervention(s) 1: Emotional support  Post Session:  1     Self Care: (10 min): Procedure(s) (per grid) utilized to improve and/or restore self-care/home management as related to dressing, toileting and grooming. Required minimal   cueing to facilitate activities of daily living skills and compensatory activities. Braces/Orthotics/Lines/Etc:   O2 Device: Room air  Treatment/Session Assessment:    Response to Treatment:  no adverse reaction   Interdisciplinary Collaboration:   Occupational Therapist  Registered Nurse  After treatment position/precautions:   Up in chair  Bed/Chair-wheels locked  Call light within reach  RN notified   Compliance with Program/Exercises: Will assess as treatment progresses. Recommendations/Intent for next treatment session: \"Next visit will focus on advancements to more challenging activities and reduction in assistance provided\".   Total Treatment Duration:  OT Patient Time In/Time Out  Time In: 0920  Time Out: [address] NANCY Kim

## 2019-09-02 NOTE — PROGRESS NOTES
LMSW met with pt. She is known to CM from previous care. She refused [facility] referral last hospitalization and has again as she does not want strangers in the home due to her 3 chihuahuas. Tried to offer suggestions for dealing with her animals and HH but pt prefers for therapy to give her exercises and handouts that she can do at home. Will continue to follow pt plan of care and assist further as she agrees. Care Management Interventions  PCP Verified by CM: Sheldon Taylor)  Mode of Transport at Discharge: (family)  Transition of Care Consult (CM Consult): Discharge Planning(Pt is insured with pharmacy benefits.  )  Discharge Durable Medical Equipment: No(pt has a swival wheel walker )  Physical Therapy Consult: Yes  Occupational Therapy Consult: Yes  Speech Therapy Consult: Yes  Current Support Network: Relative's Home(Pt lives win home with her daughter, grandchildren. and three chihuahuas.  )  Confirm Follow Up Transport: Family  Plan discussed with Pt/Family/Caregiver: Yes  Freedom of Choice Offered: Yes  [facility] Resource Information Provided?: No  Discharge Location  Discharge Placement: Home(Pt plans to return home with family at discharge.   Therapy has recommended HH PT but pt has requested they give her exercises and handout as her 3 chisvitlanaahuas do not like strangers so she is refusing HH.)

## 2019-09-03 ENCOUNTER — APPOINTMENT (OUTPATIENT)
Dept: GENERAL RADIOLOGY | Age: 74
DRG: 123 | End: 2019-09-03
Attending: NURSE PRACTITIONER
Payer: MEDICARE

## 2019-09-03 LAB
ANION GAP SERPL CALC-SCNC: 5 MMOL/L (ref 7–16)
BASOPHILS # BLD: 0 K/UL (ref 0–0.2)
BASOPHILS NFR BLD: 1 % (ref 0–2)
BUN SERPL-MCNC: 10 MG/DL (ref 8–23)
CALCIUM SERPL-MCNC: 8.4 MG/DL (ref 8.3–10.4)
CHLORIDE SERPL-SCNC: 107 MMOL/L (ref 98–107)
CO2 SERPL-SCNC: 27 MMOL/L (ref 21–32)
CREAT SERPL-MCNC: 0.63 MG/DL (ref 0.6–1)
DIFFERENTIAL METHOD BLD: ABNORMAL
EOSINOPHIL # BLD: 0.1 K/UL (ref 0–0.8)
EOSINOPHIL NFR BLD: 1 % (ref 0.5–7.8)
ERYTHROCYTE [DISTWIDTH] IN BLOOD BY AUTOMATED COUNT: 14.6 % (ref 11.9–14.6)
GLUCOSE BLD STRIP.AUTO-MCNC: 135 MG/DL (ref 65–100)
GLUCOSE BLD STRIP.AUTO-MCNC: 173 MG/DL (ref 65–100)
GLUCOSE BLD STRIP.AUTO-MCNC: 174 MG/DL (ref 65–100)
GLUCOSE BLD STRIP.AUTO-MCNC: 180 MG/DL (ref 65–100)
GLUCOSE BLD STRIP.AUTO-MCNC: 95 MG/DL (ref 65–100)
GLUCOSE SERPL-MCNC: 165 MG/DL (ref 65–100)
HCT VFR BLD AUTO: 41.5 % (ref 35.8–46.3)
HGB BLD-MCNC: 12.8 G/DL (ref 11.7–15.4)
IMM GRANULOCYTES # BLD AUTO: 0 K/UL (ref 0–0.5)
IMM GRANULOCYTES NFR BLD AUTO: 1 % (ref 0–5)
LYMPHOCYTES # BLD: 1.5 K/UL (ref 0.5–4.6)
LYMPHOCYTES NFR BLD: 28 % (ref 13–44)
MCH RBC QN AUTO: 25.4 PG (ref 26.1–32.9)
MCHC RBC AUTO-ENTMCNC: 30.8 G/DL (ref 31.4–35)
MCV RBC AUTO: 82.3 FL (ref 79.6–97.8)
MONOCYTES # BLD: 0.4 K/UL (ref 0.1–1.3)
MONOCYTES NFR BLD: 7 % (ref 4–12)
NEUTS SEG # BLD: 3.4 K/UL (ref 1.7–8.2)
NEUTS SEG NFR BLD: 63 % (ref 43–78)
NRBC # BLD: 0 K/UL (ref 0–0.2)
PLATELET # BLD AUTO: 277 K/UL (ref 150–450)
PMV BLD AUTO: 9.2 FL (ref 9.4–12.3)
POTASSIUM SERPL-SCNC: 4.1 MMOL/L (ref 3.5–5.1)
RBC # BLD AUTO: 5.04 M/UL (ref 4.05–5.2)
SODIUM SERPL-SCNC: 139 MMOL/L (ref 136–145)
WBC # BLD AUTO: 5.4 K/UL (ref 4.3–11.1)

## 2019-09-03 PROCEDURE — 74011250636 HC RX REV CODE- 250/636: Performed by: HOSPITALIST

## 2019-09-03 PROCEDURE — 74011636637 HC RX REV CODE- 636/637: Performed by: INTERNAL MEDICINE

## 2019-09-03 PROCEDURE — 74011250637 HC RX REV CODE- 250/637: Performed by: INTERNAL MEDICINE

## 2019-09-03 PROCEDURE — 74018 RADEX ABDOMEN 1 VIEW: CPT

## 2019-09-03 PROCEDURE — 36415 COLL VENOUS BLD VENIPUNCTURE: CPT

## 2019-09-03 PROCEDURE — 82962 GLUCOSE BLOOD TEST: CPT

## 2019-09-03 PROCEDURE — 74011250637 HC RX REV CODE- 250/637: Performed by: NURSE PRACTITIONER

## 2019-09-03 PROCEDURE — 99218 HC RM OBSERVATION: CPT

## 2019-09-03 PROCEDURE — 80048 BASIC METABOLIC PNL TOTAL CA: CPT

## 2019-09-03 PROCEDURE — 99233 SBSQ HOSP IP/OBS HIGH 50: CPT | Performed by: PSYCHIATRY & NEUROLOGY

## 2019-09-03 PROCEDURE — 65660000000 HC RM CCU STEPDOWN

## 2019-09-03 PROCEDURE — 85025 COMPLETE CBC W/AUTO DIFF WBC: CPT

## 2019-09-03 RX ORDER — POLYETHYLENE GLYCOL 3350 17 G/17G
17 POWDER, FOR SOLUTION ORAL 2 TIMES DAILY
Status: DISCONTINUED | OUTPATIENT
Start: 2019-09-03 | End: 2019-09-05 | Stop reason: HOSPADM

## 2019-09-03 RX ORDER — LORAZEPAM 0.5 MG/1
0.5 TABLET ORAL
Status: DISCONTINUED | OUTPATIENT
Start: 2019-09-03 | End: 2019-09-05 | Stop reason: HOSPADM

## 2019-09-03 RX ADMIN — ASPIRIN 81 MG 81 MG: 81 TABLET ORAL at 11:15

## 2019-09-03 RX ADMIN — ONDANSETRON 4 MG: 2 INJECTION INTRAMUSCULAR; INTRAVENOUS at 07:19

## 2019-09-03 RX ADMIN — ONDANSETRON 4 MG: 2 INJECTION INTRAMUSCULAR; INTRAVENOUS at 21:41

## 2019-09-03 RX ADMIN — APIXABAN 5 MG: 5 TABLET, FILM COATED ORAL at 11:16

## 2019-09-03 RX ADMIN — FLUTICASONE PROPIONATE 2 SPRAY: 50 SPRAY, METERED NASAL at 09:00

## 2019-09-03 RX ADMIN — LORAZEPAM 0.5 MG: 0.5 TABLET ORAL at 21:50

## 2019-09-03 RX ADMIN — Medication 10 ML: at 13:08

## 2019-09-03 RX ADMIN — Medication 10 ML: at 21:43

## 2019-09-03 RX ADMIN — ACETAMINOPHEN 500 MG: 500 TABLET, FILM COATED ORAL at 21:41

## 2019-09-03 RX ADMIN — INSULIN GLARGINE 25 UNITS: 100 INJECTION, SOLUTION SUBCUTANEOUS at 21:35

## 2019-09-03 RX ADMIN — INSULIN GLARGINE 35 UNITS: 100 INJECTION, SOLUTION SUBCUTANEOUS at 09:04

## 2019-09-03 RX ADMIN — INSULIN LISPRO 2 UNITS: 100 INJECTION, SOLUTION INTRAVENOUS; SUBCUTANEOUS at 13:07

## 2019-09-03 RX ADMIN — TRAMADOL HYDROCHLORIDE 50 MG: 50 TABLET, FILM COATED ORAL at 06:27

## 2019-09-03 RX ADMIN — ATORVASTATIN CALCIUM 40 MG: 40 TABLET, FILM COATED ORAL at 21:36

## 2019-09-03 RX ADMIN — AMLODIPINE BESYLATE 5 MG: 5 TABLET ORAL at 11:15

## 2019-09-03 RX ADMIN — POLYETHYLENE GLYCOL 3350 17 G: 17 POWDER, FOR SOLUTION ORAL at 18:08

## 2019-09-03 RX ADMIN — INSULIN LISPRO 20 UNITS: 100 INJECTION, SOLUTION INTRAVENOUS; SUBCUTANEOUS at 18:08

## 2019-09-03 RX ADMIN — Medication 10 ML: at 06:29

## 2019-09-03 RX ADMIN — ONDANSETRON 4 MG: 2 INJECTION INTRAMUSCULAR; INTRAVENOUS at 11:20

## 2019-09-03 RX ADMIN — INSULIN LISPRO 20 UNITS: 100 INJECTION, SOLUTION INTRAVENOUS; SUBCUTANEOUS at 13:07

## 2019-09-03 RX ADMIN — LEVOTHYROXINE SODIUM 25 MCG: 50 TABLET ORAL at 06:26

## 2019-09-03 RX ADMIN — INSULIN LISPRO 2 UNITS: 100 INJECTION, SOLUTION INTRAVENOUS; SUBCUTANEOUS at 09:14

## 2019-09-03 NOTE — PROGRESS NOTES
Paged Dr. Barton Ends for patient's c/o increased anxiety and elevated BP. Reported to  patient's verbal report of home Ativan for anxiety.  To place orders.

## 2019-09-03 NOTE — PHYSICIAN ADVISORY
Letter of Determination: Upgrade from Observation to Inpatient Status    This patient was originally hospitalized as Outpatient Status with Observation Services on 9/1/2019 for swelling and droop of right eyelid. This patient now meets for Inpatient Admission in accordance with CMS regulation Section 43 .3. Specifically, patient's stay is now over Two Midnights and was medically necessary. The patient's stay was medically necessary based on continued symptomatology beyond observation care, with ophthalmology concern for brain stem stroke. Consistent with CMS guidelines, patient meets for inpatient status. It is our recommendation that this patient's hospitalization status should be upgraded from OBSERVATION to INPATIENT status.      The final decision regarding the patient's hospitalization status depends on the attending physician's judgement.     Wilian Ayers MD, DOC,   Physician [place].

## 2019-09-03 NOTE — CONSULTS
60-year-old -American female with new onset right facial pain right eye ptosis and diplopia. Interesting history in that the patient was here in March at which time she was seen by Dr. 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 At that time she was seen at [address] The patient does have a spinal cord stimulator. This was not noted at [address], fortunately and as result she had an MRI scan which she tolerated without any incident. Normally this would be considered a contraindication. The patient is now had a repeat CT angiogram which demonstrates presence of intracranial vascular disease. Remainder of the historical details are as per prior neurology notes. The patient reports no localized associated neurological dysfunction although she does state that she is a little bit off balance. On examination the patient is an elderly -American female in acute distress the examination of the head neck reveals no nuchal rigidity. I do not hear any carotid bruits. She is not specifically tender in the left superficial temporal region she is a bit on the right but I do not appreciate nodularity over the superficial temporal artery on the right side. I see no rash and no rash has been present with the patient prior cranial neuropathies. Movement of the left eye is within normal limits. No evidence is seen of disorder of extraocular movement although there is a mild ptosis on the left side there is a total ptosis on the right and there is total involvement of the 3rd cranial nerve although the pupils are equal and react to light    Motor examination no focal drift. Finger-to-nose done well. Motor strength is symmetric in the upper and lower extremities I did not walk the patient. He will need shin is done acceptably.     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 There has been total resolution of the previous cranial nerve involvement and specifically the movement of the left eye is now normal    Sedimentation rate C-reactive protein etc. obtained at the patient's last hospitalization were negative. Further stroke work-up is not indicated but indeed it would be of huge benefit to have the patient have a MRI scan. I do have some concern with reference to the extent of differential diagnosis which includes granulomatous process of the skull base process in the cavernous sinus or localized stroke that in the absence of MRI scan we may be putting the patient at risk for complications of her underlying condition.   I understand there is some degree of risk with MRI however I believe the risk of not doing it in terms of differential diagnosis is greater than the risk of doing it    I will attempt to discuss with Dr. Rula Clark

## 2019-09-03 NOTE — PROGRESS NOTES
Hospitalist Progress Note     Admit Date:  2019  9:21 AM   Name:  Alma Powell   Age:  69 y.o.  :  1945   MRN:  [id number]   PCP:  Rajat Farfan NP  Treatment Team: Attending Provider: Kaitlyn Lyon MD; Primary Nurse: Yemi Paez, RN; Consulting Provider: Catalino Wallis MD; Utilization Review: Yue Shearer RN; Consulting Provider: Srinivas Porras MD; Primary Nurse: John Hendricks RN; Physical Therapist: Cheryl Boone, PT, DPT; Care Manager: Dinesh Del Angel    Subjective:   Patient is a 68 yo AA female with PMH of DM, CVA 10 years prior, nerve stimulator in back,  who presented to the ER with reports of right eyelid pain, HA x 1 day 5/10 intensity. Patient reported double vision and swelling. Denied fever or pain with eye movement. CT head with right preseptal orbital soft tissue swelling and presence of intracranial vascular disease. Neurology consulting. No further stroke workup needed. Subjective  Patient alert and oriented. Reports nausea, HA. Denies fever/ chills, weakness. No BM reported x 4 days. Objective:     Patient Vitals for the past 24 hrs:   Temp Pulse Resp BP SpO2   19 0720 98.6 °F (37 °C) 71 18 134/77 98 %   19 0341 98.8 °F (37.1 °C) 65 16 163/73 98 %   19 2352 98.6 °F (37 °C) 82 18 165/81 97 %   19 1934 98.6 °F (37 °C) 86 18 169/78 96 %   19 1606 98.8 °F (37.1 °C) 84 20 156/67 94 %   19 1440 98.1 °F (36.7 °C) 86 20 165/84 98 %   19 1126 98 °F (36.7 °C) 82 16 136/64 96 %     Oxygen Therapy  O2 Sat (%): 98 % (19 0720)  Pulse via Oximetry: 84 beats per minute (19 1359)  O2 Device: Room air (19 0226)    Intake/Output Summary (Last 24 hours) at 9/3/2019 1122  Last data filed at 9/3/2019 0904  Gross per 24 hour   Intake 2298 ml   Output 300 ml   Net 1998 ml         REVIEW OF SYSTEMS: Comprehensive ROS performed and negative except as stated in HPI.     Physical Examination:  General:    Well nourished. Awake and alert. No distress noted. Head:  Normocephalic, atraumatic  Eyes:  Extraocular movements intact, normal sclera. Right eye with patch. CV:   RRR. No  Murmurs, clicks, or gallops  Lungs:   Unlabored, no cyanosis. CTAB. Room air. Abdomen:   Distended. Nausea. Bowel sounds hypoactive. Extremities: Warm and dry. No cyanosis or edema. Skin:     No rashes or jaundice. Neuro:  No gross focal deficits  Psych:  Mood and affect appropriate    Data Review:  I have reviewed all labs, meds, telemetry events, and studies from the last 24 hours. Recent Results (from the past 24 hour(s))   GLUCOSE, POC    Collection Time: 09/02/19 11:29 AM   Result Value Ref Range    Glucose (POC) 138 (H) 65 - 100 mg/dL   GLUCOSE, POC    Collection Time: 09/02/19  2:39 PM   Result Value Ref Range    Glucose (POC) 83 65 - 100 mg/dL   GLUCOSE, POC    Collection Time: 09/02/19  4:09 PM   Result Value Ref Range    Glucose (POC) 104 (H) 65 - 100 mg/dL   GLUCOSE, POC    Collection Time: 09/02/19  4:40 PM   Result Value Ref Range    Glucose (POC) 90 65 - 100 mg/dL   GLUCOSE, POC    Collection Time: 09/02/19  9:10 PM   Result Value Ref Range    Glucose (POC) 157 (H) 65 - 100 mg/dL   CBC WITH AUTOMATED DIFF    Collection Time: 09/03/19  6:45 AM   Result Value Ref Range    WBC 5.4 4.3 - 11.1 K/uL    RBC 5.04 4.05 - 5.2 M/uL    HGB 12.8 11.7 - 15.4 g/dL    HCT 41.5 35.8 - 46.3 %    MCV 82.3 79.6 - 97.8 FL    MCH 25.4 (L) 26.1 - 32.9 PG    MCHC 30.8 (L) 31.4 - 35.0 g/dL    RDW 14.6 11.9 - 14.6 %    PLATELET 580 287 - 564 K/uL    MPV 9.2 (L) 9.4 - 12.3 FL    ABSOLUTE NRBC 0.00 0.0 - 0.2 K/uL    DF AUTOMATED      NEUTROPHILS 63 43 - 78 %    LYMPHOCYTES 28 13 - 44 %    MONOCYTES 7 4.0 - 12.0 %    EOSINOPHILS 1 0.5 - 7.8 %    BASOPHILS 1 0.0 - 2.0 %    IMMATURE GRANULOCYTES 1 0.0 - 5.0 %    ABS. NEUTROPHILS 3.4 1.7 - 8.2 K/UL    ABS. LYMPHOCYTES 1.5 0.5 - 4.6 K/UL    ABS.  MONOCYTES 0.4 0.1 - 1.3 K/UL    ABS. EOSINOPHILS 0.1 0.0 - 0.8 K/UL    ABS. BASOPHILS 0.0 0.0 - 0.2 K/UL    ABS. IMM.  GRANS. 0.0 0.0 - 0.5 K/UL   METABOLIC PANEL, BASIC    Collection Time: 09/03/19  6:45 AM   Result Value Ref Range    Sodium 139 136 - 145 mmol/L    Potassium 4.1 3.5 - 5.1 mmol/L    Chloride 107 98 - 107 mmol/L    CO2 27 21 - 32 mmol/L    Anion gap 5 (L) 7 - 16 mmol/L    Glucose 165 (H) 65 - 100 mg/dL    BUN 10 8 - 23 MG/DL    Creatinine 0.63 0.6 - 1.0 MG/DL    GFR est AA >60 >60 ml/min/1.73m2    GFR est non-AA >60 >60 ml/min/1.73m2    Calcium 8.4 8.3 - 10.4 MG/DL   GLUCOSE, POC    Collection Time: 09/03/19  7:21 AM   Result Value Ref Range    Glucose (POC) 174 (H) 65 - 100 mg/dL        All Micro Results     None          Current Meds:  Current Facility-Administered Medications   Medication Dose Route Frequency    LORazepam (ATIVAN) tablet 1 mg  1 mg Oral QHS PRN    albuterol (PROVENTIL VENTOLIN) nebulizer solution 2.5 mg  2.5 mg Nebulization Q6H PRN    amLODIPine (NORVASC) tablet 5 mg  5 mg Oral DAILY    apixaban (ELIQUIS) tablet 5 mg (Patient Supplied)  5 mg Oral BID    fluticasone propionate (FLONASE) 50 mcg/actuation nasal spray 2 Spray  2 Spray Both Nostrils DAILY    insulin glargine (LANTUS) injection 35 Units (Patient Supplied)  35 Units SubCUTAneous DAILY    levothyroxine (SYNTHROID) tablet 25 mcg  25 mcg Oral ACB    insulin lispro (HUMALOG) injection   SubCUTAneous AC&HS    sodium chloride (NS) flush 5-40 mL  5-40 mL IntraVENous Q8H    sodium chloride (NS) flush 5-40 mL  5-40 mL IntraVENous PRN    aspirin chewable tablet 81 mg  81 mg Oral DAILY    atorvastatin (LIPITOR) tablet 40 mg  40 mg Oral QHS    insulin glargine (LANTUS) injection 25 Units (Patient Supplied)  25 Units SubCUTAneous QHS    acetaminophen (TYLENOL) tablet 500 mg  500 mg Oral Q6H PRN    traMADol (ULTRAM) tablet 50 mg  50 mg Oral Q6H PRN    insulin lispro (HUMALOG) injection 20 Units  20 Units SubCUTAneous TIDAC    influenza vaccine 2019-20 (6 mos+)(PF) (FLUARIX/FLULAVAL/FLUZONE QUAD) injection 0.5 mL  0.5 mL IntraMUSCular PRIOR TO DISCHARGE    ondansetron (ZOFRAN) injection 4 mg  4 mg IntraVENous Q4H PRN       Diet:  DIET DIABETIC CONSISTENT CARB    Other Studies (last 24 hours):  No results found. Assessment and Plan:     Hospital Problems as of 9/3/2019 Date Reviewed: 8/28/2019          Codes Class Noted - Resolved POA    * (Principal) Swelling of right eyelid ICD-10-CM: H02.843  ICD-9-CM: 374.82  9/1/2019 - Present Unknown        DVT, recurrent, lower extremity, chronic ([facility] ICD-10-CM: I82.509  ICD-9-CM: 453.50  11/21/2013 - Present Yes        Anxiety ICD-10-CM: F41.9  ICD-9-CM: 300.00  11/21/2013 - Present Yes        Diabetes mellitus ([facility] ICD-10-CM: E11.9  ICD-9-CM: 250.00  4/30/2012 - Present Yes              A/P:       -Swelling/drop of R eyelid  Could be related to preseptal cellulitis vs Stroke  Less likely stroke as patient was having similar presentation on L eye  CT Head showing Right preseptal orbital soft tissue swelling  Patient is afebrile with no WBC elevation  (+) double vision  Dr. Roge Steward - neurology consulting - will discuss MRI with Dr. Farheen Tate.  Needs evalulation with concern for diff dx of granulomatous process of the skull base process in the cavernous sinus .   -noted no further stroke work up needed.   [place] [facility] consult - per ophthalmology feel brain stem stroke possible    Nausea with distended abdomen and no BM x 4 days  KUB for SBO or ileus   PRN anti emetic      -DM  SSI  Lantus  HgbA1c 8.0        -hx of Chronic DVT LE  Restart eliquis     DVT ppx: eliquis  Code status:DNR    Case reviewed with supervising physician - Dr. Hernandez Aguilar     Signed:  Ibis Mohr NP-C

## 2019-09-03 NOTE — PROGRESS NOTES
END OF SHIFT NOTE:    INTAKE/OUTPUT  09/02 0701 - 09/03 0700  In: 2498 [P.O.:920; I.V.:1578]  Out: -   Voiding: YES  Catheter: NO  Drain:              Flatus: Patient does have flatus present. Stool:  0 occurrences. Characteristics:       Emesis: 0 occurrences. Characteristics:        VITAL SIGNS  Patient Vitals for the past 12 hrs:   Temp Pulse Resp BP SpO2   09/03/19 1519 97.9 °F (36.6 °C) 76 18 156/83 96 %   09/03/19 1131 98.4 °F (36.9 °C) 66 18 165/82 96 %   09/03/19 0720 98.6 °F (37 °C) 71 18 134/77 98 %       Pain Assessment  Pain Intensity 1: 4 (09/03/19 0627)  Pain Location 1: Head  Pain Intervention(s) 1: Medication (see MAR)  Patient Stated Pain Goal: 0    Ambulating  Yes, short distances to bathroom and chair. Shift report given to oncoming nurse at the bedside.     Edel Roca RN

## 2019-09-03 NOTE — PROGRESS NOTES
END OF SHIFT NOTE:    INTAKE/OUTPUT  09/02 0701 - 09/03 0700  In: 2498 [P.O.:920; I.V.:1578]  Out: -   Voiding: YES  Catheter: NO  Drain:              Flatus: Patient does have flatus present. Stool:  0 occurrences. Characteristics:       Emesis: 0 occurrences. Characteristics:        VITAL SIGNS  Patient Vitals for the past 12 hrs:   Temp Pulse Resp BP SpO2   09/03/19 0720 98.6 °F (37 °C) 71 18 134/77 98 %   09/03/19 0341 98.8 °F (37.1 °C) 65 16 163/73 98 %   09/02/19 2352 98.6 °F (37 °C) 82 18 165/81 97 %       Pain Assessment  Pain Intensity 1: 4 (09/03/19 0627)  Pain Location 1: Head  Pain Intervention(s) 1: Medication (see MAR)  Patient Stated Pain Goal: 0    Ambulating  Yes, 2 times to the bathroom    Shift report given to oncoming nurse at the bedside.     Chi Gooden RN

## 2019-09-04 ENCOUNTER — APPOINTMENT (OUTPATIENT)
Dept: CT IMAGING | Age: 74
DRG: 123 | End: 2019-09-04
Attending: NURSE PRACTITIONER
Payer: MEDICARE

## 2019-09-04 LAB
ANION GAP SERPL CALC-SCNC: 5 MMOL/L (ref 7–16)
BASOPHILS # BLD: 0 K/UL (ref 0–0.2)
BASOPHILS NFR BLD: 1 % (ref 0–2)
BUN SERPL-MCNC: 10 MG/DL (ref 8–23)
CALCIUM SERPL-MCNC: 9 MG/DL (ref 8.3–10.4)
CHLORIDE SERPL-SCNC: 105 MMOL/L (ref 98–107)
CO2 SERPL-SCNC: 29 MMOL/L (ref 21–32)
CREAT SERPL-MCNC: 0.66 MG/DL (ref 0.6–1)
DIFFERENTIAL METHOD BLD: ABNORMAL
EOSINOPHIL # BLD: 0.1 K/UL (ref 0–0.8)
EOSINOPHIL NFR BLD: 1 % (ref 0.5–7.8)
ERYTHROCYTE [DISTWIDTH] IN BLOOD BY AUTOMATED COUNT: 14.3 % (ref 11.9–14.6)
GLUCOSE BLD STRIP.AUTO-MCNC: 103 MG/DL (ref 65–100)
GLUCOSE BLD STRIP.AUTO-MCNC: 129 MG/DL (ref 65–100)
GLUCOSE BLD STRIP.AUTO-MCNC: 213 MG/DL (ref 65–100)
GLUCOSE BLD STRIP.AUTO-MCNC: 66 MG/DL (ref 65–100)
GLUCOSE SERPL-MCNC: 92 MG/DL (ref 65–100)
HCT VFR BLD AUTO: 41.3 % (ref 35.8–46.3)
HGB BLD-MCNC: 13.1 G/DL (ref 11.7–15.4)
IMM GRANULOCYTES # BLD AUTO: 0 K/UL (ref 0–0.5)
IMM GRANULOCYTES NFR BLD AUTO: 0 % (ref 0–5)
LYMPHOCYTES # BLD: 1.7 K/UL (ref 0.5–4.6)
LYMPHOCYTES NFR BLD: 26 % (ref 13–44)
MCH RBC QN AUTO: 25.8 PG (ref 26.1–32.9)
MCHC RBC AUTO-ENTMCNC: 31.7 G/DL (ref 31.4–35)
MCV RBC AUTO: 81.5 FL (ref 79.6–97.8)
MONOCYTES # BLD: 0.5 K/UL (ref 0.1–1.3)
MONOCYTES NFR BLD: 8 % (ref 4–12)
NEUTS SEG # BLD: 4.3 K/UL (ref 1.7–8.2)
NEUTS SEG NFR BLD: 65 % (ref 43–78)
NRBC # BLD: 0 K/UL (ref 0–0.2)
PLATELET # BLD AUTO: 303 K/UL (ref 150–450)
PMV BLD AUTO: 9.2 FL (ref 9.4–12.3)
POTASSIUM SERPL-SCNC: 3.6 MMOL/L (ref 3.5–5.1)
RBC # BLD AUTO: 5.07 M/UL (ref 4.05–5.2)
SODIUM SERPL-SCNC: 139 MMOL/L (ref 136–145)
WBC # BLD AUTO: 6.6 K/UL (ref 4.3–11.1)

## 2019-09-04 PROCEDURE — 74011636320 HC RX REV CODE- 636/320: Performed by: INTERNAL MEDICINE

## 2019-09-04 PROCEDURE — 97530 THERAPEUTIC ACTIVITIES: CPT

## 2019-09-04 PROCEDURE — 74011636637 HC RX REV CODE- 636/637: Performed by: INTERNAL MEDICINE

## 2019-09-04 PROCEDURE — 99232 SBSQ HOSP IP/OBS MODERATE 35: CPT | Performed by: PSYCHIATRY & NEUROLOGY

## 2019-09-04 PROCEDURE — 36415 COLL VENOUS BLD VENIPUNCTURE: CPT

## 2019-09-04 PROCEDURE — 82962 GLUCOSE BLOOD TEST: CPT

## 2019-09-04 PROCEDURE — 85025 COMPLETE CBC W/AUTO DIFF WBC: CPT

## 2019-09-04 PROCEDURE — 74011250637 HC RX REV CODE- 250/637: Performed by: NURSE PRACTITIONER

## 2019-09-04 PROCEDURE — 80048 BASIC METABOLIC PNL TOTAL CA: CPT

## 2019-09-04 PROCEDURE — 74011250637 HC RX REV CODE- 250/637: Performed by: INTERNAL MEDICINE

## 2019-09-04 PROCEDURE — 74011000258 HC RX REV CODE- 258: Performed by: INTERNAL MEDICINE

## 2019-09-04 PROCEDURE — 70496 CT ANGIOGRAPHY HEAD: CPT

## 2019-09-04 PROCEDURE — 65660000000 HC RM CCU STEPDOWN

## 2019-09-04 RX ORDER — FACIAL-BODY WIPES
10 EACH TOPICAL
Status: COMPLETED | OUTPATIENT
Start: 2019-09-04 | End: 2019-09-04

## 2019-09-04 RX ORDER — SODIUM CHLORIDE 0.9 % (FLUSH) 0.9 %
10 SYRINGE (ML) INJECTION
Status: COMPLETED | OUTPATIENT
Start: 2019-09-04 | End: 2019-09-04

## 2019-09-04 RX ADMIN — Medication 10 ML: at 13:47

## 2019-09-04 RX ADMIN — LORAZEPAM 0.5 MG: 0.5 TABLET ORAL at 21:34

## 2019-09-04 RX ADMIN — POLYETHYLENE GLYCOL 3350 17 G: 17 POWDER, FOR SOLUTION ORAL at 17:41

## 2019-09-04 RX ADMIN — INSULIN LISPRO 20 UNITS: 100 INJECTION, SOLUTION INTRAVENOUS; SUBCUTANEOUS at 08:55

## 2019-09-04 RX ADMIN — LEVOTHYROXINE SODIUM 25 MCG: 50 TABLET ORAL at 06:22

## 2019-09-04 RX ADMIN — Medication 10 ML: at 06:24

## 2019-09-04 RX ADMIN — Medication 10 ML: at 13:40

## 2019-09-04 RX ADMIN — INSULIN LISPRO 4 UNITS: 100 INJECTION, SOLUTION INTRAVENOUS; SUBCUTANEOUS at 21:35

## 2019-09-04 RX ADMIN — FLUTICASONE PROPIONATE 2 SPRAY: 50 SPRAY, METERED NASAL at 09:00

## 2019-09-04 RX ADMIN — INSULIN GLARGINE 35 UNITS: 100 INJECTION, SOLUTION SUBCUTANEOUS at 08:57

## 2019-09-04 RX ADMIN — Medication 10 ML: at 21:38

## 2019-09-04 RX ADMIN — ATORVASTATIN CALCIUM 40 MG: 40 TABLET, FILM COATED ORAL at 21:33

## 2019-09-04 RX ADMIN — INSULIN GLARGINE 25 UNITS: 100 INJECTION, SOLUTION SUBCUTANEOUS at 21:32

## 2019-09-04 RX ADMIN — SODIUM CHLORIDE 100 ML: 900 INJECTION, SOLUTION INTRAVENOUS at 13:40

## 2019-09-04 RX ADMIN — BISACODYL 10 MG: 10 SUPPOSITORY RECTAL at 12:54

## 2019-09-04 RX ADMIN — INSULIN LISPRO 20 UNITS: 100 INJECTION, SOLUTION INTRAVENOUS; SUBCUTANEOUS at 12:49

## 2019-09-04 RX ADMIN — POLYETHYLENE GLYCOL 3350 17 G: 17 POWDER, FOR SOLUTION ORAL at 09:00

## 2019-09-04 RX ADMIN — ASPIRIN 81 MG 81 MG: 81 TABLET ORAL at 08:56

## 2019-09-04 RX ADMIN — AMLODIPINE BESYLATE 5 MG: 5 TABLET ORAL at 08:56

## 2019-09-04 RX ADMIN — IOPAMIDOL 100 ML: 755 INJECTION, SOLUTION INTRAVENOUS at 13:40

## 2019-09-04 NOTE — PROGRESS NOTES
END OF SHIFT NOTE:    INTAKE/OUTPUT  09/03 0701 - 09/04 0700  In: 480 [P.O.:480]  Out: 1100 [Urine:1100]  Voiding: YES  Catheter: NO  Drain:              Flatus: Patient does have flatus present. Stool:  0 occurrences. LBM 08/30. Characteristics:    Dulcolax supp admin given today w/o any results. On miralax BID. Emesis: 0 occurrences. Characteristics:        VITAL SIGNS  Patient Vitals for the past 12 hrs:   Temp Pulse Resp BP SpO2   09/04/19 1459 98.5 °F (36.9 °C) 76 18 112/72 98 %   09/04/19 1359 97.7 °F (36.5 °C) 69 18 115/69 96 %   09/04/19 1115 98 °F (36.7 °C) 73 18 107/57 96 %   09/04/19 0730 98.1 °F (36.7 °C) 72 18 153/83 97 %       Pain Assessment  Pain Intensity 1: 0 (09/04/19 1438)  Pain Location 1: Head  Pain Intervention(s) 1: Medication (see MAR)  Patient Stated Pain Goal: 0    Ambulating  Yes with rolling walker    Shift report given to oncoming nurse at the bedside.     Chiquis Mckeon RN

## 2019-09-04 NOTE — PROGRESS NOTES
CM continues to follow for discharge needs/planning. Previous  notes report pt continues to decline home health services. Pt is followed by community case management - Liza Hunt - this CM emailed CCM to inform them [facility] recommended but pt declined services. No additional discharge needs at this time. CM to continue to monitor.

## 2019-09-04 NOTE — PROGRESS NOTES
Problem: Mobility Impaired (Adult and Pediatric)  Goal: *Acute Goals and Plan of Care (Insert Text)  Description  LTG:  (1.)Ms. Sage will move from supine to sit and sit to supine , scoot up and down and roll side to side in bed with INDEPENDENT within 7 treatment day(s). (2.)Ms. Sage will transfer from bed to chair and chair to bed with MODIFIED INDEPENDENCE using the least restrictive device within 7 treatment day(s). (3.)Ms. Sage will ambulate with MODIFIED INDEPENDENCE for 250+ feet with the least restrictive device within 7 treatment day(s). ________________________________________________________________________________________________     Outcome: Progressing Towards Goal     PHYSICAL THERAPY: Daily Note 9/4/2019  INPATIENT: PT Visit Days : 2  [place] / [place] / Product Type: Medicare /       NAME/AGE/GENDER: Edison Boland is a 69 y.o. female   PRIMARY DIAGNOSIS: Swelling of right eyelid [H02.843]  Swelling of right eyelid [H02.843] Swelling of right eyelid   Swelling of right eyelid         ICD-10: Treatment Diagnosis:    · Difficulty in walking, Not elsewhere classified (R26.2)   Precaution/Allergies:  Codeine; Morphine; and Oxycodone      ASSESSMENT:     Ms. Frances Patel presents sitting in recliner pleasant and agreeable for PT. At bseline she is modified independent with RW in home and community. Patient's LE strength grossly equal and WFL. She reports peripheral neuropathy. Patient stood with mod Ind. She ambulated 150' with RW and SBA. She had difficulty with heel strike on R, seems to have decreased ankle mobility on R. Sat and to supine with mod Ind. Demonstrated progress with gait today. Ms. Frances Patel would benefit from skilled physical therapy (medically necessary) to address her deficits and maximize her function. This section established at most recent assessment   PROBLEM LIST (Impairments causing functional limitations):  1.  Decreased Transfer Abilities  2. Decreased Ambulation Ability/Technique  3. Decreased Balance  4. Increased Pain   INTERVENTIONS PLANNED: (Benefits and precautions of physical therapy have been discussed with the patient.)  1. Bed Mobility  2. Gait Training  3. Therapeutic Activites  4. Therapeutic Exercise/Strengthening  5. Transfer Training  6. education      TREATMENT PLAN: Frequency/Duration: 3 times a week for 1 week  Rehabilitation Potential For Stated Goals: Excellent     REHAB RECOMMENDATIONS (at time of discharge pending progress):    Placement: It is my opinion, based on this patient's performance to date, that Ms. Sage may benefit from [address] after discharge due to the functional deficits listed above that are likely to improve with skilled rehabilitation because patient has experienced a  . Equipment:    None at this time              HISTORY:   History of Present Injury/Illness (Reason for Referral):  Per MD note, \"70 years old F with PMH of DM, CVA 10 years ago presented to the hospital complaining of swelling of R eyelid and HA started yesterday. Patient reported HA is on R forehead, non radiated, 5/10 intensity, on and off, alleviated by pain medication. Patient reported having associated R eyelid swelling and drop. Patient also having double vision. Patient denies fever or pain with eye movement. Patient reported having a similar presentation on her L eye early this year. Patient went to eye doctor who told her condition will improve by itself. It took two months for her L eye to be back baseline. Patient reported she has \"Nerve stimulator for back pain\", She was told cannot have MRI. In the ED CT brain showing Right preseptal orbital soft tissue swelling. \"  Past Medical History/Comorbidities:   Ms. Gema Marino  has a past medical history of Allergic rhinitis, Anemia, Anxiety, Asthma, Cataracts, bilateral, Depression, Diabetes ([facility] GERD (gastroesophageal reflux disease), History of DVT (deep vein thrombosis), History of pulmonary embolus (PE), Hypercholesterolemia, Hyperlipidemia, Hypertension, Insomnia, Internal hemorrhoids without mention of complication, Osteoarthritis, Peripheral neuropathy, Personal history of colonic polyps, Rectocele, Stroke ([address] and Stroke ([facility] Ms. Vince Menendez  has a past surgical history that includes hx orthopaedic; hx  section; hx appendectomy; hx cholecystectomy; hx partial hysterectomy; and endoscopy, colon, diagnostic (13). Social History/Living Environment:   Home Environment: Private residence  # Steps to Enter: 0  One/Two Story Residence: One story  Living Alone: No  Support Systems: Family member(s)  Patient Expects to be Discharged to[de-identified] Private residence  Current DME Used/Available at Home: Alto Storm, rolling, Walker, rollator  Tub or Shower Type: Shower  Prior Level of Function/Work/Activity:  Lives with family, ambulates with RW independently in home and community. Number of Personal Factors/Comorbidities that affect the Plan of Care: 3+: HIGH COMPLEXITY   EXAMINATION:   Most Recent Physical Functioning:   Gross Assessment:  AROM: Generally decreased, functional  Strength: Generally decreased, functional  Tone: Normal  Sensation: Impaired               Posture:  Posture (WDL): Exceptions to WDL  Posture Assessment: Forward head, Rounded shoulders  Balance:  Sitting: Intact  Standing: Impaired  Standing - Static: Fair  Standing - Dynamic : Fair Bed Mobility:  Sit to Supine: Modified independent  Duration: 15 Minutes  Wheelchair Mobility:     Transfers:  Sit to Stand: Modified independent  Stand to Sit: Modified independent  Gait:     Base of Support: Center of gravity altered  Speed/Alaina: Slow  Step Length: Right shortened;Left shortened  Distance (ft): 150 Feet (ft)  Assistive Device: Walker, rolling  Ambulation - Level of Assistance: Stand-by assistance      Body Structures Involved:  1. Eyes and Ears  2.  Muscles Body Functions Affected:  1. Sensory/Pain  2. Neuromusculoskeletal  3. Movement Related Activities and Participation Affected:  1. Mobility  2. Self Care  3. Domestic Life  4. Interpersonal Interactions and Relationships   Number of elements that affect the Plan of Care: 4+: HIGH COMPLEXITY   CLINICAL PRESENTATION:   Presentation: Evolving clinical presentation with changing clinical characteristics: MODERATE COMPLEXITY   CLINICAL DECISION MAKIN [facility] Mobility Inpatient Short Form  How much difficulty does the patient currently have. .. Unable A Lot A Little None   1. Turning over in bed (including adjusting bedclothes, sheets and blankets)? ? 1   ? 2   ? 3   ? 4   2. Sitting down on and standing up from a chair with arms ( e.g., wheelchair, bedside commode, etc.)   ? 1   ? 2   ? 3   ? 4   3. Moving from lying on back to sitting on the side of the bed?   ? 1   ? 2   ? 3   ? 4   How much help from another person does the patient currently need. .. Total A Lot A Little None   4. Moving to and from a bed to a chair (including a wheelchair)? ? 1   ? 2   ? 3   ? 4   5. Need to walk in hospital room? ? 1   ? 2   ? 3   ? 4   6. Climbing 3-5 steps with a railing? ? 1   ? 2   ? 3   ? 4   © , Trustees of [facility] [employer], under license to [employer]. All rights reserved      Score:  Initial: 20 Most Recent: X (Date: -- )    Interpretation of Tool:  Represents activities that are increasingly more difficult (i.e. Bed mobility, Transfers, Gait). Medical Necessity:     · Patient is expected to demonstrate progress in strength, range of motion, balance and functional technique  ·  to increase independence with   and improve safety during all functional mobility. · .  Reason for Services/Other Comments:  · Patient continues to require skilled intervention due to decline in functional mobility.    · .   Use of outcome tool(s) and clinical judgement create a POC that gives a: Clear prediction of patient's progress: LOW COMPLEXITY            TREATMENT:   (In addition to Assessment/Re-Assessment sessions the following treatments were rendered)   Pre-treatment Symptoms/Complaints:  none. Pain: Initial:   Pain Intensity 1: 0  Pain Location 1: Head  Post Session:  0   Therapeutic Activity: (15 Minutes   ):  Therapeutic activities including Bed transfers, Chair transfers, Ambulation on level ground and LE AROM to improve mobility, strength and balance. Required minimal   to promote motor control of bilateral, lower extremity(s). Therapeutic Exercise: (minutes):  Exercises per grid below to improve strength and coordination. Required minimal visual and verbal cues to promote proper body alignment. Progressed complexity of movement as indicated. DATE: 9/2/19 9/4/19      Ambulation        Hip Flexion        Long Arc Quads X20 AB x20 AB      Knee Squeezes        Ankle DF/PF X20 AB x20 AB                                       Key:  A=active, AA=active assisted, P=passive, B=bilaterally, R=right, L=left   DF=dorsiflexion, PF=plantarflexion          Braces/Orthotics/Lines/Etc:   · none  Treatment/Session Assessment:    · Response to Treatment:  pleasant and cooperative. · Interdisciplinary Collaboration:   o Physical Therapist  o Registered Nurse  · After treatment position/precautions:   o Supine in bed  o Bed/Chair-wheels locked  o Bed in low position  o Call light within reach  o RN notified   · Compliance with Program/Exercises: Will assess as treatment progresses  · Recommendations/Intent for next treatment session: \"Next visit will focus on advancements to more challenging activities and reduction in assistance provided\".   Total Treatment Duration:  PT Patient Time In/Time Out  Time In: 1438  Time Out: 00150 Alka Ortiz, PT, DPT

## 2019-09-04 NOTE — PROGRESS NOTES
Hospitalist Progress Note     Admit Date:  2019  9:21 AM   Name:  Kieran aWllis   Age:  69 y.o.  :  1945   MRN:  [id number]   PCP:  Lawrence Simpson NP  Treatment Team: Attending Provider: Stephany Barraza MD; Primary Nurse: Tessie Roche, RN; Consulting Provider: Herve Graham MD; Utilization Review: Dustin Bear RN; Consulting Provider: Carlos Carr MD; Care Manager: Jill Yu; Physical Therapist: Robyn Landaverde, PT, DPT    Subjective:   Patient is a 70 yo AA female with PMH of DM, CVA 10 years prior, nerve stimulator in back,  who presented to the ER with reports of right eyelid pain, HA x 1 day 5/10 intensity. Patient reported double vision and swelling. Denied fever or pain with eye movement. CT head with right preseptal orbital soft tissue swelling and presence of intracranial vascular disease. Neurology consulting. No further stroke workup needed. Plan for CTA today to evaluate right eye pain and HA. Subjective  Patient alert and oriented. Reports pain from right eye into head. Denies weakness, n/v. Reports no BM. Objective:     Patient Vitals for the past 24 hrs:   Temp Pulse Resp BP SpO2   19 0730 98.1 °F (36.7 °C) 72 18 153/83 97 %   19 0355 98.6 °F (37 °C) 67 18 144/78 94 %   19 2201 98 °F (36.7 °C) 69 18 157/88 96 %   19 1918 97.6 °F (36.4 °C) 79 18 155/86 98 %   19 1519 97.9 °F (36.6 °C) 76 18 156/83 96 %   19 1131 98.4 °F (36.9 °C) 66 18 165/82 96 %     Oxygen Therapy  O2 Sat (%): 97 % (19 0730)  Pulse via Oximetry: 84 beats per minute (19 1359)  O2 Device: Room air (19 194)    Intake/Output Summary (Last 24 hours) at 2019 1127  Last data filed at 9/3/2019 2135  Gross per 24 hour   Intake 240 ml   Output 900 ml   Net -660 ml         REVIEW OF SYSTEMS: Comprehensive ROS performed and negative except as stated in HPI. Physical Examination:  General:    Well nourished.   Awake and alert.  No distress noted. Head:  Normocephalic, atraumatic  Eyes:  Extraocular movements intact, normal sclera. Right eye with patch. CV:   RRR. No  Murmurs, clicks, or gallops  Lungs:   Unlabored, no cyanosis. CTAB. Room air. Abdomen:   Distended. Nausea. Bowel sounds hypoactive. Extremities: Warm and dry. No cyanosis or edema. Skin:     No rashes or jaundice. Neuro:  No gross focal deficits  Psych:  Mood and affect appropriate    Data Review:  I have reviewed all labs, meds, telemetry events, and studies from the last 24 hours. Recent Results (from the past 24 hour(s))   GLUCOSE, POC    Collection Time: 09/03/19 11:50 AM   Result Value Ref Range    Glucose (POC) 173 (H) 65 - 100 mg/dL   GLUCOSE, POC    Collection Time: 09/03/19 12:37 PM   Result Value Ref Range    Glucose (POC) 180 (H) 65 - 100 mg/dL   GLUCOSE, POC    Collection Time: 09/03/19  3:30 PM   Result Value Ref Range    Glucose (POC) 135 (H) 65 - 100 mg/dL   GLUCOSE, POC    Collection Time: 09/03/19  8:48 PM   Result Value Ref Range    Glucose (POC) 95 65 - 100 mg/dL   CBC WITH AUTOMATED DIFF    Collection Time: 09/04/19  5:30 AM   Result Value Ref Range    WBC 6.6 4.3 - 11.1 K/uL    RBC 5.07 4.05 - 5.2 M/uL    HGB 13.1 11.7 - 15.4 g/dL    HCT 41.3 35.8 - 46.3 %    MCV 81.5 79.6 - 97.8 FL    MCH 25.8 (L) 26.1 - 32.9 PG    MCHC 31.7 31.4 - 35.0 g/dL    RDW 14.3 11.9 - 14.6 %    PLATELET 547 555 - 044 K/uL    MPV 9.2 (L) 9.4 - 12.3 FL    ABSOLUTE NRBC 0.00 0.0 - 0.2 K/uL    DF AUTOMATED      NEUTROPHILS 65 43 - 78 %    LYMPHOCYTES 26 13 - 44 %    MONOCYTES 8 4.0 - 12.0 %    EOSINOPHILS 1 0.5 - 7.8 %    BASOPHILS 1 0.0 - 2.0 %    IMMATURE GRANULOCYTES 0 0.0 - 5.0 %    ABS. NEUTROPHILS 4.3 1.7 - 8.2 K/UL    ABS. LYMPHOCYTES 1.7 0.5 - 4.6 K/UL    ABS. MONOCYTES 0.5 0.1 - 1.3 K/UL    ABS. EOSINOPHILS 0.1 0.0 - 0.8 K/UL    ABS. BASOPHILS 0.0 0.0 - 0.2 K/UL    ABS. IMM.  GRANS. 0.0 0.0 - 0.5 K/UL   METABOLIC PANEL, BASIC    Collection Time: 09/04/19  5:30 AM   Result Value Ref Range    Sodium 139 136 - 145 mmol/L    Potassium 3.6 3.5 - 5.1 mmol/L    Chloride 105 98 - 107 mmol/L    CO2 29 21 - 32 mmol/L    Anion gap 5 (L) 7 - 16 mmol/L    Glucose 92 65 - 100 mg/dL    BUN 10 8 - 23 MG/DL    Creatinine 0.66 0.6 - 1.0 MG/DL    GFR est AA >60 >60 ml/min/1.73m2    GFR est non-AA >60 >60 ml/min/1.73m2    Calcium 9.0 8.3 - 10.4 MG/DL   GLUCOSE, POC    Collection Time: 09/04/19  7:34 AM   Result Value Ref Range    Glucose (POC) 103 (H) 65 - 100 mg/dL   GLUCOSE, POC    Collection Time: 09/04/19 11:04 AM   Result Value Ref Range    Glucose (POC) 129 (H) 65 - 100 mg/dL        All Micro Results     None          Current Meds:  Current Facility-Administered Medications   Medication Dose Route Frequency    LORazepam (ATIVAN) tablet 0.5 mg  0.5 mg Oral Q6H PRN    polyethylene glycol (MIRALAX) packet 17 g  17 g Oral BID    apixaban (ELIQUIS) tablet 5 mg  5 mg Oral Q12H    albuterol (PROVENTIL VENTOLIN) nebulizer solution 2.5 mg  2.5 mg Nebulization Q6H PRN    amLODIPine (NORVASC) tablet 5 mg  5 mg Oral DAILY    fluticasone propionate (FLONASE) 50 mcg/actuation nasal spray 2 Spray  2 Spray Both Nostrils DAILY    insulin glargine (LANTUS) injection 35 Units (Patient Supplied)  35 Units SubCUTAneous DAILY    levothyroxine (SYNTHROID) tablet 25 mcg  25 mcg Oral ACB    insulin lispro (HUMALOG) injection   SubCUTAneous AC&HS    sodium chloride (NS) flush 5-40 mL  5-40 mL IntraVENous Q8H    sodium chloride (NS) flush 5-40 mL  5-40 mL IntraVENous PRN    aspirin chewable tablet 81 mg  81 mg Oral DAILY    atorvastatin (LIPITOR) tablet 40 mg  40 mg Oral QHS    insulin glargine (LANTUS) injection 25 Units (Patient Supplied)  25 Units SubCUTAneous QHS    acetaminophen (TYLENOL) tablet 500 mg  500 mg Oral Q6H PRN    traMADol (ULTRAM) tablet 50 mg  50 mg Oral Q6H PRN    insulin lispro (HUMALOG) injection 20 Units  20 Units SubCUTAneous TIDAC    influenza vaccine 2019-20 (6 mos+)(PF) (FLUARIX/FLULAVAL/FLUZONE QUAD) injection 0.5 mL  0.5 mL IntraMUSCular PRIOR TO DISCHARGE    ondansetron (ZOFRAN) injection 4 mg  4 mg IntraVENous Q4H PRN       Diet:  DIET DIABETIC CONSISTENT CARB    Other Studies (last 24 hours):  Xr Abd (kub)    Result Date: 9/3/2019  KUB supine portable views History:  rule out SBO or ileus, 74 years Female Comparison:  KUB March 10, 2019 Findings:  No free air is evident. Stool seen throughout the colon. The bowel gas pattern is nonspecific and there is no evidence of ileus or obstruction. No suspicious renal or ureteral calculi are evident. Evidence of cholecystectomy. Visualized osseous structures unremarkable. Impression: No acute pathology identified. Stool throughout the colon. Assessment and Plan:     Hospital Problems as of 9/4/2019 Date Reviewed: 8/28/2019          Codes Class Noted - Resolved POA    * (Principal) Swelling of right eyelid ICD-10-CM: H02.843  ICD-9-CM: 374.82  9/1/2019 - Present Unknown        DVT, recurrent, lower extremity, chronic ([facility] ICD-10-CM: I82.509  ICD-9-CM: 453.50  11/21/2013 - Present Yes        Anxiety ICD-10-CM: F41.9  ICD-9-CM: 300.00  11/21/2013 - Present Yes        Diabetes mellitus ([facility] ICD-10-CM: E11.9  ICD-9-CM: 250.00  4/30/2012 - Present Yes              A/P:       -Swelling/drop of R eyelid  Could be related to preseptal cellulitis vs Stroke  Less likely stroke as patient was having similar presentation on L eye  CT Head showing Right preseptal orbital soft tissue swelling  Patient is afebrile with no WBC elevation  (+) double vision  Dr. Lupe Smalls - neurology consulting - will discuss MRI with Dr. Christina Saunders. Needs evalulation with concern for diff dx of granulomatous process of the skull base process in the cavernous sinus .   -noted no further stroke work up needed.    Neuro consult - per ophthalmology feel brain stem stroke possible- CTA planned today, unable to do MRI.      Nausea with distended abdomen and no BM x 4 days  KUB with constipation  PRN anti emetic   Add miralax and prn suppositorty     -DM  SSI  Lantus  HgbA1c 8.0        -hx of Chronic DVT LE  Restart eliquis     DVT ppx: eliquis  Code status:DNR    Case reviewed with supervising physician - Dr. Cruz Likes     Signed:  Zoe Cardozo NP-C

## 2019-09-04 NOTE — PROGRESS NOTES
Neurology Daily Progress Note     Assessment:     69year old female with new onset right eye/facial pain and right third nerve palsy. 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 Likely related to diabetes, however given previous history, we would like to obtain further brain imaging. Since the patient cannot have MRI due to SCS, will obtain CTV. Plan:     CTV     Subjective: Interval history:    Patient continues to have diplopia. Right eye is patched. She continues to have pain, improvement with symptomatic treatment. Unable to have MRI due to SCS . History:    Chaitanya Villanueva is a 69 y.o. female who is being seen for right eye diplopia, pain. Review of systems negative with exception of pertinent positives and negatives noted above.        Objective:     Vitals:    09/03/19 1918 09/03/19 2201 09/04/19 0355 09/04/19 0730   BP: 155/86 157/88 144/78 153/83   Pulse: 79 69 67 72   Resp: 18 18 18 18   Temp: 97.6 °F (36.4 °C) 98 °F (36.7 °C) 98.6 °F (37 °C) 98.1 °F (36.7 °C)   SpO2: 98% 96% 94% 97%   Weight:       Height:              Current Facility-Administered Medications:     bisacodyl (DULCOLAX) suppository 10 mg, 10 mg, Rectal, NOW, Black, Renard, NP    LORazepam (ATIVAN) tablet 0.5 mg, 0.5 mg, Oral, Q6H PRN, Enrique Argueta MD, 0.5 mg at 09/03/19 2150    polyethylene glycol (MIRALAX) packet 17 g, 17 g, Oral, BID, Black, Renard, NP, 17 g at 09/03/19 1808    apixaban (ELIQUIS) tablet 5 mg, 5 mg, Oral, Q12H, Enrique Argueta MD, 5 mg at 09/04/19 0857    albuterol (PROVENTIL VENTOLIN) nebulizer solution 2.5 mg, 2.5 mg, Nebulization, Q6H PRN, Enrique Argueta MD    amLODIPine (NORVASC) tablet 5 mg, 5 mg, Oral, DAILY, Enrique Argueta MD, 5 mg at 09/04/19 0856    fluticasone propionate (FLONASE) 50 mcg/actuation nasal spray 2 Spray, 2 Spray, Both Nostrils, DAILY, Enrique Argueta MD, 2 [place] at 09/03/19 0900   insulin glargine (LANTUS) injection 35 Units (Patient Supplied), 35 Units, SubCUTAneous, DAILY, Latia Duval MD, 35 Units at 09/04/19 0857    levothyroxine (SYNTHROID) tablet 25 mcg, 25 mcg, Oral, ACB, Brice Aguilar MD, 25 mcg at 09/04/19 0622    insulin lispro (HUMALOG) injection, , SubCUTAneous, AC&HS, Miguel Champion MD, Stopped at 09/03/19 1630    sodium chloride (NS) flush 5-40 mL, 5-40 mL, IntraVENous, Q8H, Miguel Cahmpion MD, 10 mL at 09/04/19 0624    sodium chloride (NS) flush 5-40 mL, 5-40 mL, IntraVENous, PRN, Miguel Champion MD    aspirin chewable tablet 81 mg, 81 mg, Oral, DAILY, Brice Aguilar MD, 81 mg at 09/04/19 0856    atorvastatin (LIPITOR) tablet 40 mg, 40 mg, Oral, QHS, Brice Aguilar MD, 40 mg at 09/03/19 2136    insulin glargine (LANTUS) injection 25 Units (Patient Supplied), 25 Units, SubCUTAneous, QHS, Latia Duval MD, 25 Units at 09/03/19 2135    acetaminophen (TYLENOL) tablet 500 mg, 500 mg, Oral, Q6H PRN, Miguel Champion MD, 500 mg at 09/03/19 2141    traMADol (ULTRAM) tablet 50 mg, 50 mg, Oral, Q6H PRN, Latia Duval MD, 50 mg at 09/03/19 3631    insulin lispro (HUMALOG) injection 20 Units, 20 Units, SubCUTAneous, TIDAC, Latia Duval MD, 20 Units at 09/04/19 0855    influenza vaccine 2019-20 (6 mos+)(PF) (FLUARIX/FLULAVAL/FLUZONE QUAD) injection 0.5 mL, 0.5 mL, IntraMUSCular, PRIOR TO DISCHARGE, Latia Duval MD    [facility]etron [facility]) injection 4 mg, 4 mg, IntraVENous, Q4H PRN, Charito Wynn MD, 4 mg at 09/03/19 9521    Recent Results (from the past 12 hour(s))   CBC WITH AUTOMATED DIFF    Collection Time: 09/04/19  5:30 AM   Result Value Ref Range    WBC 6.6 4.3 - 11.1 K/uL    RBC 5.07 4.05 - 5.2 M/uL    HGB 13.1 11.7 - 15.4 g/dL    HCT 41.3 35.8 - 46.3 %    MCV 81.5 79.6 - 97.8 FL    MCH 25.8 (L) 26.1 - 32.9 PG    MCHC 31.7 31.4 - 35.0 g/dL    RDW 14.3 11.9 - 14.6 % PLATELET 979 915 - 986 K/uL    MPV 9.2 (L) 9.4 - 12.3 FL    ABSOLUTE NRBC 0.00 0.0 - 0.2 K/uL    DF AUTOMATED      NEUTROPHILS 65 43 - 78 %    LYMPHOCYTES 26 13 - 44 %    MONOCYTES 8 4.0 - 12.0 %    EOSINOPHILS 1 0.5 - 7.8 %    BASOPHILS 1 0.0 - 2.0 %    IMMATURE GRANULOCYTES 0 0.0 - 5.0 %    ABS. NEUTROPHILS 4.3 1.7 - 8.2 K/UL    ABS. LYMPHOCYTES 1.7 0.5 - 4.6 K/UL    ABS. MONOCYTES 0.5 0.1 - 1.3 K/UL    ABS. EOSINOPHILS 0.1 0.0 - 0.8 K/UL    ABS. BASOPHILS 0.0 0.0 - 0.2 K/UL    ABS. IMM. GRANS. 0.0 0.0 - 0.5 K/UL   METABOLIC PANEL, BASIC    Collection Time: 09/04/19  5:30 AM   Result Value Ref Range    Sodium 139 136 - 145 mmol/L    Potassium 3.6 3.5 - 5.1 mmol/L    Chloride 105 98 - 107 mmol/L    CO2 29 21 - 32 mmol/L    Anion gap 5 (L) 7 - 16 mmol/L    Glucose 92 65 - 100 mg/dL    BUN 10 8 - 23 MG/DL    Creatinine 0.66 0.6 - 1.0 MG/DL    GFR est AA >60 >60 ml/min/1.73m2    GFR est non-AA >60 >60 ml/min/1.73m2    Calcium 9.0 8.3 - 10.4 MG/DL   GLUCOSE, POC    Collection Time: 09/04/19  7:34 AM   Result Value Ref Range    Glucose (POC) 103 (H) 65 - 100 mg/dL   GLUCOSE, POC    Collection Time: 09/04/19 11:04 AM   Result Value Ref Range    Glucose (POC) 129 (H) 65 - 100 mg/dL         Physical Exam:  General - Well developed, well nourished, in no apparent distress. Pleasant and conversent. HEENT - Normocephalic, atraumatic. Conjunctiva are clear. Right lid ptosis. Right eye patched. Neck - Supple without masses  Extremities - Peripheral pulses intact. No edema and no rashes. Neurological examination - Comprehension, attention, memory and reasoning are intact. Language and speech are normal.   On cranial nerve examination, (II, III, IV, VI) pupils are equal, round, and reactive to light. Visual acuity is impaired. Patient endorses diplopia. Right lid ptosis. Right third nerve palsy. (V, VII) Face is symmetric and sensation is intact to light touch. (VIII) Hearing is intact.  (IX, X) Palate and uvula elevate symmetrically. Voice is normal. (XI) Shoulder shrug is strong and equal bilaterally. (XII)Tongue is midline. Motor examination - There is normal muscle tone and bulk. Power is full throughout. Muscle stretch reflexes are normoactive and there are no pathological reflexes present. Signed By: Jane Warner NP     September 4, 2019    Findings as per above note. 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     MR and MR venography would clearly be ideal but given the situation with her stimulator we will obtain a CT scan of the skull base to ensure no progressive infiltrative process

## 2019-09-05 ENCOUNTER — HOSPITAL ENCOUNTER (EMERGENCY)
Age: 74
Discharge: HOME | End: 2019-09-06
Attending: EMERGENCY MEDICINE
Payer: MEDICARE

## 2019-09-05 VITALS
DIASTOLIC BLOOD PRESSURE: 52 MMHG | TEMPERATURE: 97.9 F | HEART RATE: 84 BPM | OXYGEN SATURATION: 99 % | WEIGHT: 210 LBS | RESPIRATION RATE: 18 BRPM | HEIGHT: 63 IN | BODY MASS INDEX: 37.21 KG/M2 | SYSTOLIC BLOOD PRESSURE: 162 MMHG

## 2019-09-05 DIAGNOSIS — R44.1 VISUAL HALLUCINATION: Primary | ICD-10-CM

## 2019-09-05 LAB
ALBUMIN SERPL-MCNC: 3.7 G/DL (ref 3.2–4.6)
ALBUMIN/GLOB SERPL: 0.8 {RATIO} (ref 1.2–3.5)
ALP SERPL-CCNC: 150 U/L (ref 50–136)
ALT SERPL-CCNC: 35 U/L (ref 12–65)
ANION GAP SERPL CALC-SCNC: 6 MMOL/L (ref 7–16)
ANION GAP SERPL CALC-SCNC: 6 MMOL/L (ref 7–16)
AST SERPL-CCNC: 25 U/L (ref 15–37)
BASOPHILS # BLD: 0 K/UL (ref 0–0.2)
BASOPHILS # BLD: 0.1 K/UL (ref 0–0.2)
BASOPHILS NFR BLD: 1 % (ref 0–2)
BASOPHILS NFR BLD: 1 % (ref 0–2)
BILIRUB SERPL-MCNC: 0.3 MG/DL (ref 0.2–1.1)
BUN SERPL-MCNC: 12 MG/DL (ref 8–23)
BUN SERPL-MCNC: 16 MG/DL (ref 8–23)
CALCIUM SERPL-MCNC: 10 MG/DL (ref 8.3–10.4)
CALCIUM SERPL-MCNC: 9.2 MG/DL (ref 8.3–10.4)
CHLORIDE SERPL-SCNC: 104 MMOL/L (ref 98–107)
CHLORIDE SERPL-SCNC: 106 MMOL/L (ref 98–107)
CO2 SERPL-SCNC: 29 MMOL/L (ref 21–32)
CO2 SERPL-SCNC: 31 MMOL/L (ref 21–32)
CREAT SERPL-MCNC: 0.69 MG/DL (ref 0.6–1)
CREAT SERPL-MCNC: 0.92 MG/DL (ref 0.6–1)
DIFFERENTIAL METHOD BLD: ABNORMAL
DIFFERENTIAL METHOD BLD: ABNORMAL
EOSINOPHIL # BLD: 0.1 K/UL (ref 0–0.8)
EOSINOPHIL # BLD: 0.1 K/UL (ref 0–0.8)
EOSINOPHIL NFR BLD: 1 % (ref 0.5–7.8)
EOSINOPHIL NFR BLD: 2 % (ref 0.5–7.8)
ERYTHROCYTE [DISTWIDTH] IN BLOOD BY AUTOMATED COUNT: 14.5 % (ref 11.9–14.6)
ERYTHROCYTE [DISTWIDTH] IN BLOOD BY AUTOMATED COUNT: 14.6 % (ref 11.9–14.6)
GLOBULIN SER CALC-MCNC: 4.4 G/DL (ref 2.3–3.5)
GLUCOSE BLD STRIP.AUTO-MCNC: 107 MG/DL (ref 65–100)
GLUCOSE BLD STRIP.AUTO-MCNC: 111 MG/DL (ref 65–100)
GLUCOSE BLD STRIP.AUTO-MCNC: 187 MG/DL (ref 65–100)
GLUCOSE BLD STRIP.AUTO-MCNC: 229 MG/DL (ref 65–100)
GLUCOSE SERPL-MCNC: 186 MG/DL (ref 65–100)
GLUCOSE SERPL-MCNC: 97 MG/DL (ref 65–100)
HCT VFR BLD AUTO: 41.9 % (ref 35.8–46.3)
HCT VFR BLD AUTO: 45 % (ref 35.8–46.3)
HGB BLD-MCNC: 13.2 G/DL (ref 11.7–15.4)
HGB BLD-MCNC: 14.2 G/DL (ref 11.7–15.4)
IMM GRANULOCYTES # BLD AUTO: 0 K/UL (ref 0–0.5)
IMM GRANULOCYTES # BLD AUTO: 0.1 K/UL (ref 0–0.5)
IMM GRANULOCYTES NFR BLD AUTO: 1 % (ref 0–5)
IMM GRANULOCYTES NFR BLD AUTO: 1 % (ref 0–5)
LYMPHOCYTES # BLD: 2 K/UL (ref 0.5–4.6)
LYMPHOCYTES # BLD: 2.4 K/UL (ref 0.5–4.6)
LYMPHOCYTES NFR BLD: 28 % (ref 13–44)
LYMPHOCYTES NFR BLD: 28 % (ref 13–44)
MCH RBC QN AUTO: 25.7 PG (ref 26.1–32.9)
MCH RBC QN AUTO: 26.2 PG (ref 26.1–32.9)
MCHC RBC AUTO-ENTMCNC: 31.5 G/DL (ref 31.4–35)
MCHC RBC AUTO-ENTMCNC: 31.6 G/DL (ref 31.4–35)
MCV RBC AUTO: 81.5 FL (ref 79.6–97.8)
MCV RBC AUTO: 83 FL (ref 79.6–97.8)
MONOCYTES # BLD: 0.5 K/UL (ref 0.1–1.3)
MONOCYTES # BLD: 0.5 K/UL (ref 0.1–1.3)
MONOCYTES NFR BLD: 6 % (ref 4–12)
MONOCYTES NFR BLD: 7 % (ref 4–12)
NEUTS SEG # BLD: 4.4 K/UL (ref 1.7–8.2)
NEUTS SEG # BLD: 5.4 K/UL (ref 1.7–8.2)
NEUTS SEG NFR BLD: 62 % (ref 43–78)
NEUTS SEG NFR BLD: 63 % (ref 43–78)
NRBC # BLD: 0 K/UL (ref 0–0.2)
NRBC # BLD: 0 K/UL (ref 0–0.2)
PLATELET # BLD AUTO: 316 K/UL (ref 150–450)
PLATELET # BLD AUTO: 359 K/UL (ref 150–450)
PMV BLD AUTO: 9 FL (ref 9.4–12.3)
PMV BLD AUTO: 9.1 FL (ref 9.4–12.3)
POTASSIUM SERPL-SCNC: 3.7 MMOL/L (ref 3.5–5.1)
POTASSIUM SERPL-SCNC: 4 MMOL/L (ref 3.5–5.1)
PROT SERPL-MCNC: 8.1 G/DL (ref 6.3–8.2)
RBC # BLD AUTO: 5.14 M/UL (ref 4.05–5.2)
RBC # BLD AUTO: 5.42 M/UL (ref 4.05–5.2)
SODIUM SERPL-SCNC: 141 MMOL/L (ref 136–145)
SODIUM SERPL-SCNC: 141 MMOL/L (ref 136–145)
T4 FREE SERPL-MCNC: 1 NG/DL (ref 0.9–1.8)
TSH SERPL DL<=0.005 MIU/L-ACNC: 5.7 UIU/ML (ref 0.36–3.74)
WBC # BLD AUTO: 7.1 K/UL (ref 4.3–11.1)
WBC # BLD AUTO: 8.6 K/UL (ref 4.3–11.1)

## 2019-09-05 PROCEDURE — 90471 IMMUNIZATION ADMIN: CPT

## 2019-09-05 PROCEDURE — 74011636637 HC RX REV CODE- 636/637: Performed by: INTERNAL MEDICINE

## 2019-09-05 PROCEDURE — 90686 IIV4 VACC NO PRSV 0.5 ML IM: CPT | Performed by: INTERNAL MEDICINE

## 2019-09-05 PROCEDURE — 85025 COMPLETE CBC W/AUTO DIFF WBC: CPT

## 2019-09-05 PROCEDURE — 97535 SELF CARE MNGMENT TRAINING: CPT

## 2019-09-05 PROCEDURE — 74011250637 HC RX REV CODE- 250/637: Performed by: NURSE PRACTITIONER

## 2019-09-05 PROCEDURE — 74011250636 HC RX REV CODE- 250/636: Performed by: INTERNAL MEDICINE

## 2019-09-05 PROCEDURE — 82962 GLUCOSE BLOOD TEST: CPT

## 2019-09-05 PROCEDURE — 74011250637 HC RX REV CODE- 250/637: Performed by: INTERNAL MEDICINE

## 2019-09-05 PROCEDURE — 99284 EMERGENCY DEPT VISIT MOD MDM: CPT | Performed by: EMERGENCY MEDICINE

## 2019-09-05 PROCEDURE — 84439 ASSAY OF FREE THYROXINE: CPT

## 2019-09-05 PROCEDURE — 36415 COLL VENOUS BLD VENIPUNCTURE: CPT

## 2019-09-05 PROCEDURE — 97530 THERAPEUTIC ACTIVITIES: CPT

## 2019-09-05 PROCEDURE — 84443 ASSAY THYROID STIM HORMONE: CPT

## 2019-09-05 PROCEDURE — 80053 COMPREHEN METABOLIC PANEL: CPT

## 2019-09-05 RX ORDER — GUAIFENESIN 100 MG/5ML
81 LIQUID (ML) ORAL DAILY
Qty: 30 TAB | Refills: 0 | Status: SHIPPED
Start: 2019-09-06 | End: 2019-10-06

## 2019-09-05 RX ORDER — ACETAMINOPHEN 500 MG
500 TABLET ORAL
Qty: 80 TAB | Refills: 0 | Status: SHIPPED | OUTPATIENT
Start: 2019-09-05 | End: 2019-09-15

## 2019-09-05 RX ORDER — ATORVASTATIN CALCIUM 20 MG/1
40 TABLET, FILM COATED ORAL DAILY
Qty: 90 TAB | Refills: 3 | Status: SHIPPED | OUTPATIENT
Start: 2019-09-05 | End: 2020-04-29 | Stop reason: SDUPTHER

## 2019-09-05 RX ADMIN — AMLODIPINE BESYLATE 5 MG: 5 TABLET ORAL at 07:56

## 2019-09-05 RX ADMIN — INFLUENZA VIRUS VACCINE 0.5 ML: 15; 15; 15; 15 SUSPENSION INTRAMUSCULAR at 12:16

## 2019-09-05 RX ADMIN — INSULIN LISPRO 20 UNITS: 100 INJECTION, SOLUTION INTRAVENOUS; SUBCUTANEOUS at 07:30

## 2019-09-05 RX ADMIN — INSULIN GLARGINE 35 UNITS: 100 INJECTION, SOLUTION SUBCUTANEOUS at 08:00

## 2019-09-05 RX ADMIN — INSULIN LISPRO 4 UNITS: 100 INJECTION, SOLUTION INTRAVENOUS; SUBCUTANEOUS at 16:30

## 2019-09-05 RX ADMIN — POLYETHYLENE GLYCOL 3350 17 G: 17 POWDER, FOR SOLUTION ORAL at 17:41

## 2019-09-05 RX ADMIN — Medication 20 ML: at 13:00

## 2019-09-05 RX ADMIN — INSULIN LISPRO 20 UNITS: 100 INJECTION, SOLUTION INTRAVENOUS; SUBCUTANEOUS at 11:30

## 2019-09-05 RX ADMIN — INSULIN LISPRO 2 UNITS: 100 INJECTION, SOLUTION INTRAVENOUS; SUBCUTANEOUS at 11:30

## 2019-09-05 RX ADMIN — INSULIN LISPRO 20 UNITS: 100 INJECTION, SOLUTION INTRAVENOUS; SUBCUTANEOUS at 16:30

## 2019-09-05 RX ADMIN — Medication 10 ML: at 05:38

## 2019-09-05 RX ADMIN — ACETAMINOPHEN 500 MG: 500 TABLET, FILM COATED ORAL at 12:03

## 2019-09-05 RX ADMIN — LEVOTHYROXINE SODIUM 25 MCG: 50 TABLET ORAL at 05:35

## 2019-09-05 RX ADMIN — POLYETHYLENE GLYCOL 3350 17 G: 17 POWDER, FOR SOLUTION ORAL at 07:57

## 2019-09-05 RX ADMIN — FLUTICASONE PROPIONATE 2 SPRAY: 50 SPRAY, METERED NASAL at 08:05

## 2019-09-05 RX ADMIN — ASPIRIN 81 MG 81 MG: 81 TABLET ORAL at 07:55

## 2019-09-05 NOTE — DISCHARGE SUMMARY
Hospitalist Discharge Summary     Admit Date:  2019  9:21 AM   Name:  Edison Boland   Age:  69 y.o.  :  1945   MRN:  [id number]   PCP:  Sonia Meraz NP  Treatment Team: Attending Provider: Brianna Mccabe MD; Primary Nurse: Vicki Neal, RN; Consulting Provider: Jaun Leung MD; Utilization Review: Felipe Eldridge RN; Consulting Provider: Carl Chambers MD; Care Manager: Karrie Cummings Primary Nurse: Fernandez EDEN; Occupational Therapist: Bernardo Roger OT    Problem List for this Hospitalization:  Hospital Problems as of 2019 Date Reviewed: 2019          Codes Class Noted - Resolved POA    * (Principal) Swelling of right eyelid ICD-10-CM: H02.843  ICD-9-CM: 374.82  2019 - Present Unknown        DVT, recurrent, lower extremity, chronic ([facility] ICD-10-CM: I82.509  ICD-9-CM: 453.50  2013 - Present Yes        Anxiety ICD-10-CM: F41.9  ICD-9-CM: 300.00  2013 - Present Yes        Diabetes mellitus ([facility] ICD-10-CM: E11.9  ICD-9-CM: 250.00  2012 - Present Yes                Admission HPI from 2019:    \"Review H&P for details of [facility] Course:    Patient is a 68 yo AA female with PMH of DM, CVA 10 years prior, nerve stimulator in back,  who presented to the ER with reports of right eyelid pain, HA x 1 day 5/10 intensity. Patient reported double vision and swelling. Denied fever or pain with eye movement. CT head with right preseptal orbital soft tissue swelling and presence of intracranial vascular disease. Neurology consulting. No further stroke workup needed. CTV negative for acute findings. Unable to get MRI due to nerve stimulator. Patient to follow up with Dr Va Carmen. Opthamology consulted. ordered eye patch with non urgent follow up. Patient seen previously by Dr. Be Orantes at [facility].  Patient's , statin increased. HgbA1c 7.7.  Patient to follow up with PCP in 3-5 days to further evaluate and adjust current diabetes management. Follow up instructions and discharge meds at bottom of this note. Plan was discussed with patient. All questions answered. Patient was stable at time of discharge. Diagnostic Imaging/Tests:   Ct Head Wo Cont    Result Date: 9/1/2019  CT HEAD WITHOUT CONTRAST HISTORY:  Headache. COMPARISON: 3/9/2019 TECHNIQUE: Axial imaging was performed without intravenous contrast utilizing 5mm slice thickness. Sagittal and coronal reformats were performed. Radiation dose reduction techniques were used for this study. Our CT scanner uses one or all of the following: Automated exposure control, adjustment of the MAS or KUB according to patient's size and iterative reconstruction. FINDINGS:    *BRAIN:    -  There are no early signs of territorial or lacunar infarction by CT.    -  No intracranial mass, hematoma, or hydrocephalus. -  For patient's age, the scattered areas of white matter hypodensities may represent a chronic small vessel white matter ischemia. However this is nonspecific. *VISUALIZED PARANASAL SINUSES: Well aerated. *MASTOIDS:  Clear. *CALVARIUM AND SCALP: Right preseptal orbital soft tissue swelling. IMPRESSION: No acute intracranial abnormalities. Right preseptal orbital soft tissue swelling. Date of Dictation: 9/1/2019 10:04 AM     Cta Head Neck W Wo Cont    Result Date: 9/1/2019  Title:  CT arteriogram of the neck and head. Indication: Double vision. Right eyelid droop. Question TIA. Technique: Axial images of the neck and head were obtained after the uneventful administration of intravenous iodinated contrast media. Contrast was used to best identify the arterial structures.   Images were reviewed on a separate, free standing, three-dimensional workstation as per the referring physicians request.  All stenosis percentages derived by comparing the narrowest segment with the distal Internal Carotid Artery luminal diameter, as described in the Michael American Symptomatic Carotid Endarterectomy Trial (NASCET) criteria. All CT scans at this facility are performed using dose reduction/dose modulation techniques, as appropriate the performed exam, including the following: Automated Exposure Control; Adjustment of the mA and/or kV according to patient size (this includes techniques or standardized protocols for targeted exams where dose is matched to indication/reason for exam); and Use of Iterative Reconstruction Technique. Comparison: March 8, 2019. Findings: Lungs: Normal Soft Tissues: Normal Cervical Spine: Degenerative changes Aorta: Conventional 3 vessel arch with mild atherosclerotic changes Great Vessels: Patent Right ICA: Mild hard plaque at the bulb and distal cervical ICA. There is severe stenosis of the supraclinoid distal ICA, similar to the previous study. 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 Left ICA: Minimal plaque at the bulb. There is  moderate stenosis of the supraclinoid left ICA, also similar to the previous study. % Stenosis: Less than 25 Left MCA: Patent Left DELMER: Patent Right Vertebral: Patent Left Vertebral: Moderate to severe stenosis of the V4 segment. Dominance: Codominant Basilar: Patent Right PCA: Patent Left PCA: Fetal origin with Severe proximal stenosis again noted. Other Vascular: Negative     Impression: 1. Again noted are extensive atherosclerotic changes of the intracranial vessels, notably the distal right ICA and proximal left PCA. The findings are similar to the previous study. 2. No evidence of large vessel occlusion.        Echocardiogram results:  Results for orders placed or performed during the hospital encounter of 09/01/19   2D ECHO COMPLETE ADULT (TTE) W OR [address] [facility] [address] [facility], [address] [facility]  [phone]    Transthoracic Echocardiogram  2D, M-mode, Doppler, and Color Doppler    Patient: Vonda Cordoba  MR #: [id number]  : [id number]  Age: 69 years  Gender: Female  Study date: 02-Sep-2019  Account #: [de-identified]  Height: 63 in  Weight: 209.7 lb  BSA: 1.97 mï¾²  Status:Routine  Location: 220  BP: 158/ 82    Allergies: CODEINE, MORPHINE, OXYCODONE    Sonographer:  Padmini Stapleton RDCS  Group:  [facility]  Referring Physician:  Adelaida Bhatia MD  Reading Physician:  Slick Lepe MD    INDICATIONS: Stroke. PROCEDURE: This was a routine study. A transthoracic echocardiogram was  performed. The study included complete 2D imaging, M-mode, complete spectral  Doppler, and color Doppler. Intravenous contrast (Definity) was administered. Intravenous contrast (agitated saline) was administered. Intravenous contrast  (Definity) was administered. Image quality was adequate. LEFT VENTRICLE: Size was normal. Systolic function was normal. Ejection  fraction was estimated in the range of 55 % to 60 %. There were no regional  wall motion abnormalities. Wall thickness was normal. Avg. E/e'= 17.15. There  is Left ventricular diastolic dysfunction. RIGHT VENTRICLE: The size was normal. Systolic function was normal. The  tricuspid jet envelope definition was inadequate for estimation of RV   systolic  pressure. LEFT ATRIUM: The atrium was mildly dilated. RIGHT ATRIUM: Size was normal.    SYSTEMIC VEINS: IVC: The inferior vena cava was normal in size and course. AORTIC VALVE: The valve was trileaflet. Leaflets exhibited mild sclerosis. There was no evidence for stenosis. There was no insufficiency. MITRAL VALVE: Valve structure was normal. There was no evidence for stenosis. There was no regurgitation. TRICUSPID VALVE: The valve structure was normal. There was no evidence for  stenosis. There was no regurgitation. PULMONIC VALVE: The valve structure was normal. There was no evidence for  stenosis. There was no insufficiency.     PERICARDIUM: There was no pericardial effusion. AORTA: The root exhibited normal size. SUMMARY:    -  Left ventricle: Systolic function was normal. Ejection fraction was  estimated in the range of 55 % to 60 %. There were no regional wall motion  abnormalities. -  Left atrium: The atrium was mildly dilated. SYSTEM MEASUREMENT TABLES    2D mode  AoR Diam (2D): 2.6 cm  LA Dimension (2D): 3.8 cm  Left Atrium Systolic Volume Index; Method of Disks, Biplane; 2D mode;: 43   ml/m2  IVS/LVPW (2D): 1  IVSd (2D): 1.3 cm  LVIDd (2D): 4.2 cm  LVIDs (2D): 2.2 cm  LVOT Area (2D): 4.2 cm2  LVPWd (2D): 1.3 cm  RVIDd (2D): 2.6 cm    Tissue Doppler Imaging  LV Peak Early Aguilar Tissue Rocco; Lateral MA (TDI): 6.3 cm/s  LV Peak Early Aguilar Tissue Rocco; Medial MA (TDI): 7.7 cm/s    Unspecified Scan Mode  Peak Grad; Mean; Antegrade Flow: 9 mm[Hg]  Vmax; Antegrade Flow: 148 cm/s  LVOT Diam: 2.3 cm  MV Peak Rocco/LV Peak Tissue Rocco E-Wave; Lateral MA: 18.9  MV Peak Rocco/LV Peak Tissue Rocco E-Wave; Medial MA: 15.4  MV E/A: 0.8    Prepared and signed by    Derek Harper MD  Signed 02-Sep-2019 10:27:08           All Micro Results     None          Labs: Results:       BMP, Mg, Phos Recent Labs     09/05/19 0447 09/04/19 0530 09/03/19  0645    139 139   K 4.0 3.6 4.1    105 107   CO2 29 29 27   AGAP 6* 5* 5*   BUN 12 10 10   CREA 0.69 0.66 0.63   CA 9.2 9.0 8.4   GLU 97 92 165*      CBC Recent Labs     09/05/19 0447 09/04/19 0530 09/03/19  0645   WBC 7.1 6.6 5.4   RBC 5.14 5.07 5.04   HGB 13.2 13.1 12.8   HCT 41.9 41.3 41.5    303 277   GRANS 62 65 63   LYMPH 28 26 28   EOS 2 1 1   MONOS 7 8 7   BASOS 1 1 1   IG 1 0 1   ANEU 4.4 4.3 3.4   ABL 2.0 1.7 1.5   ANURAG 0.1 0.1 0.1   ABM 0.5 0.5 0.4   ABB 0.0 0.0 0.0   AIG 0.0 0.0 0.0      LFT No results for input(s): SGOT, ALT, TBIL, AP, TP, ALB, GLOB, AGRAT, GPT in the last 72 hours.    Cardiac Testing Lab Results   Component Value Date/Time    Troponin-I, Qt. <0.02 (L) 09/01/2019 08:44 AM Coagulation Tests Lab Results   Component Value Date/Time    Prothrombin time 15.1 (H) 09/01/2019 08:44 AM    INR 1.2 09/01/2019 08:44 AM    INR POC 1.3 11/21/2013 09:38 AM    INR POC 1.3 11/12/2013 03:15 PM      A1c Lab Results   Component Value Date/Time    Hemoglobin A1c 8.0 (H) 09/02/2019 06:05 AM    Hemoglobin A1c 7.7 (H) 09/01/2019 08:44 AM    Hemoglobin A1c 8.0 (H) 08/28/2019 11:04 AM      Lipid Panel Lab Results   Component Value Date/Time    Cholesterol, total 177 09/02/2019 06:05 AM    Cholesterol (POC) 257 05/29/2014 10:00 AM    HDL Cholesterol 40 09/02/2019 06:05 AM    LDL, calculated 110.2 (H) 09/02/2019 06:05 AM    VLDL, calculated 26.8 (H) 09/02/2019 06:05 AM    Triglyceride 134 09/02/2019 06:05 AM    Triglycerides (POC) 141 05/29/2014 10:00 AM    CHOL/HDL Ratio 4.4 09/02/2019 06:05 AM      Thyroid Panel Lab Results   Component Value Date/Time    TSH 3.710 08/28/2019 11:04 AM    TSH 2.600 03/10/2019 05:00 AM    T4, Total 6.6 11/03/2017 11:47 AM    T4, Total 6.4 01/24/2017 10:34 AM    T4, Free 1.31 08/28/2019 11:04 AM    T4, Free 1.1 03/10/2019 05:00 AM        Most Recent UA Lab Results   Component Value Date/Time    Color YELLOW 03/09/2019 12:26 AM    Appearance CLEAR 03/09/2019 12:26 AM    pH (UA) 7.0 03/09/2019 12:26 AM    Protein NEGATIVE  03/09/2019 12:26 AM    Glucose 100 03/09/2019 12:26 AM    Ketone NEGATIVE  03/09/2019 12:26 AM    Bilirubin NEGATIVE  03/09/2019 12:26 AM    Blood NEGATIVE  03/09/2019 12:26 AM    Urobilinogen 0.2 03/09/2019 12:26 AM    Nitrites NEGATIVE  03/09/2019 12:26 AM    Leukocyte Esterase SMALL AMOUNT (A) 03/09/2019 12:26 AM        Allergies   Allergen Reactions    Codeine Nausea and Vomiting    Morphine Nausea and Vomiting    Oxycodone Nausea and Vomiting     Immunization History   Administered Date(s) Administered    Influenza High Dose Vaccine PF 11/13/2018    Influenza Vaccine 09/25/2014, 10/22/2015    Influenza Vaccine (Quad) Mdck Pf 11/03/2017    Influenza Vaccine (Quad) PF 10/11/2016    Pneumococcal Conjugate (PCV-13) 01/24/2017    Pneumococcal Polysaccharide (PPSV-23) 03/04/2019    Tdap 03/04/2019       All Labs from Last 24 Hrs:  Recent Results (from the past 24 hour(s))   GLUCOSE, POC    Collection Time: 09/04/19  4:57 PM   Result Value Ref Range    Glucose (POC) 66 65 - 100 mg/dL   GLUCOSE, POC    Collection Time: 09/04/19  9:32 PM   Result Value Ref Range    Glucose (POC) 213 (H) 65 - 100 mg/dL   GLUCOSE, POC    Collection Time: 09/05/19  3:27 AM   Result Value Ref Range    Glucose (POC) 107 (H) 65 - 100 mg/dL   CBC WITH AUTOMATED DIFF    Collection Time: 09/05/19  4:47 AM   Result Value Ref Range    WBC 7.1 4.3 - 11.1 K/uL    RBC 5.14 4.05 - 5.2 M/uL    HGB 13.2 11.7 - 15.4 g/dL    HCT 41.9 35.8 - 46.3 %    MCV 81.5 79.6 - 97.8 FL    MCH 25.7 (L) 26.1 - 32.9 PG    MCHC 31.5 31.4 - 35.0 g/dL    RDW 14.5 11.9 - 14.6 %    PLATELET 108 641 - 390 K/uL    MPV 9.0 (L) 9.4 - 12.3 FL    ABSOLUTE NRBC 0.00 0.0 - 0.2 K/uL    DF AUTOMATED      NEUTROPHILS 62 43 - 78 %    LYMPHOCYTES 28 13 - 44 %    MONOCYTES 7 4.0 - 12.0 %    EOSINOPHILS 2 0.5 - 7.8 %    BASOPHILS 1 0.0 - 2.0 %    IMMATURE GRANULOCYTES 1 0.0 - 5.0 %    ABS. NEUTROPHILS 4.4 1.7 - 8.2 K/UL    ABS. LYMPHOCYTES 2.0 0.5 - 4.6 K/UL    ABS. MONOCYTES 0.5 0.1 - 1.3 K/UL    ABS. EOSINOPHILS 0.1 0.0 - 0.8 K/UL    ABS. BASOPHILS 0.0 0.0 - 0.2 K/UL    ABS. IMM.  GRANS. 0.0 0.0 - 0.5 K/UL   METABOLIC PANEL, BASIC    Collection Time: 09/05/19  4:47 AM   Result Value Ref Range    Sodium 141 136 - 145 mmol/L    Potassium 4.0 3.5 - 5.1 mmol/L    Chloride 106 98 - 107 mmol/L    CO2 29 21 - 32 mmol/L    Anion gap 6 (L) 7 - 16 mmol/L    Glucose 97 65 - 100 mg/dL    BUN 12 8 - 23 MG/DL    Creatinine 0.69 0.6 - 1.0 MG/DL    GFR est AA >60 >60 ml/min/1.73m2    GFR est non-AA >60 >60 ml/min/1.73m2    Calcium 9.2 8.3 - 10.4 MG/DL   GLUCOSE, POC    Collection Time: 09/05/19  7:26 AM   Result Value Ref Range    Glucose (POC) 111 (H) 65 - 100 mg/dL   GLUCOSE, POC    Collection Time: 09/05/19 11:16 AM   Result Value Ref Range    Glucose (POC) 187 (H) 65 - 100 mg/dL       Discharge Exam:  Patient Vitals for the past 24 hrs:   Temp Pulse Resp BP SpO2   09/05/19 0800  (!) 110      09/05/19 0726 98 °F (36.7 °C) 72 18 (!) 144/93 96 %   09/05/19 0440 98.2 °F (36.8 °C) 79 18 151/75 97 %   09/05/19 0024 98 °F (36.7 °C) 81 18 146/69 95 %   09/04/19 2015 98.5 °F (36.9 °C) 84 18 154/79 97 %   09/04/19 1459 98.5 °F (36.9 °C) 76 18 112/72 98 %   09/04/19 1359 97.7 °F (36.5 °C) 69 18 115/69 96 %     Oxygen Therapy  O2 Sat (%): 96 % (09/05/19 0726)  Pulse via Oximetry: 84 beats per minute (09/01/19 1359)  O2 Device: Room air (09/04/19 2015)  No intake or output data in the 24 hours ending 09/05/19 1142    General:    Well nourished. Alert. No distress. Eyes:   Normal sclera. Extraocular movements intact. ENT:  Normocephalic, atraumatic. Moist mucous membranes  CV:   Regular rate and rhythm. No murmur, rub, or gallop. Lungs:  Clear to auscultation bilaterally. No wheezing, rhonchi, or rales. Abdomen: Soft, nontender, nondistended. Bowel sounds normal.   Extremities: Warm and dry. No cyanosis or edema. Neurologic: CN II-XII grossly intact. Sensation intact. Skin:     No rashes or jaundice. Psych:  Normal mood and affect. Discharge Info:   Current Discharge Medication List      START taking these medications    Details   acetaminophen (TYLENOL) 500 mg tablet Take 1 Tab by mouth every six (6) hours as needed for Pain for up to 10 days. Qty: 80 Tab, Refills: 0      aspirin 81 mg chewable tablet Take 1 Tab by mouth daily for 30 days. Qty: 30 Tab, Refills: 0         CONTINUE these medications which have CHANGED    Details   atorvastatin (LIPITOR) 20 mg tablet Take 2 Tabs by mouth daily.   Qty: 90 Tab, Refills: 3    Associated Diagnoses: Uncontrolled type 2 diabetes mellitus with hyperglycemia, with long-term current use of insulin (HCC) CONTINUE these medications which have NOT CHANGED    Details   glucose blood VI test strips (ONETOUCH VERIO) strip Check blood sugar 4 times daily  Dx:E11.65  Qty: 400 Strip, Refills: 3    Associated Diagnoses: Uncontrolled type 2 diabetes mellitus with hyperglycemia, with long-term current use of insulin ([facility])      Insulin Needles, Disposable, 31 gauge x 5/16\" ndle 500 Pen Needle by SubCUTAneous route four (4) times daily. Qty: 400 Pen Needle, Refills: 4    Comments: Please cancel the safety pen needle rx, a replacement has been sent  Associated Diagnoses: Type 2 diabetes mellitus with diabetic neuropathy, with long-term current use of insulin ([facility])      Blood-Glucose Sensor (DEXCOM G5-G4 SENSOR) jacob Change sensor every 7 days  Qty: 12 Device, Refills: 4    Associated Diagnoses: Type 2 diabetes mellitus with diabetic neuropathy, with long-term current use of insulin ([address] Uncontrolled type 2 diabetes mellitus with hyperglycemia, with long-term current use of insulin ([facility])      Blood-Glucose Transmitter (DEXCOM G5 TRANSMITTER) jacob To monitor bgl continuously to adjust insulin and monitor for hypoglycemia  Qty: 1 Device, Refills: 0    Associated Diagnoses: Type 2 diabetes mellitus with diabetic neuropathy, with long-term current use of insulin ([address] Uncontrolled type 2 diabetes mellitus with hyperglycemia, with long-term current use of insulin ([facility])      insulin glargine (LANTUS,BASAGLAR) 100 unit/mL (3 mL) inpn 35 units am; 25 units pm  Qty: 19 Adjustable Dose Pre-filled Pen Syringe, Refills: 3    Associated Diagnoses: Type 2 diabetes mellitus with diabetic neuropathy, with long-term current use of insulin ([address] Uncontrolled type 2 diabetes mellitus with hyperglycemia, with long-term current use of insulin ([facility])      NOVOLOG FLEXPEN U-100 INSULIN 100 unit/mL (3 mL) inpn 20 Units by SubCUTAneous route Before breakfast, lunch, dinner and at bedtime.  Adjust per sliding scale as appropriate  Qty: 24 Adjustable Dose Pre-filled Pen Syringe, Refills: 3    Associated Diagnoses: Type 2 diabetes mellitus with diabetic neuropathy, with long-term current use of insulin ([address] Uncontrolled type 2 diabetes mellitus with hyperglycemia, with long-term current use of insulin ([facility])      LORazepam (ATIVAN) 1 mg tablet Take 1 Tab by mouth nightly as needed for Anxiety. Max Daily Amount: 1 mg. Qty: 30 Tab, Refills: 2    Associated Diagnoses: Anxiety; Primary insomnia      albuterol (PROVENTIL HFA, VENTOLIN HFA, PROAIR HFA) 90 mcg/actuation inhaler Take 1 Puff by inhalation every six (6) hours as needed for Wheezing. Qty: 1 Inhaler, Refills: 1    Associated Diagnoses: Cough      apixaban (ELIQUIS) 5 mg tablet Take 1 Tab by mouth two (2) times a day. Qty: 180 Tab, Refills: 1    Associated Diagnoses: Chronic deep vein thrombosis (DVT) of axillary vein of right upper extremity ([facility])      fluticasone propionate (FLONASE) 50 mcg/actuation nasal spray 1 puff in each nostril twice daily  Qty: 1 Bottle, Refills: 4    Associated Diagnoses: Environmental and seasonal allergies      levothyroxine (SYNTHROID) 25 mcg tablet Take 1 Tab by mouth Daily (before breakfast). Qty: 90 Tab, Refills: 1    Associated Diagnoses: Acquired hypothyroidism      amLODIPine (NORVASC) 5 mg tablet Take 1 Tab by mouth daily. Qty: 30 Tab, Refills: 0      Blood-Glucose Meter monitoring kit Use as directed  Qty: 1 Kit, Refills: 0      Syringe with Needle, Disp, (TERUMO ALLERGY SYRINGE) 1 mL 27 x 1/2\" syrg 1cc 27G x 1/2\" qod #1 box  Qty: 100 Each, Refills: 3    Associated Diagnoses: Allergic rhinitis due to pollen; Allergic rhinitis due to animal (cat) (dog) hair and dander      multivitamin (ONE A DAY) tablet Take 1 Tab by mouth daily. B.infantis-B.ani-B.long-B.bifi (PROBIOTIC 4X) 10-15 mg TbEC Take  by mouth.      naloxone (NARCAN) 4 mg/actuation nasal spray Use 1 spray intranasally, then discard.  Repeat with new spray every 2 min as needed for opioid overdose symptoms, alternating nostrils. Qty: 1 Each, Refills: 1               Disposition: home    Activity: Activity as tolerated  Diet: DIET DIABETIC CONSISTENT CARB Regular    Follow-up Appointments   Procedures    FOLLOW UP VISIT Appointment in: 3 - 5 Days     Standing Status:   Standing     Number of Occurrences:   1     Order Specific Question:   Appointment in     Answer:   3 - 5 Days         Follow-up Information     Follow up With Specialties Details Why Contact Info    Candis Flores DO Neurology   [address] [facility]      Chetna Bowling NP Family Practice In 3 days post hospitalization  [address] [facility] [phone]      [facility] - Dr. Susy Vyas   close follow up - seen Dr. Susy Vyas previously     Chetna Bowling NP [facility]   [address] [facility] [address] [facility] [address]  [phone]            Time spent in patient discharge planning and coordination 35 minutes.   Discharge plan discussed with Dr. Tatum Mccormick,   Signed:  OMAR Sanabria

## 2019-09-05 NOTE — PROGRESS NOTES
Discharge instructions reviewed with patient. Patient verbalized/ esigned agreement/ understanding. Family not avaialabe to pick patient up until 6: 27 tonight. Cab offered. Patient agreed to cab, but will not be able to get into her home until 4:00 this evening. IV left in place until closer to dc time. Primary nurse, April, notified.

## 2019-09-05 NOTE — PROGRESS NOTES
09/05/19 0726   Vital Signs   Temp 98 °F (36.7 °C)   Temp Source Oral   Pulse (Heart Rate) 72   Heart Rate Source Monitor   Resp Rate 18   O2 Sat (%) 96 %   Level of Consciousness Alert   BP (!) 144/93   MAP (Calculated) 110   BP 1 Method Automatic   MEWS Score 1

## 2019-09-05 NOTE — PROGRESS NOTES
Pt in bed resting rr are even and unlabored she is on room air tolerates well. Lung sounds are diminished no distress noted. Pt has patch to rt eye that is clean dry and intact. She denies pain at current time. Skin intact no new issues noted. Safety measures in place will continue to monitor.

## 2019-09-05 NOTE — PROGRESS NOTES
Pt with discharge orders this day to return home. No needs voiced at discharge. Milestones met. Care Management Interventions  PCP Verified by CM: [facility] [place])  Mode of Transport at Discharge: (family)  Transition of Care Consult (CM Consult): Discharge Planning(Pt is insured with pharmacy benefits.  )  Discharge Durable Medical Equipment: No(pt has a swival wheel walker )  Physical Therapy Consult: Yes  Occupational Therapy Consult: Yes  Speech Therapy Consult: Yes  Current Support Network: Relative's Home(Pt lives [place] home with her daughter, grandchildren. and three keatons.  )  Confirm Follow Up Transport: Family  Plan discussed with Pt/Family/Caregiver: Yes  Freedom of Choice Offered: Yes   Resource Information Provided?: No  Discharge Location  Discharge Placement: Home(Pt plans to return home with family at discharge.   Therapy has recommended HH PT but pt has requested they give her exercises and handout as her 3 chiterrances do not like strangers so she is refusing HH.)

## 2019-09-05 NOTE — PROGRESS NOTES
Problem: Self Care Deficits Care Plan (Adult)  Goal: *Acute Goals and Plan of Care (Insert Text)  Description  1. Pt will toilet with SBA   2. Pt will complete functional mobility for ADLs with SBA  3. Pt will complete lower body dressing with SBA using AE as needed  4. Pt will complete grooming and hygiene at sink with SBA  5. Pt will demonstrate independence with HEP to promote increased BUE strength and functional use for ADLs  6. Pt will tolerate 23 minutes functional activity with min or fewer rest breaks to promote increased endurance for ADLs  7. Pt will demonstrate improved safety to maintain without cues    Timeframe: 7 days     Outcome: Progressing Towards Goal     OCCUPATIONAL THERAPY: Daily Note and AM 9/5/2019  INPATIENT: OT Visit Days: 2  [place] / [place] / Product Type: Medicare /      NAME/AGE/GENDER: Kaur Anderson is a 69 y.o. female   PRIMARY DIAGNOSIS:  Swelling of right eyelid [H02.843]  Swelling of right eyelid [H02.843] Swelling of right eyelid   Swelling of right eyelid         ICD-10: Treatment Diagnosis:    · Unspecified Lack of Coordination (R27.9)   Precautions/Allergies:    falls Codeine; Morphine; and Oxycodone      ASSESSMENT:     Ms. Alisha Gonsalez was admitted from home with R eye swelling and droop, diplopia, and headache. Pt reports similar symptoms with her L eye, symptoms resolved within 2 months. Pt lives with her family members who work, is independent with ADLs and uses a RW (home) or rollator (community) for mobility. Pt denies having any recent falls. This session, pt presented with deficits in vision, endurance, balance, and mobility impacting ADLs. Pt w/ patch over R eye, reports diplopia unless R eye is occluded, visual assessment  L eye WNL. 9/5/2019  Today, pt presents supine in bed and agreeable to OT treatment. Pt transitioned to sitting edge of bed with SBA. Static and dynamic sitting balance is intact.  While seated edge of bed unsupported, pt completed self-grooming, including washing face and combing hair. Pt then completed sit to stand with SBA and ambulated in hallway for ~150 ft with CGA x RW. Pt returned back to room to sitting upright in bedside chair with needs met, call light within reach, and RN notified. Good progress towards goals today. Will continue to address OT goals and plan of care. This section established at most recent assessment   PROBLEM LIST (Impairments causing functional limitations):  1. Decreased Strength  2. Decreased ADL/Functional Activities  3. Decreased Transfer Abilities  4. Decreased Balance  5. Decreased Activity Tolerance  6. Increased Fatigue  7. Increased Shortness of Breath   INTERVENTIONS PLANNED: (Benefits and precautions of occupational therapy have been discussed with the patient.)  1. Activities of daily living training  2. Adaptive equipment training  3. Balance training  4. Therapeutic activity  5. Therapeutic exercise     TREATMENT PLAN: Frequency/Duration: Follow patient 3 times/ week to address above goals. Rehabilitation Potential For Stated Goals: Good     REHAB RECOMMENDATIONS (at time of discharge pending progress):    Placement: It is my opinion, based on this patient's performance to date, that Ms. Sage may benefit from d/c home with home health.   Equipment:    3:1 [facility]               OCCUPATIONAL PROFILE AND HISTORY:   History of Present Injury/Illness (Reason for Referral):  See H&P  Past Medical History/Comorbidities:   Ms. Fatoumata Rae  has a past medical history of Allergic rhinitis, Anemia, Anxiety, Asthma, Cataracts, bilateral, Depression, Diabetes ([facility] [address] GERD (gastroesophageal reflux disease), History of DVT (deep vein thrombosis), History of pulmonary embolus (PE), Hypercholesterolemia, Hyperlipidemia, Hypertension, Insomnia, Internal hemorrhoids without mention of complication, Osteoarthritis, Peripheral neuropathy, Personal history of colonic polyps, Rectocele, Stroke [facility]), and Stroke ([facility] [address] Ms. Luis Huddleston  has a past surgical history that includes hx orthopaedic; hx  section; hx appendectomy; hx cholecystectomy; hx partial hysterectomy; and endoscopy, colon, diagnostic (13). Social History/Living Environment:   Home Environment: Private residence  # Steps to Enter: 0  One/Two Story Residence: One story  Living Alone: No  Support Systems: Family member(s)  Patient Expects to be Discharged to[de-identified] Private residence  Current DME Used/Available at Home: Woody Alice, rolling, Walker, rollator  Tub or Shower Type: Shower  Prior Level of Function/Work/Activity:  Independent, lives w/ family, RW or rollator, does not drive     Number of Personal Factors/Comorbidities that affect the Plan of Care: Expanded review of therapy/medical records (1-2):  MODERATE COMPLEXITY   ASSESSMENT OF OCCUPATIONAL PERFORMANCE[de-identified]   Activities of Daily Living:   Basic ADLs (From Assessment) Complex ADLs (From Assessment)   Feeding: Setup  Oral Facial Hygiene/Grooming: Setup  Bathing: Minimum assistance  Upper Body Dressing: Setup  Lower Body Dressing: Minimum assistance  Toileting: Stand by assistance Instrumental ADL  Meal Preparation: Moderate assistance  Homemaking: Moderate assistance   Grooming/Bathing/Dressing Activities of Daily Living     Cognitive Retraining  Safety/Judgement: Awareness of environment                       Bed/Mat Mobility  Rolling: Supervision  Supine to Sit: Supervision  Sit to Supine: Supervision  Sit to Stand: Modified independent  Stand to Sit: Modified independent  Bed to Chair: Stand-by assistance  Scooting: Supervision     Most Recent Physical Functioning:   Gross Assessment:                  Posture:  Posture (WDL): Exceptions to WDL  Posture Assessment:  Forward head, Rounded shoulders  Balance:  Sitting: Intact  Standing: Impaired  Standing - Static: Good  Standing - Dynamic : Fair Bed Mobility:  Rolling: Supervision  Supine to Sit: Supervision  Sit to Supine: Supervision  Scooting: Supervision  Wheelchair Mobility:     Transfers:  Sit to Stand: Modified independent  Stand to Sit: Modified independent  Bed to Chair: Stand-by assistance            Patient Vitals for the past 6 hrs:   BP SpO2 Pulse   19 0440 151/75 97 % 79   19 0726 (!) 144/93 96 % 72   19 0800   (!) 110       Mental Status  Neurologic State: Alert  Orientation Level: Oriented X4  Cognition: Appropriate decision making, Follows commands  Perception: Appears intact  Perseveration: No perseveration noted  Safety/Judgement: Awareness of environment                          Physical Skills Involved:  1. Balance  2. Strength  3. Activity Tolerance Cognitive Skills Affected (resulting in the inability to perform in a timely and safe manner):  1. Executive Function Psychosocial Skills Affected:  1. Habits/Routines  2. Environmental Adaptation   Number of elements that affect the Plan of Care: 3-5:  MODERATE COMPLEXITY   CLINICAL DECISION MAKIN[address] [place] [address] AM-PAC 6 Clicks   Daily Activity Inpatient Short Form  How much help from another person does the patient currently need. .. Total A Lot A Little None   1. Putting on and taking off regular lower body clothing? ? 1   ? 2   ? 3   ? 4   2. Bathing (including washing, rinsing, drying)? ? 1   ? 2   ? 3   ? 4   3. Toileting, which includes using toilet, bedpan or urinal?   ? 1   ? 2   ? 3   ? 4   4. Putting on and taking off regular upper body clothing? ? 1   ? 2   ? 3   ? 4   5. Taking care of personal grooming such as brushing teeth? ? 1   ? 2   ? 3   ? 4   6. Eating meals? ? 1   ? 2   ? 3   ? 4   © , Trustees of [address] [place] [id number], under license to [employer]. All rights reserved      Score:  Initial: 19 Most Recent: X (Date: -- )    Interpretation of Tool:  Represents activities that are increasingly more difficult (i.e. Bed mobility, Transfers, Gait).     Medical Necessity:     · Patient demonstrates good  · rehab potential due to higher previous functional level. Reason for Services/Other Comments:  · Patient continues to require skilled intervention due to decreased ADLs and functional performance from baseline   · . Use of outcome tool(s) and clinical judgement create a POC that gives a: LOW COMPLEXITY         TREATMENT:   (In addition to Assessment/Re-Assessment sessions the following treatments were rendered)     Pre-treatment Symptoms/Complaints:  \"I really want this eye patch off. \"  Pain: Initial:   Pain Intensity 1: 0  Post Session:  1     Self Care: (10 min): Procedure(s) (per grid) utilized to improve and/or restore self-care/home management as related to dressing and grooming. Required minimal   cueing to facilitate activities of daily living skills and compensatory activities. Therapeutic Activity: (    14): Therapeutic activities including Bed transfers, Chair transfers and Ambulation on level ground to improve mobility, strength and balance. Required no   to promote static and dynamic balance in standing. Pt completed all functional transfers with SBA today. Pt able to tolerate ~10 minutes static and dynamic sitting balance, EOB, while performing self-care activities. Pt then completed functional mobility for ~150 ft with CGA x RW. Braces/Orthotics/Lines/Etc:   · O2 Device: Room air  Treatment/Session Assessment:    · Response to Treatment:  no adverse reaction   · Interdisciplinary Collaboration:   o Occupational Therapist  o Registered Nurse  · After treatment position/precautions:   o Up in chair  o Bed/Chair-wheels locked  o Call light within reach  o RN notified   · Compliance with Program/Exercises: Compliant all of the time. · Recommendations/Intent for next treatment session: \"Next visit will focus on advancements to more challenging activities and reduction in assistance provided\".   Total Treatment Duration:  OT Patient Time In/Time Out  Time In: 0910  Time Out: 6276 Kadie Shoulder, OT

## 2019-09-06 ENCOUNTER — PATIENT OUTREACH (OUTPATIENT)
Dept: CASE MANAGEMENT | Age: 74
End: 2019-09-06

## 2019-09-06 VITALS
SYSTOLIC BLOOD PRESSURE: 140 MMHG | BODY MASS INDEX: 37.39 KG/M2 | DIASTOLIC BLOOD PRESSURE: 81 MMHG | HEIGHT: 63 IN | TEMPERATURE: 98.2 F | OXYGEN SATURATION: 95 % | WEIGHT: 211 LBS | RESPIRATION RATE: 20 BRPM | HEART RATE: 98 BPM

## 2019-09-06 NOTE — ED TRIAGE NOTES
Arrives to triage with right eye patch. C/o visual hallucinations and blurred vision to left eye. Onset of symptoms 2 days ago however states worse tonight upon arriving home. Discharged today after admission for \"stroke\" and right eye diplopia. Pt denies medication changes however reports received vancomycin for right eye infection while hospitalized. Reports frontal head pain. Pain to right eye with admission 9/1. Reports dizziness since march following \"mini stroke\" per family, using walker since. Denies n/v.  Family denies receiving Tpa

## 2019-09-06 NOTE — ED PROVIDER NOTES
Antonella Smith [place] is a 69 y.o. female seen on 9/5/2019 at 11:48 PM in the [facility] EMERGENCY DEPT in room Noel De La Cruz. Chief Complaint   Patient presents with    Vision Change     HPI: 81-year-old female emergency department for visual hallucinations this patient was recently admitted to the hospital for 5 days for vision disturbance and evaluation for possible stroke. Later in her back and was unable to have an MRI. She had a CTA that was unremarkable. She was seen by neurology as well as ophthalmology. She was given outpatient follow-up and was discharged home earlier today. Apparently she got home and told her daughter that she had been having some hallucinations so they brought her back for further evaluation. She describes over the last 2 days been seeing patterns on the wall. She describes them as beautiful appearing patterns on the wall that look like wallpaper. She also states that at one point she saw figurines that appeared to be marching. Has had no auditory hallucinations. Thoughts of harming herself or harming others. She is currently having no hallucinations at this time. She has very good insight and recognizes the fact that these are unusual and that other people are not seeing these. No history of similar symptoms in the past    Historian: Patient    REVIEW OF SYSTEMS     Review of Systems   Constitutional: Negative for fever. HENT: Negative. Eyes: Negative. Respiratory: Negative for cough, chest tightness, shortness of breath and wheezing. Cardiovascular: Negative for chest pain. Gastrointestinal: Negative for abdominal distention, abdominal pain, constipation, diarrhea and vomiting. Endocrine: Negative. Genitourinary: Negative for dysuria, flank pain, frequency and urgency. Neurological: Negative for dizziness, syncope and headaches. Psychiatric/Behavioral: Positive for hallucinations.    All other systems reviewed and are negative. PAST MEDICAL HISTORY     Past Medical History:   Diagnosis Date    Allergic rhinitis     Anemia     Anxiety     Asthma     Cataracts, bilateral     Depression     Diabetes ([facility] [address]     GERD (gastroesophageal reflux disease)     History of DVT (deep vein thrombosis)     History of pulmonary embolus (PE)     Hypercholesterolemia     Hyperlipidemia     Hypertension     Insomnia     Internal hemorrhoids without mention of complication     Osteoarthritis     Peripheral neuropathy     Personal history of colonic polyps     Rectocele     Stroke ([address]     Stroke [facility])      Past Surgical History:   Procedure Laterality Date    ENDOSCOPY, COLON, DIAGNOSTIC  13    Laurent--no polyps--5 year recall    HX APPENDECTOMY      OPEN    HX  SECTION      x2    HX CHOLECYSTECTOMY      OPEN    HX ORTHOPAEDIC      BACK X3    HX PARTIAL HYSTERECTOMY       Social History     Socioeconomic History    Marital status:      Spouse name: Not on file    Number of children: Not on file    Years of education: Not on file    Highest education level: Not on file   Tobacco Use    Smoking status: Never Smoker    Smokeless tobacco: Never Used   Substance and Sexual Activity    Alcohol use: No    Drug use: No     Prior to Admission Medications   Prescriptions Last Dose Informant Patient Reported? Taking? B.infantis-B.ani-B.long-B.bifi (PROBIOTIC 4X) 10-15 mg TbEC   Yes No   Sig: Take  by mouth. Blood-Glucose Meter monitoring kit   No No   Sig: Use as directed   Blood-Glucose Sensor (DEXCOM G5-G4 SENSOR) jacob   No No   Sig: Change sensor every 7 days   Blood-Glucose Transmitter (DEXCOM G5 TRANSMITTER) jacob   No No   Sig: To monitor bgl continuously to adjust insulin and monitor for hypoglycemia   Insulin Needles, Disposable, 31 gauge x \" ndle   No No   Si Pen Needle by SubCUTAneous route four (4) times daily.    LORazepam (ATIVAN) 1 mg tablet   No No   Sig: Take 1 Tab by mouth nightly as needed for Anxiety. Max Daily Amount: 1 mg. NOVOLOG FLEXPEN U-100 INSULIN 100 unit/mL (3 mL) inpn   No No   Si Units by SubCUTAneous route Before breakfast, lunch, dinner and at bedtime. Adjust per sliding scale as appropriate   Syringe with Needle, Disp, ([facility] ALLERGY SYRINGE) 1 mL 27 x 1/2\" syrg   No No   Sicc 27G x 1/2\" qod #1 box   acetaminophen (TYLENOL) 500 mg tablet   No No   Sig: Take 1 Tab by mouth every six (6) hours as needed for Pain for up to 10 days. albuterol (PROVENTIL HFA, VENTOLIN HFA, PROAIR HFA) 90 mcg/actuation inhaler   No No   Sig: Take 1 Puff by inhalation every six (6) hours as needed for Wheezing. amLODIPine (NORVASC) 5 mg tablet   No No   Sig: Take 1 Tab by mouth daily. apixaban (ELIQUIS) 5 mg tablet   No No   Sig: Take 1 Tab by mouth two (2) times a day. aspirin 81 mg chewable tablet   No No   Sig: Take 1 Tab by mouth daily for 30 days. atorvastatin (LIPITOR) 20 mg tablet   No No   Sig: Take 2 Tabs by mouth daily. fluticasone propionate (FLONASE) 50 mcg/actuation nasal spray   No No   Si puff in each nostril twice daily   glucose blood VI test strips (ONETOUCH VERIO) strip   No No   Sig: Check blood sugar 4 times daily  Dx:E11.65   insulin glargine (LANTUS,BASAGLAR) 100 unit/mL (3 mL) inpn   No No   Si units am; 25 units pm   levothyroxine (SYNTHROID) 25 mcg tablet   No No   Sig: Take 1 Tab by mouth Daily (before breakfast). multivitamin (ONE A DAY) tablet   Yes No   Sig: Take 1 Tab by mouth daily. naloxone (NARCAN) 4 mg/actuation nasal spray   No No   Sig: Use 1 spray intranasally, then discard. Repeat with new spray every 2 min as needed for opioid overdose symptoms, alternating nostrils.       Facility-Administered Medications: None     Allergies   Allergen Reactions    Codeine Nausea and Vomiting    Morphine Nausea and Vomiting    Oxycodone Nausea and Vomiting        PHYSICAL EXAM       Vitals:    19 2201 19 2223 BP: (!) 169/91 151/70   Pulse: (!) 104    Resp: 20    Temp: 98.2 °F (36.8 °C)    SpO2: 96% 97%    Vital signs were reviewed. Physical Exam   Constitutional: She is oriented to person, place, and time. She appears well-developed and well-nourished. No distress. HENT:   Head: Normocephalic and atraumatic. Eyes:   Patch over right eye, left eye pupils reactive, extraocular muscles are intact   Neck: Normal range of motion. Neck supple. Cardiovascular: Normal rate, regular rhythm, normal heart sounds and intact distal pulses. Exam reveals no gallop and no friction rub. No murmur heard. Pulmonary/Chest: Effort normal and breath sounds normal. No stridor. No respiratory distress. She has no wheezes. Abdominal: Soft. Bowel sounds are normal. She exhibits no distension and no mass. There is no tenderness. There is no rebound and no guarding. Musculoskeletal: Normal range of motion. She exhibits no edema, tenderness or deformity. Neurological: She is alert and oriented to person, place, and time. No sensory deficit. No focal neuro deficits   Skin: Skin is warm and dry. Capillary refill takes less than 2 seconds. No rash noted. She is not diaphoretic. No erythema. Psychiatric: She has a normal mood and affect. Her behavior is normal. Judgment and thought content normal.   Vitals reviewed. MEDICAL DECISION MAKING     Differential Diagnosis: psych disorder, electrolyte abnormality, dehydration. ED Course: Exam here in the emergency department is unremarkable. The patient is well-appearing, she has no new neuro deficits. She is having no homicidal or suicidal thoughts. She actually has quite good insight into her visual hallucinations. I wonder if this could be related to medications, possibly the antibiotics she was receiving in the hospital.  I think that she is safe for outpatient management. I recommended close outpatient follow-up with her primary care doctor.   We discussed reasons to return she is comfortable with this plan. Disposition: Discharge home  Diagnosis: Visual hallucination  ____________________________________________________________________  A portion of this note was generated using voice recognition dictation software. While the note has been reviewed for accuracy, please note certain words and phrases may not be transcribed as intended and some grammatical and/or typographical errors may be present.

## 2019-09-06 NOTE — PROGRESS NOTES
Transition of Care Discharge Follow-up Questionnaire   Date/Time of Call:   9/6/19    What was the patient hospitalized for? Swelling right eyelid   Does the patient understand his/her diagnosis and/or treatment and what happened during the hospitalization? States understanding   Did the patient receive discharge instructions? Yes   CM Assessed Risk for Readmission:       Patient stated Risk for Readmission:      Moderate related to [address] on what happens   Review any discharge instructions (see discharge instructions/AVS in [facility]). Ask patient if they understand these. Do they have any questions? Activity as tolerated  Consistent carb diet   Were home services ordered (nursing, PT, OT, ST, etc.)? Offered but patient refused   If so, has the first visit occurred? If not, why? (Assist with coordination of services if necessary.)   N/A   Was any DME ordered? None   If so, has it been received? If not, why?  (Assist patient in obtaining DME orders &/or equipment if necessary.) N/A   Complete a review of all medications (new, continued and discontinued meds per the D/C instructions and medication tab in [facility]). Start: Tylenol 500mg q 6 hr as needed  ASA 81mg daily  Stop: none  Changed: Atorvastatin 20mg two daily  Continue: all other home medications       Were all new prescriptions filled? If not, why?  (Assist patient in obtaining medications if necessary  escalate for CCM &/or SW if ongoing issues are verbalized by pt or anticipated)   Yes, filled and taking as ordered. Does the patient understand the purpose and dosing instructions for all medications? (If patient has questions, provide explanation and education.)      Does the patient have any problems in performing ADLs? (If patient is unable to perform ADLs  what is the limiting factor(s)?   Do they have a support system that can assist? If no support system is present, discuss possible assistance that they may be able to obtain. Escalate for CCM/SW if ongoing issues are verbalized by pt or anticipated)   Independent of ADLs. Does the patient have all follow-up appointments scheduled? 7 day f/up with PCP?   (f/up with PCP may be w/in 14 days if patient has a f/up with their specialist w/in 7 days)    7-14 day f/up with specialist?   (or per discharge instructions)    If f/up has not been made  what actions has the care coordinator made to accomplish this? Has transportation been arranged? PCP 9/6/19 at 10:45am  Neurology will make appointment  [facility] will make appointment   Any other questions or concerns expressed by the patient? No questions. She states the doctors do not know what is causing her problems, but she hopes this eye heals like the left one did. Schedule next appointment with NOAH LOWRY Coordinator or refer to RN Case Manager/ per the workflow guidelines. When is care coordinators next follow-up call scheduled? If referred for CCM  what RN care manager was the referral assigned?                Follow-up call next week   MENDOZA Call Completed By: Sweta Carrington RN

## 2019-09-06 NOTE — ED NOTES
I have reviewed discharge instructions with the patient. The patient verbalized understanding. Patient left ED via Discharge Method: wheelchair to Home with daughter. Opportunity for questions and clarification provided. Patient given 0 scripts. Pt in no acute distress at discharge, pt denies having hallucinations in triage      To continue your aftercare when you leave the hospital, you may receive an automated call from our care team to check in on how you are doing. This is a free service and part of our promise to provide the best care and service to meet your aftercare needs.  If you have questions, or wish to unsubscribe from this service please call [phone]. Thank you for Choosing our [facility] Emergency Department.

## 2019-09-06 NOTE — DISCHARGE INSTRUCTIONS
It is very important that you follow-up with your primary care doctor for reevaluation. If your symptoms worsen or change in any way return to the emergency department.

## 2019-09-13 ENCOUNTER — PATIENT OUTREACH (OUTPATIENT)
Dept: CASE MANAGEMENT | Age: 74
End: 2019-09-13

## 2019-09-13 NOTE — PROGRESS NOTES
HR phone outreach. Mrs. Sage reports continued lethargy, but otherwise no new concerns. She has not seen neurology, stating \"last time he told me he couldn't do anything for me,\" and \"I don't want to waste my daughter's time to take me if I don't need to go. \"    She is concerned she has not heard from [facility]. Call placed to [facility] office requesting patient be contacted for appointment. Appointment given for 9/30 at 2:45 pm.    Call placed to [address] office requesting hospital record be faxed to [phone]; Left message with Sandra Palma. Mrs. Sage notified of appointment date/time.

## 2019-09-26 ENCOUNTER — PATIENT OUTREACH (OUTPATIENT)
Dept: CASE MANAGEMENT | Age: 74
End: 2019-09-26

## 2019-10-02 ENCOUNTER — PATIENT OUTREACH (OUTPATIENT)
Dept: CASE MANAGEMENT | Age: 74
End: 2019-10-02

## 2019-10-02 NOTE — PROGRESS NOTES
[facility] outreach to Mrs. Sage. She attended PCP visit today. Feels better but no improvement in eye. [facility] changed her appointment to Nov. She is aware and has transportation (daughter). All paperwork now completed for Dexcom; should be receiving soon. Post 30 days since discharge from IP. Offered to continue CM with new . Mrs. Sage refuses but agrees to let PCP know if she would like CM in the future. CM episode closed. All care goals met.

## 2019-10-07 ENCOUNTER — HOSPITAL ENCOUNTER (EMERGENCY)
Age: 74
Discharge: HOME | End: 2019-10-07
Attending: EMERGENCY MEDICINE
Payer: MEDICARE

## 2019-10-07 VITALS
HEIGHT: 63 IN | RESPIRATION RATE: 18 BRPM | HEART RATE: 92 BPM | SYSTOLIC BLOOD PRESSURE: 142 MMHG | OXYGEN SATURATION: 94 % | BODY MASS INDEX: 38.09 KG/M2 | DIASTOLIC BLOOD PRESSURE: 66 MMHG | WEIGHT: 215 LBS | TEMPERATURE: 99.2 F

## 2019-10-07 DIAGNOSIS — N39.0 URINARY TRACT INFECTION WITHOUT HEMATURIA, SITE UNSPECIFIED: Primary | ICD-10-CM

## 2019-10-07 LAB
ALBUMIN SERPL-MCNC: 3.1 G/DL (ref 3.2–4.6)
ALBUMIN/GLOB SERPL: 0.7 {RATIO} (ref 1.2–3.5)
ALP SERPL-CCNC: 107 U/L (ref 50–136)
ALT SERPL-CCNC: 37 U/L (ref 12–65)
ANION GAP SERPL CALC-SCNC: 6 MMOL/L (ref 7–16)
AST SERPL-CCNC: 25 U/L (ref 15–37)
BACTERIA URNS QL MICRO: ABNORMAL /HPF
BASOPHILS # BLD: 0 K/UL (ref 0–0.2)
BASOPHILS NFR BLD: 0 % (ref 0–2)
BILIRUB SERPL-MCNC: 0.6 MG/DL (ref 0.2–1.1)
BUN SERPL-MCNC: 20 MG/DL (ref 8–23)
CALCIUM SERPL-MCNC: 9 MG/DL (ref 8.3–10.4)
CASTS URNS QL MICRO: ABNORMAL /LPF
CHLORIDE SERPL-SCNC: 101 MMOL/L (ref 98–107)
CO2 SERPL-SCNC: 27 MMOL/L (ref 21–32)
CREAT SERPL-MCNC: 1.05 MG/DL (ref 0.6–1)
DIFFERENTIAL METHOD BLD: ABNORMAL
EOSINOPHIL # BLD: 0 K/UL (ref 0–0.8)
EOSINOPHIL NFR BLD: 0 % (ref 0.5–7.8)
EPI CELLS #/AREA URNS HPF: ABNORMAL /HPF
ERYTHROCYTE [DISTWIDTH] IN BLOOD BY AUTOMATED COUNT: 14.2 % (ref 11.9–14.6)
GLOBULIN SER CALC-MCNC: 4.3 G/DL (ref 2.3–3.5)
GLUCOSE SERPL-MCNC: 278 MG/DL (ref 65–100)
HCT VFR BLD AUTO: 41.1 % (ref 35.8–46.3)
HGB BLD-MCNC: 12.8 G/DL (ref 11.7–15.4)
IMM GRANULOCYTES # BLD AUTO: 0.1 K/UL (ref 0–0.5)
IMM GRANULOCYTES NFR BLD AUTO: 1 % (ref 0–5)
LYMPHOCYTES # BLD: 0.9 K/UL (ref 0.5–4.6)
LYMPHOCYTES NFR BLD: 9 % (ref 13–44)
MCH RBC QN AUTO: 25.9 PG (ref 26.1–32.9)
MCHC RBC AUTO-ENTMCNC: 31.1 G/DL (ref 31.4–35)
MCV RBC AUTO: 83 FL (ref 79.6–97.8)
MONOCYTES # BLD: 0.5 K/UL (ref 0.1–1.3)
MONOCYTES NFR BLD: 5 % (ref 4–12)
NEUTS SEG # BLD: 8.4 K/UL (ref 1.7–8.2)
NEUTS SEG NFR BLD: 85 % (ref 43–78)
NRBC # BLD: 0 K/UL (ref 0–0.2)
PLATELET # BLD AUTO: 299 K/UL (ref 150–450)
PMV BLD AUTO: 9.2 FL (ref 9.4–12.3)
POTASSIUM SERPL-SCNC: 4.3 MMOL/L (ref 3.5–5.1)
PROT SERPL-MCNC: 7.4 G/DL (ref 6.3–8.2)
RBC # BLD AUTO: 4.95 M/UL (ref 4.05–5.2)
RBC #/AREA URNS HPF: ABNORMAL /HPF
SODIUM SERPL-SCNC: 134 MMOL/L (ref 136–145)
WBC # BLD AUTO: 9.9 K/UL (ref 4.3–11.1)
WBC URNS QL MICRO: >100 /HPF

## 2019-10-07 PROCEDURE — 96365 THER/PROPH/DIAG IV INF INIT: CPT | Performed by: EMERGENCY MEDICINE

## 2019-10-07 PROCEDURE — 87086 URINE CULTURE/COLONY COUNT: CPT

## 2019-10-07 PROCEDURE — 81015 MICROSCOPIC EXAM OF URINE: CPT

## 2019-10-07 PROCEDURE — 74011250636 HC RX REV CODE- 250/636: Performed by: EMERGENCY MEDICINE

## 2019-10-07 PROCEDURE — 74011000258 HC RX REV CODE- 258: Performed by: EMERGENCY MEDICINE

## 2019-10-07 PROCEDURE — 80053 COMPREHEN METABOLIC PANEL: CPT

## 2019-10-07 PROCEDURE — 74011250637 HC RX REV CODE- 250/637: Performed by: EMERGENCY MEDICINE

## 2019-10-07 PROCEDURE — 81003 URINALYSIS AUTO W/O SCOPE: CPT | Performed by: EMERGENCY MEDICINE

## 2019-10-07 PROCEDURE — 99284 EMERGENCY DEPT VISIT MOD MDM: CPT | Performed by: EMERGENCY MEDICINE

## 2019-10-07 PROCEDURE — 85025 COMPLETE CBC W/AUTO DIFF WBC: CPT

## 2019-10-07 RX ORDER — ACETAMINOPHEN 325 MG/1
650 TABLET ORAL
Status: COMPLETED | OUTPATIENT
Start: 2019-10-07 | End: 2019-10-07

## 2019-10-07 RX ORDER — CEPHALEXIN 500 MG/1
500 CAPSULE ORAL 3 TIMES DAILY
Qty: 30 CAP | Refills: 0 | Status: SHIPPED | OUTPATIENT
Start: 2019-10-07 | End: 2021-04-28

## 2019-10-07 RX ADMIN — ACETAMINOPHEN 650 MG: 325 TABLET, FILM COATED ORAL at 09:31

## 2019-10-07 RX ADMIN — CEFTRIAXONE 1 G: 1 INJECTION, POWDER, FOR SOLUTION INTRAMUSCULAR; INTRAVENOUS at 09:31

## 2019-10-07 NOTE — ED TRIAGE NOTES
Patient reports chills, lower abdominal pain and dark urine since yesterday. Denies fever at home. Some nausea, denies vomiting or diarrhea.

## 2019-10-07 NOTE — ED NOTES
I have reviewed discharge instructions with the patient. The patient verbalized understanding. Patient left ED via Discharge Method: ambulatory to Home with ([facility]). Opportunity for questions and clarification provided. Patient given 1 scripts. To continue your aftercare when you leave the hospital, you may receive an automated call from our care team to check in on how you are doing. This is a free service and part of our promise to provide the best care and service to meet your aftercare needs.  If you have questions, or wish to unsubscribe from this service please call [phone]. Thank you for Choosing our [facility] [place] [facility].

## 2019-10-07 NOTE — DISCHARGE INSTRUCTIONS
Rest.  Plenty of fluids. Take antibiotic until complete. Recheck with your doctor 2 weeks for repeat urine test.  Recheck sooner for persistent high fevers, vomiting, feeling like you are going to pass out. Patient Education        Urinary Tract Infection in Women: Care Instructions  Your Care Instructions    A urinary tract infection, or UTI, is a general term for an infection anywhere between the kidneys and the urethra (where urine comes out). Most UTIs are bladder infections. They often cause pain or burning when you urinate. UTIs are caused by bacteria and can be cured with antibiotics. Be sure to complete your treatment so that the infection goes away. Follow-up care is a key part of your treatment and safety. Be sure to make and go to all appointments, and call your doctor if you are having problems. It's also a good idea to know your test results and keep a list of the medicines you take. How can you care for yourself at home? · Take your antibiotics as directed. Do not stop taking them just because you feel better. You need to take the full course of antibiotics. · Drink extra water and other fluids for the next day or two. This may help wash out the bacteria that are causing the infection. (If you have kidney, heart, or liver disease and have to limit fluids, talk with your doctor before you increase your fluid intake.)  · Avoid drinks that are carbonated or have caffeine. They can irritate the bladder. · Urinate often. Try to empty your bladder each time. · To relieve pain, take a hot bath or lay a heating pad set on low over your lower belly or genital area. Never go to sleep with a heating pad in place. To prevent UTIs  · Drink plenty of water each day. This helps you urinate often, which clears bacteria from your system.  (If you have kidney, heart, or liver disease and have to limit fluids, talk with your doctor before you increase your fluid intake.)  · Urinate when you need to.  · Urinate right after you have sex. · Change sanitary pads often. · Avoid douches, bubble baths, feminine hygiene sprays, and other feminine hygiene products that have deodorants. · After going to the bathroom, wipe from front to back. When should you call for help? Call your doctor now or seek immediate medical care if:    · Symptoms such as fever, chills, nausea, or vomiting get worse or appear for the first time.     · You have new pain in your back just below your rib cage. This is called flank pain.     · There is new blood or pus in your urine.     · You have any problems with your antibiotic medicine.    Watch closely for changes in your health, and be sure to contact your doctor if:    · You are not getting better after taking an antibiotic for 2 days.     · Your symptoms go away but then come back. Where can you learn more? Go to http://nila-alyssa.info/. Enter Y662 in the search box to learn more about \"Urinary Tract Infection in Women: Care Instructions. \"  Current as of: December 19, 2018  Content Version: 12.2  © 0008-0841 [employer]. Care instructions adapted under license by [employer] (which disclaims liability or warranty for this information). If you have questions about a medical condition or this instruction, always ask your healthcare professional. [address] any warranty or liability for your use of this information.

## 2019-10-07 NOTE — ED PROVIDER NOTES
71-year-old female has history of insulin-dependent diabetes. She has had a 36-hour history of some fatigue associated with shakiness and chills. Subjective fever. She has had some darker urine with less output. Denies any chest pain abdominal burning shortness of breath. Minimal cough. No URI symptoms. She has had nausea. Number abdominal surgery including hysterectomy appendectomy cholecystectomy and C-sections. The history is provided by the patient. Chills    This is a new problem. The current episode started yesterday. The problem occurs constantly. The problem has not changed since onset. Her temperature was unmeasured prior to arrival. Associated symptoms include cough and urinary symptoms. Pertinent negatives include no chest pain, no diarrhea, no vomiting, no congestion, no headaches, no sore throat, no muscle aches, no shortness of breath, no mental status change, no neck pain and no rash. She has tried nothing for the symptoms.         Past Medical History:   Diagnosis Date    Allergic rhinitis     Anemia     Anxiety     Asthma     Cataracts, bilateral     Depression     Diabetes (HCC)     GERD (gastroesophageal reflux disease)     History of DVT (deep vein thrombosis)     History of pulmonary embolus (PE)     Hypercholesterolemia     Hyperlipidemia     Hypertension     Insomnia     Internal hemorrhoids without mention of complication     Osteoarthritis     Peripheral neuropathy     Personal history of colonic polyps     Rectocele     Stroke ([facility] [address]     Stroke [facility])        Past Surgical History:   Procedure Laterality Date    ENDOSCOPY, COLON, DIAGNOSTIC  13    Laurent--no polyps--5 year recall    HX APPENDECTOMY      OPEN    HX  SECTION      x2    HX CHOLECYSTECTOMY      OPEN    HX ORTHOPAEDIC      BACK X3    HX PARTIAL HYSTERECTOMY           Family History:   Problem Relation Age of Onset    Heart Disease Mother     Diabetes Mother     Heart Disease Father     Diabetes Brother        Social History     Socioeconomic History    Marital status:      Spouse name: Not on file    Number of children: Not on file    Years of education: Not on file    Highest education level: Not on file   Occupational History    Not on file   Social Needs    Financial resource strain: Not on file    Food insecurity:     Worry: Not on file     Inability: Not on file    Transportation needs:     Medical: Not on file     Non-medical: Not on file   Tobacco Use    Smoking status: Never Smoker    Smokeless tobacco: Never Used   Substance and Sexual Activity    Alcohol use: No    Drug use: No    Sexual activity: Not on file   Lifestyle    Physical activity:     Days per week: Not on file     Minutes per session: Not on file    Stress: Not on file   Relationships    Social connections:     Talks on phone: Not on file     Gets together: Not on file     Attends Jew service: Not on file     Active member of club or organization: Not on file     Attends meetings of clubs or organizations: Not on file     Relationship status: Not on file    Intimate partner violence:     Fear of current or ex partner: Not on file     Emotionally abused: Not on file     Physically abused: Not on file     Forced sexual activity: Not on file   Other Topics Concern    Not on file   Social History Narrative    Not on file         ALLERGIES: Codeine; Morphine; and Oxycodone    Review of Systems   Constitutional: Positive for chills and fever. HENT: Negative for congestion, ear pain and sore throat. Respiratory: Positive for cough. Negative for shortness of breath. Cardiovascular: Negative for chest pain and palpitations. Gastrointestinal: Negative for abdominal pain, diarrhea and vomiting. Genitourinary: Negative for dysuria and flank pain. Musculoskeletal: Negative for back pain and neck pain. Skin: Negative for color change and rash.    Neurological: Negative for syncope and headaches. All other systems reviewed and are negative. Vitals:    10/07/19 0857   BP: 142/71   Pulse: (!) 109   Resp: 18   Temp: 99.2 °F (37.3 °C)   SpO2: 95%   Weight: 97.5 kg (215 lb)   Height: 5' 3\" (1.6 m)            Physical Exam   Constitutional: She is oriented to person, place, and time. She appears well-developed and well-nourished. No distress. HENT:   Head: Normocephalic and atraumatic. Right Ear: External ear normal.   Left Ear: External ear normal.   Mouth/Throat: Oropharynx is clear and moist. No oropharyngeal exudate. Eyes: Pupils are equal, round, and reactive to light. Conjunctivae are normal.   Right ptosis   Neck: Normal range of motion. Neck supple. Cardiovascular: Normal rate, regular rhythm and intact distal pulses. No murmur heard. Pulmonary/Chest: Breath sounds normal. No respiratory distress. Abdominal: Soft. Bowel sounds are normal. She exhibits no mass. There is no tenderness. There is no rebound and no guarding. No hernia. Neurological: She is alert and oriented to person, place, and time. Gait normal.   Nl speech   Skin: Skin is warm and dry. Psychiatric: She has a normal mood and affect. Her speech is normal.   Nursing note and vitals reviewed. MDM  Number of Diagnoses or Management Options  Diagnosis management comments: Suspect UTI. Screening blood work and urinalysis.        Amount and/or Complexity of Data Reviewed  Clinical lab tests: ordered and reviewed    Risk of Complications, Morbidity, and/or Mortality  Presenting problems: moderate  Diagnostic procedures: minimal  Management options: low    Patient Progress  Patient progress: stable         Procedures

## 2019-10-13 LAB
ANTIMICROBIAL SUSCEPTIBILITY, 080575: ABNORMAL
BACTERIA ISLT: NORMAL
RESULT 1, 080571: ABNORMAL
SPECIMEN SOURCE: NORMAL

## 2019-10-15 LAB
BACTERIA SPEC CULT: ABNORMAL
SERVICE CMNT-IMP: ABNORMAL

## 2019-10-16 ENCOUNTER — PATIENT OUTREACH (OUTPATIENT)
Dept: CASE MANAGEMENT | Age: 74
End: 2019-10-16

## 2019-10-17 NOTE — PROGRESS NOTES
Left a voice message with my information requesting a call back. This note will not be viewable in [address]

## 2019-10-29 ENCOUNTER — PATIENT OUTREACH (OUTPATIENT)
Dept: CASE MANAGEMENT | Age: 74
End: 2019-10-29

## 2019-10-30 NOTE — PROGRESS NOTES
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 What was outcome of appointment? When is next follow-up scheduled? N/A   Review medications. Any medication changes since last outreach? Does patient have any questions or issues related to their medications? N/A     Home health active? If yes  any issue? Progress? No   Referrals needed?  (SW, Diabetes education, HH, etc. ) N/A   Other issues/Miscellaneous? (Transportation, access to meals, ability to perform ADLs, adequate caregiver support, etc.)   None     Next Outreach Scheduled: Addend-no further follow up     Next Steps/Goals:   Plan: follow up to offer support as needed   Community Care Manager: Abe Martines RN         Outreach to patient for follow up needs. Patient was active with CCM in the past. Currently, she does not have any needs. Left my contact information with patient. This note will not be viewable in [address]

## 2019-12-02 PROBLEM — M79.609 PAIN IN SOFT TISSUES OF LIMB: Status: ACTIVE | Noted: 2019-03-09

## 2020-05-06 PROBLEM — Z00.00 MEDICARE ANNUAL WELLNESS VISIT, SUBSEQUENT: Status: ACTIVE | Noted: 2020-05-06

## 2020-06-05 PROBLEM — Z00.00 MEDICARE ANNUAL WELLNESS VISIT, SUBSEQUENT: Status: RESOLVED | Noted: 2020-05-06 | Resolved: 2020-06-05

## 2021-07-14 ENCOUNTER — HOSPITAL ENCOUNTER (OUTPATIENT)
Dept: LAB | Age: 76
Discharge: HOME | End: 2021-07-14
Payer: MEDICARE

## 2021-07-14 DIAGNOSIS — R06.02 SHORTNESS OF BREATH: ICD-10-CM

## 2021-07-14 LAB
ANION GAP SERPL CALC-SCNC: 6 MMOL/L (ref 7–16)
BASOPHILS # BLD: 0.1 K/UL (ref 0–0.2)
BASOPHILS NFR BLD: 1 % (ref 0–2)
BUN SERPL-MCNC: 20 MG/DL (ref 8–23)
CALCIUM SERPL-MCNC: 9.6 MG/DL (ref 8.3–10.4)
CHLORIDE SERPL-SCNC: 103 MMOL/L (ref 98–107)
CO2 SERPL-SCNC: 28 MMOL/L (ref 21–32)
CREAT SERPL-MCNC: 1.04 MG/DL (ref 0.6–1)
DIFFERENTIAL METHOD BLD: ABNORMAL
EOSINOPHIL # BLD: 0.1 K/UL (ref 0–0.8)
EOSINOPHIL NFR BLD: 1 % (ref 0.5–7.8)
ERYTHROCYTE [DISTWIDTH] IN BLOOD BY AUTOMATED COUNT: 14.2 % (ref 11.9–14.6)
GLUCOSE SERPL-MCNC: 221 MG/DL (ref 65–100)
HCT VFR BLD AUTO: 46.2 % (ref 35.8–46.3)
HGB BLD-MCNC: 13.9 G/DL (ref 11.7–15.4)
IMM GRANULOCYTES # BLD AUTO: 0.1 K/UL (ref 0–0.5)
IMM GRANULOCYTES NFR BLD AUTO: 1 % (ref 0–5)
INR PPP: 1
LYMPHOCYTES # BLD: 1.9 K/UL (ref 0.5–4.6)
LYMPHOCYTES NFR BLD: 22 % (ref 13–44)
MCH RBC QN AUTO: 25.5 PG (ref 26.1–32.9)
MCHC RBC AUTO-ENTMCNC: 30.1 G/DL (ref 31.4–35)
MCV RBC AUTO: 84.8 FL (ref 79.6–97.8)
MONOCYTES # BLD: 0.4 K/UL (ref 0.1–1.3)
MONOCYTES NFR BLD: 5 % (ref 4–12)
NEUTS SEG # BLD: 6.2 K/UL (ref 1.7–8.2)
NEUTS SEG NFR BLD: 71 % (ref 43–78)
NRBC # BLD: 0 K/UL (ref 0–0.2)
PLATELET # BLD AUTO: 351 K/UL (ref 150–450)
PMV BLD AUTO: 9.6 FL (ref 9.4–12.3)
POTASSIUM SERPL-SCNC: 4.3 MMOL/L (ref 3.5–5.1)
PROTHROMBIN TIME: 13.6 SEC (ref 12.5–14.7)
RBC # BLD AUTO: 5.45 M/UL (ref 4.05–5.2)
SODIUM SERPL-SCNC: 137 MMOL/L (ref 136–145)
WBC # BLD AUTO: 8.7 K/UL (ref 4.3–11.1)

## 2021-07-14 PROCEDURE — 85610 PROTHROMBIN TIME: CPT

## 2021-07-14 PROCEDURE — 36415 COLL VENOUS BLD VENIPUNCTURE: CPT

## 2021-07-14 PROCEDURE — 80048 BASIC METABOLIC PNL TOTAL CA: CPT

## 2021-07-14 PROCEDURE — 85025 COMPLETE CBC W/AUTO DIFF WBC: CPT

## 2021-07-20 NOTE — PROGRESS NOTES
Patient pre-assessment complete for [facility] scheduled for , arrival time 0900. Patient verified using . Patient instructed to bring all home medications in labeled bottles on the day of procedure. NPO status reinforced. Patient informed to take a full dose aspirin 325mg  or 81 mg x 4 on the day of procedure. Patient instructed to HOLD vitamins, eliquis. Instructed they can take all other medications excluding vitamins & supplements. Patient verbalizes understanding of all instructions & denies any questions at this time.

## 2021-07-21 ENCOUNTER — APPOINTMENT (OUTPATIENT)
Dept: NON INVASIVE DIAGNOSTICS | Age: 76
End: 2021-07-21
Attending: INTERNAL MEDICINE
Payer: MEDICARE

## 2021-07-21 ENCOUNTER — HOSPITAL ENCOUNTER (OUTPATIENT)
Age: 76
Setting detail: OUTPATIENT SURGERY
Discharge: HOME | End: 2021-07-21
Attending: INTERNAL MEDICINE | Admitting: INTERNAL MEDICINE
Payer: MEDICARE

## 2021-07-21 VITALS
BODY MASS INDEX: 39.69 KG/M2 | HEART RATE: 76 BPM | SYSTOLIC BLOOD PRESSURE: 153 MMHG | DIASTOLIC BLOOD PRESSURE: 91 MMHG | HEIGHT: 63 IN | RESPIRATION RATE: 18 BRPM | WEIGHT: 224 LBS | OXYGEN SATURATION: 97 %

## 2021-07-21 DIAGNOSIS — R06.02 SHORTNESS OF BREATH: ICD-10-CM

## 2021-07-21 DIAGNOSIS — R06.00 DYSPNEA: ICD-10-CM

## 2021-07-21 LAB
ATRIAL RATE: 78 BPM
CALCULATED P AXIS, ECG09: 52 DEGREES
CALCULATED R AXIS, ECG10: -96 DEGREES
CALCULATED T AXIS, ECG11: 39 DEGREES
DIAGNOSIS, 93000: NORMAL
ECHO AO ASC DIAM: 3.31 CM
ECHO AO SINUS VALSALVA DIAM: 3.4 CM
ECHO AV AREA PEAK VELOCITY: 2.85 CM2
ECHO AV AREA PEAK VELOCITY: 2.85 CM2
ECHO AV AREA VTI: 2.78 CM2
ECHO AV AREA VTI: 2.78 CM2
ECHO AV MEAN GRADIENT: 3.37 MMHG
ECHO AV PEAK GRADIENT: 6.07 MMHG
ECHO AV PEAK VELOCITY: 123.16 CM/S
ECHO AV VTI: 25.31 CM
ECHO IVC PROX: 1.74 CM
ECHO LA MAJOR AXIS: 3.2 CM
ECHO LA MINOR AXIS: 1.58 CM
ECHO LV E' LATERAL VELOCITY: 6.21 CM/S
ECHO LV E' SEPTAL VELOCITY: 5.14 CM/S
ECHO LV INTERNAL DIMENSION DIASTOLIC: 3.58 CM (ref 3.9–5.3)
ECHO LV INTERNAL DIMENSION SYSTOLIC: 2.41 CM
ECHO LV IVSD: 1.21 CM (ref 0.6–0.9)
ECHO LV MASS 2D: 142.1 G (ref 67–162)
ECHO LV MASS INDEX 2D: 70 G/M2 (ref 43–95)
ECHO LV POSTERIOR WALL DIASTOLIC: 1.21 CM (ref 0.6–0.9)
ECHO LVOT DIAM: 2.22 CM
ECHO LVOT PEAK GRADIENT: 3.28 MMHG
ECHO LVOT PEAK VELOCITY: 90.51 CM/S
ECHO LVOT SV: 70.4 ML
ECHO LVOT VTI: 18.13 CM
ECHO MV A VELOCITY: 107.18 CM/S
ECHO MV E DECELERATION TIME (DT): 217.05 MS
ECHO MV E VELOCITY: 79.08 CM/S
ECHO MV E/A RATIO: 0.74
ECHO MV E/E' LATERAL: 12.73
ECHO MV E/E' RATIO (AVERAGED): 14.06
ECHO MV E/E' SEPTAL: 15.39
ECHO RV INTERNAL DIMENSION: 2.98 CM
ECHO RV TAPSE: 1.68 CM (ref 1.5–2)
LVOT MG: 1.72 MMHG
P-R INTERVAL, ECG05: 166 MS
Q-T INTERVAL, ECG07: 394 MS
QRS DURATION, ECG06: 84 MS
QTC CALCULATION (BEZET), ECG08: 449 MS
VENTRICULAR RATE, ECG03: 78 BPM

## 2021-07-21 PROCEDURE — C1725 CATH, TRANSLUMIN NON-LASER: HCPCS | Performed by: INTERNAL MEDICINE

## 2021-07-21 PROCEDURE — C8929 TTE W OR WO FOL WCON,DOPPLER: HCPCS

## 2021-07-21 PROCEDURE — 77030015766: Performed by: INTERNAL MEDICINE

## 2021-07-21 PROCEDURE — C1887 CATHETER, GUIDING: HCPCS | Performed by: INTERNAL MEDICINE

## 2021-07-21 PROCEDURE — C1769 GUIDE WIRE: HCPCS | Performed by: INTERNAL MEDICINE

## 2021-07-21 PROCEDURE — 74011250637 HC RX REV CODE- 250/637: Performed by: INTERNAL MEDICINE

## 2021-07-21 PROCEDURE — 74011000258 HC RX REV CODE- 258: Performed by: INTERNAL MEDICINE

## 2021-07-21 PROCEDURE — C1894 INTRO/SHEATH, NON-LASER: HCPCS | Performed by: INTERNAL MEDICINE

## 2021-07-21 PROCEDURE — 76210000028 HC REC RM PH II 4 TO 4.5 HR: Performed by: INTERNAL MEDICINE

## 2021-07-21 PROCEDURE — 99152 MOD SED SAME PHYS/QHP 5/>YRS: CPT | Performed by: INTERNAL MEDICINE

## 2021-07-21 PROCEDURE — 93005 ELECTROCARDIOGRAM TRACING: CPT | Performed by: INTERNAL MEDICINE

## 2021-07-21 PROCEDURE — 92928 PRQ TCAT PLMT NTRAC ST 1 LES: CPT | Performed by: INTERNAL MEDICINE

## 2021-07-21 PROCEDURE — 93458 L HRT ARTERY/VENTRICLE ANGIO: CPT | Performed by: INTERNAL MEDICINE

## 2021-07-21 PROCEDURE — 74011000636 HC RX REV CODE- 636: Performed by: INTERNAL MEDICINE

## 2021-07-21 PROCEDURE — 77030042317 HC BND COMPR HEMSTAT -B: Performed by: INTERNAL MEDICINE

## 2021-07-21 PROCEDURE — C1874 STENT, COATED/COV W/DEL SYS: HCPCS | Performed by: INTERNAL MEDICINE

## 2021-07-21 PROCEDURE — 77030016699 HC CATH ANGI DX INFN1 CARD -A: Performed by: INTERNAL MEDICINE

## 2021-07-21 PROCEDURE — 74011000250 HC RX REV CODE- 250: Performed by: INTERNAL MEDICINE

## 2021-07-21 PROCEDURE — 99153 MOD SED SAME PHYS/QHP EA: CPT | Performed by: INTERNAL MEDICINE

## 2021-07-21 PROCEDURE — 74011250636 HC RX REV CODE- 250/636: Performed by: INTERNAL MEDICINE

## 2021-07-21 DEVICE — STENT RONYX22530UX RESOLUTE ONYX 2.25X30
Type: IMPLANTABLE DEVICE | Status: FUNCTIONAL
Brand: RESOLUTE ONYX™

## 2021-07-21 RX ORDER — MORPHINE SULFATE 2 MG/ML
2 INJECTION, SOLUTION INTRAMUSCULAR; INTRAVENOUS
Status: DISCONTINUED | OUTPATIENT
Start: 2021-07-21 | End: 2021-07-21 | Stop reason: HOSPADM

## 2021-07-21 RX ORDER — CLOPIDOGREL BISULFATE 75 MG/1
75 TABLET ORAL DAILY
Status: DISCONTINUED | OUTPATIENT
Start: 2021-07-22 | End: 2021-07-21 | Stop reason: HOSPADM

## 2021-07-21 RX ORDER — SODIUM CHLORIDE 0.9 % (FLUSH) 0.9 %
5-40 SYRINGE (ML) INJECTION EVERY 8 HOURS
Status: DISCONTINUED | OUTPATIENT
Start: 2021-07-21 | End: 2021-07-21 | Stop reason: HOSPADM

## 2021-07-21 RX ORDER — MAG HYDROX/ALUMINUM HYD/SIMETH 200-200-20
SUSPENSION, ORAL (FINAL DOSE FORM) ORAL AS NEEDED
Status: DISCONTINUED | OUTPATIENT
Start: 2021-07-21 | End: 2021-07-21 | Stop reason: HOSPADM

## 2021-07-21 RX ORDER — GUAIFENESIN 100 MG/5ML
81-324 LIQUID (ML) ORAL ONCE
Status: COMPLETED | OUTPATIENT
Start: 2021-07-21 | End: 2021-07-21

## 2021-07-21 RX ORDER — FENTANYL CITRATE 50 UG/ML
INJECTION, SOLUTION INTRAMUSCULAR; INTRAVENOUS AS NEEDED
Status: DISCONTINUED | OUTPATIENT
Start: 2021-07-21 | End: 2021-07-21 | Stop reason: HOSPADM

## 2021-07-21 RX ORDER — CLOPIDOGREL BISULFATE 75 MG/1
75 TABLET ORAL DAILY
Qty: 30 TABLET | Refills: 11 | Status: SHIPPED | OUTPATIENT
Start: 2021-07-22 | End: 2022-01-05 | Stop reason: SDUPTHER

## 2021-07-21 RX ORDER — CLOPIDOGREL BISULFATE 75 MG/1
75 TABLET ORAL DAILY
Qty: 30 TABLET | Refills: 11 | Status: SHIPPED | OUTPATIENT
Start: 2021-07-22 | End: 2021-07-21 | Stop reason: SDUPTHER

## 2021-07-21 RX ORDER — NITROGLYCERIN 0.4 MG/1
0.4 TABLET SUBLINGUAL
Status: DISCONTINUED | OUTPATIENT
Start: 2021-07-21 | End: 2021-07-21 | Stop reason: HOSPADM

## 2021-07-21 RX ORDER — CLOPIDOGREL BISULFATE 75 MG/1
TABLET ORAL AS NEEDED
Status: DISCONTINUED | OUTPATIENT
Start: 2021-07-21 | End: 2021-07-21 | Stop reason: HOSPADM

## 2021-07-21 RX ORDER — SODIUM CHLORIDE 9 MG/ML
75 INJECTION, SOLUTION INTRAVENOUS CONTINUOUS
Status: DISCONTINUED | OUTPATIENT
Start: 2021-07-21 | End: 2021-07-21 | Stop reason: HOSPADM

## 2021-07-21 RX ORDER — MIDAZOLAM HYDROCHLORIDE 1 MG/ML
INJECTION, SOLUTION INTRAMUSCULAR; INTRAVENOUS AS NEEDED
Status: DISCONTINUED | OUTPATIENT
Start: 2021-07-21 | End: 2021-07-21 | Stop reason: HOSPADM

## 2021-07-21 RX ORDER — SODIUM CHLORIDE 0.9 % (FLUSH) 0.9 %
5-40 SYRINGE (ML) INJECTION AS NEEDED
Status: DISCONTINUED | OUTPATIENT
Start: 2021-07-21 | End: 2021-07-21 | Stop reason: HOSPADM

## 2021-07-21 RX ORDER — HEPARIN SODIUM 200 [USP'U]/100ML
INJECTION, SOLUTION INTRAVENOUS
Status: COMPLETED | OUTPATIENT
Start: 2021-07-21 | End: 2021-07-21

## 2021-07-21 RX ORDER — LIDOCAINE HYDROCHLORIDE 10 MG/ML
INJECTION INFILTRATION; PERINEURAL AS NEEDED
Status: DISCONTINUED | OUTPATIENT
Start: 2021-07-21 | End: 2021-07-21 | Stop reason: HOSPADM

## 2021-07-21 RX ORDER — GUAIFENESIN 100 MG/5ML
81 LIQUID (ML) ORAL DAILY
Status: DISCONTINUED | OUTPATIENT
Start: 2021-07-22 | End: 2021-07-21 | Stop reason: HOSPADM

## 2021-07-21 RX ORDER — LORAZEPAM 1 MG/1
1 TABLET ORAL
Status: DISCONTINUED | OUTPATIENT
Start: 2021-07-21 | End: 2021-07-21 | Stop reason: HOSPADM

## 2021-07-21 RX ORDER — ACETAMINOPHEN 325 MG/1
650 TABLET ORAL
Status: DISCONTINUED | OUTPATIENT
Start: 2021-07-21 | End: 2021-07-21 | Stop reason: HOSPADM

## 2021-07-21 RX ADMIN — SODIUM CHLORIDE 75 ML/HR: 900 INJECTION, SOLUTION INTRAVENOUS at 09:52

## 2021-07-21 RX ADMIN — ASPIRIN 81 MG 325 MG: 81 TABLET ORAL at 08:15

## 2021-07-21 RX ADMIN — PERFLUTREN 1 ML: 6.52 INJECTION, SUSPENSION INTRAVENOUS at 10:30

## 2021-07-21 NOTE — PROGRESS NOTES
Discharge Same Day as PCI Teaching    Discharge teaching completed. Patient instructed on Right Radial site care, including symptoms to report to MD, medication changes & Follow up Care to include:    1- Patient is being treated with 2 separate anti-platelet medications including Eliquis & Plavix. It is very important that these medications are filled today started today. Patient instructed on the importance of these medications & that these medications must not be stopped for any reason or without talking to the doctor first.  2-  Patient is also being discharged on a medication for cholesterol management (ie-statin) Atorvastatin 40mg and instructed on the importance of continuing this medication as well. 3- Patient received a referral for [facility], contact information was faxed to Cardiac rehab & patient given telephone number to contact ([phone]) Cardiac Rehab if they have not heard from them within 10 days.

## 2021-07-21 NOTE — DISCHARGE INSTRUCTIONS
POST PCI/STENT DISCHARGE INSTRUCTIONS    1. Check puncture site frequently for swelling or bleeding. If there is any bleeding, lie down and apply pressure over the area with a clean towel or washcloth and call 911. Notify your doctor for any redness, swelling, drainage, or oozing from the puncture site. Notify your doctor for any fever or chills. 2. If the extremity becomes cold, numb, or painful call [address] Cardiology at [phone].    3. Activity should be limited for the next 48-72 hours. No heavy lifting (anything over 10 pounds) for 3 days. No pushing or pulling with Right Arm for the next three days. 4.  You may resume your usual diet. Drink more fluids than usual.    5.  Have a responsible person drive you home and stay with you for at least 24 hours after your heart catheterization/angiography. No driving for 24 hours. 6. You may remove bandage from your Right Arm in 24 hours. You may shower in 24 hours. No tub baths, hot tubs, or swimming for 1 week. Do not place any lotions, creams, powders, or ointments over puncture site for 1 week. You may place a clean band-aid over the puncture site each day for 5 days. Change daily. YOU ARE BEING DISCHARGED ON TWO ANTI-PLATELET MEDICATIONS    1- Plavix 75mg daily    2- Eliquis 5mg BID    THESE MEDICATIONS  ARE VERY IMPORTANT TO YOUR RECOVERY AND  MUST BE TAKEN EXACTLY AS PRESCRIBED & NOT STOPPED FOR ANY REASON. THESE MAY ONLY BE STOPPED WITH AN ORDER FROM YOUR CARDIOLOGIST. PLEASE HAVE THESE FILLED IMMEDIATELY AND START TAKING Eliquis Today. YOU ARE ALSO BEING DISCHARGED ON A MEDICATION FOR CHOLESTEROL MANAGEMENT. 1- Atorvastatin 40mg daily      You have also been referred to [facility]. Someone from Cardiac Rehab will be calling you to set up a follow up appointment. If they have not called you within a week,  please call [phone].               Percutaneous Coronary Intervention: What to Expect at [facility] Recovery     Percutaneous coronary intervention (PCI) is the name for procedures that are used to open a narrowed or blocked coronary artery. The two most common PCI procedures are coronary angioplasty and coronary stent placement. Your groin or arm may have a bruise and feel sore for a day or two after a percutaneous coronary intervention (PCI). You can do light activities around the house, but nothing strenuous for several days. This care sheet gives you a general idea about how long it will take for you to recover. But each person recovers at a different pace. Follow the steps below to get better as quickly as possible. How can you care for yourself at home? Activity  · Do not do strenuous exercise and do not lift, pull, or push anything heavy until your doctor says it is okay. This may be for a day or two. You can walk around the house and do light activity, such as cooking. · You may shower 24 to 48 hours after the procedure, if your doctor okays it. Pat the incision dry. Do not take a bath for 1 week, or until your doctor tells you it is okay. · If the catheter was placed in your groin, try not to walk up stairs for the first couple of days. · If the catheter was placed in your arm near your wrist, do not bend your wrist deeply for the first couple of days. Be careful using your hand to get into and out of a chair or bed. · If your doctor recommends it, get more exercise. Walking is a good choice. Bit by bit, increase the amount you walk every day. Try for at least 30 minutes on most days of the week. Diet  · Drink plenty of fluids to help your body flush out the dye. If you have kidney, heart, or liver disease and have to limit fluids, talk with your doctor before you increase the amount of fluids you drink. · Keep eating a heart-healthy diet that has lots of fruits, vegetables, and whole grains. If you have not been eating this way, talk to your doctor. You also may want to talk to a dietitian. This expert can help you to learn about healthy foods and plan meals. Medicines  · Your doctor will tell you if and when you can restart your medicines. He or she will also give you instructions about taking any new medicines. · If you take blood thinners, such as warfarin (Coumadin), clopidogrel (Plavix), or aspirin, be sure to talk to your doctor. He or she will tell you if and when to start taking those medicines again. Make sure that you understand exactly what your doctor wants you to do. · Your doctor will prescribe blood-thinning medicines. You will likely take aspirin plus another antiplatelet, such as clopidogrel (Plavix). It is very important that you take these medicines exactly as directed. These medicines help keep the coronary artery open and reduce your risk of a heart attack. · Call your doctor if you think you are having a problem with your medicine. Care of the catheter site  · For 1 or 2 days, keep a bandage over the spot where the catheter was inserted. The bandage probably will fall off in this time. · Put ice or a cold pack on the area for 10 to 20 minutes at a time to help with soreness or swelling. Put a thin cloth between the ice and your skin. Follow-up care is a key part of your treatment and safety. Be sure to make and go to all appointments, and call your doctor if you are having problems. It's also a good idea to know your test results and keep a list of the medicines you take. When should you call for help? Call 911 anytime you think you may need emergency care. For example, call if:  · You passed out (lost consciousness). · You have severe trouble breathing. · You have sudden chest pain and shortness of breath, or you cough up blood. · You have symptoms of a heart attack, such as:  ¨ Chest pain or pressure. ¨ Sweating. ¨ Shortness of breath. ¨ Nausea or vomiting. ¨ Pain that spreads from the chest to the neck, jaw, or one or both shoulders or arms.   ¨ Dizziness or lightheadedness. ¨ A fast or uneven pulse. After calling 911, chew 1 adult-strength aspirin. Wait for an ambulance. Do not try to drive yourself. · You have been diagnosed with angina, and you have angina symptoms that do not go away with rest or are not getting better within 5 minutes after you take one dose of nitroglycerin. Call your doctor now or seek immediate medical care if:  · You are bleeding from the area where the catheter was put in your artery. · You have a fast-growing, painful lump at the catheter site. · You have signs of infection, such as:  ¨ Increased pain, swelling, warmth, or redness. ¨ Red streaks leading from the catheter site. ¨ Pus draining from the catheter site. ¨ A fever. · Your leg or arm looks blue or feels cold, numb, or tingly. Watch closely for changes in your health, and be sure to contact your doctor if you have any problems. Where can you learn more? Go to sifonr.be  Enter M062 in the search box to learn more about \"Percutaneous Coronary Intervention: What to Expect at Home. \"   © 1122-9495 Healthwise, [employer]. Care instructions adapted under license by [employer] (which disclaims liability or warranty for this information). This care instruction is for use with your licensed healthcare professional. If you have questions about a medical condition or this instruction, always ask your healthcare professional. [place] [address] any warranty or liability for your use of this information. Content Version: 38.2.669190; Current as of: May 22, 2015         Cardiac Rehabilitation: Care Instructions  Your Care Instructions    Cardiac rehabilitation is a program for people who have a heart problem, such as a heart attack, heart failure, or a heart valve disease. The program includes exercise, lifestyle changes, education, and emotional support.  Cardiac rehab can help you improve the quality of your life through better overall health. It can help you lose weight and feel better about yourself. On your cardiac rehab team, you may have your doctor, a nurse specialist, a dietitian, and a physical therapist. They will design your cardiac rehab program specifically for you. You will learn how to reduce your risk for heart problems, how to manage stress, and how to eat a heart-healthy diet. By the end of the program, you will be ready to maintain a healthier lifestyle on your own. Follow-up care is a key part of your treatment and safety. Be sure to make and go to all appointments, and call your doctor if you are having problems. It's also a good idea to know your test results and keep a list of the medicines you take. How can you care for yourself at home? · Take your medicines exactly as prescribed. Call your doctor if you think you are having a problem with your medicine. You will get more details on the specific medicines your doctor prescribes. · Weigh yourself every day if your doctor tells you to. Watch for sudden weight gain. Weigh yourself on the same scale with the same amount of clothing at the same time of day. · Plan your meals so that you are eating heart-healthy foods. ¨ Eat a variety of foods daily. Fresh fruits and vegetables and whole-grains are good choices. ¨ Limit your fat intake, especially saturated and trans fat. ¨ Limit salt (sodium). ¨ Increase fiber in your diet. ¨ Limit alcohol. · Learn how to take your pulse so that you can track your heart rate during exercise. · Always check with your doctor before you begin a new exercise program.  · Warm up before you exercise and cool down afterward for at least 15 minutes each. This will help your heart gradually prepare for and recover from exercise and avoid pushing your heart too hard. · Stop exercising if you have any unusual discomfort, such as chest pain. · Do not smoke. Smoking can make heart problems worse.  If you need help quitting, talk to your doctor about stop-smoking programs and medicines. These can increase your chances of quitting for good. When should you call for help? Call 911 anytime you think you may need emergency care. For example, call if:  · You have severe trouble breathing. · You cough up pink, foamy mucus and you have trouble breathing. · You have symptoms of a heart attack. These may include:  ¨ Chest pain or pressure, or a strange feeling in the chest.  ¨ Sweating. ¨ Shortness of breath. ¨ Nausea or vomiting. ¨ Pain, pressure, or a strange feeling in the back, neck, jaw, or upper belly or in one or both shoulders or arms. ¨ Lightheadedness or sudden weakness. ¨ A fast or irregular heartbeat. After you call 911, the  may tell you to chew 1 adult-strength or 2 to 4 low-dose aspirin. Wait for an ambulance. Do not try to drive yourself. · You have angina symptoms (such as chest pain or pressure) that do not go away with rest or are not getting better within 5 minutes after you take a dose of nitroglycerin. · You have symptoms of a stroke. These may include:  ¨ Sudden numbness, tingling, weakness, or loss of movement in your face, arm, or leg, especially on only one side of your body. ¨ Sudden vision changes. ¨ Sudden trouble speaking. ¨ Sudden confusion or trouble understanding simple statements. ¨ Sudden problems with walking or balance. ¨ A sudden, severe headache that is different from past headaches. · You passed out (lost consciousness). Call your doctor now or seek immediate medical care if:  · You have new or increased shortness of breath. · You are dizzy or lightheaded, or you feel like you may faint. · You gain weight suddenly, such as 3 pounds or more in 2 to 3 days. (Your doctor may suggest a different range of weight gain.)  · You have increased swelling in your legs, ankles, or feet. Watch closely for changes in your health, and be sure to contact your doctor if you have any problems.   Where can you learn more?  Go to http://www.gray.com/. Enter M592 in the search box to learn more about \"Cardiac Rehabilitation: Care Instructions. \"  Current as of: May 5, 2016  Content Version: 11.1  © 2127-6499 [employer]. Care instructions adapted under license by [employer] (which disclaims liability or warranty for this information). If you have questions about a medical condition or this instruction, always ask your healthcare professional. [address] any warranty or liability for your use of this information. [employer] Activation    Thank you for requesting access to B-Obvious. Please follow the instructions below to securely access and download your online medical record. B-Obvious allows you to send messages to your doctor, view your test results, renew your prescriptions, schedule appointments, and more. How Do I Sign Up? 1. In your internet browser, go to [email or web address]. 2. Click on the First Time User? Click Here link in the Sign In box. You will see the New Member Sign Up page. 3. Enter your B-Obvious Access Code exactly as it appears below. You will not need to use this code after youve completed the sign-up process. If you do not sign up before the expiration date, you must request a new code. B-Obvious Access Code: [email or web address]  Expires: 2021  8:36 AM (This is the date your B-Obvious access code will )    4. Enter the last four digits of your Social Security Number (xxxx) and Date of Birth (mm/dd/yyyy) as indicated and click Submit. You will be taken to the next sign-up page. 5. Create a B-Obvious ID. This will be your B-Obvious login ID and cannot be changed, so think of one that is secure and easy to remember. 6. Create a B-Obvious password. You can change your password at any time. 7. Enter your Password Reset Question and Answer. This can be used at a later time if you forget your password.    8. Enter your e-mail address. You will receive e-mail notification when new information is available in [address] 9. Click Sign Up. You can now view and download portions of your medical record. 10. Click the Download Summary menu link to download a portable copy of your medical information. Additional Information    If you have questions, please visit the Frequently Asked Questions section of the [employer] website at [email or web address] Remember, [employer] is NOT to be used for urgent needs. For medical emergencies, dial 911.

## 2021-07-21 NOTE — PROGRESS NOTES
TRANSFER - OUT REPORT:    Verbal report given to RN(name) on Chestine Ofe  being transferred to CPRU(unit) for routine progression of care       Report consisted of patients Situation, Background, Assessment and   Recommendations(SBAR). Information from the following report(s) SBAR was reviewed with the receiving nurse.     [facility] w/ Dr. Griffith Clock  1 stent LAD  R radial  TR band 12 mls  2 mg versed  50 mcg fentanyl  Angiomax bolus and gtt off at 1224  600 mg plavix  mylanta

## 2021-07-21 NOTE — DISCHARGE SUMMARY
[facility] Cardiology Discharge Summary     Patient ID:  Jayant Russ  [id number]  77 y.o.  1945    Admit date: 7/21/2021    Discharge date and time:  7-    Admitting Physician: Renee Nur MD     Discharge Physician: Ozzie Hernandez PA-C/Dr. Ramona Berger    Admission Diagnoses: Dyspnea [R06.00]    Discharge Diagnoses:   Patient Active Problem List    Diagnosis Date Noted    Shortness of breath 07/14/2021    Type 2 (non-insulin dependent type) or unspecified type diabetes mellitus with neurological manifestations, uncontrolled 12/02/2019    Swelling of right eyelid 09/01/2019    Pain in soft tissues of limb 03/09/2019    Third nerve palsy 03/08/2019    CVA (cerebral vascular accident) ([facility] 03/08/2019    Mass of left lower extremity 09/19/2018    Severe obesity with body mass index (BMI) of 35.0 to 39.9 with serious comorbidity ([address] 08/06/2018    Recurrent depression ([facility] 01/12/2018    Type 2 diabetes mellitus with diabetic neuropathy ([facility] [address] [facility] 01/12/2018    Type II diabetes mellitus with complication, uncontrolled ([address] 08/10/2015    Non compliance w medication regimen 09/25/2014    Rhinitis, allergic 03/27/2014    Essential hypertension 11/21/2013    HTN (hypertension) 11/21/2013    Anxiety 11/21/2013    Abdominal pain, other specified site 11/21/2013       Cardiology Procedures this admission:  Left heart catheterization with PCI  Consults: None    Hospital Course: Pt is a 78 yo WF seen in the office by Dr. aRmona Berger with c/o increasing BARCLAY. Worse over the last 2 months and progressing with associated orthopnea. LHC was recommended to evaluate for ischemia. She was scheduled for an AM Admission LHC at [facility] on 7-21-21. Pt came in to [facility] and was taken to the cath lab by Dr. Ramona Berger. Pt was found to have an 80% mLAD stenosis that was stented with a 2.25 x 30 mm H[place]ira PRASHANT with 0% residual stenosis. Pt tolerated the procedure well and was taken to the prep and recovery area for recovery.  Pt was up feeling well without any complaints of CP, SOB or palpitations. Pt's right radial cath site was clean, dry and intact without hematoma or bruit. Pt's labs were WNL. Pt was seen and examined by Dr. Jairon Reinoso and determined stable and ready for a same day discharge. Pt was instructed on the importance of taking Plavix and Eliquis everyday without missing a dose, no ASA due to increase risk of bleeding with triple therapy. DISPOSITION: The patient is being discharged home in stable condition on a low saturated fat, low cholesterol and low salt diet. Pt is instructed to advance activities as tolerated limited to fatigue or shortness of breath. Pt is instructed to do no heavy lifting, straining, stooping or squatting for 5 days. Pt is instructed to watch cath site for bleeding/oozing, if seen Pt is instructed to apply firm pressure with clean cloth and call office at [phone]. Pt is instructed to watch for signs of infection which include increasing area of redness around site, fever/hot to touch or purulent drainage. Pt is instructed not to soak in a tub bath for 1 week, but it is okay to shower. Pt is instructed to call office or return to ER for immediate evaluation of any shortness of breath or chest pain not relieved by NTG. Follow up with [address] [place] [address] [address] Dr. Jairon Reinoso on 8/11 at 2:45 PM [place] office  Pt is being referred to out patient cardiac rehab. Discharge Exam:   Visit Vitals  /75 (BP 1 Location: Left upper arm, BP Patient Position: At rest)   Pulse 89   Resp 18   Ht 5' 3\" (1.6 m)   Wt 101.6 kg (224 lb)   SpO2 94%   Breastfeeding No   BMI 39.68 kg/m²   Pt has been seen by Dr. Jairon Reinoso: see his progress note for exam details.     Recent Results (from the past 24 hour(s))   ECHO ADULT COMPLETE    Collection Time: 07/21/21 10:54 AM   Result Value Ref Range    IVSd 1.21 (A) 0.60 - 0.90 cm    LVIDd 3.58 (A) 3.90 - 5.30 cm    LVIDs 2.41 cm    LVOT d 2.22 cm    LVPWd 1.21 (A) 0.60 - 0.90 cm    LVOT Peak Gradient 3.28 mmHg    Left Ventricular Outflow Tract Mean Gradient 1.72 mmHg    LVOT SV 70.4 mL    LVOT Peak Velocity 90.51 cm/s    LVOT VTI 18.13 cm    RVIDd 2.98 cm    Left Atrium Major Axis 3.20 cm    Aortic Valve Area by Continuity of Peak Velocity 2.85 cm2    Aortic Valve Area by Continuity of Peak Velocity 2.85 cm2    Aortic Valve Area by Continuity of VTI 2.78 cm2    Aortic Valve Area by Continuity of VTI 2.78 cm2    AoV PG 6.07 mmHg    Aortic Valve Systolic Mean Gradient 0.82 mmHg    Aortic Valve Systolic Peak Velocity 638.12 cm/s    AoV VTI 25.31 cm    MV A Rocco 107.18 cm/s    Mitral Valve E Wave Deceleration Time 217.05 ms    MV E Rocco 79.08 cm/s    E/E' ratio (averaged) 14.06     E/E' lateral 12.73     E/E' septal 15.39     LV E' Lateral Velocity 6.21 cm/s    LV E' Septal Velocity 5.14 cm/s    Tapse 1.68 1.50 - 2.00 cm    AO ASC D 3.31 cm    IVC proximal 1.74 cm    Aortic Sinus Valsalva 3.40 cm    MV E/A 0.74     LV Mass .1 67.0 - 162.0 g    LV Mass AL Index 70.0 43.0 - 95.0 g/m2    Left Atrium Minor Axis 1.58 cm   EKG, 12 LEAD, INITIAL    Collection Time: 07/21/21 12:50 PM   Result Value Ref Range    Ventricular Rate 78 BPM    Atrial Rate 78 BPM    P-R Interval 166 ms    QRS Duration 84 ms    Q-T Interval 394 ms    QTC Calculation (Bezet) 449 ms    Calculated P Axis 52 degrees    Calculated R Axis -96 degrees    Calculated T Axis 39 degrees    Diagnosis       Normal sinus rhythm  Possible Lateral infarct , age undetermined  Abnormal ECG  When compared with ECG of 01-SEP-2019 08:42,  No significant change was found           Patient Instructions:   Current Discharge Medication List      START taking these medications    Details   clopidogreL (PLAVIX) 75 mg tab Take 1 Tablet by mouth daily.   Qty: 30 Tablet, Refills: 11         CONTINUE these medications which have NOT CHANGED    Details   insulin aspart protamine/insulin aspart (NOVOLOG MIX 70/30) 100 unit/mL (70-30) inpn 45 units am, 35 units pm (increased dose from  7/14/21 visit of 40, 30) for Diabetes  Qty: 8 Pen, Refills: 3      escitalopram oxalate (LEXAPRO) 10 mg tablet Take 1 Tablet by mouth daily. 1 per day for depressed mood  Qty: 30 Tablet, Refills: 1    Associated Diagnoses: Recurrent depression (HCC)      levalbuterol tartrate (XOPENEX) 45 mcg/actuation inhaler Take 2 Puffs by inhalation every four (4) hours as needed for Wheezing or Shortness of Breath. Qty: 1 Inhaler, Refills: 1    Associated Diagnoses: Dyspnea on exertion      Insulin Needles, Disposable, 31 gauge x 5/16\" ndle 500 Pen Needle by SubCUTAneous route four (4) times daily. Qty: 400 Pen Needle, Refills: 4    Comments: Please cancel the safety pen needle rx, a replacement has been sent  Associated Diagnoses: Type 2 diabetes mellitus with diabetic neuropathy, with long-term current use of insulin ([facility])      atorvastatin (LIPITOR) 40 mg tablet 1 per day to replace 20mg for high cholesterol  Qty: 90 Tab, Refills: 1      Insulin Needles, Disposable, 31 gauge x 5/16\" ndle by Injection route four (4) times daily. Use pen needles as follows    For lantus once in the evening    For novolog 3 times per day for diabetes  Qty: 3 Package, Refills: 3    Associated Diagnoses: Type 2 diabetes mellitus with diabetic neuropathy, with long-term current use of insulin ([facility])      fluticasone propionate (Flonase) 50 mcg/actuation nasal spray 1 puff in each nostril twice daily  Qty: 1 Bottle, Refills: 4    Associated Diagnoses: Environmental and seasonal allergies      levothyroxine (SYNTHROID) 25 mcg tablet Take 1 Tab by mouth Daily (before breakfast). Qty: 90 Tab, Refills: 1    Associated Diagnoses: Acquired hypothyroidism      Syringe with Needle, Disp, (TERUMO ALLERGY SYRINGE) 1 mL 27 x 1/2\" syrg 1cc 27G x 1/2\" qod #1 box  Qty: 100 Each, Refills: 3    Associated Diagnoses: Allergic rhinitis due to pollen;  Allergic rhinitis due to animal (cat) (dog) hair and dander      multivitamin (ONE A DAY) tablet Take 1 Tab by mouth daily. B.infantis-B.ani-B.long-B.bifi (PROBIOTIC 4X) 10-15 mg TbEC Take  by mouth. Diabetic Shoes XX Wear daily for diabetes  Qty: 1 Device, Refills: 3      nitroglycerin (NITROSTAT) 0.4 mg SL tablet I po every  5 minutes while sitting or lying down for chest pain , severe shortness of breath. Not to exceed 3 dose. Ems/Er care if no response after 2 doses. Qty: 60 Tablet, Refills: 1    Associated Diagnoses: Dyspnea on exertion      Blood-Glucose Sensor (Dexcom G6 Sensor) jacob Change sensor every 10 days  Qty: 12 Device, Refills: 4    Associated Diagnoses: Uncontrolled type 2 diabetes mellitus with hyperglycemia, with long-term current use of insulin ([facility])      apixaban (ELIQUIS) 5 mg tablet Take 1 Tab by mouth two (2) times a day.   Qty: 180 Tab, Refills: 1    Associated Diagnoses: Chronic deep vein thrombosis (DVT) of axillary vein of right upper extremity ([facility])      Blood-Glucose Transmitter (Dexcom G5 Transmitter) jacob To monitor bgl continuously to adjust insulin and monitor for hypoglycemia  Qty: 1 Device, Refills: 0    Associated Diagnoses: Uncontrolled type 2 diabetes mellitus with hyperglycemia, with long-term current use of insulin ([facility])      Blood-Glucose Meter monitoring kit Use as directed  Qty: 1 Kit, Refills: 0           Dr. Akira Ozuna PA-C  7/21/2021  1:00 PM

## 2021-07-21 NOTE — Clinical Note
Balloon inserted. Balloon inflated using single inflation technique. Lesion #1: Pressure = 12 miguel; Duration = 16 sec.

## 2021-07-21 NOTE — PROGRESS NOTES
Patient received to [address] [place] [address] room # 11  Via wheelchair from [employer]. Patient scheduled for 11 today with Dr Merla Severance. Procedure reviewed & questions answered, voiced good understanding consent obtained & placed on chart. All medications and medical history reviewed. Will prep patient per orders. Patient & family updated on plan of care. The patient has a fraility score of 5-MILDLY FRAIL, based on patient A&Ox3, patient able to perform most ADLs independently.      Patient took aspirin 325mg at 0815 prior to arrival.

## 2021-07-21 NOTE — Clinical Note
TRANSFER - OUT REPORT:     Verbal report given to: CPRU RN. Report consisted of patient's Situation, Background, Assessment and   Recommendations(SBAR). Opportunity for questions and clarification was provided. Patient transported with a Registered Nurse. Patient transported to: recovery.

## 2021-07-21 NOTE — PROGRESS NOTES
Radial compression band removed at 1610 after slowly reducing air from 12 cc to zero as per hospital protocol. No bleeding or hematoma noted. 2 x 2 gauze with tegaderm placed over puncture site. The affected extremity is warm and dry to the touch. Frequent vital signs printed and placed on bedside chart. Patient instructed to call if any bleeding noted on gauze. Patient verbalized understanding the nursing instructions. None

## 2021-07-21 NOTE — Clinical Note
TRANSFER - IN REPORT:     Verbal report received from: cpru rn. Report consisted of patient's Situation, Background, Assessment and   Recommendations(SBAR). Opportunity for questions and clarification was provided. Assessment completed upon patient's arrival to unit and care assumed. Patient transported with a Registered Nurse.

## 2021-07-21 NOTE — Clinical Note
Balloon inserted. Balloon inflated using multiple inflation technique. Lesion #1: Pressure = 14 miguel; Duration = 15 sec. Inflation 2: Pressure = 18 miguel; Duration = 15 sec. Inflation 3: Pressure = 19 miguel; Duration = 15 sec.

## 2021-07-21 NOTE — Clinical Note
Contrast Dose Calculator:   Patient's age: 77.   Patient's sex: Female. Patient weight (kg) = 101.6. Creatinine level (mg/dL) = 1.04. Creatinine clearance (mL/min): 75.   Contrast concentration (mg/mL) = 370. MACD = 300 mL. Max Contrast dose per Creatinine Cl calculator = 168.75 mL.

## 2021-07-23 NOTE — PROGRESS NOTES
SAME DAY DISCHARGE FOLLOW UP PHONE CALL           NAME : Edwin Canas           : 1945                    DATE/TIME:   21 [address]                           MRN# [id number]        1. Ensure that the patient has had their new prescriptions filled & ask if they have taken their anti-platelet & aspirin that day. Pt has taken her plavix & eliquis today as ordered. Reinforced the importance of continuing both these medications daily as ordered & to not stop for any reason without discussing with the cardiologist first. Voiced good understanding    2. Ask about access site. Have the patient assess for any signs of a hematoma. Ask about any pain at access site. Reports Right radial without bleeding hematoma bruising pain numbness or tingling      3. Ask the patient if they had experienced any chest pain or shortness of breath. Reports that she has felt tired today but otherwise feels well, denies any chest pain or shortness of breath   .

## 2021-08-25 ENCOUNTER — HOSPITAL ENCOUNTER (OUTPATIENT)
Dept: LAB | Age: 76
Discharge: HOME | End: 2021-08-25
Payer: MEDICARE

## 2021-08-25 LAB
EST. AVERAGE GLUCOSE BLD GHB EST-MCNC: 229 MG/DL
HBA1C MFR BLD: 9.6 % (ref 4.2–6.3)

## 2021-08-25 PROCEDURE — 36415 COLL VENOUS BLD VENIPUNCTURE: CPT

## 2021-08-25 PROCEDURE — 83036 HEMOGLOBIN GLYCOSYLATED A1C: CPT

## 2021-08-25 PROCEDURE — 86480 TB TEST CELL IMMUN MEASURE: CPT

## 2021-08-26 ENCOUNTER — HOSPITAL ENCOUNTER (OUTPATIENT)
Dept: CT IMAGING | Age: 76
Discharge: HOME | End: 2021-08-26
Attending: CLINIC/CENTER

## 2021-08-26 DIAGNOSIS — R06.00 DYSPNEA, UNSPECIFIED TYPE: ICD-10-CM

## 2021-08-26 RX ORDER — SODIUM CHLORIDE 0.9 % (FLUSH) 0.9 %
10 SYRINGE (ML) INJECTION
Status: COMPLETED | OUTPATIENT
Start: 2021-08-26 | End: 2021-08-26

## 2021-08-26 RX ADMIN — Medication 10 ML: at 15:38

## 2021-08-26 NOTE — PROGRESS NOTES
Notify of a1c 9.6 better glucose control however goal is less than 7. (close 1 year ago at 7.7). reduce glucose intake as discussed with goal review at visit. After eating 120-200. Fasting .

## 2021-08-28 LAB
M TB IFN-G BLD-IMP: NEGATIVE
QUANTIFERON CRITERIA, QFI1T: NORMAL
QUANTIFERON MITOGEN VALUE: >10 IU/ML
QUANTIFERON NIL VALUE: 0.02 IU/ML
QUANTIFERON TB1 AG: 0.05 IU/ML
QUANTIFERON TB2 AG: 0.06 IU/ML

## 2021-10-06 ENCOUNTER — HOSPITAL ENCOUNTER (OUTPATIENT)
Dept: LAB | Age: 76
Discharge: HOME | End: 2021-10-06
Payer: MEDICARE

## 2021-10-06 ENCOUNTER — HOSPITAL ENCOUNTER (OUTPATIENT)
Dept: GENERAL RADIOLOGY | Age: 76
Discharge: HOME | End: 2021-10-06
Payer: MEDICARE

## 2021-10-06 DIAGNOSIS — R06.09 DOE (DYSPNEA ON EXERTION): ICD-10-CM

## 2021-10-06 LAB
ANION GAP SERPL CALC-SCNC: 5 MMOL/L (ref 7–16)
BNP SERPL-MCNC: 23 PG/ML
BUN SERPL-MCNC: 19 MG/DL (ref 8–23)
CALCIUM SERPL-MCNC: 9.5 MG/DL (ref 8.3–10.4)
CHLORIDE SERPL-SCNC: 105 MMOL/L (ref 98–107)
CO2 SERPL-SCNC: 30 MMOL/L (ref 21–32)
CREAT SERPL-MCNC: 0.89 MG/DL (ref 0.6–1)
D DIMER PPP FEU-MCNC: 0.45 UG/ML(FEU)
GLUCOSE SERPL-MCNC: 82 MG/DL (ref 65–100)
POTASSIUM SERPL-SCNC: 4.3 MMOL/L (ref 3.5–5.1)
SODIUM SERPL-SCNC: 140 MMOL/L (ref 136–145)

## 2021-10-06 PROCEDURE — 85379 FIBRIN DEGRADATION QUANT: CPT

## 2021-10-06 PROCEDURE — 83880 ASSAY OF NATRIURETIC PEPTIDE: CPT

## 2021-10-06 PROCEDURE — 71046 X-RAY EXAM CHEST 2 VIEWS: CPT

## 2021-10-06 PROCEDURE — 36415 COLL VENOUS BLD VENIPUNCTURE: CPT

## 2021-10-06 PROCEDURE — 80048 BASIC METABOLIC PNL TOTAL CA: CPT

## 2022-01-26 PROBLEM — I25.10 CORONARY ARTERY DISEASE INVOLVING NATIVE CORONARY ARTERY OF NATIVE HEART WITHOUT ANGINA PECTORIS: Status: ACTIVE | Noted: 2022-01-26

## 2022-02-02 NOTE — DISCHARGE INSTRUCTIONS
Disposition: home    Activity: Activity as tolerated  Diet: DIET DIABETIC CONSISTENT CARB Regular          Influenza (Flu) Vaccine: Care Instructions  Your Care Instructions    Influenza (flu) is an infection in the lungs and breathing passages. It is caused by the influenza virus. There are different strains, or types, of the flu virus every year. The flu comes on quickly. It can cause a cough, stuffy nose, fever, chills, tiredness, and aches and pains. These symptoms may last up to 10 days. The flu can make you feel very sick, but most of the time it doesn't lead to other problems. But it can cause serious problems in people who are older or who have a long-term illness, such as heart disease or diabetes. You can help prevent the flu by getting a flu vaccine every year, as soon as it is available. You cannot get the flu from the vaccine. The vaccine prevents most cases of the flu. But even when the vaccine doesn't prevent the flu, it can make symptoms less severe and reduce the chance of problems from the flu. Sometimes, young children and people who have an immune system problem may have a slight fever or muscle aches or pains 6 to 12 hours after getting the shot. These symptoms usually last 1 or 2 days. Follow-up care is a key part of your treatment and safety. Be sure to make and go to all appointments, and call your doctor if you are having problems. It's also a good idea to know your test results and keep a list of the medicines you take. Who should get the flu vaccine? Everyone age 7 months or older should get a flu vaccine each year. It lowers the chance of getting and spreading the flu. The vaccine is very important for people who are at high risk for getting other health problems from the flu. This includes:  · Anyone 48years of age or older. · People who live in a long-term care center, such as a nursing home. · All children 6 months through 25years of age.   · Adults and children 6 months and older who have long-term heart or lung problems, such as asthma. · Adults and children 6 months and older who needed medical care or were in a hospital during the past year because of diabetes, chronic kidney disease, or a weak immune system (including HIV or AIDS). · Women who will be pregnant during the flu season. · People who have any condition that can make it hard to breathe or swallow (such as a brain injury or muscle disorders). · People who can give the flu to others who are at high risk for problems from the flu. This includes all health care workers and close contacts of people age 72 or older. Who should not get the flu vaccine? The person who gives the vaccine may tell you not to get it if you:  · Have a severe allergy to eggs or any part of the vaccine. · Have had a severe reaction to a flu vaccine in the past.  · Have had Guillain-Barré syndrome (GBS). · Are sick with a fever. (Get the vaccine when symptoms are gone.)  How can you care for yourself at home? · If you or your child has a sore arm or a slight fever after the shot, take an over-the-counter pain medicine, such as acetaminophen (Tylenol) or ibuprofen (Advil, Motrin). Read and follow all instructions on the label. Do not give aspirin to anyone younger than 20. It has been linked to Reye syndrome, a serious illness. · Do not take two or more pain medicines at the same time unless the doctor told you to. Many pain medicines have acetaminophen, which is Tylenol. Too much acetaminophen (Tylenol) can be harmful. When should you call for help? Call 911 anytime you think you may need emergency care. For example, call if after getting the flu vaccine:    · You have symptoms of a severe reaction to the flu vaccine. Symptoms of a severe reaction may include:  ? Severe difficulty breathing. ? Sudden raised, red areas (hives) all over your body. ?  Severe lightheadedness.    Call your doctor now or seek immediate medical care if after getting the flu vaccine:    · You think you are having a reaction to the flu vaccine, such as a new fever.    Watch closely for changes in your health, and be sure to contact your doctor if you have any problems. Where can you learn more? Go to http://nila-alyssa.info/. Enter H622 in the search box to learn more about \"Influenza (Flu) Vaccine: Care Instructions. \"  Current as of: April 1, 2019  Content Version: 12.1  © 5333-2571 [employer]. Care instructions adapted under license by [employer] (which disclaims liability or warranty for this information). If you have questions about a medical condition or this instruction, always ask your healthcare professional. [address] any warranty or liability for your use of this information. Blepharitis: Care Instructions  Your Care Instructions    Blepharitis is an inflammation or infection of the eyelids. It causes dry, scaly crusts on the eyelids. It can also cause your eyes to itch, burn, and look red. This problem is more common in people who have rosacea, dandruff, skin allergies, or eczema. Home treatment can help you keep your eyes comfortable. Your doctor may also prescribe an ointment to put on your eyelids. Follow-up care is a key part of your treatment and safety. Be sure to make and go to all appointments, and call your doctor if you are having problems. It's also a good idea to know your test results and keep a list of the medicines you take. How can you care for yourself at home? · Wash your eyelids and eyebrows daily with baby shampoo. To wash your eyelids:  ? Place a very warm washcloth over your eyes for about a minute. This will help soften and loosen the crusts on your eyelashes. ? Put a few drops of baby shampoo on a warm washcloth. ? Gently wipe your eyelids. This helps remove any crust. It also cleans your eyelids. ? Rinse well with water. · Be safe with medicines. If your doctor prescribed medicine for you, use it exactly as directed. Call your doctor if you think you are having a problem with your medicine. When should you call for help? Call your doctor now or seek immediate medical care if:    · You have signs of an eye infection, such as:  ? Pus or thick discharge coming from the eye.  ? Redness or swelling around the eye.  ? A fever.    Watch closely for changes in your health, and be sure to contact your doctor if:    · You have vision changes.     · You do not get better as expected. Where can you learn more? Go to http://nila-alyssa.info/. Enter M362 in the search box to learn more about \"Blepharitis: Care Instructions. \"  Current as of: July 17, 2018  Content Version: 12.1  © 6522-3149 [employer]. Care instructions adapted under license by [employer] (which disclaims liability or warranty for this information). If you have questions about a medical condition or this instruction, always ask your healthcare professional. [place] [address] any warranty or liability for your use of this information. DISCHARGE SUMMARY from Nurse    PATIENT INSTRUCTIONS:    After general anesthesia or intravenous sedation, for 24 hours or while taking prescription Narcotics:  · Limit your activities  · Do not drive and operate hazardous machinery  · Do not make important personal or business decisions  · Do  not drink alcoholic beverages  · If you have not urinated within 8 hours after discharge, please contact your surgeon on call.     Report the following to your surgeon:  · Excessive pain, swelling, redness or odor of or around the surgical area  · Temperature over 100.5  · Nausea and vomiting lasting longer than 4 hours or if unable to take medications  · Any signs of decreased circulation or nerve impairment to extremity: change in color, persistent  numbness, tingling, coldness or increase pain  · Any questions    What to do at Home:  Recommended activity: Activity as tolerated. If you experience any of the following symptoms:  Increased/ continued eyelid swelling or double vision,  please follow up with your doctor. *  Please give a list of your current medications to your Primary Care Provider. *  Please update this list whenever your medications are discontinued, doses are      changed, or new medications (including over-the-counter products) are added. *  Please carry medication information at all times in case of emergency situations. These are general instructions for a healthy lifestyle:    No smoking/ No tobacco products/ Avoid exposure to second hand smoke  Surgeon General's Warning:  Quitting smoking now greatly reduces serious risk to your health. Obesity, smoking, and sedentary lifestyle greatly increases your risk for illness    A healthy diet, regular physical exercise & weight monitoring are important for maintaining a healthy lifestyle    You may be retaining fluid if you have a history of heart failure or if you experience any of the following symptoms:  Weight gain of 3 pounds or more overnight or 5 pounds in a week, increased swelling in our hands or feet or shortness of breath while lying flat in bed. Please call your doctor as soon as you notice any of these symptoms; do not wait until your next office visit. The discharge information has been reviewed with the patient. The patient verbalized understanding. Discharge medications reviewed with the patient and appropriate educational materials and side effects teaching were provided.   ___________________________________________________________________________________________________________________________________ Yes

## 2022-03-18 PROBLEM — H49.00 THIRD NERVE PALSY: Status: ACTIVE | Noted: 2019-03-08

## 2022-03-19 PROBLEM — M79.609 PAIN IN SOFT TISSUES OF LIMB: Status: ACTIVE | Noted: 2019-03-09

## 2022-03-19 PROBLEM — H02.843 SWELLING OF RIGHT EYELID: Status: ACTIVE | Noted: 2019-09-01

## 2022-03-19 PROBLEM — F33.9 RECURRENT DEPRESSION (HCC): Status: ACTIVE | Noted: 2018-01-12

## 2022-03-19 PROBLEM — E11.40 TYPE 2 DIABETES MELLITUS WITH DIABETIC NEUROPATHY (HCC): Status: ACTIVE | Noted: 2018-01-12

## 2022-03-19 PROBLEM — R22.42 MASS OF LEFT LOWER EXTREMITY: Status: ACTIVE | Noted: 2018-09-19

## 2022-03-19 PROBLEM — I25.10 CORONARY ARTERY DISEASE INVOLVING NATIVE CORONARY ARTERY OF NATIVE HEART WITHOUT ANGINA PECTORIS: Status: ACTIVE | Noted: 2022-01-26

## 2022-03-19 PROBLEM — E66.01 SEVERE OBESITY WITH BODY MASS INDEX (BMI) OF 35.0 TO 39.9 WITH SERIOUS COMORBIDITY (HCC): Status: ACTIVE | Noted: 2018-08-06

## 2022-03-19 PROBLEM — R06.02 SHORTNESS OF BREATH: Status: ACTIVE | Noted: 2021-07-14

## 2022-03-20 PROBLEM — I63.9 CVA (CEREBRAL VASCULAR ACCIDENT) (HCC): Status: ACTIVE | Noted: 2019-03-08

## 2022-06-01 ENCOUNTER — OFFICE VISIT (OUTPATIENT)
Dept: PRIMARY CARE CLINIC | Facility: CLINIC | Age: 77
End: 2022-06-01
Payer: MEDICARE

## 2022-06-01 VITALS
SYSTOLIC BLOOD PRESSURE: 138 MMHG | WEIGHT: 223 LBS | DIASTOLIC BLOOD PRESSURE: 76 MMHG | RESPIRATION RATE: 18 BRPM | TEMPERATURE: 97.6 F | HEART RATE: 89 BPM | BODY MASS INDEX: 39.51 KG/M2 | HEIGHT: 63 IN | OXYGEN SATURATION: 99 %

## 2022-06-01 DIAGNOSIS — F41.8 ANXIETY WITH DEPRESSION: ICD-10-CM

## 2022-06-01 DIAGNOSIS — R06.00 DYSPNEA, UNSPECIFIED TYPE: ICD-10-CM

## 2022-06-01 DIAGNOSIS — Z76.0 MEDICATION REFILL: ICD-10-CM

## 2022-06-01 DIAGNOSIS — L91.8 SKIN TAG: ICD-10-CM

## 2022-06-01 PROCEDURE — G8400 PT W/DXA NO RESULTS DOC: HCPCS | Performed by: CLINIC/CENTER

## 2022-06-01 PROCEDURE — 99214 OFFICE O/P EST MOD 30 MIN: CPT | Performed by: CLINIC/CENTER

## 2022-06-01 PROCEDURE — 1123F ACP DISCUSS/DSCN MKR DOCD: CPT | Performed by: CLINIC/CENTER

## 2022-06-01 PROCEDURE — 1090F PRES/ABSN URINE INCON ASSESS: CPT | Performed by: CLINIC/CENTER

## 2022-06-01 PROCEDURE — G8417 CALC BMI ABV UP PARAM F/U: HCPCS | Performed by: CLINIC/CENTER

## 2022-06-01 PROCEDURE — 1036F TOBACCO NON-USER: CPT | Performed by: CLINIC/CENTER

## 2022-06-01 PROCEDURE — G8428 CUR MEDS NOT DOCUMENT: HCPCS | Performed by: CLINIC/CENTER

## 2022-06-01 PROCEDURE — 3046F HEMOGLOBIN A1C LEVEL >9.0%: CPT | Performed by: CLINIC/CENTER

## 2022-06-01 RX ORDER — ESCITALOPRAM OXALATE 10 MG/1
10 TABLET ORAL DAILY
Qty: 90 TABLET | Refills: 1 | Status: SHIPPED | OUTPATIENT
Start: 2022-06-01

## 2022-06-01 ASSESSMENT — PATIENT HEALTH QUESTIONNAIRE - PHQ9
10. IF YOU CHECKED OFF ANY PROBLEMS, HOW DIFFICULT HAVE THESE PROBLEMS MADE IT FOR YOU TO DO YOUR WORK, TAKE CARE OF THINGS AT HOME, OR GET ALONG WITH OTHER PEOPLE: 1
8. MOVING OR SPEAKING SO SLOWLY THAT OTHER PEOPLE COULD HAVE NOTICED. OR THE OPPOSITE, BEING SO FIGETY OR RESTLESS THAT YOU HAVE BEEN MOVING AROUND A LOT MORE THAN USUAL: 1
SUM OF ALL RESPONSES TO PHQ QUESTIONS 1-9: 7
6. FEELING BAD ABOUT YOURSELF - OR THAT YOU ARE A FAILURE OR HAVE LET YOURSELF OR YOUR FAMILY DOWN: 1
SUM OF ALL RESPONSES TO PHQ QUESTIONS 1-9: 8
3. TROUBLE FALLING OR STAYING ASLEEP: 1
2. FEELING DOWN, DEPRESSED OR HOPELESS: 1
4. FEELING TIRED OR HAVING LITTLE ENERGY: 1
5. POOR APPETITE OR OVEREATING: 1
9. THOUGHTS THAT YOU WOULD BE BETTER OFF DEAD, OR OF HURTING YOURSELF: 1
7. TROUBLE CONCENTRATING ON THINGS, SUCH AS READING THE NEWSPAPER OR WATCHING TELEVISION: 1
SUM OF ALL RESPONSES TO PHQ QUESTIONS 1-9: 8
SUM OF ALL RESPONSES TO PHQ QUESTIONS 1-9: 8

## 2022-06-01 ASSESSMENT — COLUMBIA-SUICIDE SEVERITY RATING SCALE - C-SSRS
6. HAVE YOU EVER DONE ANYTHING, STARTED TO DO ANYTHING, OR PREPARED TO DO ANYTHING TO END YOUR LIFE?: NO
1. WITHIN THE PAST MONTH, HAVE YOU WISHED YOU WERE DEAD OR WISHED YOU COULD GO TO SLEEP AND NOT WAKE UP?: NO
2. HAVE YOU ACTUALLY HAD ANY THOUGHTS OF KILLING YOURSELF?: NO

## 2022-06-01 ASSESSMENT — ANXIETY QUESTIONNAIRES
4. TROUBLE RELAXING: 1
7. FEELING AFRAID AS IF SOMETHING AWFUL MIGHT HAPPEN: 1
6. BECOMING EASILY ANNOYED OR IRRITABLE: 1
GAD7 TOTAL SCORE: 7
3. WORRYING TOO MUCH ABOUT DIFFERENT THINGS: 1
1. FEELING NERVOUS, ANXIOUS, OR ON EDGE: 1
IF YOU CHECKED OFF ANY PROBLEMS ON THIS QUESTIONNAIRE, HOW DIFFICULT HAVE THESE PROBLEMS MADE IT FOR YOU TO DO YOUR WORK, TAKE CARE OF THINGS AT HOME, OR GET ALONG WITH OTHER PEOPLE: SOMEWHAT DIFFICULT
2. NOT BEING ABLE TO STOP OR CONTROL WORRYING: 1
5. BEING SO RESTLESS THAT IT IS HARD TO SIT STILL: 1

## 2022-06-01 NOTE — PROGRESS NOTES
Delmar Barnes (: 1945) presents today   Chief Complaint   Patient presents with    Diabetes    Anxiety           Assessment/Plan:  Ana Garcia was seen today for diabetes and anxiety. Diagnoses and all orders for this visit:    Type II diabetes mellitus with complication, uncontrolled ([address] refilled meds, diet reviewed, lab today, med refill on basal 50units with novolog  -     insulin aspart protamine-insulin aspart (NOVOLOG 70/30) (70-30) 100 UNIT/ML injection; 45 units am, 35 units pm (increased dose from 21 visit of 40, 30) for Diabetes  -     Hemoglobin A1C; Future    Medication refill-provided   -     insulin aspart protamine-insulin aspart (NOVOLOG 70/30) (70-30) 100 UNIT/ML injection; 45 units am, 35 units pm (increased dose from 21 visit of 40, 30) for Diabetes  -     escitalopram (LEXAPRO) 10 MG tablet; Take 1 tablet by mouth daily    Dyspnea, unspecified type- pt rescheduling pul med visit as arranged by cardio     Skin tag-findings reviewed. Anxiety with depression-restart lexapro    Soc-patient expresses desire for future female pcp with [facility]  as reviewed today     HM- advised covid vaccine and yearly flu vaccine     No follow-up provider specified. Bhavik Odom MD        /76 (Site: Left Upper Arm, Position: Sitting, Cuff Size: Large Adult)   Pulse 89   Temp 97.6 °F (36.4 °C) (Tympanic)   Resp 18   Ht 5' 3\" (1.6 m)   Wt 223 lb (101.2 kg)   SpO2 99%   BMI 39.50 kg/m²     Allergies   Allergen Reactions    Codeine Nausea And Vomiting    Morphine Nausea And Vomiting    Oxycodone Nausea And Vomiting       Current Outpatient Medications on File Prior to Visit   Medication Sig Dispense Refill    Lancets MISC Check blood sugars fours times daily.       apixaban (ELIQUIS) 5 MG TABS tablet Take 5 mg by mouth 2 times daily      atorvastatin (LIPITOR) 40 MG tablet 1 per day to replace 20mg for high cholesterol      clopidogrel (PLAVIX) 75 MG tablet Take 75 mg by mouth daily (Patient not taking: Reported on 6/1/2022)      fluticasone (FLONASE) 50 MCG/ACT nasal spray 1 puff in each nostril twice daily      insulin glargine (LANTUS;BASAGLAR) 100 UNIT/ML injection pen Inject 50 Units into the skin      levalbuterol (XOPENEX HFA) 45 MCG/ACT inhaler Inhale 2 puffs into the lungs every 4 hours as needed      levothyroxine (SYNTHROID) 25 MCG tablet Take 25 mcg by mouth every morning (before breakfast)      nitroGLYCERIN (NITROSTAT) 0.4 MG SL tablet I po every 5 minutes while sitting or lying down for chest pain , severe shortness of breath. Not to exceed 3 dose. Ems/Er care if no response after 2 doses. No current facility-administered medications on file prior to visit. 70 y/o female with anxiety/depression, type 2 dm , cad returns for f/up. She now has a dexcom meter with hx of hypoglycemic episodes. She states has been out of [place] med\" for several weeks and admits to stress with sons illnesses (son age 44 with retinopathy and is s/p amputation). She is occ tearful, but talkative, wellgroomed in no acute physical distress. Reports occ dyspnea with exertion and at night with ongoing cardiology care and visits , 1/26/22 note reviewed, and states need to reschedule pulmonary med visit canceled due to her uri sx having recovered from covid. She denies fever, chills, foot ulcers and reports glucose 100-250 with rare 400 or less than 70. Acknowledges diet contributes to fluctuations as attempting better meal choices assisted by daughter who lives at home. Concern of nodule of upper posterior thigh      Review of Systems     Physical Exam  Vitals (ambulates with quad walker ) reviewed. Constitutional:       Appearance: She is obese. She is not ill-appearing or diaphoretic. Cardiovascular:      Rate and Rhythm: Normal rate and regular rhythm. Heart sounds: Normal heart sounds.    Pulmonary:      Effort: Pulmonary effort is normal.      Breath sounds: Normal breath sounds. Skin:     General: Skin is warm. Comments: 1cm tan skin tag right posterior thigh    Neurological:      Mental Status: She is alert. No results found for this visit on 22. No results found for this or any previous visit (from the past 4464 hour(s)).          Past Medical History:   Diagnosis Date    Allergic rhinitis     Anemia     Anxiety     Asthma     Cataracts, bilateral     Depression     Diabetes (HCC)     GERD (gastroesophageal reflux disease)     History of DVT (deep vein thrombosis)     History of pulmonary embolus (PE)     Hypercholesterolemia     Hyperlipidemia     Hypertension     Insomnia     Internal hemorrhoids without mention of complication     Osteoarthritis     Peripheral neuropathy     Personal history of colonic polyps     Rectocele     Stroke ([facility]     Stroke [facility])        Past Surgical History:   Procedure Laterality Date    APPENDECTOMY      OPEN     SECTION      x2    CHOLECYSTECTOMY      OPEN    COLONOSCOPY  13    Neo--no polyps--5 year recall    ORTHOPEDIC SURGERY      BACK X3    PARTIAL HYSTERECTOMY         Family History   Problem Relation Age of Onset    Heart Disease Mother     Diabetes Mother     Heart Disease Father     Diabetes Brother        Social History     Socioeconomic History    Marital status:      Spouse name: None    Number of children: None    Years of education: None    Highest education level: None   Occupational History    None   Tobacco Use    Smoking status: Never Smoker    Smokeless tobacco: Never Used   Substance and Sexual Activity    Alcohol use: No    Drug use: No    Sexual activity: None   Other Topics Concern    None   Social History Narrative    None     Social Determinants of Health     Financial Resource Strain:     Difficulty of Paying Living Expenses: Not on file   Food Insecurity:     Worried About Running Out of Food in the Last Year: Not on file    Ran Out of Food in the Last Year: Not on file   Transportation Needs:     Lack of Transportation (Medical): Not on file    Lack of Transportation (Non-Medical):  Not on file   Physical Activity:     Days of Exercise per Week: Not on file    Minutes of Exercise per Session: Not on file   Stress:     Feeling of Stress : Not on file   Social Connections:     Frequency of Communication with Friends and Family: Not on file    Frequency of Social Gatherings with Friends and Family: Not on file    Attends Yazidism Services: Not on file    Active Member of [address] [place] [address] [employer] or Organizations: Not on file    Attends Club or Organization Meetings: Not on file    Marital Status: Not on file   Intimate Partner Violence:     Fear of Current or Ex-Partner: Not on file    Emotionally Abused: Not on file    Physically Abused: Not on file    Sexually Abused: Not on file   Housing Stability:     Unable to Pay for Housing in the Last Year: Not on file    Number of [place] in the Last Year: Not on file    Unstable Housing in the Last Year: Not on file

## 2022-06-01 NOTE — PATIENT INSTRUCTIONS
[facility] [address] ([facility])    -rescheduled pulmonology appointment  -keep planned cardiology visit

## 2022-06-02 LAB
EST. AVERAGE GLUCOSE BLD GHB EST-MCNC: 220 MG/DL
HBA1C MFR BLD: 9.3 % (ref 4.2–6.3)

## 2022-07-13 ENCOUNTER — HOSPITAL ENCOUNTER (EMERGENCY)
Dept: GENERAL RADIOLOGY | Age: 77
Discharge: HOME | End: 2022-07-16
Payer: MEDICARE

## 2022-07-13 ENCOUNTER — HOSPITAL ENCOUNTER (EMERGENCY)
Age: 77
Discharge: HOME | End: 2022-07-14
Payer: MEDICARE

## 2022-07-13 DIAGNOSIS — M79.89 ARM SWELLING: Primary | ICD-10-CM

## 2022-07-13 DIAGNOSIS — S40.022A ARM BRUISE, LEFT, INITIAL ENCOUNTER: ICD-10-CM

## 2022-07-13 PROCEDURE — 85025 COMPLETE CBC W/AUTO DIFF WBC: CPT

## 2022-07-13 PROCEDURE — 85379 FIBRIN DEGRADATION QUANT: CPT

## 2022-07-13 PROCEDURE — 99284 EMERGENCY DEPT VISIT MOD MDM: CPT

## 2022-07-13 PROCEDURE — 80053 COMPREHEN METABOLIC PANEL: CPT

## 2022-07-13 PROCEDURE — 73060 X-RAY EXAM OF HUMERUS: CPT

## 2022-07-13 ASSESSMENT — PAIN DESCRIPTION - ORIENTATION: ORIENTATION: LEFT

## 2022-07-13 ASSESSMENT — PAIN SCALES - GENERAL
PAINLEVEL_OUTOF10: 1
PAINLEVEL_OUTOF10: 5

## 2022-07-13 ASSESSMENT — PAIN DESCRIPTION - LOCATION: LOCATION: ARM

## 2022-07-13 ASSESSMENT — PAIN - FUNCTIONAL ASSESSMENT
PAIN_FUNCTIONAL_ASSESSMENT: 0-10
PAIN_FUNCTIONAL_ASSESSMENT: 0-10

## 2022-07-14 ENCOUNTER — APPOINTMENT (OUTPATIENT)
Dept: ULTRASOUND IMAGING | Age: 77
End: 2022-07-14
Payer: MEDICARE

## 2022-07-14 VITALS
HEART RATE: 86 BPM | TEMPERATURE: 98 F | SYSTOLIC BLOOD PRESSURE: 124 MMHG | DIASTOLIC BLOOD PRESSURE: 68 MMHG | BODY MASS INDEX: 39.51 KG/M2 | HEIGHT: 63 IN | OXYGEN SATURATION: 98 % | RESPIRATION RATE: 18 BRPM | WEIGHT: 223 LBS

## 2022-07-14 LAB
ALBUMIN SERPL-MCNC: 3.2 G/DL (ref 3.2–4.6)
ALBUMIN/GLOB SERPL: 0.8 {RATIO} (ref 1.2–3.5)
ALP SERPL-CCNC: 123 U/L (ref 50–136)
ALT SERPL-CCNC: 30 U/L (ref 12–65)
ANION GAP SERPL CALC-SCNC: 9 MMOL/L (ref 7–16)
AST SERPL-CCNC: 22 U/L (ref 15–37)
BASOPHILS # BLD: 0.1 K/UL (ref 0–0.2)
BASOPHILS NFR BLD: 1 % (ref 0–2)
BILIRUB SERPL-MCNC: 0.2 MG/DL (ref 0.2–1.1)
BUN SERPL-MCNC: 16 MG/DL (ref 8–23)
CALCIUM SERPL-MCNC: 9.1 MG/DL (ref 8.3–10.4)
CHLORIDE SERPL-SCNC: 103 MMOL/L (ref 98–107)
CO2 SERPL-SCNC: 27 MMOL/L (ref 21–32)
CREAT SERPL-MCNC: 1 MG/DL (ref 0.6–1)
D DIMER PPP FEU-MCNC: 0.81 UG/ML(FEU)
DIFFERENTIAL METHOD BLD: ABNORMAL
EOSINOPHIL # BLD: 0.1 K/UL (ref 0–0.8)
EOSINOPHIL NFR BLD: 1 % (ref 0.5–7.8)
ERYTHROCYTE [DISTWIDTH] IN BLOOD BY AUTOMATED COUNT: 14.1 % (ref 11.9–14.6)
GLOBULIN SER CALC-MCNC: 3.8 G/DL (ref 2.3–3.5)
GLUCOSE SERPL-MCNC: 230 MG/DL (ref 65–100)
HCT VFR BLD AUTO: 40.7 % (ref 35.8–46.3)
HGB BLD-MCNC: 13.1 G/DL (ref 11.7–15.4)
IMM GRANULOCYTES # BLD AUTO: 0 K/UL (ref 0–0.5)
IMM GRANULOCYTES NFR BLD AUTO: 1 % (ref 0–5)
LYMPHOCYTES # BLD: 2 K/UL (ref 0.5–4.6)
LYMPHOCYTES NFR BLD: 28 % (ref 13–44)
MCH RBC QN AUTO: 26.4 PG (ref 26.1–32.9)
MCHC RBC AUTO-ENTMCNC: 32.2 G/DL (ref 31.4–35)
MCV RBC AUTO: 81.9 FL (ref 79.6–97.8)
MONOCYTES # BLD: 0.4 K/UL (ref 0.1–1.3)
MONOCYTES NFR BLD: 5 % (ref 4–12)
NEUTS SEG # BLD: 4.5 K/UL (ref 1.7–8.2)
NEUTS SEG NFR BLD: 64 % (ref 43–78)
NRBC # BLD: 0 K/UL (ref 0–0.2)
PLATELET # BLD AUTO: 342 K/UL (ref 150–450)
PMV BLD AUTO: 9.3 FL (ref 9.4–12.3)
POTASSIUM SERPL-SCNC: 4.2 MMOL/L (ref 3.5–5.1)
PROT SERPL-MCNC: 7 G/DL (ref 6.3–8.2)
RBC # BLD AUTO: 4.97 M/UL (ref 4.05–5.2)
SODIUM SERPL-SCNC: 139 MMOL/L (ref 136–145)
WBC # BLD AUTO: 7 K/UL (ref 4.3–11.1)

## 2022-07-14 PROCEDURE — 93971 EXTREMITY STUDY: CPT

## 2022-07-14 ASSESSMENT — ENCOUNTER SYMPTOMS
EYES NEGATIVE: 1
ABDOMINAL PAIN: 0
SHORTNESS OF BREATH: 0
RESPIRATORY NEGATIVE: 1
GASTROINTESTINAL NEGATIVE: 1

## 2022-07-14 ASSESSMENT — PAIN - FUNCTIONAL ASSESSMENT: PAIN_FUNCTIONAL_ASSESSMENT: NONE - DENIES PAIN

## 2022-07-14 NOTE — ED NOTES
[place] sent on [facility], [address] [place] [address]  07/13/22 [id number]

## 2022-07-14 NOTE — ED TRIAGE NOTES
Pt to ED with c/o bruising, swelling, and pain to left upper arm. Pt denies known trauma. Pt states takes eliquis daily and has a hx of blood clots. Pt states shortness of breath with exertion. Pt states pain to arm 4/10. Pt alert oriented and in NAD at this time.

## 2022-07-14 NOTE — ED PROVIDER NOTES
[facility] Emergency Department Provider Note                     PCP:                Yuly Urias MD               Age: 69 y.o. Sex: female           ICD-10-CM    1. Arm swelling  M79.89 Vascular duplex upper extremity venous left     Vascular duplex upper extremity venous left   2. Arm bruise, left, initial encounter  S40.022A        DISPOSITION         New Prescriptions    No medications on file       MDM    Orders Placed This Encounter   Procedures    XR HUMERUS LEFT (MIN 2 VIEWS)    CBC with Auto Differential    Comprehensive Metabolic Panel    D-Dimer, Quantitative        Jasmin Gibbs MD  [address] [facility] [address]  [phone]    Schedule an appointment as soon as possible for a visit       [facility] EMERGENCY DEPT  [id number]  [phone]    If symptoms worsen       Rome Steward is a 69 y.o. female who presents to the Emergency Department with chief complaint of    Chief Complaint   Patient presents with    Arm Pain      71-year-old female complaining of bruise to left arm. Patient has a area of bruising to the left upper arm appears to be greater than 1days old the bruises spread out from has a yellow color along with maroon. Patient cannot recall any trauma. Patient's daughter is concerned that it might be a blood clot. The history is provided by the patient. Arm Pain  This is a new problem. Episode onset: Unable to give an onset. The problem occurs constantly. The problem has been gradually worsening. Pertinent negatives include no chest pain, no abdominal pain, no headaches and no shortness of breath. Nothing aggravates the symptoms. Nothing relieves the symptoms. She has tried nothing for the symptoms. Review of Systems   Constitutional: Negative. Negative for activity change, appetite change, chills, fatigue and fever. HENT: Negative. Eyes: Negative. Respiratory: Negative. Negative for shortness of breath. Cardiovascular: Negative. Negative for chest pain. Gastrointestinal: Negative. Negative for abdominal pain. Endocrine: Negative. Musculoskeletal: Negative. Neurological: Negative. Negative for headaches. Hematological: Negative. Psychiatric/Behavioral: Negative. All other systems reviewed and are negative. All other systems reviewed and are negative. Past Medical History:   Diagnosis Date    Allergic rhinitis     Anemia     Anxiety     Asthma     Cataracts, bilateral     Depression     Diabetes (HCC)     GERD (gastroesophageal reflux disease)     History of DVT (deep vein thrombosis)     History of pulmonary embolus (PE)     Hypercholesterolemia     Hyperlipidemia     Hypertension     Insomnia     Internal hemorrhoids without mention of complication     Osteoarthritis     Peripheral neuropathy     Personal history of colonic polyps     Rectocele     Stroke ([facility]     Stroke [facility])         Past Surgical History:   Procedure Laterality Date    APPENDECTOMY      OPEN     SECTION      x2    CHOLECYSTECTOMY      OPEN    COLONOSCOPY  13    Neo--no polyps--5 year recall    ORTHOPEDIC SURGERY      BACK X3    PARTIAL HYSTERECTOMY (CERVIX NOT REMOVED)          Family History   Problem Relation Age of Onset    Heart Disease Mother     Diabetes Mother     Heart Disease Father     Diabetes Brother            Social Connections:     Frequency of Communication with Friends and Family: Not on file    Frequency of Social Gatherings with Friends and Family: Not on file    Attends Lutheran Services: Not on file    Active Member of Clubs or Organizations: Not on file    Attends Club or Organization Meetings: Not on file    Marital Status: Not on file        Allergies   Allergen Reactions    Codeine Nausea And Vomiting    Morphine Nausea And Vomiting    Oxycodone Nausea And Vomiting        Vitals signs and nursing note reviewed.    Patient Vitals for the past 4 hrs:   Pulse Resp BP SpO2   07/14/22 0032 -- -- (!) 129/58 96 %   07/14/22 0017 -- -- 119/71 95 %   07/14/22 0002 -- -- 132/63 96 %   07/13/22 2347 -- -- (!) 146/67 96 %   07/13/22 2341 90 20 (!) 174/88 96 %          Physical Exam  Vitals and nursing note reviewed. Constitutional:       Appearance: Normal appearance. She is not ill-appearing. HENT:      Head: Normocephalic and atraumatic. Right Ear: External ear normal.      Left Ear: External ear normal.      Nose: Nose normal.      Mouth/Throat:      Mouth: Mucous membranes are moist.   Eyes:      Extraocular Movements: Extraocular movements intact. Conjunctiva/sclera: Conjunctivae normal.      Pupils: Pupils are equal, round, and reactive to light. Cardiovascular:      Rate and Rhythm: Normal rate and regular rhythm. Pulses: Normal pulses. Heart sounds: Normal heart sounds. Pulmonary:      Effort: Pulmonary effort is normal. No respiratory distress. Breath sounds: Normal breath sounds. Abdominal:      General: Bowel sounds are normal. There is no distension. Palpations: Abdomen is soft. Musculoskeletal:         General: No swelling or signs of injury. Normal range of motion. Arms:       Cervical back: Normal range of motion. No rigidity. Skin:     General: Skin is warm and dry. Capillary Refill: Capillary refill takes less than 2 seconds. Neurological:      General: No focal deficit present. Mental Status: She is alert and oriented to person, place, and time. Mental status is at baseline. Psychiatric:         Mood and Affect: Mood normal.         Behavior: Behavior normal.         Thought Content:  Thought content normal.         Judgment: Judgment normal.          Procedures    Labs Reviewed   CBC WITH AUTO DIFFERENTIAL - Abnormal; Notable for the following components:       Result Value    MPV 9.3 (*)     All other components within normal limits   COMPREHENSIVE METABOLIC PANEL - Abnormal; Notable for the following components:    Glucose 230 (*)     GFR Non- 57 (*)     Globulin 3.8 (*)     Albumin/Globulin Ratio 0.8 (*)     All other components within normal limits   D-DIMER, QUANTITATIVE - Abnormal; Notable for the following components:    D-Dimer, Quant 0.81 (*)     All other components within normal limits        Vascular duplex upper extremity venous left   Final Result   No evidence of DVT in the left upper extremity. XR HUMERUS LEFT (MIN 2 VIEWS)   Final Result   No acute osseous abnormality. [place] Coma Scale  Eye Opening: Spontaneous  Best Verbal Response: Oriented  Best Motor Response: Obeys commands  [place] Coma Scale Score: 15                     CIWA Assessment  BP: (!) 129/58  Heart Rate: 90                       Voice dictation software was used during the making of this note. This software is not perfect and grammatical and other typographical errors may be present. This note has not been completely proofread for errors.        Trey James MD  07/14/22 8617

## 2022-07-14 NOTE — ED NOTES
RN gave report to CLAUDE Johnson to assume care at this time     Jacquie Mccullough RN  07/14/22 8892

## 2022-07-15 ENCOUNTER — CARE COORDINATION (OUTPATIENT)
Dept: CARE COORDINATION | Facility: CLINIC | Age: 77
End: 2022-07-15

## 2022-07-15 NOTE — CARE COORDINATION
Ambulatory Care Management Note    Date/Time:  7/15/2022 9:02 AM    This patient was received as a referral from Daily assignment. Ambulatory Care Manager outreached to patient today to offer care management services. Introduction to self and role of care manager provided. Patient declined care management services at this time. No follow up call scheduled at this time. Patient has Ambulatory Care Manager's contact number for for any questions or concerns.

## 2022-07-20 ENCOUNTER — OFFICE VISIT (OUTPATIENT)
Dept: CARDIOLOGY CLINIC | Age: 77
End: 2022-07-20
Payer: MEDICARE

## 2022-07-20 VITALS
DIASTOLIC BLOOD PRESSURE: 60 MMHG | BODY MASS INDEX: 39.69 KG/M2 | HEIGHT: 63 IN | SYSTOLIC BLOOD PRESSURE: 130 MMHG | HEART RATE: 89 BPM | WEIGHT: 224 LBS

## 2022-07-20 DIAGNOSIS — I25.10 CORONARY ARTERY DISEASE INVOLVING NATIVE CORONARY ARTERY OF NATIVE HEART WITHOUT ANGINA PECTORIS: ICD-10-CM

## 2022-07-20 DIAGNOSIS — I10 ESSENTIAL HYPERTENSION: Primary | ICD-10-CM

## 2022-07-20 DIAGNOSIS — E78.5 DYSLIPIDEMIA: ICD-10-CM

## 2022-07-20 DIAGNOSIS — N18.31 STAGE 3A CHRONIC KIDNEY DISEASE (HCC): ICD-10-CM

## 2022-07-20 PROBLEM — N18.30 CHRONIC RENAL DISEASE, STAGE III (HCC): Status: ACTIVE | Noted: 2022-07-20

## 2022-07-20 PROCEDURE — G8428 CUR MEDS NOT DOCUMENT: HCPCS | Performed by: INTERNAL MEDICINE

## 2022-07-20 PROCEDURE — 1090F PRES/ABSN URINE INCON ASSESS: CPT | Performed by: INTERNAL MEDICINE

## 2022-07-20 PROCEDURE — 1036F TOBACCO NON-USER: CPT | Performed by: INTERNAL MEDICINE

## 2022-07-20 PROCEDURE — G8417 CALC BMI ABV UP PARAM F/U: HCPCS | Performed by: INTERNAL MEDICINE

## 2022-07-20 PROCEDURE — 1123F ACP DISCUSS/DSCN MKR DOCD: CPT | Performed by: INTERNAL MEDICINE

## 2022-07-20 PROCEDURE — G8400 PT W/DXA NO RESULTS DOC: HCPCS | Performed by: INTERNAL MEDICINE

## 2022-07-20 PROCEDURE — 99214 OFFICE O/P EST MOD 30 MIN: CPT | Performed by: INTERNAL MEDICINE

## 2022-07-20 PROCEDURE — 93000 ELECTROCARDIOGRAM COMPLETE: CPT | Performed by: INTERNAL MEDICINE

## 2022-07-20 RX ORDER — MELATONIN 10 MG
10 CAPSULE ORAL NIGHTLY
COMMUNITY

## 2022-07-20 RX ORDER — SENNA PLUS 8.6 MG/1
1 TABLET ORAL
COMMUNITY

## 2022-07-20 NOTE — PROGRESS NOTES
[facility] CARDIOLOGY  [address]  [address]  PHONE: [phone]      22    NAME:  Coy Fofana  : 1945  MRN: [id number]       SUBJECTIVE:   Coy Fofana is a 69 y.o. female seen for a follow up visit regarding the following:     Chief Complaint   Patient presents with    Hypertension    6 Month Follow-Up         HPI:  Ribeiro PERSISTS AND IS AT BASELINE. to monitor  No cp. No orthopnea or pnd. No palpitations or syncope. Past Medical History, Past Surgical History, Family history, Social History, and Medications were all reviewed with the patient today and updated as necessary. Current Outpatient Medications   Medication Sig Dispense Refill    Multiple Vitamins-Minerals (MULTI COMPLETE PO) Take by mouth daily      melatonin 10 MG CAPS capsule Take 10 mg by mouth nightly      Probiotic Product (PROBIOTIC ADVANCED PO) Take by mouth daily      senna (SENOKOT) 8.6 MG tablet Take 1 tablet by mouth 3-4 times a week      insulin aspart protamine-insulin aspart (NOVOLOG 70/30) (70-30) 100 UNIT/ML injection 45 units am, 35 units pm (increased dose from 21 visit of 40, 30) for Diabetes 5 pen 5    escitalopram (LEXAPRO) 10 MG tablet Take 1 tablet by mouth daily 90 tablet 1    Lancets MISC Check blood sugars fours times daily. apixaban (ELIQUIS) 5 MG TABS tablet Take 5 mg by mouth 2 times daily      atorvastatin (LIPITOR) 40 MG tablet 1 per day to replace 20mg for high cholesterol      clopidogrel (PLAVIX) 75 MG tablet Take 75 mg by mouth in the morning.       fluticasone (FLONASE) 50 MCG/ACT nasal spray as needed 1 puff in each nostril twice daily      insulin glargine (LANTUS;BASAGLAR) 100 UNIT/ML injection pen Inject 50 Units into the skin      levalbuterol (XOPENEX HFA) 45 MCG/ACT inhaler Inhale 2 puffs into the lungs every 4 hours as needed      levothyroxine (SYNTHROID) 25 MCG tablet Take 25 mcg by mouth every morning (before breakfast)      nitroGLYCERIN (NITROSTAT) 0.4 MG SL tablet I po every 5 minutes while sitting or lying down for chest pain , severe shortness of breath. Not to exceed 3 dose. Ems/Er care if no response after 2 doses. No current facility-administered medications for this visit. Social History     Tobacco Use    Smoking status: Never    Smokeless tobacco: Never   Substance Use Topics    Alcohol use: No              PHYSICAL EXAM:   /60   Pulse 89   Ht 5' 3\" (1.6 m)   Wt 224 lb (101.6 kg)   BMI 39.68 kg/m²    Constitutional: Oriented to person, place, and time. Appears well-developed and well-nourished. Head: Normocephalic and atraumatic. Neck: Neck supple. Cardiovascular: Normal rate and regular rhythm with no murmur -No JVP  Pulmonary/Chest: Breath sounds normal.   Abdominal: Soft. Musculoskeletal: No edema. Neurological: Alert and oriented to person, place, and time. Skin: Skin is warm and dry. Psychiatric: Normal mood and affect. Vitals reviewed. Wt Readings from Last 3 Encounters:   07/20/22 224 lb (101.6 kg)   07/13/22 223 lb (101.2 kg)   06/01/22 223 lb (101.2 kg)       Medical problems and test results were reviewed with the patient today. ASSESSMENT and PLAN    1. Essential hypertension  Stable. Continue monitor- off meds    - EKG 12 Lead    2. Coronary artery disease involving native coronary artery of native heart without angina pectoris  Stable. Continue PLAVIX    - EKG 12 Lead    3. Dyslipidemia  Stable. Continue CRESTOR         Return in about 6 months (around 1/20/2023).          Pop Bishop MD  7/20/2022  10:02 AM

## 2022-08-12 DIAGNOSIS — Z76.0 MEDICATION REFILL: ICD-10-CM

## 2022-10-18 ASSESSMENT — ENCOUNTER SYMPTOMS
NAUSEA: 0
ABDOMINAL PAIN: 0
VOMITING: 0
COUGH: 0
DIARRHEA: 0
SHORTNESS OF BREATH: 0

## 2022-10-18 NOTE — PROGRESS NOTES
[address], DO  [address] [facility], [place] [address], [address]  [phone]        ASSESSMENT AND PLAN    Problem List Items Addressed This Visit          Circulatory    Essential hypertension     Normal BP today. Reviewed recent CMP. Will continue to follow. Coronary artery disease involving native coronary artery of native heart without angina pectoris     Followed by Dr. Agustin Ray, taking Plavix and Aspirin s/p stent placement. Just saw him 3 months ago and was doing well. Will continue to follow. 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 Last A1C was 9.3 four months ago. Has been having hypoglycemic episodes with her current insulin regimen. Will increase her basal Lantus to 55 units at bedtime and will decrease her Novolog 70/30 insulin to 35 units twice a day. Discussed writing down her blood sugars and what she is eating and returning for recheck in one month. Relevant Medications    insulin aspart protamine-insulin aspart (NOVOLOG 70/30) (70-30) 100 UNIT/ML injection    RESOLVED: Type 2 diabetes mellitus with diabetic neuropathy (HCC)    Relevant Medications    insulin aspart protamine-insulin aspart (NOVOLOG 70/30) (70-30) 100 UNIT/ML injection       Other    Dyslipidemia     Patient has not had her cholesterol checked in the last couple of years. Will have her come fasting for follow up in one month to have labs checked. Encounter to establish care with new doctor - Primary     Other Visit Diagnoses       Hypothyroidism, unspecified type                 The diagnoses and plan were discussed with the patient, who verbalizes understanding and agrees with plan. All questions answered.     Chief Complaint    Chief Complaint   Patient presents with    Establish [facility]    Medication Refill     insulin aspart protamine/ 100 unit/mL (70-30) inpn 45 units am and 35 units pm       HISTORY OF PRESENT ILLNESS    69 y.o. female presents today to establish care with a new primary care provider. Was seeing Dr. Florentino Avalos but she retired. Has insulin dependent diabetes. Notes that she was initially diagnosed with an A1C of 13. States that she was initially started on Lantus for blood sugar control but states that they were unable to control it with just basal insulin. States that then they switched her to Novolog regular insulin but states that she kept forgetting to take her insulin during the day (patient lives with her daughter, who works during the day). States that she was then switched to Novolog 70/30 so she only has to remember two insulins during the day and her Lantus at night. States that she takes insulin aspart 70/30 45 units in the morning and 35 units in the evening. Notes that she has been having problems with hypoglycemia over the last month. States that she usually gives herself 45 units of her Novolog 70/30 in the morning with breakfast.  States that she then takes a nap and when she gets up around 10:00 her meter tells her that her blood sugar is dropping down into the 40s or 50s. States that she thought it was due to taking the 45 units in the morning so switched to taking 35 units in the morning and 45 units at night. States that she will still have low blood sugar early in the morning around 3:00 or 4:00 AM.  States that she does not skip meals. Notes that she eats normal size meals and states that she does try to follow a good diet but loves to eat pastries and bread. Notes that she has neuropathy in her feet. States that at times they will be numb and at other times they will hurt. Takes Tylenol for pain without relief.   Used to take Gabapentin but got very dizzy and fell twice so that normal.      Left Ear: External ear normal.      Nose: Nose normal.   Eyes:      Extraocular Movements: Extraocular movements intact. Pupils: Pupils are equal, round, and reactive to light. Cardiovascular:      Rate and Rhythm: Normal rate and regular rhythm. Heart sounds: Normal heart sounds. No murmur heard. Pulmonary:      Effort: Pulmonary effort is normal. No respiratory distress. Breath sounds: Normal breath sounds. Abdominal:      General: Bowel sounds are normal.      Palpations: Abdomen is soft. Tenderness: no abdominal tenderness There is no right CVA tenderness or left CVA tenderness. Musculoskeletal:         General: Normal range of motion. Cervical back: Normal range of motion. Skin:     General: Skin is warm. Findings: No rash. Neurological:      General: No focal deficit present. Mental Status: She is alert.       Comments: Gait not assessed as patient is sitting in a wheelchair   Psychiatric:         Mood and Affect: Mood normal.         Behavior: Behavior normal.         PERTINENT LABS AND IMAGING    Hemoglobin A1C   Date Value Ref Range Status   06/01/2022 9.3 (H) 4.20 - 6.30 % Final     Lab Results   Component Value Date     07/13/2022    K 4.2 07/13/2022     07/13/2022    CO2 27 07/13/2022    BUN 16 07/13/2022    CREATININE 1.00 07/13/2022    GLUCOSE 230 (H) 07/13/2022    CALCIUM 9.1 07/13/2022    PROT 7.0 07/13/2022    LABALBU 3.2 07/13/2022    BILITOT 0.2 07/13/2022    ALKPHOS 123 07/13/2022    AST 22 07/13/2022    ALT 30 07/13/2022    LABGLOM 57 (L) 07/13/2022    GFRAA >60 07/13/2022    AGRATIO 1.4 01/05/2022    GLOB 3.8 (H) 07/13/2022       Lab Results   Component Value Date    WBC 7.0 07/13/2022    HGB 13.1 07/13/2022    HCT 40.7 07/13/2022    MCV 81.9 07/13/2022     07/13/2022       Hope Pale, DO  12:54 PM  10/19/22

## 2022-10-19 ENCOUNTER — OFFICE VISIT (OUTPATIENT)
Dept: PRIMARY CARE CLINIC | Facility: CLINIC | Age: 77
End: 2022-10-19
Payer: MEDICARE

## 2022-10-19 VITALS
BODY MASS INDEX: 39.92 KG/M2 | RESPIRATION RATE: 16 BRPM | HEIGHT: 63 IN | HEART RATE: 84 BPM | WEIGHT: 225.3 LBS | DIASTOLIC BLOOD PRESSURE: 62 MMHG | SYSTOLIC BLOOD PRESSURE: 126 MMHG | OXYGEN SATURATION: 99 %

## 2022-10-19 DIAGNOSIS — I25.10 CORONARY ARTERY DISEASE INVOLVING NATIVE CORONARY ARTERY OF NATIVE HEART WITHOUT ANGINA PECTORIS: ICD-10-CM

## 2022-10-19 DIAGNOSIS — I10 ESSENTIAL HYPERTENSION: ICD-10-CM

## 2022-10-19 DIAGNOSIS — E78.5 DYSLIPIDEMIA: ICD-10-CM

## 2022-10-19 DIAGNOSIS — I63.9 CEREBROVASCULAR ACCIDENT (CVA), UNSPECIFIED MECHANISM (HCC): ICD-10-CM

## 2022-10-19 DIAGNOSIS — E11.49 TYPE II DIABETES MELLITUS WITH NEUROLOGICAL MANIFESTATIONS NOT AT GOAL (HCC): ICD-10-CM

## 2022-10-19 DIAGNOSIS — Z76.89 ENCOUNTER TO ESTABLISH CARE WITH NEW DOCTOR: Primary | ICD-10-CM

## 2022-10-19 DIAGNOSIS — E11.40 TYPE 2 DIABETES MELLITUS WITH DIABETIC NEUROPATHY, WITH LONG-TERM CURRENT USE OF INSULIN (HCC): ICD-10-CM

## 2022-10-19 DIAGNOSIS — E03.9 HYPOTHYROIDISM, UNSPECIFIED TYPE: ICD-10-CM

## 2022-10-19 DIAGNOSIS — Z79.4 TYPE 2 DIABETES MELLITUS WITH DIABETIC NEUROPATHY, WITH LONG-TERM CURRENT USE OF INSULIN (HCC): ICD-10-CM

## 2022-10-19 PROBLEM — H02.843 SWELLING OF RIGHT EYELID: Status: RESOLVED | Noted: 2019-09-01 | Resolved: 2022-10-19

## 2022-10-19 PROCEDURE — G8417 CALC BMI ABV UP PARAM F/U: HCPCS | Performed by: FAMILY MEDICINE

## 2022-10-19 PROCEDURE — 1090F PRES/ABSN URINE INCON ASSESS: CPT | Performed by: FAMILY MEDICINE

## 2022-10-19 PROCEDURE — 1123F ACP DISCUSS/DSCN MKR DOCD: CPT | Performed by: FAMILY MEDICINE

## 2022-10-19 PROCEDURE — 90694 VACC AIIV4 NO PRSRV 0.5ML IM: CPT | Performed by: FAMILY MEDICINE

## 2022-10-19 PROCEDURE — G8400 PT W/DXA NO RESULTS DOC: HCPCS | Performed by: FAMILY MEDICINE

## 2022-10-19 PROCEDURE — 1036F TOBACCO NON-USER: CPT | Performed by: FAMILY MEDICINE

## 2022-10-19 PROCEDURE — 3046F HEMOGLOBIN A1C LEVEL >9.0%: CPT | Performed by: FAMILY MEDICINE

## 2022-10-19 PROCEDURE — G8427 DOCREV CUR MEDS BY ELIG CLIN: HCPCS | Performed by: FAMILY MEDICINE

## 2022-10-19 PROCEDURE — G0008 ADMIN INFLUENZA VIRUS VAC: HCPCS | Performed by: FAMILY MEDICINE

## 2022-10-19 PROCEDURE — G8484 FLU IMMUNIZE NO ADMIN: HCPCS | Performed by: FAMILY MEDICINE

## 2022-10-19 PROCEDURE — 99214 OFFICE O/P EST MOD 30 MIN: CPT | Performed by: FAMILY MEDICINE

## 2022-10-19 RX ORDER — ASPIRIN 325 MG
325 TABLET ORAL DAILY
COMMUNITY

## 2022-10-19 ASSESSMENT — PATIENT HEALTH QUESTIONNAIRE - PHQ9
SUM OF ALL RESPONSES TO PHQ QUESTIONS 1-9: 0
1. LITTLE INTEREST OR PLEASURE IN DOING THINGS: 0
2. FEELING DOWN, DEPRESSED OR HOPELESS: 0
3. TROUBLE FALLING OR STAYING ASLEEP: 0
SUM OF ALL RESPONSES TO PHQ QUESTIONS 1-9: 0
9. THOUGHTS THAT YOU WOULD BE BETTER OFF DEAD, OR OF HURTING YOURSELF: 0
SUM OF ALL RESPONSES TO PHQ QUESTIONS 1-9: 0
6. FEELING BAD ABOUT YOURSELF - OR THAT YOU ARE A FAILURE OR HAVE LET YOURSELF OR YOUR FAMILY DOWN: 0
5. POOR APPETITE OR OVEREATING: 0
8. MOVING OR SPEAKING SO SLOWLY THAT OTHER PEOPLE COULD HAVE NOTICED. OR THE OPPOSITE, BEING SO FIGETY OR RESTLESS THAT YOU HAVE BEEN MOVING AROUND A LOT MORE THAN USUAL: 0
10. IF YOU CHECKED OFF ANY PROBLEMS, HOW DIFFICULT HAVE THESE PROBLEMS MADE IT FOR YOU TO DO YOUR WORK, TAKE CARE OF THINGS AT HOME, OR GET ALONG WITH OTHER PEOPLE: 0
7. TROUBLE CONCENTRATING ON THINGS, SUCH AS READING THE NEWSPAPER OR WATCHING TELEVISION: 0
4. FEELING TIRED OR HAVING LITTLE ENERGY: 0
SUM OF ALL RESPONSES TO PHQ9 QUESTIONS 1 & 2: 0
SUM OF ALL RESPONSES TO PHQ QUESTIONS 1-9: 0

## 2022-10-19 NOTE — PATIENT INSTRUCTIONS
IT WAS A PLEASURE TO MEET YOU TODAY! I HAVE SENT A REFILL OF YOUR NOVOLOG 70/30. START TAKING 35 UNITS TWICE A DAY TO DECREASE YOUR CHANCE OF LOW BLOOD SUGARS. INCREASE YOUR NIGHTTIME LANTUS DOSE TO 55 UNITS AT BEDTIME. PLEASE WRITE DOWN EVERYTHING THAT YOU EAT AND YOUR BLOOD SUGAR READINGS AND BRING THIS WITH YOU IN ONE MONTH FOR RECHECK. WE WILL DO LABS WHEN YOU COME BACK IN ONE MONTH SO PLEASE COME FASTING (NOTHING TO EAT OR DRINK AFTER MIDNIGHT THE NIGHT BEFORE).       I WILL SEE YOU IN ONE MONTH BUT PLEASE CALL WITH CONCERNS [phone]

## 2022-10-19 NOTE — PROGRESS NOTES
INFLUENZA A/B INJECTION AND   PERMISSION FORM        Screening Questions for Person to be immunized:    - Have you ever had a serious allergic reaction to eggs? No  - Have you ever had a reaction to the Influenza vaccine? No  - Are you sick right now with a fever or other symptoms? No  - Have you been diagnosed with Guillain Fowler Syndrome?  No    I have explained the following to the patient and they verbalize understanding and consent:    I verbally understand the benefits and risk of the influenza vaccine and ask that the vaccine be given to me, or the person named above for whom I am authorized to make the request.  I acknowledge that I am a patient of Claudia Pablo [place], [place]  October 19, 2022

## 2022-12-04 ASSESSMENT — ENCOUNTER SYMPTOMS
DIARRHEA: 0
NAUSEA: 0
SHORTNESS OF BREATH: 0
COUGH: 0
VOMITING: 0
ABDOMINAL PAIN: 0

## 2022-12-05 ENCOUNTER — OFFICE VISIT (OUTPATIENT)
Dept: PRIMARY CARE CLINIC | Facility: CLINIC | Age: 77
End: 2022-12-05
Payer: MEDICARE

## 2022-12-05 VITALS
DIASTOLIC BLOOD PRESSURE: 68 MMHG | HEIGHT: 63 IN | BODY MASS INDEX: 39.97 KG/M2 | OXYGEN SATURATION: 98 % | WEIGHT: 225.6 LBS | HEART RATE: 90 BPM | SYSTOLIC BLOOD PRESSURE: 124 MMHG | RESPIRATION RATE: 16 BRPM

## 2022-12-05 DIAGNOSIS — I10 ESSENTIAL HYPERTENSION: ICD-10-CM

## 2022-12-05 DIAGNOSIS — N18.30 STAGE 3 CHRONIC KIDNEY DISEASE, UNSPECIFIED WHETHER STAGE 3A OR 3B CKD (HCC): ICD-10-CM

## 2022-12-05 DIAGNOSIS — E78.5 DYSLIPIDEMIA: ICD-10-CM

## 2022-12-05 DIAGNOSIS — Z79.4 TYPE 2 DIABETES MELLITUS WITH DIABETIC NEUROPATHY, WITH LONG-TERM CURRENT USE OF INSULIN (HCC): ICD-10-CM

## 2022-12-05 DIAGNOSIS — E55.9 VITAMIN D DEFICIENCY: ICD-10-CM

## 2022-12-05 DIAGNOSIS — E66.01 SEVERE OBESITY WITH BODY MASS INDEX (BMI) OF 35.0 TO 39.9 WITH SERIOUS COMORBIDITY (HCC): ICD-10-CM

## 2022-12-05 DIAGNOSIS — E11.49 TYPE II DIABETES MELLITUS WITH NEUROLOGICAL MANIFESTATIONS NOT AT GOAL (HCC): Primary | ICD-10-CM

## 2022-12-05 DIAGNOSIS — E11.49 TYPE II DIABETES MELLITUS WITH NEUROLOGICAL MANIFESTATIONS NOT AT GOAL (HCC): ICD-10-CM

## 2022-12-05 DIAGNOSIS — E11.40 TYPE 2 DIABETES MELLITUS WITH DIABETIC NEUROPATHY, WITH LONG-TERM CURRENT USE OF INSULIN (HCC): ICD-10-CM

## 2022-12-05 DIAGNOSIS — R53.83 OTHER FATIGUE: ICD-10-CM

## 2022-12-05 DIAGNOSIS — F33.9 RECURRENT DEPRESSION (HCC): ICD-10-CM

## 2022-12-05 PROBLEM — Z76.89 ENCOUNTER TO ESTABLISH CARE WITH NEW DOCTOR: Status: RESOLVED | Noted: 2022-10-19 | Resolved: 2022-12-05

## 2022-12-05 LAB
BASOPHILS # BLD: 0.1 K/UL (ref 0–0.2)
BASOPHILS NFR BLD: 1 % (ref 0–2)
DIFFERENTIAL METHOD BLD: ABNORMAL
EOSINOPHIL # BLD: 0.1 K/UL (ref 0–0.8)
EOSINOPHIL NFR BLD: 1 % (ref 0.5–7.8)
ERYTHROCYTE [DISTWIDTH] IN BLOOD BY AUTOMATED COUNT: 14.1 % (ref 11.9–14.6)
HCT VFR BLD AUTO: 44.2 % (ref 35.8–46.3)
HGB BLD-MCNC: 13.7 G/DL (ref 11.7–15.4)
IMM GRANULOCYTES # BLD AUTO: 0.1 K/UL (ref 0–0.5)
IMM GRANULOCYTES NFR BLD AUTO: 1 % (ref 0–5)
LYMPHOCYTES # BLD: 1.2 K/UL (ref 0.5–4.6)
LYMPHOCYTES NFR BLD: 19 % (ref 13–44)
MCH RBC QN AUTO: 26.4 PG (ref 26.1–32.9)
MCHC RBC AUTO-ENTMCNC: 31 G/DL (ref 31.4–35)
MCV RBC AUTO: 85.3 FL (ref 82–102)
MONOCYTES # BLD: 0.4 K/UL (ref 0.1–1.3)
MONOCYTES NFR BLD: 6 % (ref 4–12)
NEUTS SEG # BLD: 4.3 K/UL (ref 1.7–8.2)
NEUTS SEG NFR BLD: 71 % (ref 43–78)
NRBC # BLD: 0 K/UL (ref 0–0.2)
PLATELET # BLD AUTO: 354 K/UL (ref 150–450)
PMV BLD AUTO: 9.2 FL (ref 9.4–12.3)
RBC # BLD AUTO: 5.18 M/UL (ref 4.05–5.2)
WBC # BLD AUTO: 6.1 K/UL (ref 4.3–11.1)

## 2022-12-05 PROCEDURE — G8417 CALC BMI ABV UP PARAM F/U: HCPCS | Performed by: FAMILY MEDICINE

## 2022-12-05 PROCEDURE — 99214 OFFICE O/P EST MOD 30 MIN: CPT | Performed by: FAMILY MEDICINE

## 2022-12-05 PROCEDURE — 3046F HEMOGLOBIN A1C LEVEL >9.0%: CPT | Performed by: FAMILY MEDICINE

## 2022-12-05 PROCEDURE — 3074F SYST BP LT 130 MM HG: CPT | Performed by: FAMILY MEDICINE

## 2022-12-05 PROCEDURE — 3078F DIAST BP <80 MM HG: CPT | Performed by: FAMILY MEDICINE

## 2022-12-05 PROCEDURE — 1123F ACP DISCUSS/DSCN MKR DOCD: CPT | Performed by: FAMILY MEDICINE

## 2022-12-05 PROCEDURE — G8400 PT W/DXA NO RESULTS DOC: HCPCS | Performed by: FAMILY MEDICINE

## 2022-12-05 PROCEDURE — 1090F PRES/ABSN URINE INCON ASSESS: CPT | Performed by: FAMILY MEDICINE

## 2022-12-05 PROCEDURE — G8427 DOCREV CUR MEDS BY ELIG CLIN: HCPCS | Performed by: FAMILY MEDICINE

## 2022-12-05 PROCEDURE — 1036F TOBACCO NON-USER: CPT | Performed by: FAMILY MEDICINE

## 2022-12-05 PROCEDURE — G8484 FLU IMMUNIZE NO ADMIN: HCPCS | Performed by: FAMILY MEDICINE

## 2022-12-05 RX ORDER — ESCITALOPRAM OXALATE 10 MG/1
10 TABLET ORAL DAILY
Qty: 90 TABLET | Refills: 5 | Status: SHIPPED | OUTPATIENT
Start: 2022-12-05

## 2022-12-05 ASSESSMENT — PATIENT HEALTH QUESTIONNAIRE - PHQ9
10. IF YOU CHECKED OFF ANY PROBLEMS, HOW DIFFICULT HAVE THESE PROBLEMS MADE IT FOR YOU TO DO YOUR WORK, TAKE CARE OF THINGS AT HOME, OR GET ALONG WITH OTHER PEOPLE: 0
6. FEELING BAD ABOUT YOURSELF - OR THAT YOU ARE A FAILURE OR HAVE LET YOURSELF OR YOUR FAMILY DOWN: 0
3. TROUBLE FALLING OR STAYING ASLEEP: 0
SUM OF ALL RESPONSES TO PHQ QUESTIONS 1-9: 0
1. LITTLE INTEREST OR PLEASURE IN DOING THINGS: 0
4. FEELING TIRED OR HAVING LITTLE ENERGY: 0
9. THOUGHTS THAT YOU WOULD BE BETTER OFF DEAD, OR OF HURTING YOURSELF: 0
8. MOVING OR SPEAKING SO SLOWLY THAT OTHER PEOPLE COULD HAVE NOTICED. OR THE OPPOSITE, BEING SO FIGETY OR RESTLESS THAT YOU HAVE BEEN MOVING AROUND A LOT MORE THAN USUAL: 0
2. FEELING DOWN, DEPRESSED OR HOPELESS: 0
SUM OF ALL RESPONSES TO PHQ QUESTIONS 1-9: 0
SUM OF ALL RESPONSES TO PHQ QUESTIONS 1-9: 0
SUM OF ALL RESPONSES TO PHQ9 QUESTIONS 1 & 2: 0
SUM OF ALL RESPONSES TO PHQ QUESTIONS 1-9: 0
7. TROUBLE CONCENTRATING ON THINGS, SUCH AS READING THE NEWSPAPER OR WATCHING TELEVISION: 0
5. POOR APPETITE OR OVEREATING: 0

## 2022-12-05 ASSESSMENT — ANXIETY QUESTIONNAIRES
GAD7 TOTAL SCORE: 0
2. NOT BEING ABLE TO STOP OR CONTROL WORRYING: 0
7. FEELING AFRAID AS IF SOMETHING AWFUL MIGHT HAPPEN: 0
6. BECOMING EASILY ANNOYED OR IRRITABLE: 0
4. TROUBLE RELAXING: 0
5. BEING SO RESTLESS THAT IT IS HARD TO SIT STILL: 0
3. WORRYING TOO MUCH ABOUT DIFFERENT THINGS: 0
1. FEELING NERVOUS, ANXIOUS, OR ON EDGE: 0
IF YOU CHECKED OFF ANY PROBLEMS ON THIS QUESTIONNAIRE, HOW DIFFICULT HAVE THESE PROBLEMS MADE IT FOR YOU TO DO YOUR WORK, TAKE CARE OF THINGS AT HOME, OR GET ALONG WITH OTHER PEOPLE: NOT DIFFICULT AT ALL

## 2022-12-05 NOTE — ASSESSMENT & PLAN NOTE
BP controlled today. Will order labs today. Does not need refills of her medicine today. Will continue to follow.

## 2022-12-05 NOTE — ASSESSMENT & PLAN NOTE
Patient sedentary at home, does not move around much. Discussed importance of working on her diet as patient has uncontrolled blood sugars and the importance of moving in her chair at home, including doing arm and leg exercises to help with range of motion.

## 2022-12-05 NOTE — PROGRESS NOTES
[address], DO  [address] [facility], [place] [address], [address]  [phone]         ASSESSMENT AND PLAN    Problem List Items Addressed This Visit          Circulatory    Essential hypertension     BP controlled today. Will order labs today. Does not need refills of her medicine today. Will continue to follow. 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 Reports higher blood sugars at night due to eating a larger dinner. Is not active and does not move around much. Has been to nutritionists multiple times without improvement. Will check labs today and will increase Lantus to 60 units at night. Continue Novolog 70/30 35 units BID but may need to increase AM dose if she continues to have higher blood sugars at night. Encouraged to cut back on starchy foods. Genitourinary    Chronic renal disease, stage III ([address] [[id number]]       Other    Dyslipidemia    Relevant Orders    Lipid Panel    Comprehensive Metabolic Panel    Vitamin D deficiency    Relevant Orders    Vitamin D 25 Hydroxy    Other fatigue    Relevant Orders    TSH    Severe obesity with body mass index (BMI) of 35.0 to 39.9 with serious comorbidity ([address]     Patient sedentary at home, does not move around much.   Discussed importance of working on her diet as patient has uncontrolled blood sugars and the importance of moving in her chair at home, including doing arm and leg exercises to help with range of motion. Recurrent depression ([address]     Doing well on Lexapro, refill sent to the pharmacy. Relevant Medications    escitalopram (LEXAPRO) 10 MG tablet        The diagnoses and plan were discussed with the patient, who verbalizes understanding and agrees with plan. All questions answered. Chief Complaint    Chief Complaint   Patient presents with    Follow-up     Pt has no complaints     Medication Refill    Discuss Labs     Pt is fasting        HISTORY OF PRESENT ILLNESS    69 y.o. female with insulin-dependent diabetes presents today for follow up. Last seen six weeks ago, increased her basal Lantus to 55 units at bedtime and decreased Novolog to 35 units BID due to hypoglycemia in the mid-mornings. Asked to return today for recheck and labs. States that she has had a lot of elevated blood sugars because she has not been watching her diet as much. Denies any more low blood sugars but states that she eats whatever her daughter eats. Has talked to a nutritionist several times and has a carb counter on her glucometer but states that she is only good for awhile. Needs a refill of Lexapro. Fasting for labs today. Saw an eye doctor about a year ago, thinks it was at [facility].       PAST MEDICAL HISTORY    Past Medical History:   Diagnosis Date    Allergic rhinitis     Anemia     Anxiety     Asthma     Cataracts, bilateral     Depression     Diabetes ([address]     GERD (gastroesophageal reflux disease)     History of DVT (deep vein thrombosis)     History of pulmonary embolus (PE)     Hyperlipidemia     Hypertension     Insomnia     Internal hemorrhoids without mention of complication     Osteoarthritis     Peripheral neuropathy     Personal history of colonic polyps     Rectocele     Stroke [facility])        PAST SURGICAL HISTORY    Past Surgical History:   Procedure Laterality Date    APPENDECTOMY      OPEN     SECTION      x2    CHOLECYSTECTOMY      OPEN    COLONOSCOPY  13 Neo--no polyps--5 year recall    ORTHOPEDIC SURGERY      BACK X3    PARTIAL HYSTERECTOMY (CERVIX NOT REMOVED)         FAMILY HISTORY    Family History   Problem Relation Age of Onset    Heart Disease Mother     Diabetes Mother     Heart Disease Father     Diabetes Brother        SOCIAL HISTORY    Social History     Socioeconomic History    Marital status:      Spouse name: None    Number of children: None    Years of education: None    Highest education level: None   Tobacco Use    Smoking status: Never    Smokeless tobacco: Never   Vaping Use    Vaping Use: Never used   Substance and Sexual Activity    Alcohol use: No    Drug use: No    Sexual activity: Not Currently       MEDICATIONS      Current Outpatient Medications:     escitalopram (LEXAPRO) 10 MG tablet, Take 1 tablet by mouth daily, Disp: 90 tablet, Rfl: 5    insulin aspart protamine-insulin aspart (NOVOLOG 70/30) (70-30) 100 UNIT/ML injection, Inject 35 Units into the skin 2 times daily (with meals), Disp: 5 Adjustable Dose Pre-filled Pen Syringe, Rfl: 5    aspirin 325 MG tablet, Take 325 mg by mouth daily, Disp: , Rfl:     insulin glargine (LANTUS;BASAGLAR) 100 UNIT/ML injection pen, 50 units bed time, Disp: 5 pen, Rfl: 5    Multiple Vitamins-Minerals (MULTI COMPLETE PO), Take by mouth daily, Disp: , Rfl:     melatonin 10 MG CAPS capsule, Take 10 mg by mouth nightly, Disp: , Rfl:     Probiotic Product (PROBIOTIC ADVANCED PO), Take by mouth daily, Disp: , Rfl:     senna (SENOKOT) 8.6 MG tablet, Take 1 tablet by mouth 3-4 times a week, Disp: , Rfl:     Lancets MISC, Check blood sugars fours times daily. , Disp: , Rfl:     apixaban (ELIQUIS) 5 MG TABS tablet, Take 5 mg by mouth 2 times daily, Disp: , Rfl:     atorvastatin (LIPITOR) 40 MG tablet, 1 per day to replace 20mg for high cholesterol, Disp: , Rfl:     clopidogrel (PLAVIX) 75 MG tablet, Take 75 mg by mouth in the morning., Disp: , Rfl:     fluticasone (FLONASE) 50 MCG/ACT nasal spray, as needed 1 puff in each nostril twice daily, Disp: , Rfl:     levalbuterol (XOPENEX HFA) 45 MCG/ACT inhaler, Inhale 2 puffs into the lungs every 4 hours as needed, Disp: , Rfl:     levothyroxine (SYNTHROID) 25 MCG tablet, Take 25 mcg by mouth every morning (before breakfast), Disp: , Rfl:     ALLERGIES / INTOLERANCES    Allergies   Allergen Reactions    Codeine Nausea And Vomiting    Morphine Nausea And Vomiting    Oxycodone Nausea And Vomiting       REVIEW OF SYSTEMS    Review of Systems   Constitutional:  Positive for fatigue. Negative for fever. HENT:  Negative for congestion. Respiratory:  Negative for cough and shortness of breath. Cardiovascular:  Negative for chest pain. Gastrointestinal:  Negative for abdominal pain, diarrhea, nausea and vomiting. Psychiatric/Behavioral:  Negative for dysphoric mood. PHYSICAL EXAMINATION    Vitals:    12/05/22 0812   BP: 124/68   Pulse: 90   Resp: 16   SpO2: 98%         Physical Exam  Vitals and nursing note reviewed. Constitutional:       General: She is not in acute distress. Appearance: Normal appearance. She is obese. HENT:      Head: Normocephalic and atraumatic. Right Ear: External ear normal.      Left Ear: External ear normal.      Nose: Nose normal.   Eyes:      Extraocular Movements: Extraocular movements intact. Pupils: Pupils are equal, round, and reactive to light. Cardiovascular:      Rate and Rhythm: Normal rate and regular rhythm. Heart sounds: Normal heart sounds. No murmur heard. Pulmonary:      Effort: Pulmonary effort is normal. No respiratory distress. Breath sounds: Normal breath sounds. Abdominal:      General: Bowel sounds are normal.      Palpations: Abdomen is soft. Tenderness: There is no abdominal tenderness. There is no right CVA tenderness or left CVA tenderness. Musculoskeletal:         General: Normal range of motion. Cervical back: Normal range of motion.    Skin:     General: Skin is warm. Findings: No rash. Neurological:      General: No focal deficit present. Mental Status: She is alert.    Psychiatric:         Mood and Affect: Mood normal.         Behavior: Behavior normal.     Visual inspection:  Deformity/amputation: absent  Skin lesions/pre-ulcerative calluses: absent  Edema: right- negative, left- negative    Sensory exam:  Monofilament sensation: normal  (minimum of 5 random plantar locations tested, avoiding callused areas - > 1 area with absence of sensation is + for neuropathy)    Plus at least one of the following:  Pulses: normal,   Pinprick: Intact with slight decrease on bottom of left foot compared to right foot  Proprioception: Intact  Vibration (128 Hz): N/A      RESULTS    Hemoglobin A1C   Date Value Ref Range Status   06/01/2022 9.3 (H) 4.20 - 6.30 % Final     Lab Results   Component Value Date    WBC 7.0 07/13/2022    HGB 13.1 07/13/2022    HCT 40.7 07/13/2022    MCV 81.9 07/13/2022     07/13/2022     Lab Results   Component Value Date    TSH 4.380 01/05/2022     Lab Results   Component Value Date     07/13/2022    K 4.2 07/13/2022     07/13/2022    CO2 27 07/13/2022    BUN 16 07/13/2022    CREATININE 1.00 07/13/2022    GLUCOSE 230 (H) 07/13/2022    CALCIUM 9.1 07/13/2022    PROT 7.0 07/13/2022    LABALBU 3.2 07/13/2022    BILITOT 0.2 07/13/2022    ALKPHOS 123 07/13/2022    AST 22 07/13/2022    ALT 30 07/13/2022    LABGLOM 57 (L) 07/13/2022    GFRAA >60 07/13/2022    AGRATIO 1.4 01/05/2022    GLOB 3.8 (H) 07/13/2022           Candance Griffon, DO  10:56 AM  12/05/22

## 2022-12-05 NOTE — ASSESSMENT & PLAN NOTE
Patient with elevated blood sugars since last visit, admits to not following an appropriate diet, does note that she has not had any more low blood sugars. Reports higher blood sugars at night due to eating a larger dinner. Is not active and does not move around much. Has been to nutritionists multiple times without improvement. Will check labs today and will increase Lantus to 60 units at night. Continue Novolog 70/30 35 units BID but may need to increase AM dose if she continues to have higher blood sugars at night. Encouraged to cut back on starchy foods.

## 2022-12-05 NOTE — PATIENT INSTRUCTIONS
IT WAS GREAT TO SEE YOU TODAY! I WILL REFER YOU BACK TO [facility] TO HAVE THEM CHECK YOUR EYES FOR YOUR BLOOD SUGAR. WE WILL INCREASE YOUR LANTUS TO 60 UNITS AT NIGHT. WORK ON DIABETIC MEAL PLANNING, INFO INCLUDED. GO GET YOUR COVID BOOSTER! YOU CAN TAKE TYLENOL IF NEEDED FOR HIP PAIN.      TRY TO MOVE AROUND WHEN YOU ARE SITTING AT HOME - DO ARM EXERCISES AND KICK YOUR LEGS TO KEEP YOUR JOINTS MOVING. I WILL SEE YOU IN 3 MONTHS BUT PLEASE CALL WITH CONCERNS [phone]    Patient Education        Learning About Meal Planning for Diabetes  Why plan your meals? Meal planning can be a key part of managing diabetes. Planning meals and snacks with the right balance of carbohydrate, protein, and fat can help you keep your blood sugar at the target level you set with your doctor. You don't have to eat special foods. You can eat what your family eats, including sweets once in a while. But you do have to pay attention to how often you eat and how much you eat of certain foods. You may want to work with a dietitian or a diabetes educator. They can give you tips and meal ideas and can answer your questions about meal planning. This health professional can also help you reach a healthy weight if that is one of your goals. What plan is right for you? Your dietitian or diabetes educator may suggest that you start with the plate format or carbohydrate counting. The plate format  The plate format is a simple way to help you manage how you eat. You plan meals by learning how much space each food should take on a plate. Using the plate format helps you manage the amount of carbohydrate you eat. It can make it easier to keep your blood sugar level within your target range. It also helps you see if you're eating healthy portion sizes. To use the plate format, you put non-starchy vegetables on half your plate.  Add lean protein foods, such as fish, lean meats and poultry, or soy products, on one-quarter of the plate. Put a grain or starchy vegetable (such as brown rice or a potato) on the final quarter of the plate. You can add a small piece of fruit and some low-fat or fat-free milk or yogurt, depending on your carbohydrate goal for each meal.  Here are some tips for using the plate format:  Make sure that you are not using an oversized plate. A 9-inch plate is best. Many restaurants use larger plates. Get used to using the plate format at home. Then you can use it when you eat out. Write down your questions about using the plate format. Talk to your doctor, a dietitian, or a diabetes educator about your concerns. Carbohydrate counting  With carbohydrate counting, you plan meals based on the amount of carbohydrate in each food. Carbohydrate raises blood sugar higher and more quickly than any other nutrient. It is found in desserts, breads and cereals, and fruit. It's also found in starchy vegetables such as potatoes and corn, grains such as rice and pasta, and milk and yogurt. You can help keep your blood sugar levels within your target range by planning how much carbohydrate to have at meals and snacks. The amount you need depends on several things. These include your weight, how active you are, which diabetes medicines you take, and what your goals are for your blood sugar levels. A registered dietitian or diabetes educator can help you plan how much carbohydrate to include in each meal and snack. An example of a carbohydrate counting plan is:  45 to 60 grams at each meal. That's about the same as 3 to 4 carbohydrate servings. 15 to 20 grams at each snack. That's about the same as 1 carbohydrate serving. The Nutrition Facts label on packaged foods tells you how much carbohydrate is in a serving of the food. First, look at the serving size on the food label. Is that the amount you eat in a serving? All of the nutrition information on a food label is based on that serving size.  So if you eat more or less than that, you'll need to adjust the other numbers. Total carbohydrate is the next thing you need to look for on the label. If you count carbohydrate servings, one serving of carbohydrate is 15 grams. For foods that don't come with labels, such as fresh fruits and vegetables, you'll need a guide that lists carbohydrate in these foods. Ask your doctor, dietitian, or diabetes educator about books or other nutrition guides you can use. If you take insulin, you need to know how many grams of carbohydrate are in a meal. This lets you know how much rapid-acting insulin to take before you eat. If you use an insulin pump, you get a constant rate of insulin during the day. So the pump must be programmed at meals to give you extra insulin to cover the rise in blood sugar after meals. When you know how much carbohydrate you will eat, you can take the right amount of insulin. Or, if you always use the same amount of insulin, you need to make sure that you eat the same amount of carbohydrate at meals. If you need more help to understand carbohydrate counting and food labels, ask your doctor, dietitian, or diabetes educator. How can you plan healthy meals? Here are some tips to get started:  Plan your meals a week at a time. Don't forget to include snacks too. Use cookbooks or online recipes to plan several main meals. Plan some quick meals for busy nights. You also can double some recipes that freeze well. Then you can save half for other busy nights when you don't have time to cook. Make sure you have the ingredients you need for your recipes. If you're running low on basic items, put these items on your shopping list too. List foods that you use to make breakfasts, lunches, and snacks. List plenty of fruits and vegetables. Post this list on the refrigerator. Add to it as you think of more things you need. Take the list to the store to do your weekly shopping. Follow-up care is a key part of your treatment and safety.  Be sure to make and go to all appointments, and call your doctor if you are having problems. It's also a good idea to know your test results and keep a list of the medicines you take. Where can you learn more? Go to https://sergei.[employer]. org and sign in to your [employer] account. Enter C266 in the [employer] box to learn more about \"Learning About Meal Planning for Diabetes. \"     If you do not have an account, please click on the \"Sign Up Now\" link. Current as of: October 6, 2021               Content Version: 13.4  © 5823-9402 [employer]. Care instructions adapted under license by [facility] ([facility]). If you have questions about a medical condition or this instruction, always ask your healthcare professional. [address] any warranty or liability for your use of this information.

## 2022-12-06 LAB
25(OH)D3 SERPL-MCNC: 29.3 NG/ML (ref 30–100)
ALBUMIN SERPL-MCNC: 3.5 G/DL (ref 3.2–4.6)
ALBUMIN/GLOB SERPL: 1.1 {RATIO} (ref 0.4–1.6)
ALP SERPL-CCNC: 103 U/L (ref 50–136)
ALT SERPL-CCNC: 32 U/L (ref 12–65)
ANION GAP SERPL CALC-SCNC: 3 MMOL/L (ref 2–11)
AST SERPL-CCNC: 19 U/L (ref 15–37)
BILIRUB SERPL-MCNC: 0.3 MG/DL (ref 0.2–1.1)
BUN SERPL-MCNC: 16 MG/DL (ref 8–23)
CALCIUM SERPL-MCNC: 9.1 MG/DL (ref 8.3–10.4)
CHLORIDE SERPL-SCNC: 107 MMOL/L (ref 101–110)
CHOLEST SERPL-MCNC: 224 MG/DL
CO2 SERPL-SCNC: 29 MMOL/L (ref 21–32)
CREAT SERPL-MCNC: 1.1 MG/DL (ref 0.6–1)
CREAT UR-MCNC: 266 MG/DL
EST. AVERAGE GLUCOSE BLD GHB EST-MCNC: 206 MG/DL
GLOBULIN SER CALC-MCNC: 3.1 G/DL (ref 2.8–4.5)
GLUCOSE SERPL-MCNC: 128 MG/DL (ref 65–100)
HBA1C MFR BLD: 8.8 % (ref 4.8–5.6)
HDLC SERPL-MCNC: 43 MG/DL (ref 40–60)
HDLC SERPL: 5.2 {RATIO}
LDLC SERPL CALC-MCNC: 156 MG/DL
MICROALBUMIN UR-MCNC: 5.58 MG/DL (ref 0–3)
MICROALBUMIN/CREAT UR-RTO: 21 MG/G (ref 0–30)
POTASSIUM SERPL-SCNC: 3.9 MMOL/L (ref 3.5–5.1)
PROT SERPL-MCNC: 6.6 G/DL (ref 6.3–8.2)
SODIUM SERPL-SCNC: 139 MMOL/L (ref 133–143)
TRIGL SERPL-MCNC: 125 MG/DL (ref 35–150)
TSH, 3RD GENERATION: 5.86 UIU/ML (ref 0.36–3.74)
VLDLC SERPL CALC-MCNC: 25 MG/DL (ref 6–23)

## 2023-01-18 ENCOUNTER — OFFICE VISIT (OUTPATIENT)
Dept: CARDIOLOGY CLINIC | Age: 78
End: 2023-01-18

## 2023-01-18 VITALS
HEIGHT: 64 IN | WEIGHT: 231 LBS | BODY MASS INDEX: 39.44 KG/M2 | DIASTOLIC BLOOD PRESSURE: 72 MMHG | HEART RATE: 80 BPM | SYSTOLIC BLOOD PRESSURE: 118 MMHG

## 2023-01-18 DIAGNOSIS — I10 ESSENTIAL HYPERTENSION: ICD-10-CM

## 2023-01-18 DIAGNOSIS — E78.5 DYSLIPIDEMIA: Primary | ICD-10-CM

## 2023-01-18 DIAGNOSIS — I82.A21 CHRONIC EMBOLISM AND THROMBOSIS OF RIGHT AXILLARY VEIN (HCC): ICD-10-CM

## 2023-01-18 DIAGNOSIS — I25.119 ATHEROSCLEROSIS OF NATIVE CORONARY ARTERY OF NATIVE HEART WITH ANGINA PECTORIS (HCC): ICD-10-CM

## 2023-01-18 RX ORDER — CLOPIDOGREL BISULFATE 75 MG/1
75 TABLET ORAL DAILY
Qty: 90 TABLET | Refills: 3 | Status: SHIPPED | OUTPATIENT
Start: 2023-01-18

## 2023-01-18 NOTE — PROGRESS NOTES
[facility] CARDIOLOGY  [address] [place] [address], [facility] [address]  [place] [address]  PHONE: [phone]      23    NAME:  Amber Escobedo  : 1945  MRN: [id number]       SUBJECTIVE:   Amber Escobedo is a 77 y.o. female seen for a follow up visit regarding the following:     Chief Complaint   Patient presents with    Hypertension         HPI:    No cp. No orthopnea or pnd. No palpitations or syncope.  PEPPER persists but improved.    Past Medical History, Past Surgical History, Family history, Social History, and Medications were all reviewed with the patient today and updated as necessary.     Current Outpatient Medications   Medication Sig Dispense Refill    escitalopram (LEXAPRO) 10 MG tablet Take 1 tablet by mouth daily 90 tablet 5    insulin aspart protamine-insulin aspart (NOVOLOG 70/30) (70-30) 100 UNIT/ML injection Inject 35 Units into the skin 2 times daily (with meals) 5 Adjustable Dose Pre-filled Pen Syringe 5    aspirin 325 MG tablet Take 325 mg by mouth daily      insulin glargine (LANTUS;BASAGLAR) 100 UNIT/ML injection pen 50 units bed time 5 pen 5    Multiple Vitamins-Minerals (MULTI COMPLETE PO) Take by mouth daily      melatonin 10 MG CAPS capsule Take 10 mg by mouth nightly      Probiotic Product (PROBIOTIC ADVANCED PO) Take by mouth daily      senna (SENOKOT) 8.6 MG tablet Take 1 tablet by mouth 3-4 times a week      apixaban (ELIQUIS) 5 MG TABS tablet Take 5 mg by mouth 2 times daily      atorvastatin (LIPITOR) 40 MG tablet 1 per day to replace 20mg for high cholesterol      clopidogrel (PLAVIX) 75 MG tablet Take 75 mg by mouth in the morning.      fluticasone (FLONASE) 50 MCG/ACT nasal spray as needed 1 puff in each nostril twice daily      levalbuterol (XOPENEX HFA) 45 MCG/ACT inhaler Inhale 2 puffs into the lungs every 4 hours as needed      levothyroxine (SYNTHROID) 25 MCG tablet Take 25 mcg by mouth every morning (before breakfast)      Lancets MISC Check blood sugars fours times daily.  No current facility-administered medications for this visit. Social History     Tobacco Use    Smoking status: Never    Smokeless tobacco: Never   Substance Use Topics    Alcohol use: No              PHYSICAL EXAM:   /72   Pulse 80   Ht 5' 3.5\" (1.613 m)   Wt 231 lb (104.8 kg)   BMI 40.28 kg/m²    Constitutional: Oriented to person, place, and time. Appears well-developed and well-nourished. Head: Normocephalic and atraumatic. Neck: Neck supple. Cardiovascular: Normal rate and regular rhythm with no murmur -No JVP  Pulmonary/Chest: Breath sounds normal.   Abdominal: Soft. Musculoskeletal: No edema. Neurological: Alert and oriented to person, place, and time. Skin: Skin is warm and dry. Psychiatric: Normal mood and affect. Vitals reviewed. Wt Readings from Last 3 Encounters:   01/18/23 231 lb (104.8 kg)   12/05/22 225 lb 9.6 oz (102.3 kg)   10/19/22 225 lb 4.8 oz (102.2 kg)       Medical problems and test results were reviewed with the patient today. ASSESSMENT and PLAN    1. Dyslipidemia  Stable. Continue lipitor      2. 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 Continue eliquis      3. Atherosclerosis of native coronary artery of native heart with angina pectoris (HCC)  Stable. Continue plavix      4. Essential hypertension  Stable. Continue to monitor         Return in about 6 months (around 7/18/2023).          Rinku Shane MD  1/18/2023  11:18 AM

## 2023-01-20 RX ORDER — INSULIN GLARGINE 100 [IU]/ML
INJECTION, SOLUTION SUBCUTANEOUS
Qty: 15 ML | Refills: 5 | Status: SHIPPED | OUTPATIENT
Start: 2023-01-20

## 2023-01-24 RX ORDER — ATORVASTATIN CALCIUM 40 MG/1
40 TABLET, FILM COATED ORAL DAILY
Qty: 90 TABLET | Refills: 5 | Status: SHIPPED | OUTPATIENT
Start: 2023-01-24

## 2023-01-25 ENCOUNTER — TELEPHONE (OUTPATIENT)
Dept: CARDIOLOGY CLINIC | Age: 78
End: 2023-01-25

## 2023-01-25 NOTE — TELEPHONE ENCOUNTER
Pt called to update us on her medical background. Pt states that she was supposed to be taking Lipitor, but she hasn't been. Thus, the pt states that she will not be getting her lab orders done, as Dr. Loco Wheat has her started on Atorvastatin. The patient also wanted us to know that the Evolocumba 140MG/ML that Dr. Meenu Harris prescribe has not terra sent to [facility] yet, and that she will not be taking it because she plans on only taking atorvastatin.

## 2023-01-26 ENCOUNTER — TELEPHONE (OUTPATIENT)
Dept: CARDIOLOGY CLINIC | Age: 78
End: 2023-01-26

## 2023-03-03 RX ORDER — INSULIN ASPART 100 [IU]/ML
INJECTION, SUSPENSION SUBCUTANEOUS
Qty: 15 ML | Refills: 0 | Status: SHIPPED | OUTPATIENT
Start: 2023-03-03

## 2023-03-14 ASSESSMENT — ENCOUNTER SYMPTOMS
VOMITING: 0
DIARRHEA: 0
NAUSEA: 0
COUGH: 0
SHORTNESS OF BREATH: 0

## 2023-03-14 NOTE — PROGRESS NOTES
[address], DO  [address] [facility], [place] [address], [address]  [phone]       ASSESSMENT AND PLAN    Problem List Items Addressed This Visit          Respiratory    Rhinitis, allergic     Refill sent for Xopenex, which patient uses for asthma secondary to allergies. Relevant Medications    levalbuterol (XOPENEX HFA) 45 MCG/ACT inhaler       Digestive    Chronic idiopathic constipation     Chronic problem, wishes to take Senokot daily, states that she takes 5-6 pills every other day and notes that it usually works the next day, but sometimes does not. Notes that she feels bloated and would like to go more regularly. States that she has tried Linzess, Dulcolax, Miralax and other OTC meds without relief. Discussed starting with 3 Senokot every day to see if this helps. Hopefully moving around more and drinking more water will help, as well. OK to gradually increase to 4-5 pills daily if needed. Will recheck in 3 months. 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)     Random blood sugar today 171 mg/dL, though patient reports it is often in the upper 200s and sometimes just reads \"High. \" Has been doing 60 units of Lantus at night and 35 units BID of 70/30. Admits that she is not following a diabetic diet. Discussed importance of better diabetic control as this may be causing more neuropathy which can make her legs weaker. Will increase Lantus to 64 units at night and continue 70/30 BID at 35 units. Patient to restart writing down her daily meals. Will recheck with labs in 3 months as patient does not want to do labs today.          Relevant Medications    insulin glargine (LANTUS SOLOSTAR) 100 UNIT/ML injection pen    Other Relevant Orders    AMB POC GLUCOSE BLOOD, BY GLUCOSE MONITORING DEVICE       Nervous and Auditory    Weakness of both lower extremities     Patient with worsening weakness in her legs, states that she fell three weeks ago due to her legs feeling tired and shaky after walking a few yards at her home. Good muscle strength on exam today, suspect secondary to debility from immobility. Patient has a rollator walker that she uses to get around her house once or twice per day instead of staying in the wheelchair. Discussed need to use her walker to get up more during the day, recommend starting at 3 times per day and gradually increasing to 6 times per day prior to her next appointment with me. Declines home PT, states that she wants to see if she can build up her strength at home before considering an outpatient PT referral.  Will recheck in 3 months. Genitourinary    Chronic renal disease, stage III ([facility] [address] [[id number]]     Due for labs today, patient declines having blood drawn. Agrees to let us recheck in 3 months. Other    Dyslipidemia     Patient recently restarted Atorvastatin after realizing that she had not been taking it despite me refilling it. States that she restarted it and is hoping that it will control her cholesterol. Will recheck in 3 months. Medicare annual wellness visit, subsequent - Primary    Obesity, Class III, BMI 40-49.9 (morbid obesity) ([facility] [address]     Discussed patient's weight with her and informed that this can make walking harder and that it can make blood sugar control more difficult. Discussed need to be more mobile to help with some weight loss. Will recheck in 3 months. Relevant Medications    insulin glargine (LANTUS SOLOSTAR) 100 UNIT/ML injection pen        The diagnoses and plan were discussed with the patient, who verbalizes understanding and agrees with plan. All questions answered.     Chief Complaint    Chief Complaint   Patient presents with    Follow-up    Constipation     Has been getting worse over the last cuco weeks    Extremity Weakness     Patient states her leg weakness has gotten worse Medicare AWV       HISTORY OF PRESENT ILLNESS    69 y.o. female with diabetes presents today for follow up. Last seen three months ago, had elevated blood sugars, worse at night, so increased her Lantus to 60 units daily and continued with 35 units BID of Novolog 70/30. Had labs drawn that showed A1C of 5.5, an elevated TSH and worsening cholesterol. Notes that she has a lot of discomfort from constipation. Takes Senokot every other day, usually takes about six pills per day. Has tried multiple other medications in the past without relief. Has tried Linzess without relief. Notes that Senokot sometimes causes stomach pain. Asks about taking Senokot 3-4 pills every day. States that she fell forward onto her head three weeks ago due to losing her balance. States that she got two black eyes the next day after having a hematoma on her forehead. Did not go to the hospital or urgent care. States that she did not have any headache. Did not lose consciousness. Feels fine now. Notes that she has had more weakness in her legs lately. States that they feel shaky just walking to the kitchen for coffee. States that they get numb when she is in bed at night. States that she starts moving them around and cannot go back to sleep. Has been doing 35 units of Novolog 70/30 twice a day, then takes 60 units of Lantus at night. States that she has not had any more low blood sugars. States that she eats breakfast and lunch every day but does not eat large amounts of food. Admits that she has not been good with her diet. Reports that she sits all day and is not able to move around much. States that she will walk about 40 feet before she gets shaky and short of breath. Notes that somehow she forgot to refill her Lipitor since switching to me as her PCP, didn't realize it until after her Cardiology appointment two months ago. Has a rollator walker at home.   Has not been keeping track of her meals but does check her blood sugars multiple times per day. Has a rollator walker that she will use once or twice to walk around her house with her small dogs. PAST MEDICAL HISTORY    Past Medical History:   Diagnosis Date    Allergic rhinitis     Anemia     Anxiety     Asthma     Cataracts, bilateral     Depression     Diabetes (HCC)     GERD (gastroesophageal reflux disease)     History of DVT (deep vein thrombosis)     History of pulmonary embolus (PE)     Hyperlipidemia     Hypertension     Insomnia     Internal hemorrhoids without mention of complication     Osteoarthritis     Peripheral neuropathy     Personal history of colonic polyps     Rectocele     Stroke ([facility] [address]        PAST SURGICAL HISTORY    Past Surgical History:   Procedure Laterality Date    APPENDECTOMY      OPEN     SECTION      x2    CHOLECYSTECTOMY      OPEN    COLONOSCOPY  13    Neo--no polyps--5 year recall    ORTHOPEDIC SURGERY      BACK X3    PARTIAL HYSTERECTOMY (CERVIX NOT REMOVED)         FAMILY HISTORY    Family History   Problem Relation Age of Onset    Heart Disease Mother     Diabetes Mother     Heart Disease Father     Diabetes Brother        SOCIAL HISTORY    Social History     Socioeconomic History    Marital status:      Spouse name: None    Number of children: None    Years of education: None    Highest education level: None   Tobacco Use    Smoking status: Never    Smokeless tobacco: Never   Vaping Use    Vaping Use: Never used   Substance and Sexual Activity    Alcohol use: No    Drug use: No    Sexual activity: Not Currently     Social Determinants of Health     Financial Resource Strain: Low Risk     Difficulty of Paying Living Expenses: Not hard at all   Food Insecurity: No Food Insecurity    Worried About Running Out of Food in the Last Year: Never true    Ran Out of Food in the Last Year: Never true   Transportation Needs: Unknown    Lack of Transportation (Non-Medical):  No   Physical Activity: Inactive    Days of Exercise per Week: 0 days    Minutes of Exercise per Session: 0 min   Housing Stability: Unknown    Unstable Housing in the Last Year: No       MEDICATIONS      Current Outpatient Medications:     levalbuterol (XOPENEX HFA) 45 MCG/ACT inhaler, Inhale 2 puffs into the lungs every 4 hours as needed for Shortness of Breath, Disp: 15 g, Rfl: 5    insulin glargine (LANTUS SOLOSTAR) 100 UNIT/ML injection pen, INJECT 64 UNITS SUBCUTANEOUSLY  AT BEDTIME, Disp: 90 mL, Rfl: 5    Insulin Pen Needle (PEN NEEDLES) 31G X 8 MM MISC, 1 Pen Needle by Does not apply route in the morning, at noon, and at bedtime, Disp: 300 each, Rfl: 5    NOVOLOG MIX 70/30 FLEXPEN (70-30) 100 UNIT/ML injection, inject 35 units into the skin 2 times daily with meals, Disp: 15 mL, Rfl: 0    atorvastatin (LIPITOR) 40 MG tablet, Take 1 tablet by mouth daily 1 per day to replace 20mg for high cholesterol, Disp: 90 tablet, Rfl: 5    clopidogrel (PLAVIX) 75 MG tablet, Take 1 tablet by mouth daily, Disp: 90 tablet, Rfl: 3    Evolocumab 140 MG/ML SOAJ, Inject 140 mg into the skin every 14 days, Disp: 6 Adjustable Dose Pre-filled Pen Syringe, Rfl: 5    escitalopram (LEXAPRO) 10 MG tablet, Take 1 tablet by mouth daily, Disp: 90 tablet, Rfl: 5    aspirin 325 MG tablet, Take 325 mg by mouth daily, Disp: , Rfl:     Multiple Vitamins-Minerals (MULTI COMPLETE PO), Take by mouth daily, Disp: , Rfl:     melatonin 10 MG CAPS capsule, Take 10 mg by mouth nightly, Disp: , Rfl:     Probiotic Product (PROBIOTIC ADVANCED PO), Take by mouth daily, Disp: , Rfl:     senna (SENOKOT) 8.6 MG tablet, Take 1 tablet by mouth 3-4 times a week, Disp: , Rfl:     Lancets MISC, Check blood sugars fours times daily. , Disp: , Rfl:     apixaban (ELIQUIS) 5 MG TABS tablet, Take 5 mg by mouth 2 times daily, Disp: , Rfl:     fluticasone (FLONASE) 50 MCG/ACT nasal spray, as needed 1 puff in each nostril twice daily, Disp: , Rfl:     levothyroxine (SYNTHROID) 25 MCG tablet, Take 25 mcg by mouth every morning (before breakfast), Disp: , Rfl:     ALLERGIES / INTOLERANCES    Allergies   Allergen Reactions    Codeine Nausea And Vomiting    Morphine Nausea And Vomiting    Oxycodone Nausea And Vomiting       REVIEW OF SYSTEMS    Review of Systems   Constitutional:  Positive for fatigue. Negative for fever. HENT:  Negative for congestion. Eyes:  Negative for visual disturbance. Respiratory:  Negative for cough and shortness of breath. Cardiovascular:  Negative for chest pain. Gastrointestinal:  Positive for abdominal pain and constipation. Negative for blood in stool, diarrhea, nausea and vomiting. Musculoskeletal:  Positive for arthralgias and back pain. Neurological:  Positive for weakness. Negative for dizziness, syncope and headaches. Psychiatric/Behavioral:  Negative for dysphoric mood. PHYSICAL EXAMINATION    Vitals:    03/15/23 1426   BP: 132/74   Pulse: 82   Resp: 16   SpO2: 98%         Physical Exam  Vitals and nursing note reviewed. Constitutional:       General: She is not in acute distress. Appearance: Normal appearance. She is obese. HENT:      Head: Normocephalic and atraumatic. Right Ear: External ear normal.      Left Ear: External ear normal.      Nose: Nose normal.   Eyes:      Extraocular Movements: Extraocular movements intact. Pupils: Pupils are equal, round, and reactive to light. Cardiovascular:      Rate and Rhythm: Normal rate and regular rhythm. Heart sounds: Normal heart sounds. No murmur heard. Pulmonary:      Effort: Pulmonary effort is normal. No respiratory distress. Breath sounds: Normal breath sounds. Abdominal:      General: Bowel sounds are normal.      Palpations: Abdomen is soft. Tenderness: There is no abdominal tenderness. There is no right CVA tenderness or left CVA tenderness. Musculoskeletal:         General: Normal range of motion. Cervical back: Normal range of motion.       Right lower leg: No swelling. No edema. Left lower leg: No swelling. No edema. Comments: Muscle strength 5/5 bilateral lower legs   Skin:     General: Skin is warm. Findings: No rash. Neurological:      General: No focal deficit present. Mental Status: She is alert.    Psychiatric:         Mood and Affect: Mood normal.         Behavior: Behavior normal.         RESULTS    Lab Results   Component Value Date    LABMICR 5.58 (H) 12/05/2022     Hemoglobin A1C   Date Value Ref Range Status   12/05/2022 8.8 (H) 4.8 - 5.6 % Final     Lab Results   Component Value Date    TSH 4.380 01/05/2022    VLM4XNA 5.860 (H) 12/05/2022     Lab Results   Component Value Date    CHOL 224 (H) 12/05/2022    CHOL 214 (H) 11/09/2020    CHOL 177 09/02/2019     Lab Results   Component Value Date    TRIG 125 12/05/2022    TRIG 272 (H) 11/09/2020    TRIG 134 09/02/2019     Lab Results   Component Value Date    HDL 43 12/05/2022    HDL 37 (L) 11/09/2020    HDL 40 09/02/2019     Lab Results   Component Value Date    LDLCALC 156 (H) 12/05/2022    LDLCALC 129 (H) 11/09/2020    LDLCALC 110.2 (H) 09/02/2019     Lab Results   Component Value Date    LABVLDL 25 (H) 12/05/2022    LABVLDL 26.8 (H) 09/02/2019    VLDL 48 (H) 11/09/2020     Lab Results   Component Value Date    CHOLHDLRATIO 5.2 12/05/2022    CHOLHDLRATIO 4.4 09/02/2019     Lab Results   Component Value Date     12/05/2022    K 3.9 12/05/2022     12/05/2022    CO2 29 12/05/2022    BUN 16 12/05/2022    CREATININE 1.10 (H) 12/05/2022    GLUCOSE 128 (H) 12/05/2022    CALCIUM 9.1 12/05/2022    PROT 6.6 12/05/2022    LABALBU 3.5 12/05/2022    BILITOT 0.3 12/05/2022    ALKPHOS 103 12/05/2022    AST 19 12/05/2022    ALT 32 12/05/2022    LABGLOM 52 (L) 12/05/2022    GFRAA >60 07/13/2022    AGRATIO 1.4 01/05/2022    GLOB 3.1 12/05/2022       Lab Results   Component Value Date    WBC 6.1 12/05/2022    HGB 13.7 12/05/2022    HCT 44.2 12/05/2022    MCV 85.3 12/05/2022     12/05/2022 Lab Results   Component Value Date/Time    VITD25 29.3 12/05/2022 08:52 AM          Junito Srinivasan, DO  9:31 PM  03/15/23      Medicare Annual Wellness Visit    Navjot Suazo is here for Follow-up, Constipation (Has been getting worse over the last cuco weeks), Extremity Weakness (Patient states her leg weakness has gotten worse  ), and Medicare AWV    Assessment & Plan   Medicare annual wellness visit, subsequent  Obesity, Class III, BMI 40-49.9 (morbid obesity) ([facility] [address]  Assessment & Plan:  Discussed patient's weight with her and informed that this can make walking harder and that it can make blood sugar control more difficult. Discussed need to be more mobile to help with some weight loss. Will recheck in 3 months. 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 \" Has been doing 60 units of Lantus at night and 35 units BID of 70/30. Admits that she is not following a diabetic diet. Discussed importance of better diabetic control as this may be causing more neuropathy which can make her legs weaker. Will increase Lantus to 64 units at night and continue 70/30 BID at 35 units. Patient to restart writing down her daily meals. Will recheck with labs in 3 months as patient does not want to do labs today. Orders:  -     AMB POC GLUCOSE BLOOD, BY GLUCOSE MONITORING DEVICE  Dyslipidemia  Assessment & Plan:  Patient recently restarted Atorvastatin after realizing that she had not been taking it despite me refilling it. States that she restarted it and is hoping that it will control her cholesterol. Will recheck in 3 months.   Weakness of both lower extremities  Assessment & Plan:  Patient with worsening weakness in her legs, states that she fell three weeks ago due to her legs feeling tired and shaky after walking a few yards at her home.  Good muscle strength on exam today, suspect secondary to debility from immobility.  Patient has a rollator walker that she uses to get around her house once or twice per day instead of staying in the wheelchair.  Discussed need to use her walker to get up more during the day, recommend starting at 3 times per day and gradually increasing to 6 times per day prior to her next appointment with me.  Declines home PT, states that she wants to see if she can build up her strength at home before considering an outpatient PT referral.  Will recheck in 3 months.  Stage 3 chronic kidney disease, unspecified whether stage 3a or 3b CKD (HCC)  Assessment & Plan:  Due for labs today, patient declines having blood drawn.  Agrees to let us recheck in 3 months.  Allergic rhinitis, unspecified seasonality, unspecified trigger  Assessment & Plan:  Refill sent for Xopenex, which patient uses for asthma secondary to allergies.  Chronic idiopathic constipation  Assessment & Plan:  Chronic problem, wishes to take Senokot daily, states that she takes 5-6 pills every other day and notes that it usually works the next day, but sometimes does not.  Notes that she feels bloated and would like to go more regularly.  States that she has tried Linzess, Dulcolax, Miralax and other OTC meds without relief.  Discussed starting with 3 Senokot every day to see if this helps.  Hopefully moving around more and drinking more water will help, as well.  OK to gradually increase to 4-5 pills daily if needed.  Will recheck in 3 months.      Recommendations for Preventive Services Due: see orders and patient instructions/AVS.  Recommended screening schedule for the next 5-10 years is provided to the patient in written form: see Patient Instructions/AVS.     Return for Medicare Annual Wellness Visit in 1 year.     Subjective   The following acute and/or chronic problems were also addressed today:  Worsening leg weakness, uncontrolled diabetes, worsening  constipation    Patient's complete Health Risk Assessment and screening values have been reviewed and are found in Flowsheets. The following problems were reviewed today and where indicated follow up appointments were made and/or referrals ordered. Positive Risk Factor Screenings with Interventions:    Fall Risk:  Do you feel unsteady or are you worried about falling? : (!) yes  2 or more falls in past year?: (!) yes  Fall with injury in past year?: (!) yes (Blacked eyes and Bump on head.  did not go to hospital.)     Interventions:    Patient declines any further evaluation or treatment - offered both in-home and outpatient physical therapy to help with strengthening of her legs but patient wishes to try it at home on her own     Depression:  PHQ-2 Score: 1  PHQ-9 Total Score: 7    Interpretation:   1-4 = minimal  5-9 = mild  10-14 = moderate  15-19 = moderately severe  20-27 = severe  Interventions:  See AVS for additional education material          General HRA Questions:  Select all that apply: (!) New or Increased Fatigue, New or Increased Pain    Pain Interventions:  See A/P for plan and any pertinent orders - patent declines labs today, notes more leg weakness, discussed moving around more to build strength in her legs in a gradual way    Fatigue Interventions:  Patient advised to follow up in the office for further evaluation and treatment       Weight and Activity:  Physical Activity: Inactive    Days of Exercise per Week: 0 days    Minutes of Exercise per Session: 0 min     On average, how many days per week do you engage in moderate to strenuous exercise (like a brisk walk)?: 0 days  Have you lost any weight without trying in the past 3 months?: No  Body mass index: (!) 40.92      Inactivity Interventions:  Recommendations: patient agrees to increase physical activity as follows: using her rollator walker to walk around her house minimum 3 times per day, will slowly increase to 6 times per day prior to her follow up with me in 3 months  Obesity Interventions:  low carbohydrate diet - patient to write down everything she eats for better control of her diet        Dentist Screen:  Have you seen the dentist within the past year?: (!) No    Intervention:  See AVS for additional education material     Vision Screen:  Do you have difficulty driving, watching TV, or doing any of your daily activities because of your eyesight?: (!) Yes  Have you had an eye exam within the past year?: (!) No  No results found. Interventions:   See AVS for additional education material    Safety:  Do you have either shower bars, grab bars, non-slip mats or non-slip surfaces in your shower or bathtub?: (!) No  Interventions:  Patient to use her rollator walker to move around for better stability at home    ADL's:   Patient reports needing help with:  Select all that apply: (!) Walking/Balance  Select all that apply: [employer], Food Preparation, Shopping, Banking/Finances, Housekeeping, Laundry  Interventions:  Patient declined any further interventions or treatment - declines both in-home and outpatient PT             Objective   Vitals:    03/15/23 1426   BP: 132/74   Pulse: 82   Resp: 16   SpO2: 98%   Weight: 231 lb (104.8 kg)   Height: 5' 3\" (1.6 m)      Body mass index is 40.92 kg/m².         General Appearance: alert and oriented to person, place and time, obese, in no acute distress, sitting in wheelchair  Skin: warm and dry, no rash or erythema  Head: normocephalic and atraumatic  Eyes: pupils equal, round, and reactive to light, extraocular eye movements intact  ENT: tympanic membrane, external ear and ear canal normal bilaterally, nose without deformity, nasal mucosa and turbinates normal without polyps  Neck: supple and non-tender without mass, no thyromegaly or thyroid nodules, no cervical lymphadenopathy  Pulmonary/Chest: clear to auscultation bilaterally- no wheezes, rales or rhonchi, normal air movement, no respiratory distress  Cardiovascular: normal rate, regular rhythm, normal S1 and S2, no murmurs, rubs, clicks, or gallops  Abdomen: soft, non-tender, non-distended, normal bowel sounds, no masses or organomegaly  Extremities: no cyanosis, clubbing or edema  Musculoskeletal: normal range of motion, no joint swelling, deformity or tenderness, muscle strength 5/5 bilateral lower extremities  Neurologic: coordination and speech normal, gait not assessed as patient is sitting in wheelchair      Allergies   Allergen Reactions    Codeine Nausea And Vomiting    Morphine Nausea And Vomiting    Oxycodone Nausea And Vomiting     Prior to Visit Medications    Medication Sig Taking?  Authorizing Provider   [facility] 45 MCG/ACT inhaler Inhale 2 puffs into the lungs every 4 hours as needed for Shortness of Breath Yes Betty Saxena, DO   insulin glargine (LANTUS SOLOSTAR) 100 UNIT/ML injection pen INJECT 64 UNITS SUBCUTANEOUSLY  AT BEDTIME Yes Betty Saxena DO   Insulin Pen Needle (PEN NEEDLES) 31G X 8 MM MISC 1 Pen Needle by Does not apply route in the morning, at noon, and at bedtime Yes Britt Saxena, DO   NOVOLOG MIX 70/30 FLEXPEN (70-30) 100 UNIT/ML injection inject 35 units into the skin 2 times daily with meals Yes Breanna Khoury, DO   atorvastatin (LIPITOR) 40 MG tablet Take 1 tablet by mouth daily 1 per day to replace 20mg for high cholesterol Yes Betty Saxena, DO   clopidogrel (PLAVIX) 75 MG tablet Take 1 tablet by mouth daily Yes Linda Cook MD   Evolocumab 140 MG/ML SOAJ Inject 140 mg into the skin every 14 days Yes Linda Cook MD   escitalopram (LEXAPRO) 10 MG tablet Take 1 tablet by mouth daily Yes Betty Saxena DO   aspirin 325 MG tablet Take 325 mg by mouth daily Yes Historical Provider, MD   Multiple Vitamins-Minerals (MULTI COMPLETE PO) Take by mouth daily Yes Historical Provider, MD   melatonin 10 MG CAPS capsule Take 10 mg by mouth nightly Yes Historical Provider, MD   Probiotic Product (PROBIOTIC ADVANCED PO) Take by mouth daily Yes Historical Provider, MD   senna (SENOKOT) 8.6 MG tablet Take 1 tablet by mouth 3-4 times a week Yes Historical Provider, MD   Lancets MISC Check blood sugars fours times daily.  Yes Ar Automatic Reconciliation   apixaban (ELIQUIS) 5 MG TABS tablet Take 5 mg by mouth 2 times daily Yes Ar Automatic Reconciliation   fluticasone (FLONASE) 50 MCG/ACT nasal spray as needed 1 puff in each nostril twice daily Yes Ar Automatic Reconciliation   levothyroxine (SYNTHROID) 25 MCG tablet Take 25 mcg by mouth every morning (before breakfast) Yes Ar Automatic Reconciliation       CareTeam (Including outside providers/suppliers regularly involved in providing care):   Patient Care Team:  Cuate Brooke DO as PCP - General (Family Medicine)  Cuate Brooke DO as PCP - Empaneled Provider  Dima Uribe MD as Surgeon  Seven Holland MD as Physician     Reviewed and updated this visit:  Tobacco  Allergies  Meds  Problems  Med Hx  Surg Hx  Soc Hx  Fam Hx               Junito Line, DO

## 2023-03-15 ENCOUNTER — OFFICE VISIT (OUTPATIENT)
Dept: PRIMARY CARE CLINIC | Facility: CLINIC | Age: 78
End: 2023-03-15
Payer: MEDICARE

## 2023-03-15 VITALS
DIASTOLIC BLOOD PRESSURE: 74 MMHG | SYSTOLIC BLOOD PRESSURE: 132 MMHG | HEART RATE: 82 BPM | RESPIRATION RATE: 16 BRPM | OXYGEN SATURATION: 98 % | HEIGHT: 63 IN | WEIGHT: 231 LBS | BODY MASS INDEX: 40.93 KG/M2

## 2023-03-15 DIAGNOSIS — K59.04 CHRONIC IDIOPATHIC CONSTIPATION: ICD-10-CM

## 2023-03-15 DIAGNOSIS — E66.01 OBESITY, CLASS III, BMI 40-49.9 (MORBID OBESITY) (HCC): ICD-10-CM

## 2023-03-15 DIAGNOSIS — E11.49 TYPE II DIABETES MELLITUS WITH NEUROLOGICAL MANIFESTATIONS NOT AT GOAL (HCC): ICD-10-CM

## 2023-03-15 DIAGNOSIS — J30.9 ALLERGIC RHINITIS, UNSPECIFIED SEASONALITY, UNSPECIFIED TRIGGER: ICD-10-CM

## 2023-03-15 DIAGNOSIS — Z00.00 MEDICARE ANNUAL WELLNESS VISIT, SUBSEQUENT: Primary | ICD-10-CM

## 2023-03-15 DIAGNOSIS — E78.5 DYSLIPIDEMIA: ICD-10-CM

## 2023-03-15 DIAGNOSIS — N18.30 STAGE 3 CHRONIC KIDNEY DISEASE, UNSPECIFIED WHETHER STAGE 3A OR 3B CKD (HCC): ICD-10-CM

## 2023-03-15 DIAGNOSIS — R29.898 WEAKNESS OF BOTH LOWER EXTREMITIES: ICD-10-CM

## 2023-03-15 PROBLEM — E66.813 OBESITY, CLASS III, BMI 40-49.9 (MORBID OBESITY) (HCC): Status: ACTIVE | Noted: 2018-08-06

## 2023-03-15 PROCEDURE — 3078F DIAST BP <80 MM HG: CPT | Performed by: FAMILY MEDICINE

## 2023-03-15 PROCEDURE — G0439 PPPS, SUBSEQ VISIT: HCPCS | Performed by: FAMILY MEDICINE

## 2023-03-15 PROCEDURE — G8417 CALC BMI ABV UP PARAM F/U: HCPCS | Performed by: FAMILY MEDICINE

## 2023-03-15 PROCEDURE — G8427 DOCREV CUR MEDS BY ELIG CLIN: HCPCS | Performed by: FAMILY MEDICINE

## 2023-03-15 PROCEDURE — G8400 PT W/DXA NO RESULTS DOC: HCPCS | Performed by: FAMILY MEDICINE

## 2023-03-15 PROCEDURE — 1123F ACP DISCUSS/DSCN MKR DOCD: CPT | Performed by: FAMILY MEDICINE

## 2023-03-15 PROCEDURE — G8484 FLU IMMUNIZE NO ADMIN: HCPCS | Performed by: FAMILY MEDICINE

## 2023-03-15 PROCEDURE — 1036F TOBACCO NON-USER: CPT | Performed by: FAMILY MEDICINE

## 2023-03-15 PROCEDURE — 99214 OFFICE O/P EST MOD 30 MIN: CPT | Performed by: FAMILY MEDICINE

## 2023-03-15 PROCEDURE — 1090F PRES/ABSN URINE INCON ASSESS: CPT | Performed by: FAMILY MEDICINE

## 2023-03-15 PROCEDURE — 3075F SYST BP GE 130 - 139MM HG: CPT | Performed by: FAMILY MEDICINE

## 2023-03-15 RX ORDER — LANCETS 30 GAUGE
EACH MISCELLANEOUS
Qty: 100 EACH | Status: CANCELLED | OUTPATIENT
Start: 2023-03-15

## 2023-03-15 RX ORDER — INSULIN GLARGINE 100 [IU]/ML
INJECTION, SOLUTION SUBCUTANEOUS
Qty: 90 ML | Refills: 5 | Status: SHIPPED | OUTPATIENT
Start: 2023-03-15

## 2023-03-15 RX ORDER — LEVALBUTEROL TARTRATE 45 UG/1
2 AEROSOL, METERED ORAL EVERY 4 HOURS PRN
Qty: 15 G | Refills: 5 | Status: SHIPPED | OUTPATIENT
Start: 2023-03-15

## 2023-03-15 RX ORDER — PEN NEEDLE, DIABETIC 30 GX3/16"
1 NEEDLE, DISPOSABLE MISCELLANEOUS 3 TIMES DAILY
Qty: 300 EACH | Refills: 5 | Status: SHIPPED | OUTPATIENT
Start: 2023-03-15

## 2023-03-15 SDOH — ECONOMIC STABILITY: INCOME INSECURITY: HOW HARD IS IT FOR YOU TO PAY FOR THE VERY BASICS LIKE FOOD, HOUSING, MEDICAL CARE, AND HEATING?: NOT HARD AT ALL

## 2023-03-15 SDOH — ECONOMIC STABILITY: HOUSING INSECURITY
IN THE LAST 12 MONTHS, WAS THERE A TIME WHEN YOU DID NOT HAVE A STEADY PLACE TO SLEEP OR SLEPT IN A SHELTER (INCLUDING NOW)?: NO

## 2023-03-15 SDOH — ECONOMIC STABILITY: FOOD INSECURITY: WITHIN THE PAST 12 MONTHS, YOU WORRIED THAT YOUR FOOD WOULD RUN OUT BEFORE YOU GOT MONEY TO BUY MORE.: NEVER TRUE

## 2023-03-15 SDOH — ECONOMIC STABILITY: FOOD INSECURITY: WITHIN THE PAST 12 MONTHS, THE FOOD YOU BOUGHT JUST DIDN'T LAST AND YOU DIDN'T HAVE MONEY TO GET MORE.: NEVER TRUE

## 2023-03-15 ASSESSMENT — PATIENT HEALTH QUESTIONNAIRE - PHQ9
6. FEELING BAD ABOUT YOURSELF - OR THAT YOU ARE A FAILURE OR HAVE LET YOURSELF OR YOUR FAMILY DOWN: 0
SUM OF ALL RESPONSES TO PHQ QUESTIONS 1-9: 7
3. TROUBLE FALLING OR STAYING ASLEEP: 1
7. TROUBLE CONCENTRATING ON THINGS, SUCH AS READING THE NEWSPAPER OR WATCHING TELEVISION: 1
2. FEELING DOWN, DEPRESSED OR HOPELESS: 1
SUM OF ALL RESPONSES TO PHQ QUESTIONS 1-9: 7
1. LITTLE INTEREST OR PLEASURE IN DOING THINGS: 0
9. THOUGHTS THAT YOU WOULD BE BETTER OFF DEAD, OR OF HURTING YOURSELF: 0
10. IF YOU CHECKED OFF ANY PROBLEMS, HOW DIFFICULT HAVE THESE PROBLEMS MADE IT FOR YOU TO DO YOUR WORK, TAKE CARE OF THINGS AT HOME, OR GET ALONG WITH OTHER PEOPLE: 1
5. POOR APPETITE OR OVEREATING: 2
4. FEELING TIRED OR HAVING LITTLE ENERGY: 1
SUM OF ALL RESPONSES TO PHQ9 QUESTIONS 1 & 2: 1
SUM OF ALL RESPONSES TO PHQ QUESTIONS 1-9: 7
SUM OF ALL RESPONSES TO PHQ QUESTIONS 1-9: 7
8. MOVING OR SPEAKING SO SLOWLY THAT OTHER PEOPLE COULD HAVE NOTICED. OR THE OPPOSITE, BEING SO FIGETY OR RESTLESS THAT YOU HAVE BEEN MOVING AROUND A LOT MORE THAN USUAL: 1

## 2023-03-15 ASSESSMENT — ENCOUNTER SYMPTOMS
CONSTIPATION: 1
BACK PAIN: 1
ABDOMINAL PAIN: 1
BLOOD IN STOOL: 0

## 2023-03-15 ASSESSMENT — LIFESTYLE VARIABLES
HOW MANY STANDARD DRINKS CONTAINING ALCOHOL DO YOU HAVE ON A TYPICAL DAY: 1 OR 2
HOW OFTEN DO YOU HAVE A DRINK CONTAINING ALCOHOL: MONTHLY OR LESS

## 2023-03-15 NOTE — PATIENT INSTRUCTIONS
Preventing Falls: Care Instructions  Overview     Getting around your home safely can be a challenge if you have injuries or health problems that make it easy for you to fall. Loose rugs and furniture in walkways are among the dangers for many older people who have problems walking or who have poor eyesight. People who have conditions such as arthritis, osteoporosis, or dementia also have to be careful not to fall. You can make your home safer with a few simple measures. Follow-up care is a key part of your treatment and safety. Be sure to make and go to all appointments, and call your doctor if you are having problems. It's also a good idea to know your test results and keep a list of the medicines you take. How can you care for yourself at home? Taking care of yourself  Exercise regularly to improve your strength, muscle tone, and balance. Walk if you can. Swimming may be a good choice if you cannot walk easily. Have your vision and hearing checked each year or any time you notice a change. If you have trouble seeing and hearing, you might not be able to avoid objects and could lose your balance. Know the side effects of the medicines you take. Ask your doctor or pharmacist whether the medicines you take can affect your balance. Sleeping pills or sedatives can affect your balance. Limit the amount of alcohol you drink. Alcohol can impair your balance and other senses. Ask your doctor whether calluses or corns on your feet need to be removed. If you wear loose-fitting shoes because of calluses or corns, you can lose your balance and fall. Talk to your doctor if you have numbness in your feet. You may get dizzy if you do not drink enough water. To prevent dehydration, drink plenty of fluids. Choose water and other clear liquids. If you have kidney, heart, or liver disease and have to limit fluids, talk with your doctor before you increase the amount of fluids you drink.   Preventing falls at home  Remove raised doorway thresholds, throw rugs, and clutter. Repair loose carpet or raised areas in the floor. Move furniture and electrical cords to keep them out of walking paths. Use nonskid floor wax, and wipe up spills right away, especially on ceramic tile floors. If you use a walker or cane, put rubber tips on it. If you use crutches, clean the bottoms of them regularly with an abrasive pad, such as steel wool. Keep your house well lit, especially stairways, porches, and outside walkways. Use night-lights in areas such as hallways and bathrooms. Add extra light switches or use remote switches (such as switches that go on or off when you clap your hands) to make it easier to turn lights on if you have to get up during the night. Install sturdy handrails on stairways. Move items in your cabinets so that the things you use a lot are on the lower shelves (about waist level). Keep a cordless phone and a flashlight with new batteries by your bed. If possible, put a phone in each of the main rooms of your house, or carry a cell phone in case you fall and cannot reach a phone. Or, you can wear a device around your neck or wrist. You push a button that sends a signal for help. Wear low-heeled shoes that fit well and give your feet good support. Use footwear with nonskid soles. Check the heels and soles of your shoes for wear. Repair or replace worn heels or soles. Do not wear socks without shoes on smooth floors, such as wood. Walk on the grass when the sidewalks are slippery. If you live in an area that gets snow and ice in the winter, sprinkle salt on slippery steps and sidewalks. Or ask a family member or friend to do this for you. Preventing falls in the bath  Install grab bars and nonskid mats inside and outside your shower or tub and near the toilet and sinks. Use shower chairs and bath benches. Use a hand-held shower head that will allow you to sit while showering.   Get into a tub or shower by putting the weaker leg in first. Get out of a tub or shower with your strong side first.  Repair loose toilet seats and consider installing a raised toilet seat to make getting on and off the toilet easier. Keep your bathroom door unlocked while you are in the shower. Where can you learn more? Go to http://www.mijares.com/ and enter G117 to learn more about \"Preventing Falls: Care Instructions. \"  Current as of: May 4, 2022               Content Version: 13.5  © 5173-9830 Healthwise, [employer]. Care instructions adapted under license by [facility] ([facility]). If you have questions about a medical condition or this instruction, always ask your healthcare professional. [address] any warranty or liability for your use of this information. Learning About Mindfulness for Stress  What are mindfulness and stress? Stress is what you feel when you have to handle more than you are used to. A lot of things can cause stress. You may feel stress when you go on a job interview, take a test, or run a race. This kind of short-term stress is normal and even useful. It can help you if you need to work hard or react quickly. Stress also can last a long time. Long-term stress is caused by stressful situations or events. Examples of long-term stress include long-term health problems, ongoing problems at work, and conflicts in your family. Long-term stress can harm your health. Mindfulness is a focus only on things happening in the present moment. It's a process of purposefully paying attention to and being aware of your surroundings, your emotions, your thoughts, and how your body feels. You are aware of these things, but you aren't judging these experiences as \"good\" or \"bad. \" Mindfulness can help you learn to calm your mind and body to help you cope with illness, pain, and stress. How does mindfulness help to relieve stress? Mindfulness can help quiet your mind and relax your body. Studies show that it can help some people sleep better, feel less anxious, and bring their blood pressure down. And it's been shown to help some people live and cope better with certain health problems like heart disease, depression, chronic pain, and cancer. How do you practice mindfulness? To be mindful is to pay attention, to be present, and to be accepting. When you're mindful, you do just one thing and you pay close attention to that one thing. For example, you may sit quietly and notice your emotions or how your food tastes and smells. When you're present, you focus on the things that are happening right now. You let go of your thoughts about the past and the future. When you dwell on the past or the future, you miss moments that can heal and strengthen you. You may miss moments like hearing a child laugh or seeing a friendly face when you think you're all alone. When you're accepting, you don't  the present moment. Instead you accept your thoughts and feelings as they come. You can practice anytime, anywhere, and in any way you choose. You can practice in many ways. Here are a few ideas:  While doing your chores, like washing the dishes, let your mind focus on what's in your hand. What does the dish feel like? Is the water warm or cold? Go outside and take a few deep breaths. What is the air like? Is it warm or cold? When you can, take some time at the start of your day to sit alone and think. Take a slow walk by yourself. Count your steps while you breathe in and out. Try yoga breathing exercises, stretches, and poses to strengthen and relax your muscles. At work, if you can, try to stop for a few moments each hour. Note how your body feels. Let yourself regroup and let your mind settle before you return to what you were doing. If you struggle with anxiety or \"worry thoughts,\" imagine your mind as a blue sixto and your worry thoughts as clouds.  Now imagine those worry thoughts floating across your mind's sixto. Just let them pass by as you watch. Follow-up care is a key part of your treatment and safety. Be sure to make and go to all appointments, and call your doctor if you are having problems. It's also a good idea to know your test results and keep a list of the medicines you take. Where can you learn more? Go to http://www.mijares.com/ and enter M676 to learn more about \"Learning About Mindfulness for Stress. \"  Current as of: February 9, 2022               Content Version: 13.5  © 5493-9193 [employer]. Care instructions adapted under license by [facility] [address] If you have questions about a medical condition or this instruction, always ask your healthcare professional. [address] any warranty or liability for your use of this information. Fatigue: Care Instructions  Your Care Instructions     Fatigue is a feeling of tiredness, exhaustion, or lack of energy. You may feel fatigue because of too much or not enough activity. It can also come from stress, lack of sleep, boredom, and poor diet. Many medical problems, such as viral infections, can cause fatigue. Emotional problems, especially depression, are often the cause of fatigue. Fatigue is most often a symptom of another problem. Treatment for fatigue depends on the cause. For example, if you have fatigue because you have a certain health problem, treating this problem also treats your fatigue. If depression or anxiety is the cause, treatment may help. Follow-up care is a key part of your treatment and safety. Be sure to make and go to all appointments, and call your doctor if you are having problems. It's also a good idea to know your test results and keep a list of the medicines you take. How can you care for yourself at home? Get regular exercise. But don't overdo it. Go back and forth between rest and exercise. Get plenty of rest.  Eat a healthy diet.  Do not skip meals, especially breakfast.  Reduce your use of caffeine, tobacco, and alcohol. Caffeine is most often found in coffee, tea, cola drinks, and chocolate. Limit medicines that can cause fatigue. This includes tranquilizers and cold and allergy medicines. When should you call for help? Watch closely for changes in your health, and be sure to contact your doctor if:    You have new symptoms such as fever or a rash.     Your fatigue gets worse.     You have been feeling down, depressed, or hopeless. Or you may have lost interest in things that you usually enjoy.     You are not getting better as expected. Where can you learn more? Go to http://www.woods.com/ and enter R333 to learn more about \"Fatigue: Care Instructions. \"  Current as of: February 9, 2022               Content Version: 13.5  © 1649-2840 [employer]. Care instructions adapted under license by [facility] [address] If you have questions about a medical condition or this instruction, always ask your healthcare professional. [place] [address] any warranty or liability for your use of this information. Learning About Being Active as an Older Adult  Why is being active important as you get older? Being active is one of the best things you can do for your health. And it's never too late to start. Being active--or getting active, if you aren't already--has definite benefits. It can:  Give you more energy,  Keep your mind sharp. Improve balance to reduce your risk of falls. Help you manage chronic illness with fewer medicines. No matter how old you are, how fit you are, or what health problems you have, there is a form of activity that will work for you. And the more physical activity you can do, the better your overall health will be. What kinds of activity can help you stay healthy? Being more active will make your daily activities easier. Physical activity includes planned exercise and things you do in daily life. There are four types of activity:  Aerobic. Doing aerobic activity makes your heart and lungs strong. Includes walking, dancing, and gardening. Aim for at least 2½ hours spread throughout the week. It improves your energy and can help you sleep better. Muscle-strengthening. This type of activity can help maintain muscle and strengthen bones. Includes climbing stairs, using resistance bands, and lifting or carrying heavy loads. Aim for at least twice a week. It can help protect the knees and other joints. Stretching. Stretching gives you better range of motion in joints and muscles. Includes upper arm stretches, calf stretches, and gentle yoga. Aim for at least twice a week, preferably after your muscles are warmed up from other activities. It can help you function better in daily life. Balancing. This helps you stay coordinated and have good posture. Includes heel-to-toe walking, yessica chi, and certain types of yoga. Aim for at least 3 days a week. It can reduce your risk of falling. Even if you have a hard time meeting the recommendations, it's better to be more active than less active. All activity done in each category counts toward your weekly total. You'd be surprised how daily things like carrying groceries, keeping up with grandchildren, and taking the stairs can add up. What keeps you from being active? If you've had a hard time being more active, you're not alone. Maybe you remember being able to do more. Or maybe you've never thought of yourself as being active. It's frustrating when you can't do the things you want. Being more active can help. What's holding you back? Getting started. Have a goal, but break it into easy tasks. Small steps build into big accomplishments. Staying motivated. If you feel like skipping your activity, remember your goal. Maybe you want to move better and stay independent. Every activity gets you one step closer.   Not feeling your best.  Start with 5 minutes of an activity you enjoy. Prove to yourself you can do it. As you get comfortable, increase your time. You may not be where you want to be. But you're in the process of getting there. Everyone starts somewhere. How can you find safe ways to stay active? Talk with your doctor about any physical challenges you're facing. Make a plan with your doctor if you have a health problem or aren't sure how to get started with activity. If you're already active, ask your doctor if there is anything you should change to stay safe as your body and health change. If you tend to feel dizzy after you take medicine, avoid activity at that time. Try being active before you take your medicine. This will reduce your risk of falls. If you plan to be active at home, make sure to clear your space before you get started. Remove things like TV cords, coffee tables, and throw rugs. It's safest to have plenty of space to move freely. The key to getting more active is to take it slow and steady. Try to improve only a little bit at a time. Pick just one area to improve on at first. And if an activity hurts, stop and talk to your doctor. Where can you learn more? Go to http://www.mijares.com/ and enter P600 to learn more about \"Learning About Being Active as an Older Adult. \"  Current as of: October 10, 2022               Content Version: 13.5  © 2006-2022 Healthwise, [employer]. Care instructions adapted under license by [facility] ([facility]). If you have questions about a medical condition or this instruction, always ask your healthcare professional. [place] [address] any warranty or liability for your use of this information. Learning About Dental Care for Older Adults  Dental care for older adults: Overview  Dental care for older people is much the same as for younger adults. But older adults do have concerns that younger adults do not.  Older adults may have problems with gum disease and decay on the roots of their teeth. They may need missing teeth replaced or broken fillings fixed. Or they may have dentures that need to be cared for. Some older adults may have trouble holding a toothbrush. You can help remind the person you are caring for to brush and floss their teeth or to clean their dentures. In some cases, you may need to do the brushing and other dental care tasks. People who have trouble using their hands or who have dementia may need this extra help. How can you help with dental care? Normal dental care  To keep the teeth and gums healthy:  Brush the teeth with fluoride toothpaste twice a day--in the morning and at night--and floss at least once a day. Plaque can quickly build up on the teeth of older adults. Watch for the signs of gum disease. These signs include gums that bleed after brushing or after eating hard foods, such as apples. See a dentist regularly. Many experts recommend checkups every 6 months. Keep the dentist up to date on any new medications the person is taking. Encourage a balanced diet that includes whole grains, vegetables, and fruits, and that is low in saturated fat and sodium. Encourage the person you're caring for not to use tobacco products. They can affect dental and general health. Many older adults have a fixed income and feel that they can't afford dental care. But most towns and [facility] have programs in which dentists help older adults by lowering fees. Contact your area's public health offices or  for information about dental care in your area. Using a toothbrush  Older adults with arthritis sometimes have trouble brushing their teeth because they can't easily hold the toothbrush. Their hands and fingers may be stiff, painful, or weak. If this is the case, you can: Offer an electric toothbrush. Enlarge the handle of a non-electric toothbrush by wrapping a sponge, an elastic bandage, or adhesive tape around it.   Push the toothbrush handle through a ball made of rubber or soft foam.  Make the handle longer and thicker by taping Popsicle sticks or tongue depressors to it. You may also be able to buy special toothbrushes, toothpaste dispensers, and floss holders. Your doctor may recommend a soft-bristle toothbrush if the person you care for bleeds easily. Bleeding can happen because of a health problem or from certain medicines. A toothpaste for sensitive teeth may help if the person you care for has sensitive teeth. How do you brush and floss someone's teeth? If the person you are caring for has a hard time cleaning their teeth on their own, you may need to brush and floss their teeth for them. It may be easiest to have the person sit and face away from you, and to sit or stand behind them. That way you can steady their head against your arm as you reach around to floss and brush their teeth. Choose a place that has good lighting and is comfortable for both of you. Before you begin, gather your supplies. You will need gloves, floss, a toothbrush, and a container to hold water if you are not near a sink. Wash and dry your hands well and put on gloves. Start by flossing:  Gently work a piece of floss between each of the teeth toward the gums. A plastic flossing tool may make this easier, and they are available at most [facility]. Curve the floss around each tooth into a U-shape and gently slide it under the gum line. Move the floss firmly up and down several times to scrape off the plaque. After you've finished flossing, throw away the used floss and begin brushing:  Wet the brush and apply toothpaste. Place the brush at a 45-degree angle where the teeth meet the gums. Press firmly, and move the brush in small circles over the surface of the teeth. Be careful not to brush too hard. Vigorous brushing can make the gums pull away from the teeth and can scratch the tooth enamel.   Brush all surfaces of the teeth, on the tongue side and on the cheek side. Pay special attention to the front teeth and all surfaces of the back teeth. Brush chewing surfaces with short back-and-forth strokes. After you've finished, help the person rinse the remaining toothpaste from their mouth. Where can you learn more? Go to http://www.woods.com/ and enter F944 to learn more about \"Learning About Dental Care for Older Adults. \"  Current as of: June 16, 2022               Content Version: 13.5  © 5640-9959 Healthwise, [employer]. Care instructions adapted under license by [facility] ([facility]). If you have questions about a medical condition or this instruction, always ask your healthcare professional. [address] any warranty or liability for your use of this information. Hearing Loss: Care Instructions  Overview     Hearing loss is a sudden or slow decrease in how well you hear. It can range from mild to severe. Permanent hearing loss can occur with aging. It also can happen when you are exposed long-term to loud noise. Examples include listening to loud music, riding motorcycles, or being around other loud machines. Hearing loss can affect your work and home life. It can make you feel lonely or depressed. You may feel that you have lost your independence. But hearing aids and other devices can help you hear better and feel connected to others. Follow-up care is a key part of your treatment and safety. Be sure to make and go to all appointments, and call your doctor if you are having problems. It's also a good idea to know your test results and keep a list of the medicines you take. How can you care for yourself at home? Avoid loud noises whenever possible. This helps keep your hearing from getting worse. Always wear hearing protection around loud noises. Wear a hearing aid as directed. See a professional who can help you pick a hearing aid that fits you. Have hearing tests as your doctor suggests.  They can show whether your hearing has changed. Your hearing aid may need to be adjusted. Use other devices as needed. These may include:  Telephone amplifiers and hearing aids that can connect to a television, stereo, radio, or microphone. Devices that use lights or vibrations. These alert you to the doorbell, a ringing telephone, or a baby monitor. Television closed-captioning. This shows the words at the bottom of the screen. Most new TVs can do this. TTY (text telephone). This lets you type messages back and forth on the telephone instead of talking or listening. These devices are also called TDD. When messages are typed on the keyboard, they are sent over the phone line to a receiving TTY. The message is shown on a monitor. Use text messaging, social media, and email if it is hard for you to communicate by telephone. Try to learn a listening technique called speechreading. It is not lipreading. You pay attention to people's gestures, expressions, posture, and tone of voice. These clues can help you understand what a person is saying. Face the person you are talking to, and have them face you. Make sure the lighting is good. You need to see the other person's face clearly. Think about counseling if you need help to adjust to your hearing loss. When should you call for help? Watch closely for changes in your health, and be sure to contact your doctor if:    You think your hearing is getting worse.     You have new symptoms, such as dizziness or nausea. Where can you learn more? Go to http://www.mijares.com/ and enter R798 to learn more about \"Hearing Loss: Care Instructions. \"  Current as of: May 4, 2022               Content Version: 13.5  © 7120-5057 Healthwise, [employer]. Care instructions adapted under license by [facility]  If you have questions about a medical condition or this instruction, always ask your healthcare professional. [address] any warranty or liability for your use of this information. Learning About Vision Tests  What are vision tests? The four most common vision tests are visual acuity tests, refraction, visual field tests, and color vision tests. Visual acuity (sharpness) tests  These tests are used: To see if you need glasses or contact lenses. To monitor an eye problem. To check an eye injury. Visual acuity tests are done as part of routine exams. You may also have this test when you get your 's license or apply for some types of jobs. Visual field tests  These tests are used: To check for vision loss in any area of your range of vision. To screen for certain eye diseases. To look for nerve damage after a stroke, head injury, or other problem that could reduce blood flow to the brain. Refraction and color tests  A refraction test is done to find the right prescription for glasses and contact lenses. A color vision test is done to check for color blindness. Color vision is often tested as part of a routine exam. You may also have this test when you apply for a job where recognizing different colors is important, such as , electronics, or the [employer]. How are vision tests done? Visual acuity test   You cover one eye at a time. You read aloud from a wall chart across the room. You read aloud from a small card that you hold in your hand. Refraction   You look into a special device. The device puts lenses of different strengths in front of each eye to see how strong your glasses or contact lenses need to be. Visual field tests   Your doctor may have you look through special machines. Or your doctor may simply have you stare straight ahead while they move a finger into and out of your field of vision. Color vision test   You look at pieces of printed test patterns in various colors. You say what number or symbol you see. Your doctor may have you trace the number or symbol using a pointer. How do these tests feel?   There is very little chance of having a problem from this test. If dilating drops are used for a vision test, they may make the eyes sting and cause a medicine taste in the mouth. Follow-up care is a key part of your treatment and safety. Be sure to make and go to all appointments, and call your doctor if you are having problems. It's also a good idea to know your test results and keep a list of the medicines you take. Where can you learn more? Go to http://www.mijares.com/ and enter G551 to learn more about \"Learning About Vision Tests. \"  Current as of: October 12, 2022               Content Version: 13.5  © 6984-1613 [employer]. Care instructions adapted under license by [facility] ([facility]). If you have questions about a medical condition or this instruction, always ask your healthcare professional. [address] any warranty or liability for your use of this information. Preventing Falls: Care Instructions  Overview     Getting around your home safely can be a challenge if you have injuries or health problems that make it easy for you to fall. Loose rugs and furniture in walkways are among the dangers for many older people who have problems walking or who have poor eyesight. People who have conditions such as arthritis, osteoporosis, or dementia also have to be careful not to fall. You can make your home safer with a few simple measures. Follow-up care is a key part of your treatment and safety. Be sure to make and go to all appointments, and call your doctor if you are having problems. It's also a good idea to know your test results and keep a list of the medicines you take. How can you care for yourself at home? Taking care of yourself  Exercise regularly to improve your strength, muscle tone, and balance. Walk if you can. Swimming may be a good choice if you cannot walk easily. Have your vision and hearing checked each year or any time you notice a change. If you have trouble seeing and hearing, you might not be able to avoid objects and could lose your balance. Know the side effects of the medicines you take. Ask your doctor or pharmacist whether the medicines you take can affect your balance. Sleeping pills or sedatives can affect your balance. Limit the amount of alcohol you drink. Alcohol can impair your balance and other senses. Ask your doctor whether calluses or corns on your feet need to be removed. If you wear loose-fitting shoes because of calluses or corns, you can lose your balance and fall. Talk to your doctor if you have numbness in your feet. You may get dizzy if you do not drink enough water. To prevent dehydration, drink plenty of fluids. Choose water and other clear liquids. If you have kidney, heart, or liver disease and have to limit fluids, talk with your doctor before you increase the amount of fluids you drink. Preventing falls at home  Remove raised doorway thresholds, throw rugs, and clutter. Repair loose carpet or raised areas in the floor. Move furniture and electrical cords to keep them out of walking paths. Use nonskid floor wax, and wipe up spills right away, especially on ceramic tile floors. If you use a walker or cane, put rubber tips on it. If you use crutches, clean the bottoms of them regularly with an abrasive pad, such as steel wool. Keep your house well lit, especially stairways, porches, and outside walkways. Use night-lights in areas such as hallways and bathrooms. Add extra light switches or use remote switches (such as switches that go on or off when you clap your hands) to make it easier to turn lights on if you have to get up during the night. Install sturdy handrails on stairways. Move items in your cabinets so that the things you use a lot are on the lower shelves (about waist level). Keep a cordless phone and a flashlight with new batteries by your bed.  If possible, put a phone in each of the main rooms of your house, or carry a cell phone in case you fall and cannot reach a phone. Or, you can wear a device around your neck or wrist. You push a button that sends a signal for help. Wear low-heeled shoes that fit well and give your feet good support. Use footwear with nonskid soles. Check the heels and soles of your shoes for wear. Repair or replace worn heels or soles. Do not wear socks without shoes on smooth floors, such as wood. Walk on the grass when the sidewalks are slippery. If you live in an area that gets snow and ice in the winter, sprinkle salt on slippery steps and sidewalks. Or ask a family member or friend to do this for you. Preventing falls in the bath  Install grab bars and nonskid mats inside and outside your shower or tub and near the toilet and sinks. Use shower chairs and bath benches. Use a hand-held shower head that will allow you to sit while showering. Get into a tub or shower by putting the weaker leg in first. Get out of a tub or shower with your strong side first.  Repair loose toilet seats and consider installing a raised toilet seat to make getting on and off the toilet easier. Keep your bathroom door unlocked while you are in the shower. Where can you learn more? Go to http://www.mijares.com/ and enter G117 to learn more about \"Preventing Falls: Care Instructions. \"  Current as of: May 4, 2022               Content Version: 13.5  © 2006-2022 Healthwise[employer] Inc[employer]. Care instructions adapted under license by [facility] ([facility]). If you have questions about a medical condition or this instruction, always ask your healthcare professional. [place] [address] any warranty or liability for your use of this information. Learning About Activities of Daily Living  What are activities of daily living? Activities of daily living (ADLs) are the basic self-care tasks you do every day.  As you age, and if you have health problems, you may find that it's harder to do these things for yourself. That's when you may need some help. Your doctor uses ADLs to measure how much help you need. Knowing what you can and can't do for yourself is an important first step to getting help. And when you have the help you need, you can stay as independent as possible. Your doctor will want to know if you are able to do tasks such as: Take a bath or shower without help. Go to the bathroom by yourself. Dress and undress without help. Shave, comb your hair, and brush teeth on your own. Get in and out of bed or a chair without help. Feed yourself without help. If you are having trouble doing basic self-care tasks, talk with your doctor. You may want to bring a caregiver or family member who can help the doctor understand your needs and abilities. How will a doctor assess your ADLs? Asking about ADLs is part of a routine health checkup your doctor will likely do as you age. Your health check might be done in a doctor's office, in your home, or at a hospital. The goal is to find out if you are having any problems that could make your health problems worse or that make it unsafe for you to be on your own. To measure your ADLs, your doctor will ask how hard it is for you to do routine tasks. He or she may also want to know if you have changed the way you do a task because of a health problem. He or she may watch how you:  Walk back and forth. Keep your balance while you stand or walk. Move from sitting to standing or from a bed to a chair. Button or unbutton a shirt or sweater. Remove and put on your shoes. It's normal to feel a little worried or anxious if you find you can't do all the things you used to be able to do. Talking with your doctor about ADLs isn't a test that you either pass or fail. It's just a way to get more information about your health and safety. Follow-up care is a key part of your treatment and safety.  Be sure to make and go to all appointments, and call your doctor if you are having problems. It's also a good idea to know your test results and keep a list of the medicines you take. Current as of: October 6, 2021               Content Version: 13.5  © 2006-2022 Healthwise, Incorporated. Care instructions adapted under license by [facility] ([facility]). If you have questions about a medical condition or this instruction, always ask your healthcare professional. [place] [address] any warranty or liability for your use of this information. Advance Directives: Care Instructions  Overview  An advance directive is a legal way to state your wishes at the end of your life. It tells your family and your doctor what to do if you can't say what you want. There are two main types of advance directives. You can change them any time your wishes change. Living will. This form tells your family and your doctor your wishes about life support and other treatment. The form is also called a declaration. Medical power of . This form lets you name a person to make treatment decisions for you when you can't speak for yourself. This person is called a health care agent (health care proxy, health care surrogate). The form is also called a durable power of  for health care. If you do not have an advance directive, decisions about your medical care may be made by a family member, or by a doctor or a  who doesn't know you. It may help to think of an advance directive as a gift to the people who care for you. If you have one, they won't have to make tough decisions by themselves. For more information, including forms for your state, see the [address] website (www.caringinfo.org/planning/advance-directives/). Follow-up care is a key part of your treatment and safety. Be sure to make and go to all appointments, and call your doctor if you are having problems.  It's also a good idea to know your test results and keep a list of the medicines you take. What should you include in an advance directive? Many states have a unique advance directive form. (It may ask you to address specific issues.) Or you might use a universal form that's approved by many states. If your form doesn't tell you what to address, it may be hard to know what to include in your advance directive. Use the questions below to help you get started. Who do you want to make decisions about your medical care if you are not able to? What life-support measures do you want if you have a serious illness that gets worse over time or can't be cured? What are you most afraid of that might happen? (Maybe you're afraid of having pain, losing your independence, or being kept alive by machines.)  Where would you prefer to die? (Your home? A hospital? A nursing home?)  Do you want to donate your organs when you die? Do you want certain Jewish practices performed before you die? When should you call for help? Be sure to contact your doctor if you have any questions. Where can you learn more? Go to http://www.mijares.com/ and enter R264 to learn more about \"Advance Directives: Care Instructions. \"  Current as of: June 16, 2022               Content Version: 13.5  © 2006-2022 Healthwise, Incorporated. Care instructions adapted under license by [facility] ([facility]). If you have questions about a medical condition or this instruction, always ask your healthcare professional. [place] [address] any warranty or liability for your use of this information. Starting a Weight Loss Plan: Care Instructions  Overview     If you're thinking about losing weight, it can be hard to know where to start. Your doctor can help you set up a weight loss plan that best meets your needs. You may want to take a class on nutrition or exercise, or you could join a weight loss support group.  If you have questions about how to make changes to your eating or exercise habits, ask your doctor about seeing a registered dietitian or an exercise specialist.  It can be a big challenge to lose weight. But you don't have to make huge changes at once. Make small changes, and stick with them. When those changes become habit, add a few more changes. If you don't think you're ready to make changes right now, try to pick a date in the future. Make an appointment to see your doctor to discuss whether the time is right for you to start a plan. Follow-up care is a key part of your treatment and safety. Be sure to make and go to all appointments, and call your doctor if you are having problems. It's also a good idea to know your test results and keep a list of the medicines you take. How can you care for yourself at home? Set realistic goals. Many people expect to lose much more weight than is likely. A weight loss of 5% to 10% of your body weight may be enough to improve your health. Get family and friends involved to provide support. Talk to them about why you are trying to lose weight, and ask them to help. They can help by participating in exercise and having meals with you, even if they may be eating something different. Find what works best for you. If you do not have time or do not like to cook, a program that offers meal replacement bars or shakes may be better for you. Or if you like to prepare meals, finding a plan that includes daily menus and recipes may be best.  Ask your doctor about other health professionals who can help you achieve your weight loss goals. A dietitian can help you make healthy changes in your diet. An exercise specialist or  can help you develop a safe and effective exercise program.  A counselor or psychiatrist can help you cope with issues such as depression, anxiety, or family problems that can make it hard to focus on weight loss. Consider joining a support group for people who are trying to lose weight.  Your doctor can suggest groups in your area.  Where can you learn more? Go to http://www.woods.com/ and enter U357 to learn more about \"Starting a Weight Loss Plan: Care Instructions. \"  Current as of: August 25, 2022               Content Version: 13.5  © 3202-2776 Healthwise, [employer]. Care instructions adapted under license by [facility] ([facility]). If you have questions about a medical condition or this instruction, always ask your healthcare professional. [address] any warranty or liability for your use of this information. A Healthy Heart: Care Instructions  Your Care Instructions     Coronary artery disease, also called heart disease, occurs when a substance called plaque builds up in the vessels that supply oxygen-rich blood to your heart muscle. This can narrow the blood vessels and reduce blood flow. A heart attack happens when blood flow is completely blocked. A high-fat diet, smoking, and other factors increase the risk of heart disease. Your doctor has found that you have a chance of having heart disease. You can do lots of things to keep your heart healthy. It may not be easy, but you can change your diet, exercise more, and quit smoking. These steps really work to lower your chance of heart disease. Follow-up care is a key part of your treatment and safety. Be sure to make and go to all appointments, and call your doctor if you are having problems. It's also a good idea to know your test results and keep a list of the medicines you take. How can you care for yourself at home? Diet    Use less salt when you cook and eat. This helps lower your blood pressure. Taste food before salting. Add only a little salt when you think you need it. With time, your taste buds will adjust to less salt.     Eat fewer snack items, fast foods, canned soups, and other high-salt, high-fat, processed foods.     Read food labels and try to avoid saturated and trans fats.  They increase your risk of heart disease by raising cholesterol levels.     Limit the amount of solid fat-butter, margarine, and shortening-you eat. Use olive, peanut, or canola oil when you cook. Bake, broil, and steam foods instead of frying them.     Eat a variety of fruit and vegetables every day. Dark green, deep orange, red, or yellow fruits and vegetables are especially good for you. Examples include spinach, carrots, peaches, and berries.     Foods high in fiber can reduce your cholesterol and provide important vitamins and minerals. High-fiber foods include whole-grain cereals and breads, oatmeal, beans, brown rice, citrus fruits, and apples.     Eat lean proteins. Heart-healthy proteins include seafood, lean meats and poultry, eggs, beans, peas, nuts, seeds, and soy products.     Limit drinks and foods with added sugar. These include candy, desserts, and soda pop. Lifestyle changes    If your doctor recommends it, get more exercise. Walking is a good choice. Bit by bit, increase the amount you walk every day. Try for at least 30 minutes on most days of the week. You also may want to swim, bike, or do other activities.     Do not smoke. If you need help quitting, talk to your doctor about stop-smoking programs and medicines. These can increase your chances of quitting for good. Quitting smoking may be the most important step you can take to protect your heart. It is never too late to quit.     Limit alcohol to 2 drinks a day for men and 1 drink a day for women. Too much alcohol can cause health problems.     Manage other health problems such as diabetes, high blood pressure, and high cholesterol. If you think you may have a problem with alcohol or drug use, talk to your doctor. Medicines    Take your medicines exactly as prescribed. Call your doctor if you think you are having a problem with your medicine.     If your doctor recommends aspirin, take the amount directed each day.  Make sure you take aspirin and not another kind of pain reliever, such as acetaminophen (Tylenol). When should you call for help? Call 911 if you have symptoms of a heart attack. These may include:    Chest pain or pressure, or a strange feeling in the chest.     Sweating.     Shortness of breath.     Pain, pressure, or a strange feeling in the back, neck, jaw, or upper belly or in one or both shoulders or arms.     Lightheadedness or sudden weakness.     A fast or irregular heartbeat. After you call 911, the  may tell you to chew 1 adult-strength or 2 to 4 low-dose aspirin. Wait for an ambulance. Do not try to drive yourself. Watch closely for changes in your health, and be sure to contact your doctor if you have any problems. Where can you learn more? Go to http://www.mijares.com/ and enter F075 to learn more about \"A Healthy Heart: Care Instructions. \"  Current as of: September 7, 2022               Content Version: 13.5  © 6021-6620 [employer]. Care instructions adapted under license by [facility] ([facility]). If you have questions about a medical condition or this instruction, always ask your healthcare professional. [place] [address] any warranty or liability for your use of this information. Personalized Preventive Plan for Loren Abbott - 3/15/2023  Medicare offers a range of preventive health benefits. Some of the tests and screenings are paid in full while other may be subject to a deductible, co-insurance, and/or copay. Some of these benefits include a comprehensive review of your medical history including lifestyle, illnesses that may run in your family, and various assessments and screenings as appropriate. After reviewing your medical record and screening and assessments performed today your provider may have ordered immunizations, labs, imaging, and/or referrals for you.   A list of these orders (if applicable) as well as your Preventive Care list are included within your After Visit Summary for your review. Other Preventive Recommendations:    A preventive eye exam performed by an eye specialist is recommended every 1-2 years to screen for glaucoma; cataracts, macular degeneration, and other eye disorders. A preventive dental visit is recommended every 6 months. Try to get at least 150 minutes of exercise per week or 10,000 steps per day on a pedometer . Order or download the FREE \"Exercise & Physical Activity: Your Everyday Guide\" from [employer]. Call 3-110.585.7869 or search [employer] online. You need 8382-6781 mg of calcium and 8889-3202 IU of vitamin D per day. It is possible to meet your calcium requirement with diet alone, but a vitamin D supplement is usually necessary to meet this goal.  When exposed to the sun, use a sunscreen that protects against both UVA and UVB radiation with an SPF of 30 or greater. Reapply every 2 to 3 hours or after sweating, drying off with a towel, or swimming. Always wear a seat belt when traveling in a car. Always wear a helmet when riding a bicycle or motorcycle.

## 2023-03-16 NOTE — ASSESSMENT & PLAN NOTE
Discussed patient's weight with her and informed that this can make walking harder and that it can make blood sugar control more difficult. Discussed need to be more mobile to help with some weight loss. Will recheck in 3 months.

## 2023-03-16 NOTE — ASSESSMENT & PLAN NOTE
Chronic problem, wishes to take Senokot daily, states that she takes 5-6 pills every other day and notes that it usually works the next day, but sometimes does not. Notes that she feels bloated and would like to go more regularly. States that she has tried Linzess, Dulcolax, Miralax and other OTC meds without relief. Discussed starting with 3 Senokot every day to see if this helps. Hopefully moving around more and drinking more water will help, as well. OK to gradually increase to 4-5 pills daily if needed. Will recheck in 3 months.

## 2023-03-16 NOTE — ASSESSMENT & PLAN NOTE
Random blood sugar today 171 mg/dL, though patient reports it is often in the upper 200s and sometimes just reads \"High. \" Has been doing 60 units of Lantus at night and 35 units BID of 70/30. Admits that she is not following a diabetic diet. Discussed importance of better diabetic control as this may be causing more neuropathy which can make her legs weaker. Will increase Lantus to 64 units at night and continue 70/30 BID at 35 units. Patient to restart writing down her daily meals. Will recheck with labs in 3 months as patient does not want to do labs today.

## 2023-03-16 NOTE — ASSESSMENT & PLAN NOTE
Patient recently restarted Atorvastatin after realizing that she had not been taking it despite me refilling it. States that she restarted it and is hoping that it will control her cholesterol. Will recheck in 3 months.

## 2023-03-16 NOTE — ASSESSMENT & PLAN NOTE
Patient with worsening weakness in her legs, states that she fell three weeks ago due to her legs feeling tired and shaky after walking a few yards at her home. Good muscle strength on exam today, suspect secondary to debility from immobility. Patient has a rollator walker that she uses to get around her house once or twice per day instead of staying in the wheelchair. Discussed need to use her walker to get up more during the day, recommend starting at 3 times per day and gradually increasing to 6 times per day prior to her next appointment with me. Declines home PT, states that she wants to see if she can build up her strength at home before considering an outpatient PT referral.  Will recheck in 3 months.

## 2023-03-22 RX ORDER — INSULIN ASPART 100 [IU]/ML
35 INJECTION, SUSPENSION SUBCUTANEOUS 2 TIMES DAILY WITH MEALS
Qty: 15 ML | Refills: 5 | Status: SHIPPED | OUTPATIENT
Start: 2023-03-22

## 2023-03-24 RX ORDER — INSULIN ASPART 100 [IU]/ML
INJECTION, SUSPENSION SUBCUTANEOUS
Qty: 15 ML | Refills: 0 | OUTPATIENT
Start: 2023-03-24

## 2023-04-14 PROBLEM — Z00.00 MEDICARE ANNUAL WELLNESS VISIT, SUBSEQUENT: Status: RESOLVED | Noted: 2023-03-15 | Resolved: 2023-04-14

## 2023-06-14 ENCOUNTER — HOME HEALTH ADMISSION (OUTPATIENT)
Dept: HOME HEALTH SERVICES | Facility: HOME HEALTH | Age: 78
End: 2023-06-14
Payer: MEDICARE

## 2023-06-14 PROBLEM — L72.3 SEBACEOUS CYST: Status: ACTIVE | Noted: 2023-06-14

## 2023-06-19 ENCOUNTER — HOME CARE VISIT (OUTPATIENT)
Dept: SCHEDULING | Facility: HOME HEALTH | Age: 78
End: 2023-06-19

## 2023-06-19 VITALS
HEIGHT: 63 IN | BODY MASS INDEX: 40.5 KG/M2 | WEIGHT: 228.6 LBS | RESPIRATION RATE: 19 BRPM | TEMPERATURE: 97.9 F | HEART RATE: 80 BPM | SYSTOLIC BLOOD PRESSURE: 130 MMHG | DIASTOLIC BLOOD PRESSURE: 70 MMHG

## 2023-06-19 PROCEDURE — 0221000100 HH NO PAY CLAIM PROCEDURE

## 2023-06-19 PROCEDURE — G0299 HHS/HOSPICE OF RN EA 15 MIN: HCPCS

## 2023-06-19 ASSESSMENT — ENCOUNTER SYMPTOMS
STOOL DESCRIPTION: FORMED
CONSTIPATION: 1
PAIN LOCATION - PAIN QUALITY: BURNING, SHARP
DYSPNEA ACTIVITY LEVEL: AFTER AMBULATING 10 - 20 FT

## 2023-06-20 ENCOUNTER — HOME CARE VISIT (OUTPATIENT)
Dept: SCHEDULING | Facility: HOME HEALTH | Age: 78
End: 2023-06-20

## 2023-06-20 VITALS
SYSTOLIC BLOOD PRESSURE: 116 MMHG | TEMPERATURE: 97.9 F | RESPIRATION RATE: 18 BRPM | DIASTOLIC BLOOD PRESSURE: 63 MMHG | OXYGEN SATURATION: 96 % | HEART RATE: 79 BPM

## 2023-06-20 PROCEDURE — G0151 HHCP-SERV OF PT,EA 15 MIN: HCPCS

## 2023-06-20 ASSESSMENT — ENCOUNTER SYMPTOMS
COUGH: 1
COUGH CHARACTERISTICS: NON-PRODUCTIVE
PAIN LOCATION - PAIN QUALITY: BURNING, ACHY
DYSPNEA ACTIVITY LEVEL: AFTER AMBULATING 10 - 20 FT

## 2023-06-21 ENCOUNTER — HOME CARE VISIT (OUTPATIENT)
Dept: SCHEDULING | Facility: HOME HEALTH | Age: 78
End: 2023-06-21

## 2023-06-21 PROCEDURE — G0299 HHS/HOSPICE OF RN EA 15 MIN: HCPCS

## 2023-06-22 ENCOUNTER — HOME CARE VISIT (OUTPATIENT)
Dept: SCHEDULING | Facility: HOME HEALTH | Age: 78
End: 2023-06-22

## 2023-06-22 VITALS
TEMPERATURE: 97.4 F | SYSTOLIC BLOOD PRESSURE: 128 MMHG | RESPIRATION RATE: 19 BRPM | OXYGEN SATURATION: 97 % | DIASTOLIC BLOOD PRESSURE: 70 MMHG | HEART RATE: 74 BPM

## 2023-06-22 VITALS
TEMPERATURE: 97.9 F | SYSTOLIC BLOOD PRESSURE: 136 MMHG | HEART RATE: 97 BPM | RESPIRATION RATE: 20 BRPM | DIASTOLIC BLOOD PRESSURE: 76 MMHG | OXYGEN SATURATION: 96 %

## 2023-06-22 PROCEDURE — G0157 HHC PT ASSISTANT EA 15: HCPCS

## 2023-06-22 ASSESSMENT — ENCOUNTER SYMPTOMS
PAIN LOCATION - PAIN QUALITY: ACHY
DYSPNEA ACTIVITY LEVEL: AFTER AMBULATING LESS THAN 10 FT
CONSTIPATION: 1

## 2023-06-23 ENCOUNTER — HOME CARE VISIT (OUTPATIENT)
Dept: SCHEDULING | Facility: HOME HEALTH | Age: 78
End: 2023-06-23

## 2023-06-23 VITALS
SYSTOLIC BLOOD PRESSURE: 122 MMHG | HEART RATE: 82 BPM | TEMPERATURE: 97.6 F | OXYGEN SATURATION: 96 % | RESPIRATION RATE: 18 BRPM | DIASTOLIC BLOOD PRESSURE: 72 MMHG

## 2023-06-23 PROCEDURE — G0299 HHS/HOSPICE OF RN EA 15 MIN: HCPCS

## 2023-06-23 ASSESSMENT — ENCOUNTER SYMPTOMS
DYSPNEA ACTIVITY LEVEL: AFTER AMBULATING LESS THAN 10 FT
PAIN LOCATION - PAIN QUALITY: ACHY
STOOL DESCRIPTION: SOFT FORMED

## 2023-06-26 ENCOUNTER — HOME CARE VISIT (OUTPATIENT)
Dept: SCHEDULING | Facility: HOME HEALTH | Age: 78
End: 2023-06-26

## 2023-06-26 VITALS
SYSTOLIC BLOOD PRESSURE: 126 MMHG | TEMPERATURE: 97.9 F | DIASTOLIC BLOOD PRESSURE: 72 MMHG | HEART RATE: 70 BPM | OXYGEN SATURATION: 97 % | RESPIRATION RATE: 18 BRPM

## 2023-06-26 VITALS
DIASTOLIC BLOOD PRESSURE: 68 MMHG | TEMPERATURE: 97.2 F | HEART RATE: 74 BPM | SYSTOLIC BLOOD PRESSURE: 130 MMHG | RESPIRATION RATE: 20 BRPM

## 2023-06-26 PROCEDURE — G0299 HHS/HOSPICE OF RN EA 15 MIN: HCPCS

## 2023-06-26 PROCEDURE — G0157 HHC PT ASSISTANT EA 15: HCPCS

## 2023-06-26 ASSESSMENT — ENCOUNTER SYMPTOMS
PAIN LOCATION - PAIN QUALITY: ACHY
STOOL DESCRIPTION: SOFT FORMED
DYSPNEA ACTIVITY LEVEL: AFTER AMBULATING LESS THAN 10 FT

## 2023-06-27 ENCOUNTER — HOME CARE VISIT (OUTPATIENT)
Dept: HOME HEALTH SERVICES | Facility: HOME HEALTH | Age: 78
End: 2023-06-27

## 2023-06-28 ENCOUNTER — HOME CARE VISIT (OUTPATIENT)
Dept: SCHEDULING | Facility: HOME HEALTH | Age: 78
End: 2023-06-28

## 2023-06-28 VITALS
TEMPERATURE: 97.3 F | OXYGEN SATURATION: 99 % | HEART RATE: 76 BPM | DIASTOLIC BLOOD PRESSURE: 68 MMHG | RESPIRATION RATE: 18 BRPM | SYSTOLIC BLOOD PRESSURE: 142 MMHG

## 2023-06-28 PROCEDURE — G0157 HHC PT ASSISTANT EA 15: HCPCS

## 2023-06-29 ENCOUNTER — HOME CARE VISIT (OUTPATIENT)
Dept: SCHEDULING | Facility: HOME HEALTH | Age: 78
End: 2023-06-29

## 2023-06-29 VITALS
DIASTOLIC BLOOD PRESSURE: 76 MMHG | RESPIRATION RATE: 19 BRPM | SYSTOLIC BLOOD PRESSURE: 134 MMHG | OXYGEN SATURATION: 94 % | HEART RATE: 78 BPM | TEMPERATURE: 97.5 F

## 2023-06-29 PROCEDURE — G0299 HHS/HOSPICE OF RN EA 15 MIN: HCPCS

## 2023-06-29 ASSESSMENT — ENCOUNTER SYMPTOMS
DYSPNEA ACTIVITY LEVEL: AFTER AMBULATING 10 - 20 FT
STOOL DESCRIPTION: FORMED
PAIN LOCATION - PAIN QUALITY: ACHINESS

## 2023-07-03 ENCOUNTER — HOME CARE VISIT (OUTPATIENT)
Dept: SCHEDULING | Facility: HOME HEALTH | Age: 78
End: 2023-07-03
Payer: MEDICARE

## 2023-07-03 VITALS
RESPIRATION RATE: 20 BRPM | OXYGEN SATURATION: 96 % | DIASTOLIC BLOOD PRESSURE: 76 MMHG | TEMPERATURE: 97.9 F | HEART RATE: 95 BPM | SYSTOLIC BLOOD PRESSURE: 138 MMHG

## 2023-07-03 PROCEDURE — G0299 HHS/HOSPICE OF RN EA 15 MIN: HCPCS

## 2023-07-03 ASSESSMENT — ENCOUNTER SYMPTOMS
DYSPNEA ACTIVITY LEVEL: AFTER AMBULATING 10 - 20 FT
STOOL DESCRIPTION: SOFT FORMED

## 2023-07-04 ENCOUNTER — HOME CARE VISIT (OUTPATIENT)
Dept: SCHEDULING | Facility: HOME HEALTH | Age: 78
End: 2023-07-04
Payer: MEDICARE

## 2023-07-04 VITALS
SYSTOLIC BLOOD PRESSURE: 130 MMHG | DIASTOLIC BLOOD PRESSURE: 64 MMHG | TEMPERATURE: 97.4 F | HEART RATE: 77 BPM | RESPIRATION RATE: 19 BRPM

## 2023-07-04 PROCEDURE — G0157 HHC PT ASSISTANT EA 15: HCPCS

## 2023-07-05 ENCOUNTER — HOME CARE VISIT (OUTPATIENT)
Dept: HOME HEALTH SERVICES | Facility: HOME HEALTH | Age: 78
End: 2023-07-05
Payer: MEDICARE

## 2023-07-06 ENCOUNTER — HOME CARE VISIT (OUTPATIENT)
Dept: SCHEDULING | Facility: HOME HEALTH | Age: 78
End: 2023-07-06
Payer: MEDICARE

## 2023-07-06 VITALS
TEMPERATURE: 97.2 F | RESPIRATION RATE: 18 BRPM | DIASTOLIC BLOOD PRESSURE: 62 MMHG | SYSTOLIC BLOOD PRESSURE: 142 MMHG | HEART RATE: 80 BPM

## 2023-07-06 PROCEDURE — G0157 HHC PT ASSISTANT EA 15: HCPCS

## 2023-07-07 ENCOUNTER — HOME CARE VISIT (OUTPATIENT)
Dept: SCHEDULING | Facility: HOME HEALTH | Age: 78
End: 2023-07-07
Payer: MEDICARE

## 2023-07-07 VITALS
OXYGEN SATURATION: 95 % | TEMPERATURE: 98 F | DIASTOLIC BLOOD PRESSURE: 68 MMHG | RESPIRATION RATE: 18 BRPM | HEART RATE: 73 BPM | SYSTOLIC BLOOD PRESSURE: 130 MMHG

## 2023-07-07 PROCEDURE — G0299 HHS/HOSPICE OF RN EA 15 MIN: HCPCS

## 2023-07-07 ASSESSMENT — ENCOUNTER SYMPTOMS
DYSPNEA ACTIVITY LEVEL: AFTER AMBULATING 10 - 20 FT
STOOL DESCRIPTION: SOFT FORMED

## 2023-07-10 ENCOUNTER — HOME CARE VISIT (OUTPATIENT)
Dept: SCHEDULING | Facility: HOME HEALTH | Age: 78
End: 2023-07-10
Payer: MEDICARE

## 2023-07-10 VITALS
OXYGEN SATURATION: 97 % | SYSTOLIC BLOOD PRESSURE: 118 MMHG | HEART RATE: 68 BPM | DIASTOLIC BLOOD PRESSURE: 62 MMHG | TEMPERATURE: 97.7 F | RESPIRATION RATE: 18 BRPM

## 2023-07-10 PROCEDURE — G0299 HHS/HOSPICE OF RN EA 15 MIN: HCPCS

## 2023-07-10 ASSESSMENT — ENCOUNTER SYMPTOMS
DYSPNEA ACTIVITY LEVEL: AFTER AMBULATING 10 - 20 FT
STOOL DESCRIPTION: SOFT FORMED

## 2023-07-12 ENCOUNTER — HOME CARE VISIT (OUTPATIENT)
Dept: SCHEDULING | Facility: HOME HEALTH | Age: 78
End: 2023-07-12
Payer: MEDICARE

## 2023-07-12 VITALS
OXYGEN SATURATION: 99 % | RESPIRATION RATE: 18 BRPM | SYSTOLIC BLOOD PRESSURE: 126 MMHG | DIASTOLIC BLOOD PRESSURE: 68 MMHG | HEART RATE: 88 BPM

## 2023-07-12 PROCEDURE — G0299 HHS/HOSPICE OF RN EA 15 MIN: HCPCS

## 2023-07-12 PROCEDURE — G0151 HHCP-SERV OF PT,EA 15 MIN: HCPCS

## 2023-07-13 ENCOUNTER — HOME CARE VISIT (OUTPATIENT)
Dept: HOME HEALTH SERVICES | Facility: HOME HEALTH | Age: 78
End: 2023-07-13
Payer: MEDICARE

## 2023-07-13 VITALS
TEMPERATURE: 97.8 F | OXYGEN SATURATION: 96 % | HEART RATE: 82 BPM | RESPIRATION RATE: 17 BRPM | DIASTOLIC BLOOD PRESSURE: 72 MMHG | SYSTOLIC BLOOD PRESSURE: 122 MMHG

## 2023-07-13 ASSESSMENT — ENCOUNTER SYMPTOMS
STOOL DESCRIPTION: SOFT FORMED
DYSPNEA ACTIVITY LEVEL: AFTER AMBULATING 10 - 20 FT

## 2023-07-18 ENCOUNTER — HOME CARE VISIT (OUTPATIENT)
Dept: SCHEDULING | Facility: HOME HEALTH | Age: 78
End: 2023-07-18
Payer: MEDICARE

## 2023-07-18 VITALS
DIASTOLIC BLOOD PRESSURE: 68 MMHG | RESPIRATION RATE: 18 BRPM | HEART RATE: 69 BPM | TEMPERATURE: 97.9 F | SYSTOLIC BLOOD PRESSURE: 122 MMHG | OXYGEN SATURATION: 95 %

## 2023-07-18 PROCEDURE — G0299 HHS/HOSPICE OF RN EA 15 MIN: HCPCS

## 2023-07-18 ASSESSMENT — ENCOUNTER SYMPTOMS
STOOL DESCRIPTION: SOFT FORMED
DYSPNEA ACTIVITY LEVEL: AFTER AMBULATING MORE THAN 20 FT

## 2023-07-19 ENCOUNTER — OFFICE VISIT (OUTPATIENT)
Age: 78
End: 2023-07-19
Payer: MEDICARE

## 2023-07-19 ENCOUNTER — HOME CARE VISIT (OUTPATIENT)
Dept: SCHEDULING | Facility: HOME HEALTH | Age: 78
End: 2023-07-19

## 2023-07-19 VITALS
DIASTOLIC BLOOD PRESSURE: 62 MMHG | HEIGHT: 63 IN | HEART RATE: 79 BPM | SYSTOLIC BLOOD PRESSURE: 136 MMHG | BODY MASS INDEX: 40.75 KG/M2 | WEIGHT: 230 LBS

## 2023-07-19 DIAGNOSIS — I82.A21 CHRONIC EMBOLISM AND THROMBOSIS OF RIGHT AXILLARY VEIN (HCC): ICD-10-CM

## 2023-07-19 DIAGNOSIS — I25.119 ATHEROSCLEROSIS OF NATIVE CORONARY ARTERY OF NATIVE HEART WITH ANGINA PECTORIS (HCC): ICD-10-CM

## 2023-07-19 DIAGNOSIS — I10 ESSENTIAL HYPERTENSION: ICD-10-CM

## 2023-07-19 DIAGNOSIS — E78.5 DYSLIPIDEMIA: Primary | ICD-10-CM

## 2023-07-19 PROCEDURE — 3075F SYST BP GE 130 - 139MM HG: CPT | Performed by: INTERNAL MEDICINE

## 2023-07-19 PROCEDURE — 1090F PRES/ABSN URINE INCON ASSESS: CPT | Performed by: INTERNAL MEDICINE

## 2023-07-19 PROCEDURE — G8400 PT W/DXA NO RESULTS DOC: HCPCS | Performed by: INTERNAL MEDICINE

## 2023-07-19 PROCEDURE — 3078F DIAST BP <80 MM HG: CPT | Performed by: INTERNAL MEDICINE

## 2023-07-19 PROCEDURE — 1123F ACP DISCUSS/DSCN MKR DOCD: CPT | Performed by: INTERNAL MEDICINE

## 2023-07-19 PROCEDURE — G8428 CUR MEDS NOT DOCUMENT: HCPCS | Performed by: INTERNAL MEDICINE

## 2023-07-19 PROCEDURE — 1036F TOBACCO NON-USER: CPT | Performed by: INTERNAL MEDICINE

## 2023-07-19 PROCEDURE — 99214 OFFICE O/P EST MOD 30 MIN: CPT | Performed by: INTERNAL MEDICINE

## 2023-07-19 PROCEDURE — 93000 ELECTROCARDIOGRAM COMPLETE: CPT | Performed by: INTERNAL MEDICINE

## 2023-07-19 PROCEDURE — G8417 CALC BMI ABV UP PARAM F/U: HCPCS | Performed by: INTERNAL MEDICINE

## 2023-07-19 NOTE — PROGRESS NOTES
[facility] CARDIOLOGY  [address] [facility], [address]  PHONE: [phone]      23    NAME:  Linnea Cruz  : 1945  MRN: [id number]       SUBJECTIVE:   Linnea Cruz is a 69 y.o. female seen for a follow up visit regarding the following:     Chief Complaint   Patient presents with    Hypertension    Coronary Artery Disease    6 Month Follow-Up         HPI:    No cp or hunter. No orthopnea or pnd. No palpitations or syncope. Past Medical History, Past Surgical History, Family history, Social History, and Medications were all reviewed with the patient today and updated as necessary.      Current Outpatient Medications   Medication Sig Dispense Refill    acetaminophen (TYLENOL) 500 MG tablet Take 2 tablets by mouth every 6 hours as needed for Pain      insulin glargine (LANTUS SOLOSTAR) 100 UNIT/ML injection pen INJECT 70 UNITS SUBCUTANEOUSLY  AT BEDTIME 90 mL 5    insulin aspart protamine-insulin aspart (NOVOLOG MIX 70/30 FLEXPEN) injection pen Inject 38 Units into the skin 2 times daily (with meals) 15 mL 5    escitalopram (LEXAPRO) 20 MG tablet Take 1 tablet by mouth daily 90 tablet 1    apixaban (ELIQUIS) 5 MG TABS tablet Take 1 tablet by mouth 2 times daily 60 tablet 5    levalbuterol (XOPENEX HFA) 45 MCG/ACT inhaler Inhale 2 puffs into the lungs every 4 hours as needed for Shortness of Breath 15 g 5    atorvastatin (LIPITOR) 40 MG tablet Take 1 tablet by mouth daily 1 per day to replace 20mg for high cholesterol 90 tablet 5    clopidogrel (PLAVIX) 75 MG tablet Take 1 tablet by mouth daily 90 tablet 3    aspirin 325 MG tablet Take 1 tablet by mouth daily      Multiple Vitamins-Minerals (MULTI COMPLETE PO) Take by mouth daily      melatonin 10 MG CAPS capsule Take 1 capsule by mouth nightly      Probiotic Product (PROBIOTIC ADVANCED PO) Take by mouth daily      senna (SENOKOT) 8.6 MG tablet Take 1 tablet by mouth 3-4 times a week      fluticasone (FLONASE) 50 MCG/ACT nasal

## 2023-07-25 ENCOUNTER — HOME CARE VISIT (OUTPATIENT)
Dept: HOME HEALTH SERVICES | Facility: HOME HEALTH | Age: 78
End: 2023-07-25
Payer: MEDICARE

## 2023-07-25 ENCOUNTER — HOME CARE VISIT (OUTPATIENT)
Dept: SCHEDULING | Facility: HOME HEALTH | Age: 78
End: 2023-07-25
Payer: MEDICARE

## 2023-07-25 VITALS
RESPIRATION RATE: 18 BRPM | TEMPERATURE: 97.9 F | HEART RATE: 77 BPM | SYSTOLIC BLOOD PRESSURE: 122 MMHG | DIASTOLIC BLOOD PRESSURE: 68 MMHG | OXYGEN SATURATION: 98 %

## 2023-07-25 PROCEDURE — G0299 HHS/HOSPICE OF RN EA 15 MIN: HCPCS

## 2023-07-25 ASSESSMENT — ENCOUNTER SYMPTOMS
DYSPNEA ACTIVITY LEVEL: AFTER AMBULATING MORE THAN 20 FT
STOOL DESCRIPTION: SOFT FORMED

## 2023-08-01 ENCOUNTER — HOME CARE VISIT (OUTPATIENT)
Dept: SCHEDULING | Facility: HOME HEALTH | Age: 78
End: 2023-08-01
Payer: MEDICARE

## 2023-08-01 VITALS
OXYGEN SATURATION: 95 % | HEART RATE: 68 BPM | TEMPERATURE: 97.4 F | DIASTOLIC BLOOD PRESSURE: 66 MMHG | RESPIRATION RATE: 18 BRPM | SYSTOLIC BLOOD PRESSURE: 126 MMHG

## 2023-08-01 PROCEDURE — G0299 HHS/HOSPICE OF RN EA 15 MIN: HCPCS

## 2023-08-01 ASSESSMENT — ENCOUNTER SYMPTOMS
DYSPNEA ACTIVITY LEVEL: AFTER AMBULATING MORE THAN 20 FT
STOOL DESCRIPTION: SOFT FORMED

## 2023-08-07 ENCOUNTER — HOME CARE VISIT (OUTPATIENT)
Dept: SCHEDULING | Facility: HOME HEALTH | Age: 78
End: 2023-08-07
Payer: MEDICARE

## 2023-08-07 VITALS
SYSTOLIC BLOOD PRESSURE: 122 MMHG | OXYGEN SATURATION: 98 % | DIASTOLIC BLOOD PRESSURE: 66 MMHG | HEART RATE: 72 BPM | RESPIRATION RATE: 18 BRPM | TEMPERATURE: 97.9 F

## 2023-08-07 PROCEDURE — G0299 HHS/HOSPICE OF RN EA 15 MIN: HCPCS

## 2023-08-07 ASSESSMENT — ENCOUNTER SYMPTOMS
DYSPNEA ACTIVITY LEVEL: AFTER AMBULATING MORE THAN 20 FT
STOOL DESCRIPTION: SOFT FORMED

## 2023-10-13 ENCOUNTER — TELEPHONE (OUTPATIENT)
Dept: PRIMARY CARE CLINIC | Facility: CLINIC | Age: 78
End: 2023-10-13

## 2023-10-13 RX ORDER — INSULIN ASPART 100 [IU]/ML
38 INJECTION, SUSPENSION SUBCUTANEOUS 2 TIMES DAILY WITH MEALS
Qty: 45 ML | Refills: 5 | Status: SHIPPED | OUTPATIENT
Start: 2023-10-13

## 2023-10-13 NOTE — TELEPHONE ENCOUNTER
Patient called needing a refill of her NOVOLOG Mix 70/30 FLEXPEN. Sent to [facility] on file. She is requesting a 3 month supply so her daughter does not have to pick it up every week if possible.

## 2023-10-17 ENCOUNTER — OFFICE VISIT (OUTPATIENT)
Dept: PRIMARY CARE CLINIC | Facility: CLINIC | Age: 78
End: 2023-10-17
Payer: MEDICARE

## 2023-10-17 VITALS
WEIGHT: 221 LBS | DIASTOLIC BLOOD PRESSURE: 84 MMHG | TEMPERATURE: 97 F | SYSTOLIC BLOOD PRESSURE: 126 MMHG | OXYGEN SATURATION: 95 % | BODY MASS INDEX: 39.16 KG/M2 | HEIGHT: 63 IN | HEART RATE: 80 BPM

## 2023-10-17 DIAGNOSIS — F41.8 ANXIETY WITH DEPRESSION: ICD-10-CM

## 2023-10-17 DIAGNOSIS — E03.9 HYPOTHYROIDISM, UNSPECIFIED TYPE: ICD-10-CM

## 2023-10-17 DIAGNOSIS — E11.49 TYPE II DIABETES MELLITUS WITH NEUROLOGICAL MANIFESTATIONS NOT AT GOAL (HCC): Primary | ICD-10-CM

## 2023-10-17 DIAGNOSIS — E55.9 VITAMIN D DEFICIENCY: ICD-10-CM

## 2023-10-17 DIAGNOSIS — I10 ESSENTIAL HYPERTENSION: ICD-10-CM

## 2023-10-17 DIAGNOSIS — E78.5 DYSLIPIDEMIA: ICD-10-CM

## 2023-10-17 DIAGNOSIS — E66.01 SEVERE OBESITY WITH BODY MASS INDEX (BMI) OF 35.0 TO 39.9 WITH SERIOUS COMORBIDITY (HCC): ICD-10-CM

## 2023-10-17 PROCEDURE — G8427 DOCREV CUR MEDS BY ELIG CLIN: HCPCS | Performed by: FAMILY MEDICINE

## 2023-10-17 PROCEDURE — 90694 VACC AIIV4 NO PRSRV 0.5ML IM: CPT | Performed by: FAMILY MEDICINE

## 2023-10-17 PROCEDURE — 3074F SYST BP LT 130 MM HG: CPT | Performed by: FAMILY MEDICINE

## 2023-10-17 PROCEDURE — G8484 FLU IMMUNIZE NO ADMIN: HCPCS | Performed by: FAMILY MEDICINE

## 2023-10-17 PROCEDURE — 99214 OFFICE O/P EST MOD 30 MIN: CPT | Performed by: FAMILY MEDICINE

## 2023-10-17 PROCEDURE — 3046F HEMOGLOBIN A1C LEVEL >9.0%: CPT | Performed by: FAMILY MEDICINE

## 2023-10-17 PROCEDURE — 1036F TOBACCO NON-USER: CPT | Performed by: FAMILY MEDICINE

## 2023-10-17 PROCEDURE — 90677 PCV20 VACCINE IM: CPT | Performed by: FAMILY MEDICINE

## 2023-10-17 PROCEDURE — 1123F ACP DISCUSS/DSCN MKR DOCD: CPT | Performed by: FAMILY MEDICINE

## 2023-10-17 PROCEDURE — G8400 PT W/DXA NO RESULTS DOC: HCPCS | Performed by: FAMILY MEDICINE

## 2023-10-17 PROCEDURE — G8417 CALC BMI ABV UP PARAM F/U: HCPCS | Performed by: FAMILY MEDICINE

## 2023-10-17 PROCEDURE — G0009 ADMIN PNEUMOCOCCAL VACCINE: HCPCS | Performed by: FAMILY MEDICINE

## 2023-10-17 PROCEDURE — 3079F DIAST BP 80-89 MM HG: CPT | Performed by: FAMILY MEDICINE

## 2023-10-17 PROCEDURE — G0008 ADMIN INFLUENZA VIRUS VAC: HCPCS | Performed by: FAMILY MEDICINE

## 2023-10-17 PROCEDURE — 1090F PRES/ABSN URINE INCON ASSESS: CPT | Performed by: FAMILY MEDICINE

## 2023-10-17 ASSESSMENT — PATIENT HEALTH QUESTIONNAIRE - PHQ9
1. LITTLE INTEREST OR PLEASURE IN DOING THINGS: 0
3. TROUBLE FALLING OR STAYING ASLEEP: 0
2. FEELING DOWN, DEPRESSED OR HOPELESS: 0
SUM OF ALL RESPONSES TO PHQ QUESTIONS 1-9: 0
SUM OF ALL RESPONSES TO PHQ QUESTIONS 1-9: 0
7. TROUBLE CONCENTRATING ON THINGS, SUCH AS READING THE NEWSPAPER OR WATCHING TELEVISION: 0
SUM OF ALL RESPONSES TO PHQ QUESTIONS 1-9: 0
9. THOUGHTS THAT YOU WOULD BE BETTER OFF DEAD, OR OF HURTING YOURSELF: 0
8. MOVING OR SPEAKING SO SLOWLY THAT OTHER PEOPLE COULD HAVE NOTICED. OR THE OPPOSITE, BEING SO FIGETY OR RESTLESS THAT YOU HAVE BEEN MOVING AROUND A LOT MORE THAN USUAL: 0
6. FEELING BAD ABOUT YOURSELF - OR THAT YOU ARE A FAILURE OR HAVE LET YOURSELF OR YOUR FAMILY DOWN: 0
SUM OF ALL RESPONSES TO PHQ QUESTIONS 1-9: 0
5. POOR APPETITE OR OVEREATING: 0
SUM OF ALL RESPONSES TO PHQ9 QUESTIONS 1 & 2: 0
4. FEELING TIRED OR HAVING LITTLE ENERGY: 0
10. IF YOU CHECKED OFF ANY PROBLEMS, HOW DIFFICULT HAVE THESE PROBLEMS MADE IT FOR YOU TO DO YOUR WORK, TAKE CARE OF THINGS AT HOME, OR GET ALONG WITH OTHER PEOPLE: 0

## 2023-10-17 ASSESSMENT — ENCOUNTER SYMPTOMS
VOMITING: 0
NAUSEA: 0
ABDOMINAL PAIN: 0
DIARRHEA: 0
SHORTNESS OF BREATH: 0
COUGH: 0

## 2023-10-17 NOTE — PATIENT INSTRUCTIONS
IT WAS GREAT TO SEE YOU TODAY! I WILL CALL YOU WITH THE RESULTS OF YOUR LABS. PLEASE TAKE ALL MEDICATION AS DISCUSSED. CONGRATULATIONS ON YOUR WEIGHT LOSS!  KEEP UP THE GOOD WORK!  KEEP WALKING AROUND YOUR HOUSE TO BUILD YOUR STRENGTH!     I WILL SEE YOU AGAIN IN 3 MONTHS BUT PLEASE CALL WITH CONCERNS [phone]

## 2023-10-17 NOTE — ASSESSMENT & PLAN NOTE
Hemoglobin A1C   Date Value Ref Range Status   06/14/2023 10.6 (H) 4.8 - 5.6 % Final     Patient has increased her 70/30, has been working with [address] [place] [address] and states that she has lost weight and been more active. Plan to repeat labs today. Diabetic foot exam performed.

## 2023-10-17 NOTE — ASSESSMENT & PLAN NOTE
Last lipid panel WNL, patient doing well on Atorvastatin, will check lipids today as patient has been working on diet.

## 2023-11-13 ENCOUNTER — TELEPHONE (OUTPATIENT)
Dept: PRIMARY CARE CLINIC | Facility: CLINIC | Age: 78
End: 2023-11-13

## 2023-11-13 RX ORDER — WHEELCHAIR
1 EACH MISCELLANEOUS DAILY
Qty: 1 EACH | Refills: 0 | Status: SHIPPED | OUTPATIENT
Start: 2023-11-13

## 2023-11-13 NOTE — TELEPHONE ENCOUNTER
I can send a prescription to a medical supply company, does she know which one takes her insurance? I do not like the fact that she is more short of breath than before. Any chest pain or cough? Is she still getting physical therapy?

## 2023-11-13 NOTE — TELEPHONE ENCOUNTER
Patient called requesting a wheelchair at home. She stated that she is now finding that she has more of a shortness of breath and unsteadiness in her gait when she is moving about at home and is requesting a wheelchair.

## 2023-11-13 NOTE — TELEPHONE ENCOUNTER
Christian Santana called back stating she does not qualify based on her last visit note, as it states that she was mobile at home.

## 2023-11-13 NOTE — TELEPHONE ENCOUNTER
Prescription and corresponding information faxed today to [facility]. Patient notified. She stated that her shortness of breath has not changed since her last visit with us. She stated that she is not experiencing any unusual pain, just what she normally feels per patient for a 69year old. She is no longer doing PT, last PT visit was in June she believes.

## 2023-12-06 ENCOUNTER — TELEPHONE (OUTPATIENT)
Dept: PRIMARY CARE CLINIC | Facility: CLINIC | Age: 78
End: 2023-12-06

## 2024-01-18 ENCOUNTER — OFFICE VISIT (OUTPATIENT)
Dept: PRIMARY CARE CLINIC | Facility: CLINIC | Age: 79
End: 2024-01-18
Payer: MEDICARE

## 2024-01-18 VITALS
OXYGEN SATURATION: 93 % | DIASTOLIC BLOOD PRESSURE: 80 MMHG | BODY MASS INDEX: 39.69 KG/M2 | HEART RATE: 85 BPM | WEIGHT: 224 LBS | HEIGHT: 63 IN | SYSTOLIC BLOOD PRESSURE: 114 MMHG

## 2024-01-18 DIAGNOSIS — I10 ESSENTIAL HYPERTENSION: ICD-10-CM

## 2024-01-18 DIAGNOSIS — E78.5 DYSLIPIDEMIA: ICD-10-CM

## 2024-01-18 DIAGNOSIS — E11.49 TYPE II DIABETES MELLITUS WITH NEUROLOGICAL MANIFESTATIONS NOT AT GOAL (HCC): ICD-10-CM

## 2024-01-18 DIAGNOSIS — N18.30 STAGE 3 CHRONIC KIDNEY DISEASE, UNSPECIFIED WHETHER STAGE 3A OR 3B CKD (HCC): ICD-10-CM

## 2024-01-18 DIAGNOSIS — R54 AGE-RELATED PHYSICAL DEBILITY: Primary | ICD-10-CM

## 2024-01-18 DIAGNOSIS — E03.9 HYPOTHYROIDISM, UNSPECIFIED TYPE: ICD-10-CM

## 2024-01-18 DIAGNOSIS — I82.A21 CHRONIC EMBOLISM AND THROMBOSIS OF RIGHT AXILLARY VEIN (HCC): ICD-10-CM

## 2024-01-18 DIAGNOSIS — E66.01 SEVERE OBESITY WITH BODY MASS INDEX (BMI) OF 35.0 TO 39.9 WITH SERIOUS COMORBIDITY (HCC): ICD-10-CM

## 2024-01-18 DIAGNOSIS — F33.9 RECURRENT DEPRESSION (HCC): ICD-10-CM

## 2024-01-18 DIAGNOSIS — I25.119 ATHEROSCLEROSIS OF NATIVE CORONARY ARTERY OF NATIVE HEART WITH ANGINA PECTORIS (HCC): ICD-10-CM

## 2024-01-18 PROBLEM — R29.898 WEAKNESS OF BOTH LOWER EXTREMITIES: Chronic | Status: ACTIVE | Noted: 2023-03-15

## 2024-01-18 LAB
ALBUMIN SERPL-MCNC: 3.5 G/DL (ref 3.2–4.6)
ALBUMIN/GLOB SERPL: 1 (ref 0.4–1.6)
ALP SERPL-CCNC: 118 U/L (ref 50–136)
ALT SERPL-CCNC: 33 U/L (ref 12–65)
ANION GAP SERPL CALC-SCNC: 3 MMOL/L (ref 2–11)
AST SERPL-CCNC: 23 U/L (ref 15–37)
BASOPHILS # BLD: 0.1 K/UL (ref 0–0.2)
BASOPHILS NFR BLD: 1 % (ref 0–2)
BILIRUB SERPL-MCNC: 0.4 MG/DL (ref 0.2–1.1)
BUN SERPL-MCNC: 18 MG/DL (ref 8–23)
CALCIUM SERPL-MCNC: 9.7 MG/DL (ref 8.3–10.4)
CHLORIDE SERPL-SCNC: 105 MMOL/L (ref 103–113)
CHOLEST SERPL-MCNC: 129 MG/DL
CO2 SERPL-SCNC: 29 MMOL/L (ref 21–32)
CREAT SERPL-MCNC: 0.9 MG/DL (ref 0.6–1)
CREAT UR-MCNC: 165 MG/DL
DIFFERENTIAL METHOD BLD: ABNORMAL
EOSINOPHIL # BLD: 0.1 K/UL (ref 0–0.8)
EOSINOPHIL NFR BLD: 1 % (ref 0.5–7.8)
ERYTHROCYTE [DISTWIDTH] IN BLOOD BY AUTOMATED COUNT: 15.2 % (ref 11.9–14.6)
GLOBULIN SER CALC-MCNC: 3.5 G/DL (ref 2.8–4.5)
GLUCOSE SERPL-MCNC: 43 MG/DL (ref 65–100)
HCT VFR BLD AUTO: 46.3 % (ref 35.8–46.3)
HDLC SERPL-MCNC: 47 MG/DL (ref 40–60)
HDLC SERPL: 2.7
HGB BLD-MCNC: 14.2 G/DL (ref 11.7–15.4)
IMM GRANULOCYTES # BLD AUTO: 0.1 K/UL (ref 0–0.5)
IMM GRANULOCYTES NFR BLD AUTO: 1 % (ref 0–5)
LDLC SERPL CALC-MCNC: 58 MG/DL
LYMPHOCYTES # BLD: 2.4 K/UL (ref 0.5–4.6)
LYMPHOCYTES NFR BLD: 32 % (ref 13–44)
MCH RBC QN AUTO: 25.7 PG (ref 26.1–32.9)
MCHC RBC AUTO-ENTMCNC: 30.7 G/DL (ref 31.4–35)
MCV RBC AUTO: 83.7 FL (ref 82–102)
MICROALBUMIN UR-MCNC: 1.57 MG/DL
MICROALBUMIN/CREAT UR-RTO: 10 MG/G (ref 0–30)
MONOCYTES # BLD: 0.4 K/UL (ref 0.1–1.3)
MONOCYTES NFR BLD: 6 % (ref 4–12)
NEUTS SEG # BLD: 4.6 K/UL (ref 1.7–8.2)
NEUTS SEG NFR BLD: 61 % (ref 43–78)
NRBC # BLD: 0 K/UL (ref 0–0.2)
PLATELET # BLD AUTO: 349 K/UL (ref 150–450)
PMV BLD AUTO: 9.1 FL (ref 9.4–12.3)
POTASSIUM SERPL-SCNC: 3.5 MMOL/L (ref 3.5–5.1)
PROT SERPL-MCNC: 7 G/DL (ref 6.3–8.2)
RBC # BLD AUTO: 5.53 M/UL (ref 4.05–5.2)
SODIUM SERPL-SCNC: 137 MMOL/L (ref 136–146)
TRIGL SERPL-MCNC: 120 MG/DL (ref 35–150)
TSH, 3RD GENERATION: 6.14 UIU/ML (ref 0.36–3.74)
VLDLC SERPL CALC-MCNC: 24 MG/DL (ref 6–23)
WBC # BLD AUTO: 7.6 K/UL (ref 4.3–11.1)

## 2024-01-18 PROCEDURE — G8400 PT W/DXA NO RESULTS DOC: HCPCS | Performed by: FAMILY MEDICINE

## 2024-01-18 PROCEDURE — 1123F ACP DISCUSS/DSCN MKR DOCD: CPT | Performed by: FAMILY MEDICINE

## 2024-01-18 PROCEDURE — G8427 DOCREV CUR MEDS BY ELIG CLIN: HCPCS | Performed by: FAMILY MEDICINE

## 2024-01-18 PROCEDURE — 99214 OFFICE O/P EST MOD 30 MIN: CPT | Performed by: FAMILY MEDICINE

## 2024-01-18 PROCEDURE — 3074F SYST BP LT 130 MM HG: CPT | Performed by: FAMILY MEDICINE

## 2024-01-18 PROCEDURE — G8484 FLU IMMUNIZE NO ADMIN: HCPCS | Performed by: FAMILY MEDICINE

## 2024-01-18 PROCEDURE — 1090F PRES/ABSN URINE INCON ASSESS: CPT | Performed by: FAMILY MEDICINE

## 2024-01-18 PROCEDURE — 3079F DIAST BP 80-89 MM HG: CPT | Performed by: FAMILY MEDICINE

## 2024-01-18 PROCEDURE — 1036F TOBACCO NON-USER: CPT | Performed by: FAMILY MEDICINE

## 2024-01-18 PROCEDURE — G8417 CALC BMI ABV UP PARAM F/U: HCPCS | Performed by: FAMILY MEDICINE

## 2024-01-18 RX ORDER — WHEELCHAIR
1 EACH MISCELLANEOUS DAILY
Qty: 1 EACH | Refills: 0 | Status: SHIPPED | OUTPATIENT
Start: 2024-01-18

## 2024-01-18 ASSESSMENT — PATIENT HEALTH QUESTIONNAIRE - PHQ9
SUM OF ALL RESPONSES TO PHQ QUESTIONS 1-9: 0
SUM OF ALL RESPONSES TO PHQ QUESTIONS 1-9: 0
2. FEELING DOWN, DEPRESSED OR HOPELESS: 0
9. THOUGHTS THAT YOU WOULD BE BETTER OFF DEAD, OR OF HURTING YOURSELF: 0
7. TROUBLE CONCENTRATING ON THINGS, SUCH AS READING THE NEWSPAPER OR WATCHING TELEVISION: 0
1. LITTLE INTEREST OR PLEASURE IN DOING THINGS: 0
5. POOR APPETITE OR OVEREATING: 0
6. FEELING BAD ABOUT YOURSELF - OR THAT YOU ARE A FAILURE OR HAVE LET YOURSELF OR YOUR FAMILY DOWN: 0
SUM OF ALL RESPONSES TO PHQ QUESTIONS 1-9: 0
SUM OF ALL RESPONSES TO PHQ9 QUESTIONS 1 & 2: 0
SUM OF ALL RESPONSES TO PHQ QUESTIONS 1-9: 0
4. FEELING TIRED OR HAVING LITTLE ENERGY: 0
8. MOVING OR SPEAKING SO SLOWLY THAT OTHER PEOPLE COULD HAVE NOTICED. OR THE OPPOSITE, BEING SO FIGETY OR RESTLESS THAT YOU HAVE BEEN MOVING AROUND A LOT MORE THAN USUAL: 0
3. TROUBLE FALLING OR STAYING ASLEEP: 0
10. IF YOU CHECKED OFF ANY PROBLEMS, HOW DIFFICULT HAVE THESE PROBLEMS MADE IT FOR YOU TO DO YOUR WORK, TAKE CARE OF THINGS AT HOME, OR GET ALONG WITH OTHER PEOPLE: 0

## 2024-01-18 ASSESSMENT — ENCOUNTER SYMPTOMS
VOMITING: 0
DIARRHEA: 0
NAUSEA: 0
SHORTNESS OF BREATH: 1
ABDOMINAL PAIN: 0
COUGH: 0

## 2024-01-18 NOTE — ASSESSMENT & PLAN NOTE
Patient with limited movement due to sitting most of the time, has some hip pain.  Asks about a wheelchair to help with leaving the house, uses her walker at home.  Will send a prescription for a wheelchair to a local medical supply store to see if we can get insurance to cover this.

## 2024-01-18 NOTE — PROGRESS NOTES
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         Atherosclerosis of native coronary artery of native heart with angina pectoris (HCC)      Notes SOB with prolonged walking, denies chest pain or SOB at rest.         Essential hypertension      BP stable, will check labs today.         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  Will check labs today and will increase Novolog 70/30 to 40 units BID and will increase Lantus to 64 units once daily.         Relevant Orders    Hemoglobin A1C    Microalbumin / Creatinine Urine Ratio    Hypothyroidism      Stable on Levothyroxine, will check TSH today.         Relevant Orders    TSH       Genitourinary    Chronic renal disease, stage III ([facility]) [688532]      Will recheck BUN and Creatinine today.         Relevant Orders    Comprehensive Metabolic Panel       Other    Dyslipidemia      Patient fasting, will check lipids today.  Doing well on Atorvastatin.         Relevant Orders    Lipid Panel    Severe obesity with body mass index (BMI) of 35.0 to 39.9 with serious comorbidity (HCC)        Wt Readings from Last 3 Encounters:   01/18/24 101.6 kg (224 lb)   10/17/23 100.2 kg (221 lb)   07/19/23 104.3 kg (230 lb)   Patient with slight increase in her weight from the holidays.  Discussed moving more and returning to a healthy diet to help improve her weight.         Recurrent depression (HCC)     Doing well recently, stable on Lexapro.

## 2024-01-18 NOTE — ASSESSMENT & PLAN NOTE
Wt Readings from Last 3 Encounters:   01/18/24 101.6 kg (224 lb)   10/17/23 100.2 kg (221 lb)   07/19/23 104.3 kg (230 lb)     Patient with slight increase in her weight from the holidays.  Discussed moving more and returning to a healthy diet to help improve her weight.

## 2024-01-18 NOTE — PATIENT INSTRUCTIONS
IT WAS GREAT TO SEE YOU TODAY!    I WILL CALL YOU WITH THE RESULTS OF YOUR LABS.    PLEASE TAKE ALL MEDICATION AS DISCUSSED.    ~WE ARE INCREASING YOUR LANTUS AT NIGHT TO 64 UNITS DAILY.    ~WE ARE INCREASING YOUR NOVOLOG 70/30 TO 40 UNITS TWICE A DAY.    ~I WILL LET YOU KNOW WHEN WE FIND A MEDICAL SUPPLY STORE THAT WILL GET YOU A WHEELCHAIR THROUGH YOUR INSURANCE.    TRY TO GET UP AND WALK MORE.    WORK ON YOUR DIET SO WE CAN GET YOUR BLOOD SUGARS BETTER!    I WILL SEE YOU AGAIN IN 3 MONTHS BUT PLEASE CALL WITH CONCERNS [phone]

## 2024-01-19 ENCOUNTER — OFFICE VISIT (OUTPATIENT)
Age: 79
End: 2024-01-19
Payer: MEDICARE

## 2024-01-19 ENCOUNTER — TELEPHONE (OUTPATIENT)
Dept: PRIMARY CARE CLINIC | Facility: CLINIC | Age: 79
End: 2024-01-19

## 2024-01-19 VITALS
BODY MASS INDEX: 40.29 KG/M2 | HEIGHT: 63 IN | WEIGHT: 227.4 LBS | HEART RATE: 80 BPM | SYSTOLIC BLOOD PRESSURE: 132 MMHG | DIASTOLIC BLOOD PRESSURE: 80 MMHG

## 2024-01-19 DIAGNOSIS — E78.5 DYSLIPIDEMIA: Primary | ICD-10-CM

## 2024-01-19 DIAGNOSIS — I10 ESSENTIAL HYPERTENSION: ICD-10-CM

## 2024-01-19 DIAGNOSIS — I25.10 CORONARY ARTERY DISEASE INVOLVING NATIVE CORONARY ARTERY OF NATIVE HEART WITHOUT ANGINA PECTORIS: ICD-10-CM

## 2024-01-19 LAB
EST. AVERAGE GLUCOSE BLD GHB EST-MCNC: 206 MG/DL
HBA1C MFR BLD: 8.8 % (ref 4.8–5.6)

## 2024-01-19 PROCEDURE — 3075F SYST BP GE 130 - 139MM HG: CPT | Performed by: INTERNAL MEDICINE

## 2024-01-19 PROCEDURE — 3079F DIAST BP 80-89 MM HG: CPT | Performed by: INTERNAL MEDICINE

## 2024-01-19 PROCEDURE — G8484 FLU IMMUNIZE NO ADMIN: HCPCS | Performed by: INTERNAL MEDICINE

## 2024-01-19 PROCEDURE — 1036F TOBACCO NON-USER: CPT | Performed by: INTERNAL MEDICINE

## 2024-01-19 PROCEDURE — 99214 OFFICE O/P EST MOD 30 MIN: CPT | Performed by: INTERNAL MEDICINE

## 2024-01-19 PROCEDURE — G8417 CALC BMI ABV UP PARAM F/U: HCPCS | Performed by: INTERNAL MEDICINE

## 2024-01-19 PROCEDURE — G8428 CUR MEDS NOT DOCUMENT: HCPCS | Performed by: INTERNAL MEDICINE

## 2024-01-19 PROCEDURE — 1090F PRES/ABSN URINE INCON ASSESS: CPT | Performed by: INTERNAL MEDICINE

## 2024-01-19 PROCEDURE — G8400 PT W/DXA NO RESULTS DOC: HCPCS | Performed by: INTERNAL MEDICINE

## 2024-01-19 PROCEDURE — 1123F ACP DISCUSS/DSCN MKR DOCD: CPT | Performed by: INTERNAL MEDICINE

## 2024-01-19 NOTE — PROGRESS NOTES
[facility] CARDIOLOGY  [address] [place] [address], [address]  [place] [address]  PHONE: [phone]      24    NAME:  Amber Escobedo  : 1945  MRN: [id number]       SUBJECTIVE:   Amber Escobedo is a 78 y.o. female seen for a follow up visit regarding the following:     Chief Complaint   Patient presents with    Coronary Artery Disease    Hypertension         HPI:  PEPPER at extremes of exertion.  No cp. No orthopnea or pnd. No palpitations or syncope.'    Past Medical History, Past Surgical History, Family history, Social History, and Medications were all reviewed with the patient today and updated as necessary.     Current Outpatient Medications   Medication Sig Dispense Refill    apixaban (ELIQUIS) 5 MG TABS tablet Take 1 tablet by mouth 2 times daily 180 tablet 1    insulin aspart prot & aspart (NOVOLOG MIX 70/30 FLEXPEN) injection pen Inject 38 Units into the skin 2 times daily (with meals) 45 mL 5    acetaminophen (TYLENOL) 500 MG tablet Take 2 tablets by mouth every 6 hours as needed for Pain      insulin glargine (LANTUS SOLOSTAR) 100 UNIT/ML injection pen INJECT 70 UNITS SUBCUTANEOUSLY  AT BEDTIME 90 mL 5    escitalopram (LEXAPRO) 20 MG tablet Take 1 tablet by mouth daily 90 tablet 1    levalbuterol (XOPENEX HFA) 45 MCG/ACT inhaler Inhale 2 puffs into the lungs every 4 hours as needed for Shortness of Breath 15 g 5    atorvastatin (LIPITOR) 40 MG tablet Take 1 tablet by mouth daily 1 per day to replace 20mg for high cholesterol 90 tablet 5    aspirin 325 MG tablet Take 1 tablet by mouth daily      Multiple Vitamins-Minerals (MULTI COMPLETE PO) Take by mouth daily      melatonin 10 MG CAPS capsule Take 1 capsule by mouth nightly      Probiotic Product (PROBIOTIC ADVANCED PO) Take by mouth daily      senna (SENOKOT) 8.6 MG tablet Take 1 tablet by mouth 3-4 times a week      fluticasone (FLONASE) 50 MCG/ACT nasal spray 1 spray by Nasal route daily as needed for Rhinitis 1 puff in each nostril twice

## 2024-01-31 ENCOUNTER — CLINICAL DOCUMENTATION (OUTPATIENT)
Age: 79
End: 2024-01-31

## 2024-04-23 ENCOUNTER — TELEPHONE (OUTPATIENT)
Dept: PRIMARY CARE CLINIC | Facility: CLINIC | Age: 79
End: 2024-04-23

## 2024-04-23 RX ORDER — ATORVASTATIN CALCIUM 40 MG/1
40 TABLET, FILM COATED ORAL DAILY
Qty: 90 TABLET | Refills: 5 | Status: SHIPPED | OUTPATIENT
Start: 2024-04-23

## 2024-05-07 SDOH — ECONOMIC STABILITY: FOOD INSECURITY: WITHIN THE PAST 12 MONTHS, YOU WORRIED THAT YOUR FOOD WOULD RUN OUT BEFORE YOU GOT MONEY TO BUY MORE.: NEVER TRUE

## 2024-05-07 SDOH — ECONOMIC STABILITY: INCOME INSECURITY: HOW HARD IS IT FOR YOU TO PAY FOR THE VERY BASICS LIKE FOOD, HOUSING, MEDICAL CARE, AND HEATING?: NOT VERY HARD

## 2024-05-07 SDOH — ECONOMIC STABILITY: FOOD INSECURITY: WITHIN THE PAST 12 MONTHS, THE FOOD YOU BOUGHT JUST DIDN'T LAST AND YOU DIDN'T HAVE MONEY TO GET MORE.: NEVER TRUE

## 2024-05-07 SDOH — ECONOMIC STABILITY: TRANSPORTATION INSECURITY
IN THE PAST 12 MONTHS, HAS LACK OF TRANSPORTATION KEPT YOU FROM MEETINGS, WORK, OR FROM GETTING THINGS NEEDED FOR DAILY LIVING?: NO

## 2024-05-09 ENCOUNTER — OFFICE VISIT (OUTPATIENT)
Dept: PRIMARY CARE CLINIC | Facility: CLINIC | Age: 79
End: 2024-05-09

## 2024-05-09 VITALS
HEART RATE: 55 BPM | DIASTOLIC BLOOD PRESSURE: 82 MMHG | HEIGHT: 63 IN | OXYGEN SATURATION: 93 % | SYSTOLIC BLOOD PRESSURE: 138 MMHG | BODY MASS INDEX: 40.22 KG/M2 | WEIGHT: 227 LBS

## 2024-05-09 DIAGNOSIS — R54 AGE-RELATED PHYSICAL DEBILITY: ICD-10-CM

## 2024-05-09 DIAGNOSIS — Z86.73 HISTORY OF STROKE: ICD-10-CM

## 2024-05-09 DIAGNOSIS — E11.49 TYPE II DIABETES MELLITUS WITH NEUROLOGICAL MANIFESTATIONS NOT AT GOAL (HCC): Primary | ICD-10-CM

## 2024-05-09 DIAGNOSIS — L72.0 EPIDERMOID CYST OF SKIN OF BACK: ICD-10-CM

## 2024-05-09 RX ORDER — INSULIN GLARGINE 100 [IU]/ML
INJECTION, SOLUTION SUBCUTANEOUS
Qty: 90 ML | Refills: 5 | Status: SHIPPED | OUTPATIENT
Start: 2024-05-09

## 2024-05-09 RX ORDER — CEPHALEXIN 500 MG/1
500 CAPSULE ORAL 2 TIMES DAILY
Qty: 14 CAPSULE | Refills: 0 | Status: SHIPPED | OUTPATIENT
Start: 2024-05-09

## 2024-05-09 RX ORDER — ATORVASTATIN CALCIUM 40 MG/1
40 TABLET, FILM COATED ORAL DAILY
Qty: 90 TABLET | Refills: 5 | Status: CANCELLED | OUTPATIENT
Start: 2024-05-09

## 2024-05-09 SDOH — ECONOMIC STABILITY: FOOD INSECURITY: WITHIN THE PAST 12 MONTHS, YOU WORRIED THAT YOUR FOOD WOULD RUN OUT BEFORE YOU GOT MONEY TO BUY MORE.: NEVER TRUE

## 2024-05-09 SDOH — ECONOMIC STABILITY: INCOME INSECURITY: HOW HARD IS IT FOR YOU TO PAY FOR THE VERY BASICS LIKE FOOD, HOUSING, MEDICAL CARE, AND HEATING?: NOT HARD AT ALL

## 2024-05-09 SDOH — ECONOMIC STABILITY: FOOD INSECURITY: WITHIN THE PAST 12 MONTHS, THE FOOD YOU BOUGHT JUST DIDN'T LAST AND YOU DIDN'T HAVE MONEY TO GET MORE.: NEVER TRUE

## 2024-05-09 ASSESSMENT — PATIENT HEALTH QUESTIONNAIRE - PHQ9
SUM OF ALL RESPONSES TO PHQ9 QUESTIONS 1 & 2: 0
SUM OF ALL RESPONSES TO PHQ QUESTIONS 1-9: 0
1. LITTLE INTEREST OR PLEASURE IN DOING THINGS: NOT AT ALL
SUM OF ALL RESPONSES TO PHQ9 QUESTIONS 1 & 2: 0
2. FEELING DOWN, DEPRESSED OR HOPELESS: NOT AT ALL
1. LITTLE INTEREST OR PLEASURE IN DOING THINGS: NOT AT ALL
SUM OF ALL RESPONSES TO PHQ QUESTIONS 1-9: 0
SUM OF ALL RESPONSES TO PHQ QUESTIONS 1-9: 0
2. FEELING DOWN, DEPRESSED OR HOPELESS: NOT AT ALL
SUM OF ALL RESPONSES TO PHQ QUESTIONS 1-9: 0
SUM OF ALL RESPONSES TO PHQ QUESTIONS 1-9: 0

## 2024-05-09 ASSESSMENT — ENCOUNTER SYMPTOMS
SHORTNESS OF BREATH: 0
ABDOMINAL PAIN: 0
VOMITING: 0
DIARRHEA: 0
COUGH: 0
NAUSEA: 0

## 2024-05-09 NOTE — PATIENT INSTRUCTIONS
IT WAS GREAT TO SEE YOU TODAY!    PLEASE WORK HARD ON YOUR DIET - LESS RED MEAT, LESS DAIRY PRODUCTS, LESS STARCHES (LIKE POTATOES, WHITE RICE, WHITE BREAD, PASTA, CHIPS, TORTILLAS, BAKED GOODS, CANDY AND OTHER SWEETS), AS WELL AS LESS SODA/ICED TEA/JUICE/ALCOHOL.  DRINK MORE WATER AND EAT MORE FRUITS, VEGETABLES, NUTS AND BEANS.    PLEASE TRY TO WALK AROUND MORE WITH YOUR WALKER TO BUILD UP STRENGTH IN YOUR LEGS.  MAKE SURE TO SIT DOWN ON YOUR WALKER IF YOU GET TIRED OR DIZZY.  DO YOUR EXERCISES FROM PHYSICAL THERAPY.    PLEASE TAKE ALL MEDICATION AS DISCUSSED.    I WILL SEE YOU AGAIN IN 3 MONTHS BUT PLEASE CALL WITH CONCERNS [phone]

## 2024-05-09 NOTE — PROGRESS NOTES
[facility]  Britt Villafana [place], DO  [address] 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  Wants to work on healthier diet.  Will continue 64 units of insulin and continue Novolin 70/30 twice daily.  Refill of Lantus sent to the pharmacy.         Relevant Medications    insulin glargine (LANTUS SOLOSTAR) 100 UNIT/ML injection pen       Musculoskeletal and Integument    Epidermoid cyst of skin of back     Patient with cyst on her back that became very swollen, painful and drained bloody purulent drainage.  Daughter has been keeping it covered and notes that it has mostly closed up.  Still with tenderness and mild erythema, will treat with Keflex.              Other    Age-related physical debility      Patient continues to decline home health PT due to having dogs at home that go crazy.  Does not move around much and complains that her legs are getting weaker.  Discussed importance of getting up and moving around, daughter will likely be moving to [place] so patient will have to be more self-sufficient.  Has a walker at home to help with stability.  Encouraged to get up and be more active now while her daughter is there to help improve her weakness prior to living on her own.         History of stroke        The diagnoses and plan were discussed with the patient, who verbalizes understanding and agrees with plan.  All questions answered.    Chief Complaint    Chief Complaint   Patient presents with    3 Month Follow-Up    Medication Refill     Pt has questions about

## 2024-05-10 NOTE — ASSESSMENT & PLAN NOTE
Hemoglobin A1C   Date Value Ref Range Status   01/18/2024 8.8 (H) 4.8 - 5.6 % Final     Patient with elevated A1C on last check, states that she just completed several weeks of birthdays so does not want to check labs today.  Wants to work on healthier diet.  Will continue 64 units of insulin and continue Novolin 70/30 twice daily.  Refill of Lantus sent to the pharmacy.

## 2024-05-10 NOTE — ASSESSMENT & PLAN NOTE
Patient continues to decline home health PT due to having dogs at home that go crazy.  Does not move around much and complains that her legs are getting weaker.  Discussed importance of getting up and moving around, daughter will likely be moving to [place] so patient will have to be more self-sufficient.  Has a walker at home to help with stability.  Encouraged to get up and be more active now while her daughter is there to help improve her weakness prior to living on her own.

## 2024-05-10 NOTE — ASSESSMENT & PLAN NOTE
Patient with cyst on her back that became very swollen, painful and drained bloody purulent drainage.  Daughter has been keeping it covered and notes that it has mostly closed up.  Still with tenderness and mild erythema, will treat with Keflex.

## 2024-05-28 RX ORDER — APIXABAN 5 MG/1
5 TABLET, FILM COATED ORAL 2 TIMES DAILY
Qty: 180 TABLET | Refills: 0 | Status: SHIPPED | OUTPATIENT
Start: 2024-05-28

## 2024-07-24 ENCOUNTER — TELEPHONE (OUTPATIENT)
Dept: PRIMARY CARE CLINIC | Facility: CLINIC | Age: 79
End: 2024-07-24

## 2024-07-24 RX ORDER — ESCITALOPRAM OXALATE 20 MG/1
20 TABLET ORAL DAILY
Qty: 90 TABLET | Refills: 1 | Status: SHIPPED | OUTPATIENT
Start: 2024-07-24

## 2024-07-24 NOTE — TELEPHONE ENCOUNTER
Patient called requesting refill for her anxiety medicine. Unsure what the name was.    Send to:  [facility] # [id number] [address] - P [phone] - F [phone]

## 2024-08-07 ENCOUNTER — OFFICE VISIT (OUTPATIENT)
Age: 79
End: 2024-08-07
Payer: MEDICARE

## 2024-08-07 VITALS
HEIGHT: 64 IN | BODY MASS INDEX: 38.76 KG/M2 | HEART RATE: 80 BPM | DIASTOLIC BLOOD PRESSURE: 70 MMHG | SYSTOLIC BLOOD PRESSURE: 122 MMHG | WEIGHT: 227 LBS

## 2024-08-07 DIAGNOSIS — I25.10 CORONARY ARTERY DISEASE INVOLVING NATIVE CORONARY ARTERY OF NATIVE HEART WITHOUT ANGINA PECTORIS: ICD-10-CM

## 2024-08-07 DIAGNOSIS — I10 ESSENTIAL HYPERTENSION: Primary | ICD-10-CM

## 2024-08-07 DIAGNOSIS — E78.5 DYSLIPIDEMIA: ICD-10-CM

## 2024-08-07 PROCEDURE — G8428 CUR MEDS NOT DOCUMENT: HCPCS | Performed by: INTERNAL MEDICINE

## 2024-08-07 PROCEDURE — 99214 OFFICE O/P EST MOD 30 MIN: CPT | Performed by: INTERNAL MEDICINE

## 2024-08-07 PROCEDURE — 1036F TOBACCO NON-USER: CPT | Performed by: INTERNAL MEDICINE

## 2024-08-07 PROCEDURE — 3078F DIAST BP <80 MM HG: CPT | Performed by: INTERNAL MEDICINE

## 2024-08-07 PROCEDURE — 3074F SYST BP LT 130 MM HG: CPT | Performed by: INTERNAL MEDICINE

## 2024-08-07 PROCEDURE — 1090F PRES/ABSN URINE INCON ASSESS: CPT | Performed by: INTERNAL MEDICINE

## 2024-08-07 PROCEDURE — G8417 CALC BMI ABV UP PARAM F/U: HCPCS | Performed by: INTERNAL MEDICINE

## 2024-08-07 PROCEDURE — 1123F ACP DISCUSS/DSCN MKR DOCD: CPT | Performed by: INTERNAL MEDICINE

## 2024-08-07 PROCEDURE — G8400 PT W/DXA NO RESULTS DOC: HCPCS | Performed by: INTERNAL MEDICINE

## 2024-08-07 NOTE — PROGRESS NOTES
[facility] CARDIOLOGY  [address] [place] [address], [facility] [address]  [place] [address]  PHONE: [phone]      24    NAME:  Amber Escobedo  : 1945  MRN: [id number]       SUBJECTIVE:   Amber Escobedo is a 79 y.o. female seen for a follow up visit regarding the following:     Chief Complaint   Patient presents with    Coronary Artery Disease    Hypertension         HPI:    No cp. No orthopnea or pnd. No palpitations or syncope.  Ribeiro AT EXTREMES OF EXERTION.    Past Medical History, Past Surgical History, Family history, Social History, and Medications were all reviewed with the patient today and updated as necessary.     Current Outpatient Medications   Medication Sig Dispense Refill    escitalopram (LEXAPRO) 20 MG tablet Take 1 tablet by mouth daily 90 tablet 1    ELIQUIS 5 MG TABS tablet TAKE ONE TABLET BY MOUTH TWICE DAILY 180 tablet 0    insulin glargine (LANTUS SOLOSTAR) 100 UNIT/ML injection pen INJECT 70 UNITS SUBCUTANEOUSLY  AT BEDTIME 90 mL 5    atorvastatin (LIPITOR) 40 MG tablet Take 1 tablet by mouth daily 90 tablet 5    insulin aspart prot & aspart (NOVOLOG MIX 70/30 FLEXPEN) injection pen Inject 38 Units into the skin 2 times daily (with meals) 45 mL 5    acetaminophen (TYLENOL) 500 MG tablet Take 2 tablets by mouth every 6 hours as needed for Pain      levalbuterol (XOPENEX HFA) 45 MCG/ACT inhaler Inhale 2 puffs into the lungs every 4 hours as needed for Shortness of Breath 15 g 5    aspirin 325 MG tablet Take 1 tablet by mouth daily      Multiple Vitamins-Minerals (MULTI COMPLETE PO) Take by mouth daily      melatonin 10 MG CAPS capsule Take 1 capsule by mouth nightly      Probiotic Product (PROBIOTIC ADVANCED PO) Take by mouth daily      senna (SENOKOT) 8.6 MG tablet Take 1 tablet by mouth 3-4 times a week      fluticasone (FLONASE) 50 MCG/ACT nasal spray 1 spray by Nasal route daily as needed for Rhinitis 1 puff in each nostril twice daily      levothyroxine (SYNTHROID) 25 MCG tablet Take 1

## 2024-08-15 ENCOUNTER — OFFICE VISIT (OUTPATIENT)
Dept: PRIMARY CARE CLINIC | Facility: CLINIC | Age: 79
End: 2024-08-15

## 2024-08-15 VITALS
TEMPERATURE: 97.7 F | HEART RATE: 85 BPM | WEIGHT: 228 LBS | BODY MASS INDEX: 38.93 KG/M2 | OXYGEN SATURATION: 94 % | HEIGHT: 64 IN | DIASTOLIC BLOOD PRESSURE: 70 MMHG | SYSTOLIC BLOOD PRESSURE: 134 MMHG

## 2024-08-15 DIAGNOSIS — R29.898 WEAKNESS OF BOTH LOWER EXTREMITIES: ICD-10-CM

## 2024-08-15 DIAGNOSIS — Z79.4 TYPE 2 DIABETES MELLITUS WITH DIABETIC NEUROPATHY, WITH LONG-TERM CURRENT USE OF INSULIN (HCC): ICD-10-CM

## 2024-08-15 DIAGNOSIS — E11.40 TYPE 2 DIABETES MELLITUS WITH DIABETIC NEUROPATHY, WITH LONG-TERM CURRENT USE OF INSULIN (HCC): ICD-10-CM

## 2024-08-15 DIAGNOSIS — R79.89 ELEVATED TSH: Primary | ICD-10-CM

## 2024-08-15 DIAGNOSIS — I10 ESSENTIAL HYPERTENSION: ICD-10-CM

## 2024-08-15 DIAGNOSIS — R94.6 ABNORMAL RESULTS OF THYROID FUNCTION STUDIES: ICD-10-CM

## 2024-08-15 DIAGNOSIS — K59.04 CHRONIC IDIOPATHIC CONSTIPATION: ICD-10-CM

## 2024-08-15 DIAGNOSIS — F33.9 RECURRENT DEPRESSION (HCC): ICD-10-CM

## 2024-08-15 LAB
EST. AVERAGE GLUCOSE BLD GHB EST-MCNC: 203 MG/DL
HBA1C MFR BLD: 8.7 % (ref 0–5.6)
T4 FREE SERPL-MCNC: 0.9 NG/DL (ref 0.9–1.7)
TSH, 3RD GENERATION: 3.27 UIU/ML (ref 0.27–4.2)

## 2024-08-15 RX ORDER — INSULIN ASPART 100 [IU]/ML
38 INJECTION, SUSPENSION SUBCUTANEOUS 2 TIMES DAILY WITH MEALS
Qty: 45 ML | Refills: 5 | Status: SHIPPED | OUTPATIENT
Start: 2024-08-15

## 2024-08-15 RX ORDER — LEVOTHYROXINE SODIUM 0.03 MG/1
25 TABLET ORAL
Qty: 90 TABLET | Refills: 2 | Status: SHIPPED | OUTPATIENT
Start: 2024-08-15

## 2024-08-15 ASSESSMENT — PATIENT HEALTH QUESTIONNAIRE - PHQ9
1. LITTLE INTEREST OR PLEASURE IN DOING THINGS: NOT AT ALL
SUM OF ALL RESPONSES TO PHQ QUESTIONS 1-9: 0
3. TROUBLE FALLING OR STAYING ASLEEP: NOT AT ALL
7. TROUBLE CONCENTRATING ON THINGS, SUCH AS READING THE NEWSPAPER OR WATCHING TELEVISION: NOT AT ALL
6. FEELING BAD ABOUT YOURSELF - OR THAT YOU ARE A FAILURE OR HAVE LET YOURSELF OR YOUR FAMILY DOWN: NOT AT ALL
2. FEELING DOWN, DEPRESSED OR HOPELESS: NOT AT ALL
SUM OF ALL RESPONSES TO PHQ QUESTIONS 1-9: 0
SUM OF ALL RESPONSES TO PHQ9 QUESTIONS 1 & 2: 0
8. MOVING OR SPEAKING SO SLOWLY THAT OTHER PEOPLE COULD HAVE NOTICED. OR THE OPPOSITE, BEING SO FIGETY OR RESTLESS THAT YOU HAVE BEEN MOVING AROUND A LOT MORE THAN USUAL: NOT AT ALL
4. FEELING TIRED OR HAVING LITTLE ENERGY: NOT AT ALL
5. POOR APPETITE OR OVEREATING: NOT AT ALL
SUM OF ALL RESPONSES TO PHQ QUESTIONS 1-9: 0
10. IF YOU CHECKED OFF ANY PROBLEMS, HOW DIFFICULT HAVE THESE PROBLEMS MADE IT FOR YOU TO DO YOUR WORK, TAKE CARE OF THINGS AT HOME, OR GET ALONG WITH OTHER PEOPLE: NOT DIFFICULT AT ALL
SUM OF ALL RESPONSES TO PHQ QUESTIONS 1-9: 0
9. THOUGHTS THAT YOU WOULD BE BETTER OFF DEAD, OR OF HURTING YOURSELF: NOT AT ALL

## 2024-08-15 ASSESSMENT — ENCOUNTER SYMPTOMS
COUGH: 0
ABDOMINAL PAIN: 0
NAUSEA: 0
DIARRHEA: 0
SHORTNESS OF BREATH: 0
VOMITING: 0

## 2024-08-15 NOTE — PROGRESS NOTES
[facility]  Britt Villafana [place], DO  [address] [facility], [place] [address] [phone]         ASSESSMENT AND PLAN    Problem List Items Addressed This Visit          Circulatory    Essential hypertension      Chronic, stable off of medications.  Recent visit with cardiology with no abnormalities.  Will continue to monitor.            Digestive    Chronic idiopathic constipation      Chronic issue, patient taking senna.  Discussed use of smooth move tea that patient can purchase and drink at home that also has Senokot in it.            Endocrine    Type 2 diabetes mellitus with diabetic neuropathy (HCC)     Hemoglobin A1C   Date Value Ref Range Status   01/18/2024 8.8 (H) 4.8 - 5.6 % Final     Chronic, patient admits she has not been following a healthy diet but informed her that she did not want labs done at her last visit 3 months ago due to the same excuse.  Patient has concerns going into the fall due to Thanksgiving and Renaldo.  Discussed need to work on her healthy diet and get her blood sugars under control.  Refill sent for her insulin, will check labs today.         Relevant Medications    insulin aspart prot & aspart (NOVOLOG MIX 70/30 FLEXPEN) injection pen       Other    Weakness of both lower extremities      Chronic, admittedly not walking around her house.  Encouraged to get up and walk more, hopefully restarting her thyroid medicine will help with this motivation.         Recurrent depression (HCC)      Chronic, stable on Lexapro.  Patient believes she is doing well on this dose despite her daughter recently moving.  Will continue to follow.         Elevated TSH - Primary      Patient with last few TSH is elevated, though only mildly.  Patient does not know that she has been taking her thyroid medication.  Sent refill to the pharmacy, will check levels today.         Relevant Orders    TSH    T4, Free    T3, Free    Abnormal results of thyroid

## 2024-08-15 NOTE — ASSESSMENT & PLAN NOTE
Chronic, stable off of medications.  Recent visit with cardiology with no abnormalities.  Will continue to monitor.

## 2024-08-15 NOTE — ASSESSMENT & PLAN NOTE
Chronic, stable on Lexapro.  Patient believes she is doing well on this dose despite her daughter recently moving.  Will continue to follow.

## 2024-08-15 NOTE — PATIENT INSTRUCTIONS
IT WAS GREAT TO SEE YOU TODAY!    I WILL CONTACT YOU WITH THE RESULTS OF YOUR LABS.    PLEASE TAKE ALL MEDICATION AS DISCUSSED.    ~CONTINUE YOUR NOVOLIN.    ~MAKE SURE YOU ARE TAKING 25 MCG OF LEVOTHYROXINE DAILY FOR YOUR THYROID.      TRY TO WALK MORE!!  WORK ON DIET!!    LOOK FOR THIS TEA ON [employer] OR LOCAL [employer] TO HELP WITH CONSTIPATION:  [email or web address]    I WILL SEE YOU AGAIN IN 3 MONTHS BUT PLEASE CALL WITH CONCERNS [phone]

## 2024-08-15 NOTE — ASSESSMENT & PLAN NOTE
Hemoglobin A1C   Date Value Ref Range Status   01/18/2024 8.8 (H) 4.8 - 5.6 % Final     Chronic, patient admits she has not been following a healthy diet but informed her that she did not want labs done at her last visit 3 months ago due to the same excuse.  Patient has concerns going into the fall due to Thanksgiving and Renaldo.  Discussed need to work on her healthy diet and get her blood sugars under control.  Refill sent for her insulin, will check labs today.

## 2024-08-15 NOTE — ASSESSMENT & PLAN NOTE
Chronic issue, patient taking senna.  Discussed use of smooth move tea that patient can purchase and drink at home that also has Senokot in it.

## 2024-08-15 NOTE — ASSESSMENT & PLAN NOTE
Chronic, admittedly not walking around her house.  Encouraged to get up and walk more, hopefully restarting her thyroid medicine will help with this motivation.

## 2024-08-15 NOTE — ASSESSMENT & PLAN NOTE
Patient with last few TSH is elevated, though only mildly.  Patient does not know that she has been taking her thyroid medication.  Sent refill to the pharmacy, will check levels today.

## 2024-08-17 LAB — T3FREE SERPL-MCNC: 2.7 PG/ML (ref 2–4.4)

## 2024-08-27 RX ORDER — APIXABAN 5 MG/1
5 TABLET, FILM COATED ORAL 2 TIMES DAILY
Qty: 180 TABLET | Refills: 0 | Status: SHIPPED | OUTPATIENT
Start: 2024-08-27

## 2024-11-27 ENCOUNTER — OFFICE VISIT (OUTPATIENT)
Dept: PRIMARY CARE CLINIC | Facility: CLINIC | Age: 79
End: 2024-11-27

## 2024-11-27 VITALS
HEART RATE: 88 BPM | OXYGEN SATURATION: 93 % | WEIGHT: 230 LBS | TEMPERATURE: 97.3 F | BODY MASS INDEX: 39.27 KG/M2 | SYSTOLIC BLOOD PRESSURE: 114 MMHG | HEIGHT: 64 IN | DIASTOLIC BLOOD PRESSURE: 68 MMHG

## 2024-11-27 DIAGNOSIS — E03.9 HYPOTHYROIDISM, UNSPECIFIED TYPE: ICD-10-CM

## 2024-11-27 DIAGNOSIS — Z79.4 TYPE 2 DIABETES MELLITUS WITH DIABETIC NEUROPATHY, WITH LONG-TERM CURRENT USE OF INSULIN (HCC): ICD-10-CM

## 2024-11-27 DIAGNOSIS — Z00.00 MEDICARE ANNUAL WELLNESS VISIT, SUBSEQUENT: Primary | ICD-10-CM

## 2024-11-27 DIAGNOSIS — R29.898 WEAKNESS OF BOTH LOWER EXTREMITIES: ICD-10-CM

## 2024-11-27 DIAGNOSIS — E11.40 TYPE 2 DIABETES MELLITUS WITH DIABETIC NEUROPATHY, WITH LONG-TERM CURRENT USE OF INSULIN (HCC): ICD-10-CM

## 2024-11-27 RX ORDER — INSULIN GLARGINE 100 [IU]/ML
INJECTION, SOLUTION SUBCUTANEOUS
Qty: 90 ML | Refills: 5 | Status: SHIPPED | OUTPATIENT
Start: 2024-11-27

## 2024-11-27 ASSESSMENT — ENCOUNTER SYMPTOMS
DIARRHEA: 0
NAUSEA: 0
COUGH: 0
VOMITING: 0
SHORTNESS OF BREATH: 0
ABDOMINAL PAIN: 0

## 2024-11-27 ASSESSMENT — PATIENT HEALTH QUESTIONNAIRE - PHQ9
3. TROUBLE FALLING OR STAYING ASLEEP: NOT AT ALL
8. MOVING OR SPEAKING SO SLOWLY THAT OTHER PEOPLE COULD HAVE NOTICED. OR THE OPPOSITE, BEING SO FIGETY OR RESTLESS THAT YOU HAVE BEEN MOVING AROUND A LOT MORE THAN USUAL: NOT AT ALL
SUM OF ALL RESPONSES TO PHQ QUESTIONS 1-9: 0
4. FEELING TIRED OR HAVING LITTLE ENERGY: NOT AT ALL
2. FEELING DOWN, DEPRESSED OR HOPELESS: NOT AT ALL
1. LITTLE INTEREST OR PLEASURE IN DOING THINGS: NOT AT ALL
5. POOR APPETITE OR OVEREATING: NOT AT ALL
9. THOUGHTS THAT YOU WOULD BE BETTER OFF DEAD, OR OF HURTING YOURSELF: NOT AT ALL
SUM OF ALL RESPONSES TO PHQ9 QUESTIONS 1 & 2: 0
SUM OF ALL RESPONSES TO PHQ QUESTIONS 1-9: 0
10. IF YOU CHECKED OFF ANY PROBLEMS, HOW DIFFICULT HAVE THESE PROBLEMS MADE IT FOR YOU TO DO YOUR WORK, TAKE CARE OF THINGS AT HOME, OR GET ALONG WITH OTHER PEOPLE: NOT DIFFICULT AT ALL
6. FEELING BAD ABOUT YOURSELF - OR THAT YOU ARE A FAILURE OR HAVE LET YOURSELF OR YOUR FAMILY DOWN: NOT AT ALL
SUM OF ALL RESPONSES TO PHQ QUESTIONS 1-9: 0
7. TROUBLE CONCENTRATING ON THINGS, SUCH AS READING THE NEWSPAPER OR WATCHING TELEVISION: NOT AT ALL
SUM OF ALL RESPONSES TO PHQ QUESTIONS 1-9: 0

## 2024-11-27 ASSESSMENT — LIFESTYLE VARIABLES
HOW MANY STANDARD DRINKS CONTAINING ALCOHOL DO YOU HAVE ON A TYPICAL DAY: PATIENT DOES NOT DRINK
HOW OFTEN DO YOU HAVE A DRINK CONTAINING ALCOHOL: NEVER

## 2024-11-27 NOTE — PROGRESS NOTES
Medicare Annual Wellness Visit    Amber Escobedo is here for Medicare AWV and 3 Month Follow-Up    Assessment & Plan   Medicare annual wellness visit, subsequent  Type 2 diabetes mellitus with diabetic neuropathy, with long-term current use of insulin (HCC)  Assessment & Plan:   Chronic, uncontrolled but patient not fasting.  Reports doing better with her blood sugars, doing 40 units of Novolog 70/30 in the morning and 70 units of Lantus at night.  Admits she needs to do better.  Will check labs at next visit.  Encouraged healthier diet after the holidays.  Weakness of both lower extremities  Assessment & Plan:   Chronic, slightly improved now that she has to walk more due to having a puppy.  Encouraged to do this regularly to help with strength.  Hypothyroidism, unspecified type  Assessment & Plan:   Chronic, stable since she restarted her medicine.  Last labs WNL.  Recommendations for Preventive Services Due: see orders and patient instructions/AVS.  Recommended screening schedule for the next 5-10 years is provided to the patient in written form: see Patient Instructions/AVS.     Return in 3 months (on 2/27/2025).     Subjective     80 yo female presents for Medicare AWV and follow up.  Last seen three months ago, was not following a healthy diet and discussed her elevated hemoglobin A1C.  Was not walking around to help with leg weakness and states that her mood was doing well on Lexapro.  Discussed increasing her insulin to 40 units of Novolog in the morning and 70 units of Lantus at night.  Notes that she thinks her blood sugars have been doing fair.  Has been walking around more due to her puppy keeping her busy.      Patient's complete Health Risk Assessment and screening values have been reviewed and are found in Flowsheets. The following problems were reviewed today and where indicated follow up appointments were made and/or referrals ordered.    Positive Risk Factor Screenings with Interventions:    Fall

## 2024-11-27 NOTE — PATIENT INSTRUCTIONS
contact your doctor if you have any problems.  Where can you learn more?  Go to [email or web address].net/patientEd and enter F075 to learn more about \"A Healthy Heart: Care Instructions.\"  Current as of: June 24, 2023  Content Version: 14.2  © 2024 [employer].   Care instructions adapted under license by [employer]. If you have questions about a medical condition or this instruction, always ask your healthcare professional. [employer] disclaims any warranty or liability for your use of this information.      Personalized Preventive Plan for Amber Escobedo - 11/27/2024  Medicare offers a range of preventive health benefits. Some of the tests and screenings are paid in full while other may be subject to a deductible, co-insurance, and/or copay.    Some of these benefits include a comprehensive review of your medical history including lifestyle, illnesses that may run in your family, and various assessments and screenings as appropriate.    After reviewing your medical record and screening and assessments performed today your provider may have ordered immunizations, labs, imaging, and/or referrals for you.  A list of these orders (if applicable) as well as your Preventive Care list are included within your After Visit Summary for your review.    Other Preventive Recommendations:    A preventive eye exam performed by an eye specialist is recommended every 1-2 years to screen for glaucoma; cataracts, macular degeneration, and other eye disorders.  A preventive dental visit is recommended every 6 months.  Try to get at least 150 minutes of exercise per week or 10,000 steps per day on a pedometer .  Order or download the FREE \"Exercise & Physical Activity: Your Everyday Guide\" from [employer] [place] [employer]. Call 1-471.415.3863 or search [employer] [place] [employer] online.  You need 3999-3459 mg of calcium and 3439-6477 IU of vitamin D per day. It is possible to meet your calcium

## 2024-11-27 NOTE — ASSESSMENT & PLAN NOTE
Chronic, slightly improved now that she has to walk more due to having a puppy.  Encouraged to do this regularly to help with strength.

## 2024-11-27 NOTE — ASSESSMENT & PLAN NOTE
Chronic, uncontrolled but patient not fasting.  Reports doing better with her blood sugars, doing 40 units of Novolog 70/30 in the morning and 70 units of Lantus at night.  Admits she needs to do better.  Will check labs at next visit.  Encouraged healthier diet after the holidays.

## 2024-11-28 NOTE — PROGRESS NOTES
Cardiovascular Progress Note  Cardiology / Wes     Chief Complaints:  Shortness of breath, vision changes    History Of Present Illness  Qamar is a 40 year old male with past medical history of hypertension, hyperlipidemia, diabetes, and recurrent pancreatitis who is presenting with shortness of breath and transient blurry vision.    Cardiology on consult for evaluation and management of troponin elevation    Upon chart review, patient went to immediate care clinic on Friday where he was encouraged to go to the emergency department to rule out pulmonary embolism as he was SOB with tachycardia. Patient refused and left AMA.    Had CTA PE done yesterday with no evidence of pulmonary emboli.  Findings significant for hepatic steatosis and small sliding-type hiatal hernia    ECG: Sinus tachycardia with a nonspecific T wave abnormality.  No ischemic changes.    Telemetry reviewed: Sinus tachycardia, -130    High-sensitivity Troponin: 118 --> 103    NTproBNP: 206    Creatinine: 0.79    Chest X-ray:       IMPRESSION: (11/19/2024)  Normal chest    Echocardiogram (11/21/2024):  SUMMARY:  1. Left ventricle: The cavity size is normal. Wall thickness is normal.     Systolic function is normal. The ejection fraction was measured by biplane     method of disks. The ejection fraction is 60%.  2. Aortic valve: Not well visualized.  3. Mitral valve: Not well visualized. There is moderate regurgitation.  4. Right ventricle: The cavity size is normal. Systolic function is normal.  5. Tricuspid valve: There is trivial regurgitation.  RECOMMENDATIONS:  Suboptimal images 2/2 to body habitus Aortic and mitral  valves not well-seen. If clinically warranted, consider transesophageal  echocardiography.    Nuclear stress test (11/21/2024):  IMPRESSION:  Normal myocardial perfusion study.    11/21/2024:  - Denies shortness of breath or dizziness. Has mild chest pain.   - SBP optimal.   - Plan for nuclear stress test today.   - Tele:  MENDOZA phone outreach; no answer. Chart review shows patient d/c from IP stay, then returned to ED for hallucinations. No new orders; D/C to home. Ms. Ar Bartholomew [facility] due to dogs in home and desire for privacy. PCP f/u scheduled for this am.  Will attempt outreach again this afternoon for MENDOZA. Sinus tachycardia.   - Sodium 130    11/22/2024:  - critical limb ischemia s/p emergent left lower extremity thrombectomy.   - Drowsy, but awake and alert. Denies chest pain or shortness of breath. On NC O2.  - SBP elevated to 160-170s  - Tele: sinus tachycardia  - On heparin gtt.     11/23/2024:  - Awake and alert. Denies chest pain. Informed about CASSIE findings and plan for coronary angiogram Monday. Wants to discuss with his family.   - SBP optimal  - Tele: sinus tachycardia  - On heparin gtt    11/26/2024:  - Has some chest tenderness. No shortness of breath. Epicardial wires in place.   - SBP optimal.   - Tele: NSR    11/27/2024:  - Has chest tenderness. Denies shortness of breath.   - SBP optimal.   - Tele: NSR    11/28/2024:  - Denies shortness of breath or dizziness. Has chest tenderness.   - SBP optimal.   - Net neg 9.5L  - Tele: NSR with PACs    Past Medical History  History reviewed. No pertinent past medical history.       Surgical History  History reviewed. No pertinent surgical history.         Social History  Social History     Tobacco Use    Smoking status: Every Day     Current packs/day: 0.50     Average packs/day: 0.5 packs/day for 2.9 years (1.5 ttl pk-yrs)     Types: Cigarettes     Start date: 2022     Passive exposure: Current    Smokeless tobacco: Never   Vaping Use    Vaping status: never used   Substance Use Topics    Alcohol use: Never    Drug use: Never           Family History  History reviewed. No pertinent family history.     Allergies  ALLERGIES:  Patient has no known allergies.    Medications  Medications Prior to Admission   Medication Sig Dispense Refill    Rybelsus 7 MG Tab Take 7 mg by mouth daily.      pantoprazole (PROTONIX) 40 MG tablet Take 40 mg by mouth daily.      lisinopril (ZESTRIL) 10 MG tablet Take 10 mg by mouth daily.      atorvastatin (LIPITOR) 40 MG tablet Take 40 mg by mouth nightly.      metformin (GLUCOPHAGE) 1000 MG tablet Take 1,000 mg by mouth in the morning  and 1,000 mg in the evening. Take with meals.      acetaminophen (TYLENOL) 500 MG tablet Take 1,000 mg by mouth every 6 hours as needed for Pain.         Review of Systems  Constitutional: negative unless in noted in HPI above  Eyes:  negative unless in noted in HPI above  Ears,  negative unless in noted in HPI above  Respiratory:  No SOB/NGUYEN. negative unless in noted in HPI above  Cardiovascular:  No chest pain. negative unless in noted in HPI above  Gastrointestinal:  No n/v/diarrhea. negative unless in noted in HPI above  Genitourinary: No hematuria. negative unless in noted in HPI above  Musculoskeletal: negative unless in noted in HPI above  Neurological:  negative unless in noted in HPI above  Behavioral/Psych:  negative unless in noted in HPI above        Last Recorded Vitals  Blood pressure 129/73, pulse 69, temperature 97.5 °F (36.4 °C), temperature source Oral, resp. rate 18, height 5' 11\" (1.803 m), weight 96.2 kg (212 lb 1.3 oz), SpO2 92%.    Physical Exam  General appearance - awake, alert, nad  Skin: no rashes, no pallor.  Psych - calm, cooperative, no psychosis  Eyes - conjunctive clear. No eye drainage.   Mouth - mucous membranes moist. No erythema. No oral lesions.  Neck - soft, supple. No thyromegaly. No LAD. No jvd.   Pulmonary - clear to auscultation, breathing non-labored. No w/r/r.  CV - tachycardiac,  normal S1, S2, distant. No s3/4. No rub. Non displaced pmi.   GI - bowel sounds present, soft, nontender, nondistended, no hsm.   Neurological - alert, oriented,no focal deficits  Musculoskeletal - perfused and warm,  LLE bandaged with ace wrap.   Ext:  No edema             Relevant Results  WBC (K/mcL)   Date Value   11/28/2024 15.5 (H)     RBC (mil/mcL)   Date Value   11/28/2024 3.04 (L)     HCT (%)   Date Value   11/28/2024 27.2 (L)     HGB (g/dL)   Date Value   11/28/2024 8.8 (L)     PLT (K/mcL)   Date Value   11/28/2024 305     Sodium (mmol/L)   Date Value   11/28/2024 129 (L)     Potassium  (mmol/L)   Date Value   11/28/2024 4.2     Chloride (mmol/L)   Date Value   11/28/2024 96 (L)     Glucose (mg/dL)   Date Value   11/28/2024 254 (H)     Calcium (mg/dL)   Date Value   11/28/2024 8.0 (L)     Carbon Dioxide (mmol/L)   Date Value   11/28/2024 30     BUN (mg/dL)   Date Value   11/28/2024 14     Creatinine (mg/dL)   Date Value   11/28/2024 0.68      No results found for: \"BNP\"     XR CHEST AP OR PA   Final Result      [place] catheter and chest tube removal. No pneumothorax. Stable left   lower lobe airspace consolidation and small left pleural effusion.               Electronically Signed by: MYKEL BARRETO M.D.    Signed on: 11/25/2024 7:06 PM    Workstation ID: [id number]      XR CHEST AP OR PA   Final Result      1.   Postoperative changes. Small bilateral pleural effusions and bibasilar   opacities, likely atelectasis               Electronically Signed by: HELEN GRUBER M.D.    Signed on: 11/25/2024 8:11 AM    Workstation ID: [id number]      XR CHEST AP OR PA   Final Result   FINDINGS/IMPRESSION: Cardiac size within normal limits. Poststernotomy   changes. Post valvular replacement. Right IJ approach [place] the left   main pulmonary artery. Mediastinal drain. Nasogastric tube courses through   the stomach. Endotracheal tube at the thuy. No lobar consolidation,   pleural effusion, or pneumothorax. Possible curvilinear foreign body   overlying/within left upper quadrant abdomen, correlate clinically.       Called to [facility] OR 11.24.24, 1516 hours.               Electronically Signed by: NAYELI LOWE M.D.    Signed on: 11/24/2024 3:17 PM    Workstation ID: [id number]      CT ABDOMEN PELVIS W CONTRAST   Final Result   1.   No acute abnormality of the abdomen or pelvis. Normal appendix.   Moderate stool burden.   2.   Mild improvement of small right and minimal left pleural effusions   with bibasilar atelectasis and consolidations.   3.   Other nonacute findings, as above.          Electronically Signed by: AMOL SWEENEY M.D.    Signed on: 11/24/2024 7:54 AM    Workstation ID: [id number]      FL INTRAOPERATIVE C ARM NO REPORT   Final Result      CTA ABDOMINAL AORTA ILIOFEMORAL BILATERAL RUNOFF   Final Result   Addendum (preliminary) 1 of 1   Addendum:      Critical findings discussed with Dr. Green of vascular surgery at   approximately 4:10 p.m. on the date of examination      Electronically Signed by: JESUS MALDONADO M.D.    Signed on: 11/21/2024 4:23 PM    Workstation ID: [id number]      Final      1. Occlusion of the left popliteal P2 segment with reconstitution of the   anterior tibial artery several centimeters distally. Flow past the left   ankle however is not demonstrated      2. No definite evidence for major arterial occlusive disease in the right   lower extremity      3. Small right pleural effusion, hepatic steatosis         Electronically Signed by: JESUS MALDONADO M.D.    Signed on: 11/21/2024 4:10 PM    Workstation ID: [id number]      NUC MED MYOCARDIAL PERFUSION SPECT MULTI   Final Result   Normal myocardial perfusion study.       This study was interpreted with Dr Mynor Simon DO   Cardiology Fellow       Dictated by: DENAE HOLT DO   Dictated on: 11/21/2024 1:28 PM          SJ ARRIAGA M.D., have reviewed the images and report and   concur with these findings interpreted by DENAE HOLT DO.      Electronically Signed by: SJ FISHER M.D.    Signed on: 11/21/2024 1:34 PM    Workstation ID: [id number]      US VASC UPPER EXTREMITY VENOUS DUPLEX RIGHT   Final Result   1.  Normal exam.  Specifically, there is no evidence of DVT.         Electronically Signed by: GISELLE BENSON M.D.    Signed on: 11/21/2024 3:00 PM    Workstation ID: [id number]      US VASC LOWER EXTREMITY VENOUS DUPLEX BILATERAL   Final Result   1.  Normal lower extremity venous duplex study.  Specifically, there is no   evidence  of acute DVT.         Electronically Signed by: GISELLE BENSON M.D.    Signed on: 11/21/2024 3:00 PM    Workstation ID: [id number]      MRI BRAIN W WO CONTRAST   Final Result   1. 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      2. Focused examination of the orbits is unremarkable, as detailed above.      Electronically Signed by: GISELLE BENSON M.D.    Signed on: 11/21/2024 8:22 AM    Workstation ID: [id number]      MRI ORBIT W WO CONTRAST   Final Result   1. 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      2. Focused examination of the orbits is unremarkable, as detailed above.      Electronically Signed by: GISELLE BENSON M.D.    Signed on: 11/21/2024 8:22 AM    Workstation ID: [id number]      CTA HEAD AND NECK   Final Result   1. Normal CTA neck.      2. Normal CTA head.      Electronically Signed by: JERRI ZELAYA M.D.    Signed on: 11/20/2024 3:18 PM    Workstation ID: [id number]      US LIVER GALLBLADDER PANCREAS (RUQ)   Final Result   1. Diffuse fatty liver infiltration with hepatomegaly.   2. No cholelithiasis or sonographic evidence of cholecystitis.               Electronically Signed by: LOS DEMPSEY M.D.    Signed on: 11/20/2024 9:33 AM    Workstation ID: [id number]      CT HEAD WO CONTRAST   Final Result      2 small nonspecific areas of hypoattenuation within the right cerebellum   (2:6-8). Consider further evaluation with MRI if there is continued   clinical concern.         Electronically Signed by: ODILON MONGE MD    Signed on: 11/20/2024 4:56 AM    Workstation ID: [id number]      CTA CHEST PULMONARY EMBOLISM   Final Result   1.   No evidence of pulmonary emboli.   2.   Small sliding-type hiatal hernia.   3.   Hepatic steatosis.               Electronically Signed by: SARITHA WILKES M.D.    Signed on: 11/19/2024 9:45 PM    Workstation ID: [id number]      XR CHEST PA AND LATERAL 2 VIEWS    Final Result      Normal chest               Electronically Signed by: JERRI ZELAYA M.D.    Signed on: 11/19/2024 7:37 PM    Workstation ID: [id number]                    Assessment & Plan     Patient Active Problem List   Diagnosis    Joseph Foote is a 40 year old male with past medical history of hypertension, hyperlipidemia, diabetes, and recurrent pancreatitis who is presenting with shortness of breath and transient blurry vision.    Cardiology on consult for evaluation and management of troponin elevation    Diagnoses:  - NSTEMI, type II  -- Mitral valve endocarditis  -- Mitral valve regurgitation, severe  - Sinus tachycardia  - Hypertension  - Hyperlipidemia  - Obesity  - Shortness of breath  - Diabetes    Plan    Left popliteal thrombus  Critical limb ischemia  -- s/p emergent thrombectomy on 11/21. Wound vac in place.   -- Consider FD AC when feasible from CVSx standpoint.     Bacteremia  Suspected embolic phenomena   Mitral valve endocarditis  Mitral valve regurgitation, severe  - BCx positive for strep species. On antibiotic, per ID.   - MRI brain with nonhemorrhagic right cerebellar and bilateral cerebral infarcts. Neurology following.   - TTE with EF 60%. Aortic valve and mitral valve not well-seen due to body-habitus.   - CASSIE confirms large, irregular sized vegetation on the anterior leaflet with severe MR.   - s/p emergent MVR on 11/24/2024. On bumex 2 mg IV BID post-surgery.     NSTEMI type II  --Type II NSTEMI, demand ischemia in setting of sustained tachycardia 2/2 hyperadrenergic state from possible sepsis/infection. Continue to treat underlying etiology.  --EKG with non specific ST abnormality. No ischemic changes.   --Troponin elevated to 118 and trending down. Can stop monitoring.  -- Nuclear stress test (11/21/2024) without myocardial perfusion defect.    Sinus tachycardia  -In setting of possible sepsis/infection.  --continue to treat potential underlying causes.   Can  consider fluid boluses as needed.  --Will continue to monitor.     Hypertension  -- On lisinopril 10mg qdaily at home. Will hold off for now d/t soft BPs    Code Status    Code Status: Full Resuscitation    This note was created using the Dragon voice recognition system. Errors in content may be related to improper recognition of the system. Effort to review and correct the note has been made but irregularities may still be present.    Discussed this case with Attending Physician, Dr. Holli Harvey, and he agrees with the plan.    MYA Soto-BC  Cardiology  Office:  [phone] or [employer] or [employer] Chat

## 2024-12-27 PROBLEM — Z00.00 MEDICARE ANNUAL WELLNESS VISIT, SUBSEQUENT: Status: RESOLVED | Noted: 2023-03-15 | Resolved: 2024-12-27

## 2025-01-14 RX ORDER — ESCITALOPRAM OXALATE 20 MG/1
20 TABLET ORAL DAILY
Qty: 90 TABLET | Refills: 1 | Status: SHIPPED | OUTPATIENT
Start: 2025-01-14

## 2025-01-27 ENCOUNTER — TELEPHONE (OUTPATIENT)
Dept: PRIMARY CARE CLINIC | Facility: CLINIC | Age: 80
End: 2025-01-27

## 2025-03-10 ENCOUNTER — APPOINTMENT (OUTPATIENT)
Dept: GENERAL RADIOLOGY | Age: 80
DRG: 388 | End: 2025-03-10
Payer: MEDICARE

## 2025-03-10 ENCOUNTER — APPOINTMENT (OUTPATIENT)
Dept: CT IMAGING | Age: 80
DRG: 388 | End: 2025-03-10
Payer: MEDICARE

## 2025-03-10 ENCOUNTER — HOSPITAL ENCOUNTER (INPATIENT)
Age: 80
LOS: 9 days | Discharge: ANOTHER HOSPITAL | DRG: 388 | End: 2025-03-19
Attending: GENERAL PRACTICE | Admitting: HOSPITALIST
Payer: MEDICARE

## 2025-03-10 DIAGNOSIS — K56.609 SMALL BOWEL OBSTRUCTION (HCC): Primary | ICD-10-CM

## 2025-03-10 LAB
ALBUMIN SERPL-MCNC: 3.5 G/DL (ref 3.2–4.6)
ALBUMIN/GLOB SERPL: 0.9 (ref 1–1.9)
ALP SERPL-CCNC: 108 U/L (ref 35–104)
ALT SERPL-CCNC: 31 U/L (ref 8–45)
ANION GAP SERPL CALC-SCNC: 14 MMOL/L (ref 7–16)
AST SERPL-CCNC: 31 U/L (ref 15–37)
BASOPHILS # BLD: 0.03 K/UL (ref 0–0.2)
BASOPHILS NFR BLD: 0.2 % (ref 0–2)
BILIRUB SERPL-MCNC: 0.4 MG/DL (ref 0–1.2)
BUN SERPL-MCNC: 17 MG/DL (ref 8–23)
CALCIUM SERPL-MCNC: 9.2 MG/DL (ref 8.8–10.2)
CHLORIDE SERPL-SCNC: 97 MMOL/L (ref 98–107)
CO2 SERPL-SCNC: 27 MMOL/L (ref 20–29)
CREAT SERPL-MCNC: 0.86 MG/DL (ref 0.6–1.1)
DIFFERENTIAL METHOD BLD: ABNORMAL
EOSINOPHIL # BLD: 0.01 K/UL (ref 0–0.8)
EOSINOPHIL NFR BLD: 0.1 % (ref 0.5–7.8)
ERYTHROCYTE [DISTWIDTH] IN BLOOD BY AUTOMATED COUNT: 14 % (ref 11.9–14.6)
GLOBULIN SER CALC-MCNC: 3.8 G/DL (ref 2.3–3.5)
GLUCOSE BLD STRIP.AUTO-MCNC: 272 MG/DL (ref 65–100)
GLUCOSE BLD STRIP.AUTO-MCNC: 336 MG/DL (ref 65–100)
GLUCOSE SERPL-MCNC: 250 MG/DL (ref 70–99)
HCT VFR BLD AUTO: 46.3 % (ref 35.8–46.3)
HGB BLD-MCNC: 14.5 G/DL (ref 11.7–15.4)
IMM GRANULOCYTES # BLD AUTO: 0.08 K/UL (ref 0–0.5)
IMM GRANULOCYTES NFR BLD AUTO: 0.6 % (ref 0–5)
LACTATE SERPL-SCNC: 1.8 MMOL/L (ref 0.5–2)
LACTATE SERPL-SCNC: 2.4 MMOL/L (ref 0.5–2)
LYMPHOCYTES # BLD: 1.13 K/UL (ref 0.5–4.6)
LYMPHOCYTES NFR BLD: 7.9 % (ref 13–44)
MCH RBC QN AUTO: 25.9 PG (ref 26.1–32.9)
MCHC RBC AUTO-ENTMCNC: 31.3 G/DL (ref 31.4–35)
MCV RBC AUTO: 82.8 FL (ref 82–102)
MONOCYTES # BLD: 0.34 K/UL (ref 0.1–1.3)
MONOCYTES NFR BLD: 2.4 % (ref 4–12)
NEUTS SEG # BLD: 12.66 K/UL (ref 1.7–8.2)
NEUTS SEG NFR BLD: 88.8 % (ref 43–78)
NRBC # BLD: 0 K/UL (ref 0–0.2)
PLATELET # BLD AUTO: 329 K/UL (ref 150–450)
PMV BLD AUTO: 8.7 FL (ref 9.4–12.3)
POTASSIUM SERPL-SCNC: 4.3 MMOL/L (ref 3.5–5.1)
PROCALCITONIN SERPL-MCNC: 0.05 NG/ML (ref 0–0.1)
PROT SERPL-MCNC: 7.3 G/DL (ref 6.3–8.2)
RBC # BLD AUTO: 5.59 M/UL (ref 4.05–5.2)
SERVICE CMNT-IMP: ABNORMAL
SERVICE CMNT-IMP: ABNORMAL
SODIUM SERPL-SCNC: 138 MMOL/L (ref 136–145)
TROPONIN T SERPL HS-MCNC: 31 NG/L (ref 0–14)
WBC # BLD AUTO: 14.3 K/UL (ref 4.3–11.1)

## 2025-03-10 PROCEDURE — 0D9670Z DRAINAGE OF STOMACH WITH DRAINAGE DEVICE, VIA NATURAL OR ARTIFICIAL OPENING: ICD-10-PCS | Performed by: GENERAL PRACTICE

## 2025-03-10 PROCEDURE — 6360000002 HC RX W HCPCS: Performed by: HOSPITALIST

## 2025-03-10 PROCEDURE — 6360000002 HC RX W HCPCS: Performed by: GENERAL PRACTICE

## 2025-03-10 PROCEDURE — 1100000000 HC RM PRIVATE

## 2025-03-10 PROCEDURE — 74022 RADEX COMPL AQT ABD SERIES: CPT

## 2025-03-10 PROCEDURE — 84484 ASSAY OF TROPONIN QUANT: CPT

## 2025-03-10 PROCEDURE — 6360000004 HC RX CONTRAST MEDICATION: Performed by: GENERAL PRACTICE

## 2025-03-10 PROCEDURE — 2500000003 HC RX 250 WO HCPCS: Performed by: HOSPITALIST

## 2025-03-10 PROCEDURE — 83605 ASSAY OF LACTIC ACID: CPT

## 2025-03-10 PROCEDURE — 74018 RADEX ABDOMEN 1 VIEW: CPT

## 2025-03-10 PROCEDURE — 96365 THER/PROPH/DIAG IV INF INIT: CPT

## 2025-03-10 PROCEDURE — 87040 BLOOD CULTURE FOR BACTERIA: CPT

## 2025-03-10 PROCEDURE — 93005 ELECTROCARDIOGRAM TRACING: CPT | Performed by: GENERAL PRACTICE

## 2025-03-10 PROCEDURE — 96375 TX/PRO/DX INJ NEW DRUG ADDON: CPT

## 2025-03-10 PROCEDURE — 2580000003 HC RX 258: Performed by: HOSPITALIST

## 2025-03-10 PROCEDURE — 36415 COLL VENOUS BLD VENIPUNCTURE: CPT

## 2025-03-10 PROCEDURE — 80053 COMPREHEN METABOLIC PANEL: CPT

## 2025-03-10 PROCEDURE — 85025 COMPLETE CBC W/AUTO DIFF WBC: CPT

## 2025-03-10 PROCEDURE — 99285 EMERGENCY DEPT VISIT HI MDM: CPT

## 2025-03-10 PROCEDURE — 82962 GLUCOSE BLOOD TEST: CPT

## 2025-03-10 PROCEDURE — 84145 PROCALCITONIN (PCT): CPT

## 2025-03-10 PROCEDURE — 2500000003 HC RX 250 WO HCPCS: Performed by: GENERAL PRACTICE

## 2025-03-10 PROCEDURE — 2580000003 HC RX 258: Performed by: GENERAL PRACTICE

## 2025-03-10 PROCEDURE — 74177 CT ABD & PELVIS W/CONTRAST: CPT

## 2025-03-10 PROCEDURE — 6370000000 HC RX 637 (ALT 250 FOR IP): Performed by: HOSPITALIST

## 2025-03-10 RX ORDER — 0.9 % SODIUM CHLORIDE 0.9 %
30 INTRAVENOUS SOLUTION INTRAVENOUS ONCE
Status: COMPLETED | OUTPATIENT
Start: 2025-03-10 | End: 2025-03-10

## 2025-03-10 RX ORDER — DEXTROSE MONOHYDRATE 100 MG/ML
INJECTION, SOLUTION INTRAVENOUS CONTINUOUS PRN
Status: DISCONTINUED | OUTPATIENT
Start: 2025-03-10 | End: 2025-03-11 | Stop reason: SDUPTHER

## 2025-03-10 RX ORDER — ONDANSETRON 2 MG/ML
4 INJECTION INTRAMUSCULAR; INTRAVENOUS ONCE
Status: COMPLETED | OUTPATIENT
Start: 2025-03-10 | End: 2025-03-10

## 2025-03-10 RX ORDER — MORPHINE SULFATE 4 MG/ML
4 INJECTION, SOLUTION INTRAMUSCULAR; INTRAVENOUS
Status: DISCONTINUED | OUTPATIENT
Start: 2025-03-10 | End: 2025-03-11

## 2025-03-10 RX ORDER — ATORVASTATIN CALCIUM 40 MG/1
40 TABLET, FILM COATED ORAL DAILY
Status: DISCONTINUED | OUTPATIENT
Start: 2025-03-11 | End: 2025-03-19 | Stop reason: HOSPADM

## 2025-03-10 RX ORDER — IBUPROFEN 600 MG/1
1 TABLET ORAL PRN
Status: DISCONTINUED | OUTPATIENT
Start: 2025-03-10 | End: 2025-03-19 | Stop reason: SDUPTHER

## 2025-03-10 RX ORDER — SENNOSIDES A AND B 8.6 MG/1
1 TABLET, FILM COATED ORAL NIGHTLY
Status: DISCONTINUED | OUTPATIENT
Start: 2025-03-10 | End: 2025-03-19 | Stop reason: HOSPADM

## 2025-03-10 RX ORDER — IOPAMIDOL 755 MG/ML
100 INJECTION, SOLUTION INTRAVASCULAR
Status: COMPLETED | OUTPATIENT
Start: 2025-03-10 | End: 2025-03-10

## 2025-03-10 RX ORDER — SODIUM CHLORIDE 9 MG/ML
INJECTION, SOLUTION INTRAVENOUS PRN
Status: DISCONTINUED | OUTPATIENT
Start: 2025-03-10 | End: 2025-03-19 | Stop reason: HOSPADM

## 2025-03-10 RX ORDER — POTASSIUM CHLORIDE 7.45 MG/ML
10 INJECTION INTRAVENOUS PRN
Status: DISCONTINUED | OUTPATIENT
Start: 2025-03-10 | End: 2025-03-19 | Stop reason: HOSPADM

## 2025-03-10 RX ORDER — LEVOTHYROXINE SODIUM 50 UG/1
25 TABLET ORAL
Status: DISCONTINUED | OUTPATIENT
Start: 2025-03-11 | End: 2025-03-19 | Stop reason: HOSPADM

## 2025-03-10 RX ORDER — ACETAMINOPHEN 650 MG/1
650 SUPPOSITORY RECTAL EVERY 6 HOURS PRN
Status: DISCONTINUED | OUTPATIENT
Start: 2025-03-10 | End: 2025-03-19 | Stop reason: HOSPADM

## 2025-03-10 RX ORDER — SODIUM CHLORIDE 0.9 % (FLUSH) 0.9 %
5-40 SYRINGE (ML) INJECTION EVERY 12 HOURS SCHEDULED
Status: DISCONTINUED | OUTPATIENT
Start: 2025-03-10 | End: 2025-03-19 | Stop reason: HOSPADM

## 2025-03-10 RX ORDER — POLYETHYLENE GLYCOL 3350 17 G/17G
17 POWDER, FOR SOLUTION ORAL DAILY PRN
Status: DISCONTINUED | OUTPATIENT
Start: 2025-03-10 | End: 2025-03-19 | Stop reason: HOSPADM

## 2025-03-10 RX ORDER — IBUPROFEN 600 MG/1
1 TABLET ORAL PRN
Status: DISCONTINUED | OUTPATIENT
Start: 2025-03-10 | End: 2025-03-11 | Stop reason: SDUPTHER

## 2025-03-10 RX ORDER — ONDANSETRON 2 MG/ML
4 INJECTION INTRAMUSCULAR; INTRAVENOUS EVERY 6 HOURS PRN
Status: DISCONTINUED | OUTPATIENT
Start: 2025-03-10 | End: 2025-03-19 | Stop reason: HOSPADM

## 2025-03-10 RX ORDER — ESCITALOPRAM OXALATE 10 MG/1
20 TABLET ORAL DAILY
Status: DISCONTINUED | OUTPATIENT
Start: 2025-03-11 | End: 2025-03-19 | Stop reason: HOSPADM

## 2025-03-10 RX ORDER — MAGNESIUM SULFATE IN WATER 40 MG/ML
2000 INJECTION, SOLUTION INTRAVENOUS PRN
Status: DISCONTINUED | OUTPATIENT
Start: 2025-03-10 | End: 2025-03-19 | Stop reason: HOSPADM

## 2025-03-10 RX ORDER — HYDROMORPHONE HYDROCHLORIDE 1 MG/ML
0.5 INJECTION, SOLUTION INTRAMUSCULAR; INTRAVENOUS; SUBCUTANEOUS
Status: COMPLETED | OUTPATIENT
Start: 2025-03-10 | End: 2025-03-10

## 2025-03-10 RX ORDER — ACETAMINOPHEN 325 MG/1
650 TABLET ORAL EVERY 6 HOURS PRN
Status: DISCONTINUED | OUTPATIENT
Start: 2025-03-10 | End: 2025-03-19 | Stop reason: HOSPADM

## 2025-03-10 RX ORDER — POTASSIUM CHLORIDE 1500 MG/1
40 TABLET, EXTENDED RELEASE ORAL PRN
Status: DISCONTINUED | OUTPATIENT
Start: 2025-03-10 | End: 2025-03-19 | Stop reason: HOSPADM

## 2025-03-10 RX ORDER — ONDANSETRON 4 MG/1
4 TABLET, ORALLY DISINTEGRATING ORAL EVERY 8 HOURS PRN
Status: DISCONTINUED | OUTPATIENT
Start: 2025-03-10 | End: 2025-03-19 | Stop reason: HOSPADM

## 2025-03-10 RX ORDER — SODIUM CHLORIDE 0.9 % (FLUSH) 0.9 %
5-40 SYRINGE (ML) INJECTION PRN
Status: DISCONTINUED | OUTPATIENT
Start: 2025-03-10 | End: 2025-03-19 | Stop reason: HOSPADM

## 2025-03-10 RX ORDER — ENOXAPARIN SODIUM 100 MG/ML
30 INJECTION SUBCUTANEOUS 2 TIMES DAILY
Status: DISCONTINUED | OUTPATIENT
Start: 2025-03-10 | End: 2025-03-15

## 2025-03-10 RX ORDER — INSULIN LISPRO 100 [IU]/ML
0-4 INJECTION, SOLUTION INTRAVENOUS; SUBCUTANEOUS
Status: DISCONTINUED | OUTPATIENT
Start: 2025-03-10 | End: 2025-03-11

## 2025-03-10 RX ORDER — LIDOCAINE HYDROCHLORIDE 20 MG/ML
JELLY TOPICAL ONCE
Status: COMPLETED | OUTPATIENT
Start: 2025-03-10 | End: 2025-03-10

## 2025-03-10 RX ORDER — ASPIRIN 325 MG
325 TABLET ORAL DAILY
Status: DISCONTINUED | OUTPATIENT
Start: 2025-03-11 | End: 2025-03-19 | Stop reason: HOSPADM

## 2025-03-10 RX ADMIN — ONDANSETRON 4 MG: 2 INJECTION, SOLUTION INTRAMUSCULAR; INTRAVENOUS at 18:24

## 2025-03-10 RX ADMIN — SODIUM CHLORIDE 1 MG: 9 INJECTION INTRAMUSCULAR; INTRAVENOUS; SUBCUTANEOUS at 21:52

## 2025-03-10 RX ADMIN — INSULIN LISPRO 2 UNITS: 100 INJECTION, SOLUTION INTRAVENOUS; SUBCUTANEOUS at 22:37

## 2025-03-10 RX ADMIN — IOPAMIDOL 100 ML: 755 INJECTION, SOLUTION INTRAVENOUS at 19:22

## 2025-03-10 RX ADMIN — LIDOCAINE HYDROCHLORIDE: 20 JELLY TOPICAL at 22:13

## 2025-03-10 RX ADMIN — ENOXAPARIN SODIUM 30 MG: 100 INJECTION SUBCUTANEOUS at 23:36

## 2025-03-10 RX ADMIN — PIPERACILLIN AND TAZOBACTAM 4500 MG: 4; .5 INJECTION, POWDER, LYOPHILIZED, FOR SOLUTION INTRAVENOUS; PARENTERAL at 18:40

## 2025-03-10 RX ADMIN — SODIUM CHLORIDE 1572 ML: 9 INJECTION, SOLUTION INTRAVENOUS at 18:25

## 2025-03-10 RX ADMIN — MORPHINE SULFATE 4 MG: 4 INJECTION INTRAVENOUS at 23:36

## 2025-03-10 RX ADMIN — SODIUM CHLORIDE, PRESERVATIVE FREE 10 ML: 5 INJECTION INTRAVENOUS at 22:38

## 2025-03-10 RX ADMIN — HYDROMORPHONE HYDROCHLORIDE 0.5 MG: 1 INJECTION, SOLUTION INTRAMUSCULAR; INTRAVENOUS; SUBCUTANEOUS at 18:24

## 2025-03-10 ASSESSMENT — PAIN SCALES - GENERAL
PAINLEVEL_OUTOF10: 10
PAINLEVEL_OUTOF10: 6

## 2025-03-10 ASSESSMENT — PAIN - FUNCTIONAL ASSESSMENT: PAIN_FUNCTIONAL_ASSESSMENT: 0-10

## 2025-03-10 ASSESSMENT — PAIN DESCRIPTION - LOCATION
LOCATION: THROAT;ABDOMEN
LOCATION: ABDOMEN

## 2025-03-10 ASSESSMENT — PAIN DESCRIPTION - DESCRIPTORS: DESCRIPTORS: DISCOMFORT

## 2025-03-10 NOTE — ED TRIAGE NOTES
Pt BIB EMS from home with abd pain with N/V started about 3 hrs ago. Sharp bilateral upper quadrants. States last BM was 4 days ago which she states is normal for her. .  8mg of IV zofran & 500ml of NS in route

## 2025-03-11 ENCOUNTER — APPOINTMENT (OUTPATIENT)
Dept: GENERAL RADIOLOGY | Age: 80
DRG: 388 | End: 2025-03-11
Payer: MEDICARE

## 2025-03-11 LAB
ALBUMIN SERPL-MCNC: 3.3 G/DL (ref 3.2–4.6)
ALBUMIN/GLOB SERPL: 0.9 (ref 1–1.9)
ALP SERPL-CCNC: 100 U/L (ref 35–104)
ALT SERPL-CCNC: 30 U/L (ref 8–45)
ANION GAP SERPL CALC-SCNC: 11 MMOL/L (ref 7–16)
AST SERPL-CCNC: 34 U/L (ref 15–37)
BASOPHILS # BLD: 0.03 K/UL (ref 0–0.2)
BASOPHILS NFR BLD: 0.3 % (ref 0–2)
BILIRUB SERPL-MCNC: 0.4 MG/DL (ref 0–1.2)
BUN SERPL-MCNC: 16 MG/DL (ref 8–23)
CALCIUM SERPL-MCNC: 8.9 MG/DL (ref 8.8–10.2)
CHLORIDE SERPL-SCNC: 100 MMOL/L (ref 98–107)
CO2 SERPL-SCNC: 26 MMOL/L (ref 20–29)
CREAT SERPL-MCNC: 0.83 MG/DL (ref 0.6–1.1)
DIFFERENTIAL METHOD BLD: ABNORMAL
EKG ATRIAL RATE: 103 BPM
EKG DIAGNOSIS: NORMAL
EKG P AXIS: 57 DEGREES
EKG P-R INTERVAL: 173 MS
EKG Q-T INTERVAL: 349 MS
EKG QRS DURATION: 98 MS
EKG QTC CALCULATION (BAZETT): 457 MS
EKG R AXIS: 151 DEGREES
EKG T AXIS: 51 DEGREES
EKG VENTRICULAR RATE: 103 BPM
EOSINOPHIL # BLD: 0 K/UL (ref 0–0.8)
EOSINOPHIL NFR BLD: 0 % (ref 0.5–7.8)
ERYTHROCYTE [DISTWIDTH] IN BLOOD BY AUTOMATED COUNT: 14.4 % (ref 11.9–14.6)
EST. AVERAGE GLUCOSE BLD GHB EST-MCNC: 186 MG/DL
GLOBULIN SER CALC-MCNC: 3.7 G/DL (ref 2.3–3.5)
GLUCOSE BLD STRIP.AUTO-MCNC: 212 MG/DL (ref 65–100)
GLUCOSE BLD STRIP.AUTO-MCNC: 231 MG/DL (ref 65–100)
GLUCOSE BLD STRIP.AUTO-MCNC: 236 MG/DL (ref 65–100)
GLUCOSE BLD STRIP.AUTO-MCNC: 240 MG/DL (ref 65–100)
GLUCOSE SERPL-MCNC: 235 MG/DL (ref 70–99)
HBA1C MFR BLD: 8.1 % (ref 0–5.6)
HCT VFR BLD AUTO: 44.9 % (ref 35.8–46.3)
HGB BLD-MCNC: 14.2 G/DL (ref 11.7–15.4)
IMM GRANULOCYTES # BLD AUTO: 0.06 K/UL (ref 0–0.5)
IMM GRANULOCYTES NFR BLD AUTO: 0.6 % (ref 0–5)
LYMPHOCYTES # BLD: 1.1 K/UL (ref 0.5–4.6)
LYMPHOCYTES NFR BLD: 10.4 % (ref 13–44)
MCH RBC QN AUTO: 25.5 PG (ref 26.1–32.9)
MCHC RBC AUTO-ENTMCNC: 31.6 G/DL (ref 31.4–35)
MCV RBC AUTO: 80.8 FL (ref 82–102)
MONOCYTES # BLD: 0.47 K/UL (ref 0.1–1.3)
MONOCYTES NFR BLD: 4.4 % (ref 4–12)
NEUTS SEG # BLD: 8.93 K/UL (ref 1.7–8.2)
NEUTS SEG NFR BLD: 84.3 % (ref 43–78)
NRBC # BLD: 0 K/UL (ref 0–0.2)
PLATELET # BLD AUTO: 337 K/UL (ref 150–450)
PMV BLD AUTO: 9.1 FL (ref 9.4–12.3)
POTASSIUM SERPL-SCNC: 5 MMOL/L (ref 3.5–5.1)
PROT SERPL-MCNC: 7 G/DL (ref 6.3–8.2)
RBC # BLD AUTO: 5.56 M/UL (ref 4.05–5.2)
SERVICE CMNT-IMP: ABNORMAL
SODIUM SERPL-SCNC: 137 MMOL/L (ref 136–145)
WBC # BLD AUTO: 10.6 K/UL (ref 4.3–11.1)

## 2025-03-11 PROCEDURE — 2500000003 HC RX 250 WO HCPCS: Performed by: HOSPITALIST

## 2025-03-11 PROCEDURE — 82962 GLUCOSE BLOOD TEST: CPT

## 2025-03-11 PROCEDURE — 1100000000 HC RM PRIVATE

## 2025-03-11 PROCEDURE — 6360000002 HC RX W HCPCS: Performed by: HOSPITALIST

## 2025-03-11 PROCEDURE — 6370000000 HC RX 637 (ALT 250 FOR IP)

## 2025-03-11 PROCEDURE — 99222 1ST HOSP IP/OBS MODERATE 55: CPT | Performed by: INTERNAL MEDICINE

## 2025-03-11 PROCEDURE — 51798 US URINE CAPACITY MEASURE: CPT

## 2025-03-11 PROCEDURE — 2580000003 HC RX 258

## 2025-03-11 PROCEDURE — 83036 HEMOGLOBIN GLYCOSYLATED A1C: CPT

## 2025-03-11 PROCEDURE — 2500000003 HC RX 250 WO HCPCS

## 2025-03-11 PROCEDURE — 85025 COMPLETE CBC W/AUTO DIFF WBC: CPT

## 2025-03-11 PROCEDURE — 6370000000 HC RX 637 (ALT 250 FOR IP): Performed by: NURSE PRACTITIONER

## 2025-03-11 PROCEDURE — 99222 1ST HOSP IP/OBS MODERATE 55: CPT | Performed by: SURGERY

## 2025-03-11 PROCEDURE — 80053 COMPREHEN METABOLIC PANEL: CPT

## 2025-03-11 PROCEDURE — 74018 RADEX ABDOMEN 1 VIEW: CPT

## 2025-03-11 PROCEDURE — 99222 1ST HOSP IP/OBS MODERATE 55: CPT | Performed by: NURSE PRACTITIONER

## 2025-03-11 PROCEDURE — 93010 ELECTROCARDIOGRAM REPORT: CPT | Performed by: INTERNAL MEDICINE

## 2025-03-11 RX ORDER — DEXTROSE MONOHYDRATE, SODIUM CHLORIDE, AND POTASSIUM CHLORIDE 50; 1.49; 4.5 G/1000ML; G/1000ML; G/1000ML
INJECTION, SOLUTION INTRAVENOUS CONTINUOUS
Status: DISCONTINUED | OUTPATIENT
Start: 2025-03-11 | End: 2025-03-11

## 2025-03-11 RX ORDER — MORPHINE SULFATE 4 MG/ML
4 INJECTION, SOLUTION INTRAMUSCULAR; INTRAVENOUS
Status: DISCONTINUED | OUTPATIENT
Start: 2025-03-11 | End: 2025-03-11

## 2025-03-11 RX ORDER — DEXTROSE MONOHYDRATE 100 MG/ML
INJECTION, SOLUTION INTRAVENOUS CONTINUOUS PRN
Status: DISCONTINUED | OUTPATIENT
Start: 2025-03-11 | End: 2025-03-19 | Stop reason: SDUPTHER

## 2025-03-11 RX ORDER — INSULIN LISPRO 100 [IU]/ML
0-8 INJECTION, SOLUTION INTRAVENOUS; SUBCUTANEOUS EVERY 6 HOURS SCHEDULED
Status: DISCONTINUED | OUTPATIENT
Start: 2025-03-11 | End: 2025-03-19

## 2025-03-11 RX ORDER — MENTHOL/CAMPHOR/ALLANTOIN/PHE 0.6-0.5-1%
OINTMENT(EA) TOPICAL PRN
Status: DISCONTINUED | OUTPATIENT
Start: 2025-03-11 | End: 2025-03-19 | Stop reason: HOSPADM

## 2025-03-11 RX ORDER — INSULIN GLARGINE 100 [IU]/ML
20 INJECTION, SOLUTION SUBCUTANEOUS DAILY
Status: DISCONTINUED | OUTPATIENT
Start: 2025-03-11 | End: 2025-03-12

## 2025-03-11 RX ORDER — MORPHINE SULFATE 4 MG/ML
4 INJECTION, SOLUTION INTRAMUSCULAR; INTRAVENOUS
Status: DISCONTINUED | OUTPATIENT
Start: 2025-03-11 | End: 2025-03-13

## 2025-03-11 RX ORDER — MORPHINE SULFATE 2 MG/ML
2 INJECTION, SOLUTION INTRAMUSCULAR; INTRAVENOUS
Status: DISCONTINUED | OUTPATIENT
Start: 2025-03-11 | End: 2025-03-13

## 2025-03-11 RX ORDER — DEXTROSE MONOHYDRATE AND SODIUM CHLORIDE 5; .9 G/100ML; G/100ML
INJECTION, SOLUTION INTRAVENOUS CONTINUOUS
Status: DISCONTINUED | OUTPATIENT
Start: 2025-03-11 | End: 2025-03-14

## 2025-03-11 RX ADMIN — INSULIN LISPRO 2 UNITS: 100 INJECTION, SOLUTION INTRAVENOUS; SUBCUTANEOUS at 12:20

## 2025-03-11 RX ADMIN — MORPHINE SULFATE 2 MG: 2 INJECTION, SOLUTION INTRAMUSCULAR; INTRAVENOUS at 21:36

## 2025-03-11 RX ADMIN — INSULIN LISPRO 2 UNITS: 100 INJECTION, SOLUTION INTRAVENOUS; SUBCUTANEOUS at 17:39

## 2025-03-11 RX ADMIN — DEXTROSE AND SODIUM CHLORIDE: 5; 900 INJECTION, SOLUTION INTRAVENOUS at 09:30

## 2025-03-11 RX ADMIN — SODIUM CHLORIDE, PRESERVATIVE FREE 10 ML: 5 INJECTION INTRAVENOUS at 21:36

## 2025-03-11 RX ADMIN — MORPHINE SULFATE 2 MG: 2 INJECTION, SOLUTION INTRAMUSCULAR; INTRAVENOUS at 14:57

## 2025-03-11 RX ADMIN — MORPHINE SULFATE 4 MG: 4 INJECTION INTRAVENOUS at 02:53

## 2025-03-11 RX ADMIN — INSULIN LISPRO 2 UNITS: 100 INJECTION, SOLUTION INTRAVENOUS; SUBCUTANEOUS at 09:19

## 2025-03-11 RX ADMIN — INSULIN GLARGINE 20 UNITS: 100 INJECTION, SOLUTION SUBCUTANEOUS at 12:20

## 2025-03-11 RX ADMIN — Medication 7 G: at 09:21

## 2025-03-11 RX ADMIN — DEXTROSE AND SODIUM CHLORIDE: 5; 900 INJECTION, SOLUTION INTRAVENOUS at 19:14

## 2025-03-11 RX ADMIN — PHENOL 1 SPRAY: 1.5 LIQUID ORAL at 09:21

## 2025-03-11 ASSESSMENT — PAIN SCALES - GENERAL
PAINLEVEL_OUTOF10: 7
PAINLEVEL_OUTOF10: 6
PAINLEVEL_OUTOF10: 2
PAINLEVEL_OUTOF10: 6
PAINLEVEL_OUTOF10: 3
PAINLEVEL_OUTOF10: 0

## 2025-03-11 ASSESSMENT — PAIN DESCRIPTION - DESCRIPTORS
DESCRIPTORS: DISCOMFORT
DESCRIPTORS: CRAMPING;ACHING
DESCRIPTORS: DISCOMFORT
DESCRIPTORS: ACHING

## 2025-03-11 ASSESSMENT — PAIN DESCRIPTION - LOCATION
LOCATION: THROAT
LOCATION: ABDOMEN;THROAT
LOCATION: ABDOMEN;THROAT
LOCATION: HEAD

## 2025-03-11 ASSESSMENT — PAIN SCALES - WONG BAKER
WONGBAKER_NUMERICALRESPONSE: NO HURT
WONGBAKER_NUMERICALRESPONSE: NO HURT

## 2025-03-11 ASSESSMENT — PAIN DESCRIPTION - ORIENTATION
ORIENTATION: INNER
ORIENTATION: LOWER;LEFT;MID

## 2025-03-11 NOTE — ED PROVIDER NOTES
Lesley   XR ACUTE ABD SERIES CHEST 1 VW    Narrative    Abdominal Series    INDICATION: Abdominal pain    COMPARISON:  None    TECHNIQUE:  A single view of the chest and 2 views of the abdomen were obtained.    FINDINGS: The lungs are clear. There are no infiltrates or effusions. The heart  size is normal.     Large amount of stool noted throughout the large bowel. No suspicious  calcifications. Mild levoconvex scoliotic curvature. There are wires overlying  the abdomen which may be related to previous surgical intervention.    Impression    Moderate amount of stool noted throughout the large bowel.  Correlate.      Electronically signed by Dav Novoa   CBC with Auto Differential   Result Value Ref Range    WBC 14.3 (H) 4.3 - 11.1 K/uL    RBC 5.59 (H) 4.05 - 5.2 M/uL    Hemoglobin 14.5 11.7 - 15.4 g/dL    Hematocrit 46.3 35.8 - 46.3 %    MCV 82.8 82 - 102 FL    MCH 25.9 (L) 26.1 - 32.9 PG    MCHC 31.3 (L) 31.4 - 35.0 g/dL    RDW 14.0 11.9 - 14.6 %    Platelets 329 150 - 450 K/uL    MPV 8.7 (L) 9.4 - 12.3 FL    nRBC 0.00 0.0 - 0.2 K/uL    Differential Type AUTOMATED      Neutrophils % 88.8 (H) 43.0 - 78.0 %    Lymphocytes % 7.9 (L) 13.0 - 44.0 %    Monocytes % 2.4 (L) 4.0 - 12.0 %    Eosinophils % 0.1 (L) 0.5 - 7.8 %    Basophils % 0.2 0.0 - 2.0 %    Immature Granulocytes % 0.6 0.0 - 5.0 %    Neutrophils Absolute 12.66 (H) 1.70 - 8.20 K/UL    Lymphocytes Absolute 1.13 0.50 - 4.60 K/UL    Monocytes Absolute 0.34 0.10 - 1.30 K/UL    Eosinophils Absolute 0.01 0.00 - 0.80 K/UL    Basophils Absolute 0.03 0.00 - 0.20 K/UL    Immature Granulocytes Absolute 0.08 0.0 - 0.5 K/UL   CMP   Result Value Ref Range    Sodium 138 136 - 145 mmol/L    Potassium 4.3 3.5 - 5.1 mmol/L    Chloride 97 (L) 98 - 107 mmol/L    CO2 27 20 - 29 mmol/L    Anion Gap 14 7 - 16 mmol/L    Glucose 250 (H) 70 - 99 mg/dL    BUN 17 8 - 23 MG/DL    Creatinine 0.86 0.60 - 1.10 MG/DL    Est, Glom Filt Rate 69 >60 ml/min/1.73m2    Calcium 9.2 8.8 - 10.2

## 2025-03-11 NOTE — H&P
DAILY    escitalopram (LEXAPRO) 20 mg, Oral, DAILY    fluticasone (FLONASE) 50 MCG/ACT nasal spray 1 spray, Nasal, DAILY PRN, 1 puff in each nostril twice daily    insulin glargine (LANTUS SOLOSTAR) 100 UNIT/ML injection pen INJECT 70 UNITS SUBCUTANEOUSLY  AT BEDTIME    Insulin Pen Needle (PEN NEEDLES) 31G X 8 MM MISC 1 Pen Needle, Does not apply, 3 times daily    Lancets MISC Check blood sugars fours times daily.    levalbuterol (XOPENEX HFA) 45 MCG/ACT inhaler 2 puffs, Inhalation, EVERY 4 HOURS PRN    levothyroxine (SYNTHROID) 25 mcg, Oral, DAILY BEFORE BREAKFAST    melatonin 10 mg, Oral, NIGHTLY    Misc. Devices (WHEELCHAIR) MISC 1 Units, Does not apply, DAILY    Multiple Vitamins-Minerals (MULTI COMPLETE PO) Oral, DAILY    NovoLOG Mix 70/30 FlexPen 38 Units, SubCUTAneous, 2 TIMES DAILY WITH MEALS    Probiotic Product (PROBIOTIC ADVANCED PO) Oral, DAILY    senna (SENOKOT) 8.6 MG tablet 1 tablet, Oral, 3-4 times a week       I have personally reviewed labs and tests:  Recent Results (from the past 24 hours)   CBC with Auto Differential    Collection Time: 03/10/25  6:19 PM   Result Value Ref Range    WBC 14.3 (H) 4.3 - 11.1 K/uL    RBC 5.59 (H) 4.05 - 5.2 M/uL    Hemoglobin 14.5 11.7 - 15.4 g/dL    Hematocrit 46.3 35.8 - 46.3 %    MCV 82.8 82 - 102 FL    MCH 25.9 (L) 26.1 - 32.9 PG    MCHC 31.3 (L) 31.4 - 35.0 g/dL    RDW 14.0 11.9 - 14.6 %    Platelets 329 150 - 450 K/uL    MPV 8.7 (L) 9.4 - 12.3 FL    nRBC 0.00 0.0 - 0.2 K/uL    Differential Type AUTOMATED      Neutrophils % 88.8 (H) 43.0 - 78.0 %    Lymphocytes % 7.9 (L) 13.0 - 44.0 %    Monocytes % 2.4 (L) 4.0 - 12.0 %    Eosinophils % 0.1 (L) 0.5 - 7.8 %    Basophils % 0.2 0.0 - 2.0 %    Immature Granulocytes % 0.6 0.0 - 5.0 %    Neutrophils Absolute 12.66 (H) 1.70 - 8.20 K/UL    Lymphocytes Absolute 1.13 0.50 - 4.60 K/UL    Monocytes Absolute 0.34 0.10 - 1.30 K/UL    Eosinophils Absolute 0.01 0.00 - 0.80 K/UL    Basophils Absolute 0.03 0.00 - 0.20 K/UL

## 2025-03-11 NOTE — CARE COORDINATION
Assessment completed with patient at bedside. Patient lives with family. Family assist with all adls. Patient reports she is a assist x1. She does not drive, family assist with transportation needs. Demographics and Pcp confirmed. Patient reports no current in home services. Discharge plan is to return home, at this time.      03/11/25 1427   Service Assessment   Patient Orientation Alert and Oriented   Cognition Alert   History Provided By Patient   Primary Caregiver Self   Support Systems Family Members   Patient's Healthcare Decision Maker is: Legal Next of Kin   PCP Verified by CM Yes   Prior Functional Level Assistance with the following:   Current Functional Level Assistance with the following:   Can patient return to prior living arrangement Yes   Ability to make needs known: Fair   Family able to assist with home care needs: Yes   Would you like for me to discuss the discharge plan with any other family members/significant others, and if so, who? Yes  (family)   Financial Resources Medicare   Community Resources None   Social/Functional History   Lives With Family   Type of Home House   Condition of Participation: Discharge Planning   The Plan for Transition of Care is related to the following treatment goals: return home   The Patient and/or Patient Representative was provided with a Choice of Provider? Patient   The Patient and/Or Patient Representative agree with the Discharge Plan? Yes   Freedom of Choice list was provided with basic dialogue that supports the patient's individualized plan of care/goals, treatment preferences, and shares the quality data associated with the providers?  Yes       Sirisha PETTY, ACM  [facility]

## 2025-03-12 LAB
ALBUMIN SERPL-MCNC: 3.1 G/DL (ref 3.2–4.6)
ALBUMIN/GLOB SERPL: 0.9 (ref 1–1.9)
ALP SERPL-CCNC: 93 U/L (ref 35–104)
ALT SERPL-CCNC: 27 U/L (ref 8–45)
ANION GAP SERPL CALC-SCNC: 7 MMOL/L (ref 7–16)
AST SERPL-CCNC: 36 U/L (ref 15–37)
BASOPHILS # BLD: 0.04 K/UL (ref 0–0.2)
BASOPHILS NFR BLD: 0.4 % (ref 0–2)
BILIRUB SERPL-MCNC: 0.4 MG/DL (ref 0–1.2)
BUN SERPL-MCNC: 13 MG/DL (ref 8–23)
CALCIUM SERPL-MCNC: 8.7 MG/DL (ref 8.8–10.2)
CHLORIDE SERPL-SCNC: 104 MMOL/L (ref 98–107)
CO2 SERPL-SCNC: 30 MMOL/L (ref 20–29)
CREAT SERPL-MCNC: 0.73 MG/DL (ref 0.6–1.1)
DIFFERENTIAL METHOD BLD: ABNORMAL
EOSINOPHIL # BLD: 0.04 K/UL (ref 0–0.8)
EOSINOPHIL NFR BLD: 0.4 % (ref 0.5–7.8)
ERYTHROCYTE [DISTWIDTH] IN BLOOD BY AUTOMATED COUNT: 14.6 % (ref 11.9–14.6)
GLOBULIN SER CALC-MCNC: 3.6 G/DL (ref 2.3–3.5)
GLUCOSE BLD STRIP.AUTO-MCNC: 206 MG/DL (ref 65–100)
GLUCOSE BLD STRIP.AUTO-MCNC: 225 MG/DL (ref 65–100)
GLUCOSE BLD STRIP.AUTO-MCNC: 231 MG/DL (ref 65–100)
GLUCOSE BLD STRIP.AUTO-MCNC: 244 MG/DL (ref 65–100)
GLUCOSE BLD STRIP.AUTO-MCNC: 275 MG/DL (ref 65–100)
GLUCOSE BLD STRIP.AUTO-MCNC: 279 MG/DL (ref 65–100)
GLUCOSE BLD STRIP.AUTO-MCNC: 298 MG/DL (ref 65–100)
GLUCOSE SERPL-MCNC: 239 MG/DL (ref 70–99)
HCT VFR BLD AUTO: 41.9 % (ref 35.8–46.3)
HGB BLD-MCNC: 13.1 G/DL (ref 11.7–15.4)
IMM GRANULOCYTES # BLD AUTO: 0.06 K/UL (ref 0–0.5)
IMM GRANULOCYTES NFR BLD AUTO: 0.6 % (ref 0–5)
LYMPHOCYTES # BLD: 1.36 K/UL (ref 0.5–4.6)
LYMPHOCYTES NFR BLD: 13.8 % (ref 13–44)
MCH RBC QN AUTO: 26 PG (ref 26.1–32.9)
MCHC RBC AUTO-ENTMCNC: 31.3 G/DL (ref 31.4–35)
MCV RBC AUTO: 83.1 FL (ref 82–102)
MONOCYTES # BLD: 0.68 K/UL (ref 0.1–1.3)
MONOCYTES NFR BLD: 6.9 % (ref 4–12)
NEUTS SEG # BLD: 7.66 K/UL (ref 1.7–8.2)
NEUTS SEG NFR BLD: 77.9 % (ref 43–78)
NRBC # BLD: 0 K/UL (ref 0–0.2)
PLATELET # BLD AUTO: 303 K/UL (ref 150–450)
PMV BLD AUTO: 9 FL (ref 9.4–12.3)
POTASSIUM SERPL-SCNC: 4.8 MMOL/L (ref 3.5–5.1)
PROT SERPL-MCNC: 6.7 G/DL (ref 6.3–8.2)
RBC # BLD AUTO: 5.04 M/UL (ref 4.05–5.2)
SERVICE CMNT-IMP: ABNORMAL
SODIUM SERPL-SCNC: 140 MMOL/L (ref 136–145)
WBC # BLD AUTO: 9.8 K/UL (ref 4.3–11.1)

## 2025-03-12 PROCEDURE — 36415 COLL VENOUS BLD VENIPUNCTURE: CPT

## 2025-03-12 PROCEDURE — 1100000000 HC RM PRIVATE

## 2025-03-12 PROCEDURE — 6360000002 HC RX W HCPCS: Performed by: HOSPITALIST

## 2025-03-12 PROCEDURE — 80053 COMPREHEN METABOLIC PANEL: CPT

## 2025-03-12 PROCEDURE — 82962 GLUCOSE BLOOD TEST: CPT

## 2025-03-12 PROCEDURE — 85025 COMPLETE CBC W/AUTO DIFF WBC: CPT

## 2025-03-12 PROCEDURE — 6370000000 HC RX 637 (ALT 250 FOR IP)

## 2025-03-12 PROCEDURE — 51798 US URINE CAPACITY MEASURE: CPT

## 2025-03-12 PROCEDURE — 6360000002 HC RX W HCPCS: Performed by: NURSE PRACTITIONER

## 2025-03-12 PROCEDURE — 99232 SBSQ HOSP IP/OBS MODERATE 35: CPT | Performed by: NURSE PRACTITIONER

## 2025-03-12 PROCEDURE — 99232 SBSQ HOSP IP/OBS MODERATE 35: CPT | Performed by: INTERNAL MEDICINE

## 2025-03-12 PROCEDURE — 2500000003 HC RX 250 WO HCPCS

## 2025-03-12 PROCEDURE — 2580000003 HC RX 258

## 2025-03-12 RX ORDER — KETOROLAC TROMETHAMINE 30 MG/ML
15 INJECTION, SOLUTION INTRAMUSCULAR; INTRAVENOUS ONCE
Status: COMPLETED | OUTPATIENT
Start: 2025-03-12 | End: 2025-03-12

## 2025-03-12 RX ORDER — INSULIN GLARGINE 100 [IU]/ML
25 INJECTION, SOLUTION SUBCUTANEOUS DAILY
Status: DISCONTINUED | OUTPATIENT
Start: 2025-03-12 | End: 2025-03-14

## 2025-03-12 RX ADMIN — DEXTROSE AND SODIUM CHLORIDE: 5; 900 INJECTION, SOLUTION INTRAVENOUS at 05:17

## 2025-03-12 RX ADMIN — INSULIN LISPRO 2 UNITS: 100 INJECTION, SOLUTION INTRAVENOUS; SUBCUTANEOUS at 00:45

## 2025-03-12 RX ADMIN — BENZOCAINE: 200 SPRAY DENTAL; ORAL; PERIODONTAL at 15:45

## 2025-03-12 RX ADMIN — INSULIN LISPRO 4 UNITS: 100 INJECTION, SOLUTION INTRAVENOUS; SUBCUTANEOUS at 17:05

## 2025-03-12 RX ADMIN — INSULIN LISPRO 4 UNITS: 100 INJECTION, SOLUTION INTRAVENOUS; SUBCUTANEOUS at 05:20

## 2025-03-12 RX ADMIN — ONDANSETRON 4 MG: 2 INJECTION, SOLUTION INTRAMUSCULAR; INTRAVENOUS at 23:36

## 2025-03-12 RX ADMIN — INSULIN LISPRO 2 UNITS: 100 INJECTION, SOLUTION INTRAVENOUS; SUBCUTANEOUS at 23:36

## 2025-03-12 RX ADMIN — DEXTROSE AND SODIUM CHLORIDE: 5; 900 INJECTION, SOLUTION INTRAVENOUS at 15:44

## 2025-03-12 RX ADMIN — MORPHINE SULFATE 2 MG: 2 INJECTION, SOLUTION INTRAMUSCULAR; INTRAVENOUS at 04:03

## 2025-03-12 RX ADMIN — INSULIN LISPRO 2 UNITS: 100 INJECTION, SOLUTION INTRAVENOUS; SUBCUTANEOUS at 12:54

## 2025-03-12 RX ADMIN — INSULIN GLARGINE 25 UNITS: 100 INJECTION, SOLUTION SUBCUTANEOUS at 08:33

## 2025-03-12 RX ADMIN — KETOROLAC TROMETHAMINE 15 MG: 30 INJECTION, SOLUTION INTRAMUSCULAR at 17:31

## 2025-03-12 ASSESSMENT — PAIN SCALES - GENERAL
PAINLEVEL_OUTOF10: 6
PAINLEVEL_OUTOF10: 5
PAINLEVEL_OUTOF10: 2
PAINLEVEL_OUTOF10: 1
PAINLEVEL_OUTOF10: 8

## 2025-03-12 ASSESSMENT — PAIN DESCRIPTION - DESCRIPTORS
DESCRIPTORS: SORE
DESCRIPTORS: BURNING
DESCRIPTORS: ACHING;DISCOMFORT

## 2025-03-12 ASSESSMENT — PAIN DESCRIPTION - LOCATION
LOCATION: ABDOMEN;HEAD;THROAT
LOCATION: THROAT;ABDOMEN
LOCATION: THROAT
LOCATION: THROAT;ABDOMEN

## 2025-03-12 ASSESSMENT — PAIN DESCRIPTION - ORIENTATION
ORIENTATION: INNER
ORIENTATION: ANTERIOR

## 2025-03-12 NOTE — CARE COORDINATION
Chart reviewed and patient discussed in IDT rounds this AM. Patient admitted with SBO. General surgery consulted and is planning to resolve SBO with non surgical management. GI consulted  for pancreatic mass, EUS pending. Discharge plan is to return home pending therapy evaluations.     Sirisha PETTY, ACM  [facility]

## 2025-03-13 ENCOUNTER — APPOINTMENT (OUTPATIENT)
Dept: GENERAL RADIOLOGY | Age: 80
DRG: 388 | End: 2025-03-13
Payer: MEDICARE

## 2025-03-13 LAB
ALBUMIN SERPL-MCNC: 2.8 G/DL (ref 3.2–4.6)
ALBUMIN/GLOB SERPL: 0.8 (ref 1–1.9)
ALP SERPL-CCNC: 82 U/L (ref 35–104)
ALT SERPL-CCNC: 23 U/L (ref 8–45)
ANION GAP SERPL CALC-SCNC: 9 MMOL/L (ref 7–16)
AST SERPL-CCNC: 29 U/L (ref 15–37)
BASOPHILS # BLD: 0.03 K/UL (ref 0–0.2)
BASOPHILS NFR BLD: 0.4 % (ref 0–2)
BILIRUB SERPL-MCNC: 0.6 MG/DL (ref 0–1.2)
BUN SERPL-MCNC: 14 MG/DL (ref 8–23)
CALCIUM SERPL-MCNC: 8.3 MG/DL (ref 8.8–10.2)
CHLORIDE SERPL-SCNC: 103 MMOL/L (ref 98–107)
CO2 SERPL-SCNC: 26 MMOL/L (ref 20–29)
CREAT SERPL-MCNC: 0.69 MG/DL (ref 0.6–1.1)
DIFFERENTIAL METHOD BLD: ABNORMAL
EOSINOPHIL # BLD: 0.02 K/UL (ref 0–0.8)
EOSINOPHIL NFR BLD: 0.3 % (ref 0.5–7.8)
ERYTHROCYTE [DISTWIDTH] IN BLOOD BY AUTOMATED COUNT: 14.1 % (ref 11.9–14.6)
GLOBULIN SER CALC-MCNC: 3.4 G/DL (ref 2.3–3.5)
GLUCOSE BLD STRIP.AUTO-MCNC: 224 MG/DL (ref 65–100)
GLUCOSE BLD STRIP.AUTO-MCNC: 252 MG/DL (ref 65–100)
GLUCOSE BLD STRIP.AUTO-MCNC: 292 MG/DL (ref 65–100)
GLUCOSE BLD STRIP.AUTO-MCNC: 298 MG/DL (ref 65–100)
GLUCOSE BLD STRIP.AUTO-MCNC: 312 MG/DL (ref 65–100)
GLUCOSE SERPL-MCNC: 322 MG/DL (ref 70–99)
HCT VFR BLD AUTO: 42.8 % (ref 35.8–46.3)
HGB BLD-MCNC: 12.9 G/DL (ref 11.7–15.4)
IMM GRANULOCYTES # BLD AUTO: 0.05 K/UL (ref 0–0.5)
IMM GRANULOCYTES NFR BLD AUTO: 0.7 % (ref 0–5)
LYMPHOCYTES # BLD: 0.94 K/UL (ref 0.5–4.6)
LYMPHOCYTES NFR BLD: 12.3 % (ref 13–44)
MCH RBC QN AUTO: 25.8 PG (ref 26.1–32.9)
MCHC RBC AUTO-ENTMCNC: 30.1 G/DL (ref 31.4–35)
MCV RBC AUTO: 85.6 FL (ref 82–102)
MONOCYTES # BLD: 0.6 K/UL (ref 0.1–1.3)
MONOCYTES NFR BLD: 7.9 % (ref 4–12)
NEUTS SEG # BLD: 5.99 K/UL (ref 1.7–8.2)
NEUTS SEG NFR BLD: 78.4 % (ref 43–78)
NRBC # BLD: 0 K/UL (ref 0–0.2)
PLATELET # BLD AUTO: 291 K/UL (ref 150–450)
PMV BLD AUTO: 9.1 FL (ref 9.4–12.3)
POTASSIUM SERPL-SCNC: 4.4 MMOL/L (ref 3.5–5.1)
PROT SERPL-MCNC: 6.2 G/DL (ref 6.3–8.2)
RBC # BLD AUTO: 5 M/UL (ref 4.05–5.2)
SERVICE CMNT-IMP: ABNORMAL
SODIUM SERPL-SCNC: 138 MMOL/L (ref 136–145)
WBC # BLD AUTO: 7.6 K/UL (ref 4.3–11.1)

## 2025-03-13 PROCEDURE — 74018 RADEX ABDOMEN 1 VIEW: CPT

## 2025-03-13 PROCEDURE — 97530 THERAPEUTIC ACTIVITIES: CPT

## 2025-03-13 PROCEDURE — 97162 PT EVAL MOD COMPLEX 30 MIN: CPT

## 2025-03-13 PROCEDURE — 82962 GLUCOSE BLOOD TEST: CPT

## 2025-03-13 PROCEDURE — 1100000000 HC RM PRIVATE

## 2025-03-13 PROCEDURE — 36415 COLL VENOUS BLD VENIPUNCTURE: CPT

## 2025-03-13 PROCEDURE — 6370000000 HC RX 637 (ALT 250 FOR IP)

## 2025-03-13 PROCEDURE — 97535 SELF CARE MNGMENT TRAINING: CPT

## 2025-03-13 PROCEDURE — 6360000002 HC RX W HCPCS: Performed by: HOSPITALIST

## 2025-03-13 PROCEDURE — 97166 OT EVAL MOD COMPLEX 45 MIN: CPT

## 2025-03-13 PROCEDURE — 2580000003 HC RX 258

## 2025-03-13 PROCEDURE — 80053 COMPREHEN METABOLIC PANEL: CPT

## 2025-03-13 PROCEDURE — 6360000002 HC RX W HCPCS: Performed by: NURSE PRACTITIONER

## 2025-03-13 PROCEDURE — 2500000003 HC RX 250 WO HCPCS: Performed by: HOSPITALIST

## 2025-03-13 PROCEDURE — 2500000003 HC RX 250 WO HCPCS: Performed by: INTERNAL MEDICINE

## 2025-03-13 PROCEDURE — 85025 COMPLETE CBC W/AUTO DIFF WBC: CPT

## 2025-03-13 RX ORDER — KETOROLAC TROMETHAMINE 30 MG/ML
15 INJECTION, SOLUTION INTRAMUSCULAR; INTRAVENOUS ONCE
Status: COMPLETED | OUTPATIENT
Start: 2025-03-13 | End: 2025-03-13

## 2025-03-13 RX ORDER — HYDROMORPHONE HYDROCHLORIDE 1 MG/ML
0.5 INJECTION, SOLUTION INTRAMUSCULAR; INTRAVENOUS; SUBCUTANEOUS EVERY 4 HOURS PRN
Status: DISCONTINUED | OUTPATIENT
Start: 2025-03-13 | End: 2025-03-14

## 2025-03-13 RX ADMIN — INSULIN LISPRO 4 UNITS: 100 INJECTION, SOLUTION INTRAVENOUS; SUBCUTANEOUS at 17:08

## 2025-03-13 RX ADMIN — DEXTROSE AND SODIUM CHLORIDE: 5; 900 INJECTION, SOLUTION INTRAVENOUS at 22:47

## 2025-03-13 RX ADMIN — KETOROLAC TROMETHAMINE 15 MG: 30 INJECTION, SOLUTION INTRAMUSCULAR at 01:31

## 2025-03-13 RX ADMIN — KETOROLAC TROMETHAMINE 15 MG: 30 INJECTION, SOLUTION INTRAMUSCULAR at 06:40

## 2025-03-13 RX ADMIN — HYDROMORPHONE HYDROCHLORIDE 0.5 MG: 1 INJECTION, SOLUTION INTRAMUSCULAR; INTRAVENOUS; SUBCUTANEOUS at 15:37

## 2025-03-13 RX ADMIN — INSULIN LISPRO 4 UNITS: 100 INJECTION, SOLUTION INTRAVENOUS; SUBCUTANEOUS at 06:20

## 2025-03-13 RX ADMIN — INSULIN GLARGINE 25 UNITS: 100 INJECTION, SOLUTION SUBCUTANEOUS at 08:25

## 2025-03-13 RX ADMIN — ONDANSETRON 4 MG: 2 INJECTION, SOLUTION INTRAMUSCULAR; INTRAVENOUS at 11:20

## 2025-03-13 RX ADMIN — SODIUM CHLORIDE, PRESERVATIVE FREE 10 ML: 5 INJECTION INTRAVENOUS at 21:30

## 2025-03-13 RX ADMIN — HYDROMORPHONE HYDROCHLORIDE 0.5 MG: 1 INJECTION, SOLUTION INTRAMUSCULAR; INTRAVENOUS; SUBCUTANEOUS at 19:37

## 2025-03-13 RX ADMIN — DEXTROSE AND SODIUM CHLORIDE: 5; 900 INJECTION, SOLUTION INTRAVENOUS at 02:22

## 2025-03-13 RX ADMIN — DEXTROSE AND SODIUM CHLORIDE: 5; 900 INJECTION, SOLUTION INTRAVENOUS at 12:40

## 2025-03-13 RX ADMIN — ONDANSETRON 4 MG: 2 INJECTION, SOLUTION INTRAMUSCULAR; INTRAVENOUS at 19:37

## 2025-03-13 RX ADMIN — INSULIN LISPRO 6 UNITS: 100 INJECTION, SOLUTION INTRAVENOUS; SUBCUTANEOUS at 11:28

## 2025-03-13 ASSESSMENT — PAIN SCALES - GENERAL
PAINLEVEL_OUTOF10: 5
PAINLEVEL_OUTOF10: 8
PAINLEVEL_OUTOF10: 7
PAINLEVEL_OUTOF10: 8
PAINLEVEL_OUTOF10: 8
PAINLEVEL_OUTOF10: 4
PAINLEVEL_OUTOF10: 7

## 2025-03-13 ASSESSMENT — PAIN DESCRIPTION - LOCATION
LOCATION: THROAT
LOCATION: ABDOMEN
LOCATION: ABDOMEN
LOCATION: ABDOMEN;THROAT
LOCATION: ABDOMEN

## 2025-03-13 ASSESSMENT — PAIN DESCRIPTION - PAIN TYPE: TYPE: ACUTE PAIN

## 2025-03-13 ASSESSMENT — PAIN DESCRIPTION - ORIENTATION
ORIENTATION: UPPER;LOWER;RIGHT;LEFT
ORIENTATION: ANTERIOR

## 2025-03-13 ASSESSMENT — PAIN DESCRIPTION - DESCRIPTORS
DESCRIPTORS: SORE
DESCRIPTORS: ACHING;DISCOMFORT
DESCRIPTORS: SHARP;DISCOMFORT

## 2025-03-13 ASSESSMENT — PAIN DESCRIPTION - FREQUENCY: FREQUENCY: CONTINUOUS

## 2025-03-13 ASSESSMENT — PAIN - FUNCTIONAL ASSESSMENT: PAIN_FUNCTIONAL_ASSESSMENT: PREVENTS OR INTERFERES SOME ACTIVE ACTIVITIES AND ADLS

## 2025-03-13 ASSESSMENT — PAIN DESCRIPTION - ONSET: ONSET: ON-GOING

## 2025-03-14 LAB
ALBUMIN SERPL-MCNC: 2.7 G/DL (ref 3.2–4.6)
ALBUMIN/GLOB SERPL: 0.7 (ref 1–1.9)
ALP SERPL-CCNC: 81 U/L (ref 35–104)
ALT SERPL-CCNC: 23 U/L (ref 8–45)
ANION GAP SERPL CALC-SCNC: 8 MMOL/L (ref 7–16)
AST SERPL-CCNC: 25 U/L (ref 15–37)
BASOPHILS # BLD: 0.02 K/UL (ref 0–0.2)
BASOPHILS NFR BLD: 0.2 % (ref 0–2)
BILIRUB SERPL-MCNC: 0.5 MG/DL (ref 0–1.2)
BUN SERPL-MCNC: 10 MG/DL (ref 8–23)
CALCIUM SERPL-MCNC: 8.4 MG/DL (ref 8.8–10.2)
CHLORIDE SERPL-SCNC: 107 MMOL/L (ref 98–107)
CO2 SERPL-SCNC: 25 MMOL/L (ref 20–29)
CREAT SERPL-MCNC: 0.64 MG/DL (ref 0.6–1.1)
DIFFERENTIAL METHOD BLD: ABNORMAL
EOSINOPHIL # BLD: 0.03 K/UL (ref 0–0.8)
EOSINOPHIL NFR BLD: 0.4 % (ref 0.5–7.8)
ERYTHROCYTE [DISTWIDTH] IN BLOOD BY AUTOMATED COUNT: 14.1 % (ref 11.9–14.6)
GLOBULIN SER CALC-MCNC: 3.7 G/DL (ref 2.3–3.5)
GLUCOSE BLD STRIP.AUTO-MCNC: 246 MG/DL (ref 65–100)
GLUCOSE BLD STRIP.AUTO-MCNC: 256 MG/DL (ref 65–100)
GLUCOSE BLD STRIP.AUTO-MCNC: 264 MG/DL (ref 65–100)
GLUCOSE BLD STRIP.AUTO-MCNC: 267 MG/DL (ref 65–100)
GLUCOSE BLD STRIP.AUTO-MCNC: 282 MG/DL (ref 65–100)
GLUCOSE SERPL-MCNC: 308 MG/DL (ref 70–99)
HCT VFR BLD AUTO: 42.9 % (ref 35.8–46.3)
HGB BLD-MCNC: 13.1 G/DL (ref 11.7–15.4)
IMM GRANULOCYTES # BLD AUTO: 0.03 K/UL (ref 0–0.5)
IMM GRANULOCYTES NFR BLD AUTO: 0.4 % (ref 0–5)
LYMPHOCYTES # BLD: 0.76 K/UL (ref 0.5–4.6)
LYMPHOCYTES NFR BLD: 9.3 % (ref 13–44)
MCH RBC QN AUTO: 25.8 PG (ref 26.1–32.9)
MCHC RBC AUTO-ENTMCNC: 30.5 G/DL (ref 31.4–35)
MCV RBC AUTO: 84.6 FL (ref 82–102)
MONOCYTES # BLD: 0.62 K/UL (ref 0.1–1.3)
MONOCYTES NFR BLD: 7.6 % (ref 4–12)
NEUTS SEG # BLD: 6.67 K/UL (ref 1.7–8.2)
NEUTS SEG NFR BLD: 82.1 % (ref 43–78)
NRBC # BLD: 0 K/UL (ref 0–0.2)
PLATELET # BLD AUTO: 294 K/UL (ref 150–450)
PMV BLD AUTO: 9.2 FL (ref 9.4–12.3)
POTASSIUM SERPL-SCNC: 4.3 MMOL/L (ref 3.5–5.1)
PROT SERPL-MCNC: 6.4 G/DL (ref 6.3–8.2)
RBC # BLD AUTO: 5.07 M/UL (ref 4.05–5.2)
SERVICE CMNT-IMP: ABNORMAL
SODIUM SERPL-SCNC: 140 MMOL/L (ref 136–145)
WBC # BLD AUTO: 8.1 K/UL (ref 4.3–11.1)

## 2025-03-14 PROCEDURE — 80053 COMPREHEN METABOLIC PANEL: CPT

## 2025-03-14 PROCEDURE — 82962 GLUCOSE BLOOD TEST: CPT

## 2025-03-14 PROCEDURE — 6370000000 HC RX 637 (ALT 250 FOR IP)

## 2025-03-14 PROCEDURE — 2500000003 HC RX 250 WO HCPCS: Performed by: HOSPITALIST

## 2025-03-14 PROCEDURE — 1100000000 HC RM PRIVATE

## 2025-03-14 PROCEDURE — 2500000003 HC RX 250 WO HCPCS: Performed by: INTERNAL MEDICINE

## 2025-03-14 PROCEDURE — 6360000002 HC RX W HCPCS: Performed by: INTERNAL MEDICINE

## 2025-03-14 PROCEDURE — 2580000003 HC RX 258: Performed by: INTERNAL MEDICINE

## 2025-03-14 PROCEDURE — 85025 COMPLETE CBC W/AUTO DIFF WBC: CPT

## 2025-03-14 PROCEDURE — 36415 COLL VENOUS BLD VENIPUNCTURE: CPT

## 2025-03-14 PROCEDURE — 6360000002 HC RX W HCPCS: Performed by: HOSPITALIST

## 2025-03-14 RX ORDER — INSULIN GLARGINE 100 [IU]/ML
28 INJECTION, SOLUTION SUBCUTANEOUS DAILY
Status: DISCONTINUED | OUTPATIENT
Start: 2025-03-15 | End: 2025-03-14

## 2025-03-14 RX ORDER — HYDROMORPHONE HYDROCHLORIDE 1 MG/ML
1 INJECTION, SOLUTION INTRAMUSCULAR; INTRAVENOUS; SUBCUTANEOUS EVERY 4 HOURS PRN
Status: DISCONTINUED | OUTPATIENT
Start: 2025-03-14 | End: 2025-03-16

## 2025-03-14 RX ORDER — HYDRALAZINE HYDROCHLORIDE 20 MG/ML
10 INJECTION INTRAMUSCULAR; INTRAVENOUS EVERY 4 HOURS PRN
Status: DISCONTINUED | OUTPATIENT
Start: 2025-03-14 | End: 2025-03-15

## 2025-03-14 RX ORDER — KETOROLAC TROMETHAMINE 30 MG/ML
15 INJECTION, SOLUTION INTRAMUSCULAR; INTRAVENOUS ONCE
Status: COMPLETED | OUTPATIENT
Start: 2025-03-14 | End: 2025-03-14

## 2025-03-14 RX ORDER — LABETALOL HYDROCHLORIDE 5 MG/ML
10 INJECTION, SOLUTION INTRAVENOUS EVERY 6 HOURS PRN
Status: DISCONTINUED | OUTPATIENT
Start: 2025-03-14 | End: 2025-03-19 | Stop reason: HOSPADM

## 2025-03-14 RX ORDER — SODIUM CHLORIDE 9 MG/ML
INJECTION, SOLUTION INTRAVENOUS CONTINUOUS
Status: ACTIVE | OUTPATIENT
Start: 2025-03-14 | End: 2025-03-17

## 2025-03-14 RX ORDER — INSULIN GLARGINE 100 [IU]/ML
24 INJECTION, SOLUTION SUBCUTANEOUS DAILY
Status: DISCONTINUED | OUTPATIENT
Start: 2025-03-15 | End: 2025-03-15

## 2025-03-14 RX ADMIN — SODIUM CHLORIDE, PRESERVATIVE FREE 10 ML: 5 INJECTION INTRAVENOUS at 19:46

## 2025-03-14 RX ADMIN — SODIUM CHLORIDE: 9 INJECTION, SOLUTION INTRAVENOUS at 14:06

## 2025-03-14 RX ADMIN — HYDRALAZINE HYDROCHLORIDE 10 MG: 20 INJECTION INTRAMUSCULAR; INTRAVENOUS at 22:42

## 2025-03-14 RX ADMIN — KETOROLAC TROMETHAMINE 15 MG: 30 INJECTION, SOLUTION INTRAMUSCULAR at 05:34

## 2025-03-14 RX ADMIN — HYDROMORPHONE HYDROCHLORIDE 0.5 MG: 1 INJECTION, SOLUTION INTRAMUSCULAR; INTRAVENOUS; SUBCUTANEOUS at 00:05

## 2025-03-14 RX ADMIN — SODIUM CHLORIDE, PRESERVATIVE FREE 10 ML: 5 INJECTION INTRAVENOUS at 08:18

## 2025-03-14 RX ADMIN — LORAZEPAM 0.5 MG: 2 INJECTION INTRAMUSCULAR; INTRAVENOUS at 17:01

## 2025-03-14 RX ADMIN — HYDROMORPHONE HYDROCHLORIDE 1 MG: 1 INJECTION, SOLUTION INTRAMUSCULAR; INTRAVENOUS; SUBCUTANEOUS at 13:52

## 2025-03-14 RX ADMIN — HYDROMORPHONE HYDROCHLORIDE 0.5 MG: 1 INJECTION, SOLUTION INTRAMUSCULAR; INTRAVENOUS; SUBCUTANEOUS at 03:46

## 2025-03-14 RX ADMIN — HYDRALAZINE HYDROCHLORIDE 10 MG: 20 INJECTION INTRAMUSCULAR; INTRAVENOUS at 04:24

## 2025-03-14 RX ADMIN — ONDANSETRON 4 MG: 2 INJECTION, SOLUTION INTRAMUSCULAR; INTRAVENOUS at 19:46

## 2025-03-14 RX ADMIN — HYDROMORPHONE HYDROCHLORIDE 0.5 MG: 1 INJECTION, SOLUTION INTRAMUSCULAR; INTRAVENOUS; SUBCUTANEOUS at 11:00

## 2025-03-14 RX ADMIN — INSULIN LISPRO 4 UNITS: 100 INJECTION, SOLUTION INTRAVENOUS; SUBCUTANEOUS at 12:17

## 2025-03-14 RX ADMIN — HYDROMORPHONE HYDROCHLORIDE 1 MG: 1 INJECTION, SOLUTION INTRAMUSCULAR; INTRAVENOUS; SUBCUTANEOUS at 19:45

## 2025-03-14 RX ADMIN — INSULIN LISPRO 4 UNITS: 100 INJECTION, SOLUTION INTRAVENOUS; SUBCUTANEOUS at 05:31

## 2025-03-14 RX ADMIN — INSULIN LISPRO 2 UNITS: 100 INJECTION, SOLUTION INTRAVENOUS; SUBCUTANEOUS at 16:54

## 2025-03-14 RX ADMIN — INSULIN GLARGINE 25 UNITS: 100 INJECTION, SOLUTION SUBCUTANEOUS at 08:16

## 2025-03-14 RX ADMIN — ONDANSETRON 4 MG: 2 INJECTION, SOLUTION INTRAMUSCULAR; INTRAVENOUS at 03:46

## 2025-03-14 ASSESSMENT — PAIN DESCRIPTION - DESCRIPTORS
DESCRIPTORS: ACHING
DESCRIPTORS: ACHING;DISCOMFORT
DESCRIPTORS: ACHING
DESCRIPTORS: ACHING;CRAMPING
DESCRIPTORS: ACHING

## 2025-03-14 ASSESSMENT — PAIN - FUNCTIONAL ASSESSMENT
PAIN_FUNCTIONAL_ASSESSMENT: PREVENTS OR INTERFERES SOME ACTIVE ACTIVITIES AND ADLS

## 2025-03-14 ASSESSMENT — PAIN SCALES - GENERAL
PAINLEVEL_OUTOF10: 8
PAINLEVEL_OUTOF10: 7
PAINLEVEL_OUTOF10: 8
PAINLEVEL_OUTOF10: 4
PAINLEVEL_OUTOF10: 10
PAINLEVEL_OUTOF10: 5
PAINLEVEL_OUTOF10: 4
PAINLEVEL_OUTOF10: 0
PAINLEVEL_OUTOF10: 4
PAINLEVEL_OUTOF10: 7
PAINLEVEL_OUTOF10: 4
PAINLEVEL_OUTOF10: 8

## 2025-03-14 ASSESSMENT — PAIN DESCRIPTION - PAIN TYPE
TYPE: ACUTE PAIN
TYPE: ACUTE PAIN

## 2025-03-14 ASSESSMENT — PAIN DESCRIPTION - ONSET
ONSET: ON-GOING
ONSET: GRADUAL

## 2025-03-14 ASSESSMENT — PAIN DESCRIPTION - FREQUENCY
FREQUENCY: CONTINUOUS
FREQUENCY: CONTINUOUS

## 2025-03-14 ASSESSMENT — PAIN DESCRIPTION - LOCATION
LOCATION: BACK
LOCATION: ABDOMEN
LOCATION: GENERALIZED
LOCATION: ABDOMEN
LOCATION: ABDOMEN;THROAT

## 2025-03-14 ASSESSMENT — PAIN DESCRIPTION - ORIENTATION
ORIENTATION: UPPER
ORIENTATION: RIGHT;LEFT;LOWER;UPPER
ORIENTATION: RIGHT;LEFT;UPPER;LOWER

## 2025-03-14 NOTE — CARE COORDINATION
Chart reviewed and patient discussed in IDT rounds this AM. Patient lives with family, family assist with adls as need at baseline. Patient admitted with SBO, general surgery hoping to resolve this non surgically. Patient getting a EUS once SBO resolves.     Sirisha FOSSSW, ACM  [facility]

## 2025-03-14 NOTE — DIABETES MGMT
Patient seen for diabetes education.    Patient seen for assessment regarding diabetes management by diabetes educator. Patient currently NPO, has fluids with D5 running. Per patient she is being worked up for small bowel obstruction due to possible mass. Admitting blood glucose 250. A1c 8.1. Patient has a past medical history of type 2 diabetes, insulin resistance, obesity, HTN, depression, hypothyroidism, dyslipidemia, chronic renal disease, neuropathy. Patient states they have been living with diabetes for over 40 years and voices an extensive family history of diabetes. Patient states they do have a working glucometer with supplies at home. Per patient they typically check blood glucose levels several times a day stating she using a Dexcom G7. Patient does not have clarity luis, per Dexcom close episode of hypoglycemia yesterday, patient states she has not eaten since 1000 yesterday. Patient states they are currently taking 70/30 insulin 45 units BID and Lantus 35 units BID at home for management of diabetes. Patient voices that they have experienced hypoglycemia in the past. Patient typically treats with an apple and cheese. Educated regarding hypoglycemia signs, symptoms, and treatment. Discussed rapid acting carb first for treatment of hypoglycemia. Patient states she typically has lows at night or in the middle of the day if she sleeps too long during her nap. Patient states she is mostly bed-ridden but using walker some. Reviewed target blood glucose goals. Patient verbalized understanding. Patient receiving phone call at this time. Will follow along.   
Patient seen for follow up diabetes education.    Patient in bed, brushing teeth. Reviewed current glucose levels 225. Glucose ranged 212-240 yesterday with patient receiving Lantus 20 units and Humalog 6 units. Educated patient regarding current basal/bolus regimen of Lantus 25 units daily and Humalog correctional insulin 4x per day including type of insulin, timing with meals, onset, duration of effect, and peak of insulin dose. Patient typically using high dose insulins at home. Patient remains NPO fluids with D5 infusing. Reviewed target blood glucose goals with patient. Patient verbalized understanding and has no questions regarding diabetes education at this time.  
current basal/bolus regimen of Lantus 25 units daily and Humalog correctional scale coverage 4x/day ac and hs, including type of insulin, timing with meals, onset, duration of effect, and peak of insulin dose. Instructed patient to always seek guidance from their primary care provider about adjusting their insulin doses and not to adjust them on their own as this could negatively impact their glycemic control or result in hypoglycemia. Patient verbalizes understanding.  Stressed the importance of follow up care for diabetes management with PCP. Pt states that she is seen by Dr. Britt Saxena. Pt given contact information for [facility] to pursue at discharge if desired. Pt has no further questions regarding diabetes management at this time.

## 2025-03-15 ENCOUNTER — APPOINTMENT (OUTPATIENT)
Dept: GENERAL RADIOLOGY | Age: 80
DRG: 388 | End: 2025-03-15
Payer: MEDICARE

## 2025-03-15 ENCOUNTER — APPOINTMENT (OUTPATIENT)
Dept: CT IMAGING | Age: 80
DRG: 388 | End: 2025-03-15
Payer: MEDICARE

## 2025-03-15 LAB
ALBUMIN SERPL-MCNC: 2.5 G/DL (ref 3.2–4.6)
ALBUMIN/GLOB SERPL: 0.7 (ref 1–1.9)
ALP SERPL-CCNC: 83 U/L (ref 35–104)
ALT SERPL-CCNC: 22 U/L (ref 8–45)
ANION GAP SERPL CALC-SCNC: 10 MMOL/L (ref 7–16)
APTT PPP: 26.9 SEC (ref 23.3–37.4)
AST SERPL-CCNC: 22 U/L (ref 15–37)
BACTERIA SPEC CULT: NORMAL
BASOPHILS # BLD: 0.01 K/UL (ref 0–0.2)
BASOPHILS NFR BLD: 0.1 % (ref 0–2)
BILIRUB SERPL-MCNC: 0.5 MG/DL (ref 0–1.2)
BUN SERPL-MCNC: 14 MG/DL (ref 8–23)
CALCIUM SERPL-MCNC: 8.6 MG/DL (ref 8.8–10.2)
CHLORIDE SERPL-SCNC: 107 MMOL/L (ref 98–107)
CO2 SERPL-SCNC: 23 MMOL/L (ref 20–29)
CREAT SERPL-MCNC: 0.62 MG/DL (ref 0.6–1.1)
DIFFERENTIAL METHOD BLD: ABNORMAL
EKG ATRIAL RATE: 109 BPM
EKG DIAGNOSIS: NORMAL
EKG P AXIS: 33 DEGREES
EKG P-R INTERVAL: 156 MS
EKG Q-T INTERVAL: 340 MS
EKG QRS DURATION: 82 MS
EKG QTC CALCULATION (BAZETT): 457 MS
EKG R AXIS: 152 DEGREES
EKG T AXIS: 13 DEGREES
EKG VENTRICULAR RATE: 109 BPM
EOSINOPHIL # BLD: 0.01 K/UL (ref 0–0.8)
EOSINOPHIL NFR BLD: 0.1 % (ref 0.5–7.8)
ERYTHROCYTE [DISTWIDTH] IN BLOOD BY AUTOMATED COUNT: 14.2 % (ref 11.9–14.6)
GLOBULIN SER CALC-MCNC: 3.7 G/DL (ref 2.3–3.5)
GLUCOSE BLD STRIP.AUTO-MCNC: 240 MG/DL (ref 65–100)
GLUCOSE BLD STRIP.AUTO-MCNC: 253 MG/DL (ref 65–100)
GLUCOSE BLD STRIP.AUTO-MCNC: 265 MG/DL (ref 65–100)
GLUCOSE BLD STRIP.AUTO-MCNC: 267 MG/DL (ref 65–100)
GLUCOSE BLD STRIP.AUTO-MCNC: 280 MG/DL (ref 65–100)
GLUCOSE BLD STRIP.AUTO-MCNC: 282 MG/DL (ref 65–100)
GLUCOSE BLD STRIP.AUTO-MCNC: 302 MG/DL (ref 65–100)
GLUCOSE BLD STRIP.AUTO-MCNC: 308 MG/DL (ref 65–100)
GLUCOSE SERPL-MCNC: 312 MG/DL (ref 70–99)
HCT VFR BLD AUTO: 41.5 % (ref 35.8–46.3)
HGB BLD-MCNC: 12.8 G/DL (ref 11.7–15.4)
IMM GRANULOCYTES # BLD AUTO: 0.06 K/UL (ref 0–0.5)
IMM GRANULOCYTES NFR BLD AUTO: 0.6 % (ref 0–5)
INR PPP: 1.2
LYMPHOCYTES # BLD: 0.57 K/UL (ref 0.5–4.6)
LYMPHOCYTES NFR BLD: 5.4 % (ref 13–44)
MCH RBC QN AUTO: 26.1 PG (ref 26.1–32.9)
MCHC RBC AUTO-ENTMCNC: 30.8 G/DL (ref 31.4–35)
MCV RBC AUTO: 84.5 FL (ref 82–102)
MONOCYTES # BLD: 0.83 K/UL (ref 0.1–1.3)
MONOCYTES NFR BLD: 7.9 % (ref 4–12)
NEUTS SEG # BLD: 9.01 K/UL (ref 1.7–8.2)
NEUTS SEG NFR BLD: 85.9 % (ref 43–78)
NRBC # BLD: 0 K/UL (ref 0–0.2)
NT PRO BNP: 415 PG/ML (ref 0–450)
PLATELET # BLD AUTO: 319 K/UL (ref 150–450)
PMV BLD AUTO: 8.8 FL (ref 9.4–12.3)
POTASSIUM SERPL-SCNC: 4.5 MMOL/L (ref 3.5–5.1)
PROT SERPL-MCNC: 6.2 G/DL (ref 6.3–8.2)
PROTHROMBIN TIME: 15.9 SEC (ref 11.3–14.9)
RBC # BLD AUTO: 4.91 M/UL (ref 4.05–5.2)
SERVICE CMNT-IMP: ABNORMAL
SERVICE CMNT-IMP: NORMAL
SODIUM SERPL-SCNC: 139 MMOL/L (ref 136–145)
TROPONIN T SERPL HS-MCNC: 36 NG/L (ref 0–14)
TROPONIN T SERPL HS-MCNC: 38 NG/L (ref 0–14)
UFH PPP CHRO-ACNC: >1.1 IU/ML (ref 0.3–0.7)
UFH PPP CHRO-ACNC: >1.1 IU/ML (ref 0.3–0.7)
WBC # BLD AUTO: 10.5 K/UL (ref 4.3–11.1)

## 2025-03-15 PROCEDURE — 6360000002 HC RX W HCPCS: Performed by: INTERNAL MEDICINE

## 2025-03-15 PROCEDURE — 6360000002 HC RX W HCPCS: Performed by: HOSPITALIST

## 2025-03-15 PROCEDURE — 71045 X-RAY EXAM CHEST 1 VIEW: CPT

## 2025-03-15 PROCEDURE — 93010 ELECTROCARDIOGRAM REPORT: CPT | Performed by: INTERNAL MEDICINE

## 2025-03-15 PROCEDURE — 6360000004 HC RX CONTRAST MEDICATION: Performed by: SURGERY

## 2025-03-15 PROCEDURE — 85730 THROMBOPLASTIN TIME PARTIAL: CPT

## 2025-03-15 PROCEDURE — 1100000003 HC PRIVATE W/ TELEMETRY

## 2025-03-15 PROCEDURE — 85520 HEPARIN ASSAY: CPT

## 2025-03-15 PROCEDURE — 84484 ASSAY OF TROPONIN QUANT: CPT

## 2025-03-15 PROCEDURE — 36415 COLL VENOUS BLD VENIPUNCTURE: CPT

## 2025-03-15 PROCEDURE — 2500000003 HC RX 250 WO HCPCS: Performed by: INTERNAL MEDICINE

## 2025-03-15 PROCEDURE — 2580000003 HC RX 258: Performed by: INTERNAL MEDICINE

## 2025-03-15 PROCEDURE — 94760 N-INVAS EAR/PLS OXIMETRY 1: CPT

## 2025-03-15 PROCEDURE — 6370000000 HC RX 637 (ALT 250 FOR IP): Performed by: NURSE PRACTITIONER

## 2025-03-15 PROCEDURE — 74177 CT ABD & PELVIS W/CONTRAST: CPT

## 2025-03-15 PROCEDURE — 6370000000 HC RX 637 (ALT 250 FOR IP): Performed by: INTERNAL MEDICINE

## 2025-03-15 PROCEDURE — 76937 US GUIDE VASCULAR ACCESS: CPT

## 2025-03-15 PROCEDURE — 2700000000 HC OXYGEN THERAPY PER DAY

## 2025-03-15 PROCEDURE — 80053 COMPREHEN METABOLIC PANEL: CPT

## 2025-03-15 PROCEDURE — 6370000000 HC RX 637 (ALT 250 FOR IP)

## 2025-03-15 PROCEDURE — 85025 COMPLETE CBC W/AUTO DIFF WBC: CPT

## 2025-03-15 PROCEDURE — 83880 ASSAY OF NATRIURETIC PEPTIDE: CPT

## 2025-03-15 PROCEDURE — 93005 ELECTROCARDIOGRAM TRACING: CPT | Performed by: HOSPITALIST

## 2025-03-15 PROCEDURE — 2500000003 HC RX 250 WO HCPCS: Performed by: HOSPITALIST

## 2025-03-15 PROCEDURE — 85610 PROTHROMBIN TIME: CPT

## 2025-03-15 PROCEDURE — 82962 GLUCOSE BLOOD TEST: CPT

## 2025-03-15 RX ORDER — HYDRALAZINE HYDROCHLORIDE 20 MG/ML
20 INJECTION INTRAMUSCULAR; INTRAVENOUS EVERY 4 HOURS PRN
Status: DISCONTINUED | OUTPATIENT
Start: 2025-03-15 | End: 2025-03-19 | Stop reason: HOSPADM

## 2025-03-15 RX ORDER — HEPARIN SODIUM 1000 [USP'U]/ML
40 INJECTION, SOLUTION INTRAVENOUS; SUBCUTANEOUS PRN
Status: DISCONTINUED | OUTPATIENT
Start: 2025-03-15 | End: 2025-03-19 | Stop reason: HOSPADM

## 2025-03-15 RX ORDER — DIATRIZOATE MEGLUMINE AND DIATRIZOATE SODIUM 660; 100 MG/ML; MG/ML
15 SOLUTION ORAL; RECTAL
Status: DISCONTINUED | OUTPATIENT
Start: 2025-03-15 | End: 2025-03-19 | Stop reason: HOSPADM

## 2025-03-15 RX ORDER — INSULIN GLARGINE 100 [IU]/ML
30 INJECTION, SOLUTION SUBCUTANEOUS DAILY
Status: DISCONTINUED | OUTPATIENT
Start: 2025-03-15 | End: 2025-03-18

## 2025-03-15 RX ORDER — HEPARIN SODIUM 1000 [USP'U]/ML
80 INJECTION, SOLUTION INTRAVENOUS; SUBCUTANEOUS PRN
Status: DISCONTINUED | OUTPATIENT
Start: 2025-03-15 | End: 2025-03-19 | Stop reason: HOSPADM

## 2025-03-15 RX ORDER — IOPAMIDOL 755 MG/ML
100 INJECTION, SOLUTION INTRAVASCULAR
Status: COMPLETED | OUTPATIENT
Start: 2025-03-15 | End: 2025-03-15

## 2025-03-15 RX ORDER — HEPARIN SODIUM 10000 [USP'U]/100ML
5-30 INJECTION, SOLUTION INTRAVENOUS CONTINUOUS
Status: DISCONTINUED | OUTPATIENT
Start: 2025-03-15 | End: 2025-03-19 | Stop reason: HOSPADM

## 2025-03-15 RX ORDER — CLONIDINE 0.1 MG/24H
1 PATCH, EXTENDED RELEASE TRANSDERMAL WEEKLY
Status: DISCONTINUED | OUTPATIENT
Start: 2025-03-15 | End: 2025-03-19 | Stop reason: HOSPADM

## 2025-03-15 RX ORDER — FUROSEMIDE 10 MG/ML
40 INJECTION INTRAMUSCULAR; INTRAVENOUS ONCE
Status: COMPLETED | OUTPATIENT
Start: 2025-03-15 | End: 2025-03-15

## 2025-03-15 RX ORDER — HEPARIN SODIUM 1000 [USP'U]/ML
80 INJECTION, SOLUTION INTRAVENOUS; SUBCUTANEOUS ONCE
Status: COMPLETED | OUTPATIENT
Start: 2025-03-15 | End: 2025-03-15

## 2025-03-15 RX ADMIN — HEPARIN SODIUM 8700 UNITS: 1000 INJECTION INTRAVENOUS; SUBCUTANEOUS at 17:29

## 2025-03-15 RX ADMIN — HYDROMORPHONE HYDROCHLORIDE 1 MG: 1 INJECTION, SOLUTION INTRAMUSCULAR; INTRAVENOUS; SUBCUTANEOUS at 20:38

## 2025-03-15 RX ADMIN — INSULIN HUMAN 8 UNITS: 100 INJECTION, SUSPENSION SUBCUTANEOUS at 08:48

## 2025-03-15 RX ADMIN — LORAZEPAM 0.5 MG: 2 INJECTION INTRAMUSCULAR; INTRAVENOUS at 23:39

## 2025-03-15 RX ADMIN — PHENOL 1 SPRAY: 1.5 LIQUID ORAL at 23:09

## 2025-03-15 RX ADMIN — INSULIN LISPRO 4 UNITS: 100 INJECTION, SOLUTION INTRAVENOUS; SUBCUTANEOUS at 17:36

## 2025-03-15 RX ADMIN — INSULIN LISPRO 6 UNITS: 100 INJECTION, SOLUTION INTRAVENOUS; SUBCUTANEOUS at 13:52

## 2025-03-15 RX ADMIN — LORAZEPAM 0.5 MG: 2 INJECTION INTRAMUSCULAR; INTRAVENOUS at 00:46

## 2025-03-15 RX ADMIN — SODIUM CHLORIDE, PRESERVATIVE FREE 10 ML: 5 INJECTION INTRAVENOUS at 08:49

## 2025-03-15 RX ADMIN — SODIUM CHLORIDE: 9 INJECTION, SOLUTION INTRAVENOUS at 08:58

## 2025-03-15 RX ADMIN — FUROSEMIDE 40 MG: 10 INJECTION, SOLUTION INTRAMUSCULAR; INTRAVENOUS at 10:26

## 2025-03-15 RX ADMIN — HEPARIN SODIUM 18 UNITS/KG/HR: 10000 INJECTION, SOLUTION INTRAVENOUS at 17:29

## 2025-03-15 RX ADMIN — ONDANSETRON 4 MG: 2 INJECTION, SOLUTION INTRAMUSCULAR; INTRAVENOUS at 01:55

## 2025-03-15 RX ADMIN — INSULIN LISPRO 4 UNITS: 100 INJECTION, SOLUTION INTRAVENOUS; SUBCUTANEOUS at 00:39

## 2025-03-15 RX ADMIN — SODIUM CHLORIDE: 9 INJECTION, SOLUTION INTRAVENOUS at 00:09

## 2025-03-15 RX ADMIN — DIATRIZOATE MEGLUMINE AND DIATRIZOATE SODIUM 15 ML: 660; 100 LIQUID ORAL; RECTAL at 08:49

## 2025-03-15 RX ADMIN — LABETALOL HYDROCHLORIDE 10 MG: 5 INJECTION INTRAVENOUS at 03:47

## 2025-03-15 RX ADMIN — IOPAMIDOL 100 ML: 755 INJECTION, SOLUTION INTRAVENOUS at 10:47

## 2025-03-15 RX ADMIN — INSULIN LISPRO 4 UNITS: 100 INJECTION, SOLUTION INTRAVENOUS; SUBCUTANEOUS at 05:39

## 2025-03-15 RX ADMIN — HEPARIN SODIUM 4300 UNITS: 1000 INJECTION INTRAVENOUS; SUBCUTANEOUS at 23:17

## 2025-03-15 RX ADMIN — INSULIN GLARGINE 30 UNITS: 100 INJECTION, SOLUTION SUBCUTANEOUS at 09:01

## 2025-03-15 RX ADMIN — SODIUM CHLORIDE, PRESERVATIVE FREE 10 ML: 5 INJECTION INTRAVENOUS at 20:38

## 2025-03-15 RX ADMIN — HYDROMORPHONE HYDROCHLORIDE 1 MG: 1 INJECTION, SOLUTION INTRAMUSCULAR; INTRAVENOUS; SUBCUTANEOUS at 03:05

## 2025-03-15 ASSESSMENT — PAIN DESCRIPTION - DESCRIPTORS: DESCRIPTORS: ACHING;DISCOMFORT

## 2025-03-15 ASSESSMENT — PAIN - FUNCTIONAL ASSESSMENT
PAIN_FUNCTIONAL_ASSESSMENT: ACTIVITIES ARE NOT PREVENTED
PAIN_FUNCTIONAL_ASSESSMENT: ACTIVITIES ARE NOT PREVENTED

## 2025-03-15 ASSESSMENT — PAIN DESCRIPTION - LOCATION: LOCATION: CHEST

## 2025-03-15 ASSESSMENT — PAIN SCALES - GENERAL
PAINLEVEL_OUTOF10: 9
PAINLEVEL_OUTOF10: 4
PAINLEVEL_OUTOF10: 8

## 2025-03-16 PROBLEM — G93.41 METABOLIC ENCEPHALOPATHY: Status: ACTIVE | Noted: 2025-03-16

## 2025-03-16 LAB
ANION GAP SERPL CALC-SCNC: 10 MMOL/L (ref 7–16)
BACTERIA SPEC CULT: NORMAL
BASOPHILS # BLD: 0.03 K/UL (ref 0–0.2)
BASOPHILS NFR BLD: 0.3 % (ref 0–2)
BUN SERPL-MCNC: 19 MG/DL (ref 8–23)
CALCIUM SERPL-MCNC: 8.7 MG/DL (ref 8.8–10.2)
CHLORIDE SERPL-SCNC: 106 MMOL/L (ref 98–107)
CO2 SERPL-SCNC: 25 MMOL/L (ref 20–29)
CREAT SERPL-MCNC: 0.67 MG/DL (ref 0.6–1.1)
DIFFERENTIAL METHOD BLD: ABNORMAL
EOSINOPHIL # BLD: 0.05 K/UL (ref 0–0.8)
EOSINOPHIL NFR BLD: 0.5 % (ref 0.5–7.8)
ERYTHROCYTE [DISTWIDTH] IN BLOOD BY AUTOMATED COUNT: 14.2 % (ref 11.9–14.6)
GLUCOSE BLD STRIP.AUTO-MCNC: 152 MG/DL (ref 65–100)
GLUCOSE BLD STRIP.AUTO-MCNC: 153 MG/DL (ref 65–100)
GLUCOSE BLD STRIP.AUTO-MCNC: 177 MG/DL (ref 65–100)
GLUCOSE BLD STRIP.AUTO-MCNC: 190 MG/DL (ref 65–100)
GLUCOSE BLD STRIP.AUTO-MCNC: 197 MG/DL (ref 65–100)
GLUCOSE BLD STRIP.AUTO-MCNC: 202 MG/DL (ref 65–100)
GLUCOSE SERPL-MCNC: 189 MG/DL (ref 70–99)
HCT VFR BLD AUTO: 41.6 % (ref 35.8–46.3)
HGB BLD-MCNC: 12.7 G/DL (ref 11.7–15.4)
IMM GRANULOCYTES # BLD AUTO: 0.05 K/UL (ref 0–0.5)
IMM GRANULOCYTES NFR BLD AUTO: 0.5 % (ref 0–5)
LYMPHOCYTES # BLD: 1.1 K/UL (ref 0.5–4.6)
LYMPHOCYTES NFR BLD: 11.2 % (ref 13–44)
MCH RBC QN AUTO: 25.8 PG (ref 26.1–32.9)
MCHC RBC AUTO-ENTMCNC: 30.5 G/DL (ref 31.4–35)
MCV RBC AUTO: 84.6 FL (ref 82–102)
MONOCYTES # BLD: 0.91 K/UL (ref 0.1–1.3)
MONOCYTES NFR BLD: 9.3 % (ref 4–12)
NEUTS SEG # BLD: 7.69 K/UL (ref 1.7–8.2)
NEUTS SEG NFR BLD: 78.2 % (ref 43–78)
NRBC # BLD: 0.02 K/UL (ref 0–0.2)
PLATELET # BLD AUTO: 303 K/UL (ref 150–450)
PMV BLD AUTO: 8.9 FL (ref 9.4–12.3)
POTASSIUM SERPL-SCNC: 4.2 MMOL/L (ref 3.5–5.1)
RBC # BLD AUTO: 4.92 M/UL (ref 4.05–5.2)
SERVICE CMNT-IMP: ABNORMAL
SERVICE CMNT-IMP: NORMAL
SODIUM SERPL-SCNC: 141 MMOL/L (ref 136–145)
UFH PPP CHRO-ACNC: 0.39 IU/ML (ref 0.3–0.7)
UFH PPP CHRO-ACNC: 0.49 IU/ML (ref 0.3–0.7)
UFH PPP CHRO-ACNC: >1.1 IU/ML (ref 0.3–0.7)
WBC # BLD AUTO: 9.8 K/UL (ref 4.3–11.1)

## 2025-03-16 PROCEDURE — 36415 COLL VENOUS BLD VENIPUNCTURE: CPT

## 2025-03-16 PROCEDURE — 6360000002 HC RX W HCPCS: Performed by: HOSPITALIST

## 2025-03-16 PROCEDURE — 2500000003 HC RX 250 WO HCPCS: Performed by: HOSPITALIST

## 2025-03-16 PROCEDURE — 6360000002 HC RX W HCPCS: Performed by: INTERNAL MEDICINE

## 2025-03-16 PROCEDURE — 82962 GLUCOSE BLOOD TEST: CPT

## 2025-03-16 PROCEDURE — 6370000000 HC RX 637 (ALT 250 FOR IP): Performed by: INTERNAL MEDICINE

## 2025-03-16 PROCEDURE — 2500000003 HC RX 250 WO HCPCS: Performed by: INTERNAL MEDICINE

## 2025-03-16 PROCEDURE — 2580000003 HC RX 258: Performed by: INTERNAL MEDICINE

## 2025-03-16 PROCEDURE — 80048 BASIC METABOLIC PNL TOTAL CA: CPT

## 2025-03-16 PROCEDURE — 85025 COMPLETE CBC W/AUTO DIFF WBC: CPT

## 2025-03-16 PROCEDURE — 1100000003 HC PRIVATE W/ TELEMETRY

## 2025-03-16 PROCEDURE — 6370000000 HC RX 637 (ALT 250 FOR IP)

## 2025-03-16 PROCEDURE — 85520 HEPARIN ASSAY: CPT

## 2025-03-16 RX ORDER — FUROSEMIDE 10 MG/ML
20 INJECTION INTRAMUSCULAR; INTRAVENOUS ONCE
Status: COMPLETED | OUTPATIENT
Start: 2025-03-16 | End: 2025-03-16

## 2025-03-16 RX ORDER — MORPHINE SULFATE 2 MG/ML
2 INJECTION, SOLUTION INTRAMUSCULAR; INTRAVENOUS EVERY 4 HOURS PRN
Refills: 0 | Status: DISCONTINUED | OUTPATIENT
Start: 2025-03-16 | End: 2025-03-19 | Stop reason: HOSPADM

## 2025-03-16 RX ORDER — HYDROMORPHONE HYDROCHLORIDE 1 MG/ML
0.5 INJECTION, SOLUTION INTRAMUSCULAR; INTRAVENOUS; SUBCUTANEOUS EVERY 4 HOURS PRN
Status: DISCONTINUED | OUTPATIENT
Start: 2025-03-16 | End: 2025-03-16

## 2025-03-16 RX ADMIN — ONDANSETRON 4 MG: 2 INJECTION, SOLUTION INTRAMUSCULAR; INTRAVENOUS at 14:31

## 2025-03-16 RX ADMIN — LORAZEPAM 0.5 MG: 2 INJECTION INTRAMUSCULAR; INTRAVENOUS at 14:44

## 2025-03-16 RX ADMIN — INSULIN GLARGINE 30 UNITS: 100 INJECTION, SOLUTION SUBCUTANEOUS at 08:54

## 2025-03-16 RX ADMIN — SODIUM CHLORIDE 40 MG: 9 INJECTION INTRAMUSCULAR; INTRAVENOUS; SUBCUTANEOUS at 16:06

## 2025-03-16 RX ADMIN — ONDANSETRON 4 MG: 2 INJECTION, SOLUTION INTRAMUSCULAR; INTRAVENOUS at 23:59

## 2025-03-16 RX ADMIN — LORAZEPAM 0.5 MG: 2 INJECTION INTRAMUSCULAR; INTRAVENOUS at 22:03

## 2025-03-16 RX ADMIN — ONDANSETRON 4 MG: 2 INJECTION, SOLUTION INTRAMUSCULAR; INTRAVENOUS at 04:53

## 2025-03-16 RX ADMIN — INSULIN LISPRO 2 UNITS: 100 INJECTION, SOLUTION INTRAVENOUS; SUBCUTANEOUS at 01:09

## 2025-03-16 RX ADMIN — HEPARIN SODIUM 12 UNITS/KG/HR: 10000 INJECTION, SOLUTION INTRAVENOUS at 12:01

## 2025-03-16 RX ADMIN — SODIUM CHLORIDE: 9 INJECTION, SOLUTION INTRAVENOUS at 06:23

## 2025-03-16 RX ADMIN — FUROSEMIDE 20 MG: 10 INJECTION, SOLUTION INTRAMUSCULAR; INTRAVENOUS at 15:11

## 2025-03-16 RX ADMIN — SODIUM CHLORIDE, PRESERVATIVE FREE 10 ML: 5 INJECTION INTRAVENOUS at 08:54

## 2025-03-16 RX ADMIN — HYDROMORPHONE HYDROCHLORIDE 1 MG: 1 INJECTION, SOLUTION INTRAMUSCULAR; INTRAVENOUS; SUBCUTANEOUS at 04:51

## 2025-03-16 RX ADMIN — SODIUM CHLORIDE, PRESERVATIVE FREE 10 ML: 5 INJECTION INTRAVENOUS at 21:20

## 2025-03-16 ASSESSMENT — PAIN DESCRIPTION - LOCATION: LOCATION: GENERALIZED

## 2025-03-16 ASSESSMENT — PAIN SCALES - GENERAL: PAINLEVEL_OUTOF10: 8

## 2025-03-16 ASSESSMENT — PAIN DESCRIPTION - DESCRIPTORS: DESCRIPTORS: ACHING;DISCOMFORT

## 2025-03-16 ASSESSMENT — PAIN - FUNCTIONAL ASSESSMENT: PAIN_FUNCTIONAL_ASSESSMENT: ACTIVITIES ARE NOT PREVENTED

## 2025-03-17 LAB
ALBUMIN SERPL-MCNC: 2.4 G/DL (ref 3.2–4.6)
ALBUMIN/GLOB SERPL: 0.6 (ref 1–1.9)
ALP SERPL-CCNC: 80 U/L (ref 35–104)
ALT SERPL-CCNC: 21 U/L (ref 8–45)
ANION GAP SERPL CALC-SCNC: 9 MMOL/L (ref 7–16)
AST SERPL-CCNC: 18 U/L (ref 15–37)
BASOPHILS # BLD: 0.02 K/UL (ref 0–0.2)
BASOPHILS NFR BLD: 0.3 % (ref 0–2)
BILIRUB DIRECT SERPL-MCNC: <0.2 MG/DL (ref 0–0.4)
BILIRUB SERPL-MCNC: 0.4 MG/DL (ref 0–1.2)
BUN SERPL-MCNC: 28 MG/DL (ref 8–23)
CALCIUM SERPL-MCNC: 8.8 MG/DL (ref 8.8–10.2)
CHLORIDE SERPL-SCNC: 103 MMOL/L (ref 98–107)
CO2 SERPL-SCNC: 28 MMOL/L (ref 20–29)
CREAT SERPL-MCNC: 0.76 MG/DL (ref 0.6–1.1)
DIFFERENTIAL METHOD BLD: NORMAL
EOSINOPHIL # BLD: 0.04 K/UL (ref 0–0.8)
EOSINOPHIL NFR BLD: 0.5 % (ref 0.5–7.8)
ERYTHROCYTE [DISTWIDTH] IN BLOOD BY AUTOMATED COUNT: 14.4 % (ref 11.9–14.6)
GLOBULIN SER CALC-MCNC: 3.7 G/DL (ref 2.3–3.5)
GLUCOSE BLD STRIP.AUTO-MCNC: 184 MG/DL (ref 65–100)
GLUCOSE BLD STRIP.AUTO-MCNC: 199 MG/DL (ref 65–100)
GLUCOSE BLD STRIP.AUTO-MCNC: 202 MG/DL (ref 65–100)
GLUCOSE BLD STRIP.AUTO-MCNC: 202 MG/DL (ref 65–100)
GLUCOSE BLD STRIP.AUTO-MCNC: 205 MG/DL (ref 65–100)
GLUCOSE SERPL-MCNC: 235 MG/DL (ref 70–99)
HCT VFR BLD AUTO: 40.9 % (ref 35.8–46.3)
HGB BLD-MCNC: 13 G/DL (ref 11.7–15.4)
IMM GRANULOCYTES # BLD AUTO: 0.1 K/UL (ref 0–0.5)
IMM GRANULOCYTES NFR BLD AUTO: 1.3 % (ref 0–5)
LYMPHOCYTES # BLD: 1.15 K/UL (ref 0.5–4.6)
LYMPHOCYTES NFR BLD: 14.7 % (ref 13–44)
MAGNESIUM SERPL-MCNC: 2.6 MG/DL (ref 1.8–2.4)
MCH RBC QN AUTO: 26.5 PG (ref 26.1–32.9)
MCHC RBC AUTO-ENTMCNC: 31.8 G/DL (ref 31.4–35)
MCV RBC AUTO: 83.3 FL (ref 82–102)
MONOCYTES # BLD: 0.69 K/UL (ref 0.1–1.3)
MONOCYTES NFR BLD: 8.8 % (ref 4–12)
NEUTS SEG # BLD: 5.83 K/UL (ref 1.7–8.2)
NEUTS SEG NFR BLD: 74.4 % (ref 43–78)
NRBC # BLD: 0 K/UL (ref 0–0.2)
PHOSPHATE SERPL-MCNC: 3.2 MG/DL (ref 2.5–4.5)
PLATELET # BLD AUTO: 347 K/UL (ref 150–450)
PMV BLD AUTO: 9.6 FL (ref 9.4–12.3)
POTASSIUM SERPL-SCNC: 3.9 MMOL/L (ref 3.5–5.1)
PROT SERPL-MCNC: 6.1 G/DL (ref 6.3–8.2)
RBC # BLD AUTO: 4.91 M/UL (ref 4.05–5.2)
SERVICE CMNT-IMP: ABNORMAL
SODIUM SERPL-SCNC: 140 MMOL/L (ref 136–145)
UFH PPP CHRO-ACNC: 0.37 IU/ML (ref 0.3–0.7)
WBC # BLD AUTO: 7.8 K/UL (ref 4.3–11.1)

## 2025-03-17 PROCEDURE — 1100000003 HC PRIVATE W/ TELEMETRY

## 2025-03-17 PROCEDURE — 84100 ASSAY OF PHOSPHORUS: CPT

## 2025-03-17 PROCEDURE — 80076 HEPATIC FUNCTION PANEL: CPT

## 2025-03-17 PROCEDURE — 85025 COMPLETE CBC W/AUTO DIFF WBC: CPT

## 2025-03-17 PROCEDURE — 2500000003 HC RX 250 WO HCPCS: Performed by: NURSE PRACTITIONER

## 2025-03-17 PROCEDURE — 83735 ASSAY OF MAGNESIUM: CPT

## 2025-03-17 PROCEDURE — 6370000000 HC RX 637 (ALT 250 FOR IP)

## 2025-03-17 PROCEDURE — 2580000003 HC RX 258: Performed by: INTERNAL MEDICINE

## 2025-03-17 PROCEDURE — 85520 HEPARIN ASSAY: CPT

## 2025-03-17 PROCEDURE — 2500000003 HC RX 250 WO HCPCS: Performed by: INTERNAL MEDICINE

## 2025-03-17 PROCEDURE — 80048 BASIC METABOLIC PNL TOTAL CA: CPT

## 2025-03-17 PROCEDURE — 94760 N-INVAS EAR/PLS OXIMETRY 1: CPT

## 2025-03-17 PROCEDURE — 36415 COLL VENOUS BLD VENIPUNCTURE: CPT

## 2025-03-17 PROCEDURE — 82962 GLUCOSE BLOOD TEST: CPT

## 2025-03-17 PROCEDURE — 2700000000 HC OXYGEN THERAPY PER DAY

## 2025-03-17 PROCEDURE — 2500000003 HC RX 250 WO HCPCS: Performed by: HOSPITALIST

## 2025-03-17 PROCEDURE — 6370000000 HC RX 637 (ALT 250 FOR IP): Performed by: INTERNAL MEDICINE

## 2025-03-17 PROCEDURE — 6360000002 HC RX W HCPCS: Performed by: INTERNAL MEDICINE

## 2025-03-17 PROCEDURE — 6360000002 HC RX W HCPCS: Performed by: HOSPITALIST

## 2025-03-17 RX ORDER — SODIUM CHLORIDE 0.9 % (FLUSH) 0.9 %
5-40 SYRINGE (ML) INJECTION PRN
Status: DISCONTINUED | OUTPATIENT
Start: 2025-03-17 | End: 2025-03-19 | Stop reason: HOSPADM

## 2025-03-17 RX ORDER — LIDOCAINE HYDROCHLORIDE 10 MG/ML
50 INJECTION, SOLUTION EPIDURAL; INFILTRATION; INTRACAUDAL; PERINEURAL ONCE
Status: DISCONTINUED | OUTPATIENT
Start: 2025-03-17 | End: 2025-03-19 | Stop reason: SDUPTHER

## 2025-03-17 RX ORDER — SODIUM CHLORIDE 9 MG/ML
INJECTION, SOLUTION INTRAVENOUS PRN
Status: DISCONTINUED | OUTPATIENT
Start: 2025-03-17 | End: 2025-03-19 | Stop reason: HOSPADM

## 2025-03-17 RX ORDER — SODIUM CHLORIDE 0.9 % (FLUSH) 0.9 %
5-40 SYRINGE (ML) INJECTION EVERY 12 HOURS SCHEDULED
Status: DISCONTINUED | OUTPATIENT
Start: 2025-03-17 | End: 2025-03-19 | Stop reason: HOSPADM

## 2025-03-17 RX ADMIN — SODIUM CHLORIDE, PRESERVATIVE FREE 10 ML: 5 INJECTION INTRAVENOUS at 21:45

## 2025-03-17 RX ADMIN — I.V. FAT EMULSION 250 ML: 20 EMULSION INTRAVENOUS at 18:31

## 2025-03-17 RX ADMIN — INSULIN GLARGINE 30 UNITS: 100 INJECTION, SOLUTION SUBCUTANEOUS at 09:14

## 2025-03-17 RX ADMIN — INSULIN LISPRO 2 UNITS: 100 INJECTION, SOLUTION INTRAVENOUS; SUBCUTANEOUS at 18:57

## 2025-03-17 RX ADMIN — MORPHINE SULFATE 2 MG: 2 INJECTION, SOLUTION INTRAMUSCULAR; INTRAVENOUS at 15:12

## 2025-03-17 RX ADMIN — SODIUM CHLORIDE, PRESERVATIVE FREE 10 ML: 5 INJECTION INTRAVENOUS at 21:39

## 2025-03-17 RX ADMIN — INSULIN LISPRO 2 UNITS: 100 INJECTION, SOLUTION INTRAVENOUS; SUBCUTANEOUS at 00:35

## 2025-03-17 RX ADMIN — CALCIUM GLUCONATE: 98 INJECTION INTRAVENOUS at 18:27

## 2025-03-17 RX ADMIN — ONDANSETRON 4 MG: 2 INJECTION, SOLUTION INTRAMUSCULAR; INTRAVENOUS at 06:00

## 2025-03-17 RX ADMIN — MORPHINE SULFATE 2 MG: 2 INJECTION, SOLUTION INTRAMUSCULAR; INTRAVENOUS at 21:34

## 2025-03-17 RX ADMIN — ONDANSETRON 4 MG: 2 INJECTION, SOLUTION INTRAMUSCULAR; INTRAVENOUS at 21:34

## 2025-03-17 RX ADMIN — INSULIN LISPRO 2 UNITS: 100 INJECTION, SOLUTION INTRAVENOUS; SUBCUTANEOUS at 12:03

## 2025-03-17 RX ADMIN — HEPARIN SODIUM 12 UNITS/KG/HR: 10000 INJECTION, SOLUTION INTRAVENOUS at 07:19

## 2025-03-17 RX ADMIN — SODIUM CHLORIDE 40 MG: 9 INJECTION INTRAMUSCULAR; INTRAVENOUS; SUBCUTANEOUS at 09:15

## 2025-03-17 RX ADMIN — SODIUM CHLORIDE: 9 INJECTION, SOLUTION INTRAVENOUS at 02:00

## 2025-03-17 RX ADMIN — ONDANSETRON 4 MG: 2 INJECTION, SOLUTION INTRAMUSCULAR; INTRAVENOUS at 15:13

## 2025-03-17 RX ADMIN — SODIUM CHLORIDE, PRESERVATIVE FREE 10 ML: 5 INJECTION INTRAVENOUS at 09:15

## 2025-03-17 RX ADMIN — LORAZEPAM 0.5 MG: 2 INJECTION INTRAMUSCULAR; INTRAVENOUS at 21:34

## 2025-03-17 ASSESSMENT — PAIN DESCRIPTION - LOCATION: LOCATION: ABDOMEN

## 2025-03-17 ASSESSMENT — PAIN SCALES - GENERAL
PAINLEVEL_OUTOF10: 8
PAINLEVEL_OUTOF10: 6
PAINLEVEL_OUTOF10: 8

## 2025-03-17 ASSESSMENT — PAIN DESCRIPTION - DESCRIPTORS: DESCRIPTORS: ACHING

## 2025-03-17 ASSESSMENT — PAIN DESCRIPTION - ORIENTATION: ORIENTATION: RIGHT;LEFT;MID

## 2025-03-17 NOTE — CONSULTS
Consult Note            Date:3/11/2025        Patient Name:Amber Escobedo     YOB: 1945     Age:79 y.o.    Consult to Gastroenterology  Consult performed by: Arielle Wallace APRN - CNP  Consult ordered by: Winston Granados MD          Chief Complaint     Chief Complaint   Patient presents with    Abdominal Pain    Nausea        History Obtained From   patient    History of Present Illness   Amber Escobedo is a 80 yo female who presented to the ED with nausea, vomiting, abdominal pain and distention. 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 GI was consulted for pancreatic mass. Patient began having nausea, vomiting and abdominal pain yesterday that radiated into her back. She thought she was having a heart attack so she called 911. She had previously been constipated for 3 days prior to this. The CT scan in the ED showed a small bowel obstruction with transition point in mid right abdomen and a soft tissue mass in the mid abdomen just right of midline which is seen medial to the uncinate process of pancrease. An NG tube was placed and she was made NPO. Currently she has no nausea, vomiting due to NG tube and anti-nausea medication. She is still having some upper abdominal pain with distention. She is on Eliquis but it is currently being held. She takes NSAIDs as needed for pain. She denies alcohol or tobacco use and denies unintentional weight loss or loss of appetite. She denies any family history of cancer. She states she has always been constipated her whole life. Her last EGD was in 2016 which showed mild narrowing at the z-line and mild bile reflux gastritis.     Medications: Eliquis (held)    Imaging: CT ABDOMEN PELVIS W IV CONTRAST Additional Contrast? None  Result Date: 3/10/2025  1. Small bowel obstruction with transition point in the mid right abdomen with associated decompressed distal small bowel loops. 2. Soft tissue 
Nutrition Monitoring/Evaluation      Nutrition:    Acknowledge: Parenteral Nutrition Management (Surgery)  Diet: Diet NPO  PN-Adult Premix 4.25/5 - Peripheral Line      See nutrition note from earlier today for full assessment.  PPN starting per consult from hospitalist.      AMRIT JURADO, RD     
US Guided PIV access-    Ultrasound was used to find the vein which was compressible and without any ultrasound features of an artery or nerve bundle. Skin was cleaned and disinfected prior to IV puncture.  Under real-time ultrasound guidance peripheral access was obtained in the left cephalic using 20 G 2.5\" B Jones Deep Access after 1 attempt(s). Blood return was present and IV flushed without difficulty with no clinical signs of infiltration. IV dressing applied and no immediate complications noted. Patient tolerated the procedure well.    
RD    
joint.    Impression  1. Small bowel obstruction with transition point in the mid right abdomen with  associated decompressed distal small bowel loops.  2. Soft tissue mass in the mid abdomen, just right of midline, which is seen  medial to the uncinate process of the pancreas and anterior to the IVC measuring  4.5 cm. Findings may relate to sarcoma or metastatic lymphadenopathy. There are  additional small surrounding lymph nodes as well as periaortic/retroperitoneal  lymph nodes.  3. Soft tissue density anterior to the abdominal aorta which abuts the abdominal  aorta measuring 17 mm which may relate to a mildly enlarged lymph node or small  saccular aneurysm from the infrarenal abdominal aorta.  4. Small pericardial effusion.  5. Additional findings as above.          Electronically signed by Ruben Sutton        Admission date (for inpatients): 3/10/2025   * No surgery found *  * No surgery found *    ASSESSMENT/PLAN:    1. 79 yr old female admitted with abdominal pain, nausea and vomiting with SBO.   2.  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 *     Agree with current management per Hospitalists  >Continue NGT to [facility] for bowel decompression.  Keep NPO, IVF.  We hope to resolve the SBO with non surgical management.    > GI consult for lesion in mid-abdomen near uncinate process. Will follow their work up.   Patient seen by Dr Diza in consultation who explain to her in detail we first need to resolve the SBO prior to moving on to investigate abdominal lesion.      Signed:  ELLIS MEDEIROS

## 2025-03-17 NOTE — CARE COORDINATION
Chart reviewed and patient discussed in IDT rounds this AM.  Patient started on PPN today. General surgery may proceed with surgical intervention if no improvement in the next 24 hours. CM following for discharge needs.    Sirisha PETTY, ACM  [facility]

## 2025-03-18 LAB
ALBUMIN SERPL-MCNC: 2.1 G/DL (ref 3.2–4.6)
ALBUMIN/GLOB SERPL: 0.6 (ref 1–1.9)
ALP SERPL-CCNC: 74 U/L (ref 35–104)
ALT SERPL-CCNC: 19 U/L (ref 8–45)
ANION GAP SERPL CALC-SCNC: 10 MMOL/L (ref 7–16)
AST SERPL-CCNC: 21 U/L (ref 15–37)
BASOPHILS # BLD: 0.02 K/UL (ref 0–0.2)
BASOPHILS NFR BLD: 0.2 % (ref 0–2)
BILIRUB DIRECT SERPL-MCNC: <0.2 MG/DL (ref 0–0.4)
BILIRUB SERPL-MCNC: 0.3 MG/DL (ref 0–1.2)
BUN SERPL-MCNC: 22 MG/DL (ref 8–23)
CALCIUM SERPL-MCNC: 8.6 MG/DL (ref 8.8–10.2)
CHLORIDE SERPL-SCNC: 102 MMOL/L (ref 98–107)
CO2 SERPL-SCNC: 25 MMOL/L (ref 20–29)
CREAT SERPL-MCNC: 0.65 MG/DL (ref 0.6–1.1)
DIFFERENTIAL METHOD BLD: ABNORMAL
EOSINOPHIL # BLD: 0.03 K/UL (ref 0–0.8)
EOSINOPHIL NFR BLD: 0.3 % (ref 0.5–7.8)
ERYTHROCYTE [DISTWIDTH] IN BLOOD BY AUTOMATED COUNT: 13.9 % (ref 11.9–14.6)
GLOBULIN SER CALC-MCNC: 3.9 G/DL (ref 2.3–3.5)
GLUCOSE BLD STRIP.AUTO-MCNC: 207 MG/DL (ref 65–100)
GLUCOSE BLD STRIP.AUTO-MCNC: 219 MG/DL (ref 65–100)
GLUCOSE BLD STRIP.AUTO-MCNC: 235 MG/DL (ref 65–100)
GLUCOSE BLD STRIP.AUTO-MCNC: 272 MG/DL (ref 65–100)
GLUCOSE BLD STRIP.AUTO-MCNC: 299 MG/DL (ref 65–100)
GLUCOSE SERPL-MCNC: 280 MG/DL (ref 70–99)
HCT VFR BLD AUTO: 40 % (ref 35.8–46.3)
HGB BLD-MCNC: 11.9 G/DL (ref 11.7–15.4)
IMM GRANULOCYTES # BLD AUTO: 0.06 K/UL (ref 0–0.5)
IMM GRANULOCYTES NFR BLD AUTO: 0.7 % (ref 0–5)
LYMPHOCYTES # BLD: 0.89 K/UL (ref 0.5–4.6)
LYMPHOCYTES NFR BLD: 10.1 % (ref 13–44)
MAGNESIUM SERPL-MCNC: 2.2 MG/DL (ref 1.8–2.4)
MCH RBC QN AUTO: 25.9 PG (ref 26.1–32.9)
MCHC RBC AUTO-ENTMCNC: 29.8 G/DL (ref 31.4–35)
MCV RBC AUTO: 87.1 FL (ref 82–102)
MONOCYTES # BLD: 0.65 K/UL (ref 0.1–1.3)
MONOCYTES NFR BLD: 7.4 % (ref 4–12)
NEUTS SEG # BLD: 7.18 K/UL (ref 1.7–8.2)
NEUTS SEG NFR BLD: 81.3 % (ref 43–78)
NRBC # BLD: 0 K/UL (ref 0–0.2)
PHOSPHATE SERPL-MCNC: 2.5 MG/DL (ref 2.5–4.5)
PLATELET # BLD AUTO: 288 K/UL (ref 150–450)
PMV BLD AUTO: 9.1 FL (ref 9.4–12.3)
POTASSIUM SERPL-SCNC: 4.1 MMOL/L (ref 3.5–5.1)
PROT SERPL-MCNC: 6 G/DL (ref 6.3–8.2)
RBC # BLD AUTO: 4.59 M/UL (ref 4.05–5.2)
SERVICE CMNT-IMP: ABNORMAL
SODIUM SERPL-SCNC: 137 MMOL/L (ref 136–145)
TRIGL SERPL-MCNC: 104 MG/DL (ref 0–150)
UFH PPP CHRO-ACNC: <0.1 IU/ML (ref 0.3–0.7)
WBC # BLD AUTO: 8.8 K/UL (ref 4.3–11.1)

## 2025-03-18 PROCEDURE — 84478 ASSAY OF TRIGLYCERIDES: CPT

## 2025-03-18 PROCEDURE — 83735 ASSAY OF MAGNESIUM: CPT

## 2025-03-18 PROCEDURE — 80048 BASIC METABOLIC PNL TOTAL CA: CPT

## 2025-03-18 PROCEDURE — 2500000003 HC RX 250 WO HCPCS: Performed by: HOSPITALIST

## 2025-03-18 PROCEDURE — 6370000000 HC RX 637 (ALT 250 FOR IP)

## 2025-03-18 PROCEDURE — 1100000000 HC RM PRIVATE

## 2025-03-18 PROCEDURE — 2500000003 HC RX 250 WO HCPCS: Performed by: NURSE PRACTITIONER

## 2025-03-18 PROCEDURE — 80076 HEPATIC FUNCTION PANEL: CPT

## 2025-03-18 PROCEDURE — 2580000003 HC RX 258: Performed by: NURSE PRACTITIONER

## 2025-03-18 PROCEDURE — 82962 GLUCOSE BLOOD TEST: CPT

## 2025-03-18 PROCEDURE — 94761 N-INVAS EAR/PLS OXIMETRY MLT: CPT

## 2025-03-18 PROCEDURE — 97530 THERAPEUTIC ACTIVITIES: CPT

## 2025-03-18 PROCEDURE — 85520 HEPARIN ASSAY: CPT

## 2025-03-18 PROCEDURE — 2700000000 HC OXYGEN THERAPY PER DAY

## 2025-03-18 PROCEDURE — 36415 COLL VENOUS BLD VENIPUNCTURE: CPT

## 2025-03-18 PROCEDURE — 85025 COMPLETE CBC W/AUTO DIFF WBC: CPT

## 2025-03-18 PROCEDURE — 2580000003 HC RX 258: Performed by: INTERNAL MEDICINE

## 2025-03-18 PROCEDURE — 97535 SELF CARE MNGMENT TRAINING: CPT

## 2025-03-18 PROCEDURE — 2500000003 HC RX 250 WO HCPCS: Performed by: INTERNAL MEDICINE

## 2025-03-18 PROCEDURE — 6360000002 HC RX W HCPCS: Performed by: INTERNAL MEDICINE

## 2025-03-18 PROCEDURE — 6360000002 HC RX W HCPCS: Performed by: NURSE PRACTITIONER

## 2025-03-18 PROCEDURE — 6360000002 HC RX W HCPCS: Performed by: HOSPITALIST

## 2025-03-18 PROCEDURE — 84100 ASSAY OF PHOSPHORUS: CPT

## 2025-03-18 PROCEDURE — 6370000000 HC RX 637 (ALT 250 FOR IP): Performed by: INTERNAL MEDICINE

## 2025-03-18 RX ORDER — DEXTROSE MONOHYDRATE 100 MG/ML
INJECTION, SOLUTION INTRAVENOUS CONTINUOUS
Status: DISCONTINUED | OUTPATIENT
Start: 2025-03-18 | End: 2025-03-19 | Stop reason: HOSPADM

## 2025-03-18 RX ORDER — INSULIN GLARGINE 100 [IU]/ML
34 INJECTION, SOLUTION SUBCUTANEOUS DAILY
Status: DISCONTINUED | OUTPATIENT
Start: 2025-03-19 | End: 2025-03-19 | Stop reason: HOSPADM

## 2025-03-18 RX ORDER — PROCHLORPERAZINE EDISYLATE 5 MG/ML
10 INJECTION INTRAMUSCULAR; INTRAVENOUS ONCE
Status: COMPLETED | OUTPATIENT
Start: 2025-03-18 | End: 2025-03-18

## 2025-03-18 RX ADMIN — INSULIN GLARGINE 30 UNITS: 100 INJECTION, SOLUTION SUBCUTANEOUS at 09:00

## 2025-03-18 RX ADMIN — DEXTROSE: 10 SOLUTION INTRAVENOUS at 22:28

## 2025-03-18 RX ADMIN — INSULIN LISPRO 2 UNITS: 100 INJECTION, SOLUTION INTRAVENOUS; SUBCUTANEOUS at 11:51

## 2025-03-18 RX ADMIN — MORPHINE SULFATE 2 MG: 2 INJECTION, SOLUTION INTRAMUSCULAR; INTRAVENOUS at 03:34

## 2025-03-18 RX ADMIN — INSULIN LISPRO 4 UNITS: 100 INJECTION, SOLUTION INTRAVENOUS; SUBCUTANEOUS at 17:09

## 2025-03-18 RX ADMIN — ONDANSETRON 4 MG: 2 INJECTION, SOLUTION INTRAMUSCULAR; INTRAVENOUS at 03:35

## 2025-03-18 RX ADMIN — SODIUM CHLORIDE, PRESERVATIVE FREE 10 ML: 5 INJECTION INTRAVENOUS at 09:12

## 2025-03-18 RX ADMIN — SODIUM CHLORIDE, PRESERVATIVE FREE 10 ML: 5 INJECTION INTRAVENOUS at 09:02

## 2025-03-18 RX ADMIN — INSULIN LISPRO 2 UNITS: 100 INJECTION, SOLUTION INTRAVENOUS; SUBCUTANEOUS at 00:50

## 2025-03-18 RX ADMIN — HEPARIN SODIUM 12 UNITS/KG/HR: 10000 INJECTION, SOLUTION INTRAVENOUS at 03:30

## 2025-03-18 RX ADMIN — LEUCINE, PHENYLALANINE, LYSINE, METHIONINE, ISOLEUCINE, VALINE, HISTIDINE, THREONINE, TRYPTOPHAN, ALANINE, GLYCINE, ARGININE, PROLINE, SERINE, TYROSINE, DEXTROSE: 311; 238; 247; 170; 255; 247; 204; 179; 77; 880; 438; 489; 289; 213; 17; 5 INJECTION INTRAVENOUS at 11:31

## 2025-03-18 RX ADMIN — SODIUM CHLORIDE, PRESERVATIVE FREE 10 ML: 5 INJECTION INTRAVENOUS at 22:21

## 2025-03-18 RX ADMIN — PHENOL 1 SPRAY: 1.5 LIQUID ORAL at 21:42

## 2025-03-18 RX ADMIN — PIPERACILLIN AND TAZOBACTAM 4500 MG: 4; .5 INJECTION, POWDER, FOR SOLUTION INTRAVENOUS at 00:00

## 2025-03-18 RX ADMIN — LORAZEPAM 0.5 MG: 2 INJECTION INTRAMUSCULAR; INTRAVENOUS at 21:32

## 2025-03-18 RX ADMIN — INSULIN LISPRO 4 UNITS: 100 INJECTION, SOLUTION INTRAVENOUS; SUBCUTANEOUS at 06:00

## 2025-03-18 RX ADMIN — SODIUM CHLORIDE 40 MG: 9 INJECTION INTRAMUSCULAR; INTRAVENOUS; SUBCUTANEOUS at 09:01

## 2025-03-18 RX ADMIN — MORPHINE SULFATE 2 MG: 2 INJECTION, SOLUTION INTRAMUSCULAR; INTRAVENOUS at 21:41

## 2025-03-18 RX ADMIN — PROCHLORPERAZINE EDISYLATE 10 MG: 5 INJECTION INTRAMUSCULAR; INTRAVENOUS at 06:00

## 2025-03-18 RX ADMIN — ONDANSETRON 4 MG: 2 INJECTION, SOLUTION INTRAMUSCULAR; INTRAVENOUS at 21:41

## 2025-03-18 ASSESSMENT — PAIN SCALES - WONG BAKER: WONGBAKER_NUMERICALRESPONSE: NO HURT

## 2025-03-18 ASSESSMENT — PAIN SCALES - GENERAL
PAINLEVEL_OUTOF10: 8
PAINLEVEL_OUTOF10: 8
PAINLEVEL_OUTOF10: 6

## 2025-03-18 ASSESSMENT — PAIN DESCRIPTION - DESCRIPTORS: DESCRIPTORS: DISCOMFORT;ACHING

## 2025-03-18 ASSESSMENT — PAIN DESCRIPTION - LOCATION: LOCATION: THROAT

## 2025-03-18 ASSESSMENT — PAIN - FUNCTIONAL ASSESSMENT: PAIN_FUNCTIONAL_ASSESSMENT: ACTIVITIES ARE NOT PREVENTED

## 2025-03-18 NOTE — PLAN OF CARE
Problem: Chronic Conditions and Co-morbidities  Goal: Patient's chronic conditions and co-morbidity symptoms are monitored and maintained or improved  3/12/2025 0730 by Frances Huynh RN  Outcome: Progressing  3/11/2025 1916 by Lanny Terrell RN  Outcome: Progressing     Problem: Discharge Planning  Goal: Discharge to home or other facility with appropriate resources  3/12/2025 0730 by Frances Huynh RN  Outcome: Progressing  3/11/2025 1916 by Lanny Terrell RN  Outcome: Progressing     Problem: Pain  Goal: Verbalizes/displays adequate comfort level or baseline comfort level  3/12/2025 0730 by Frances Huynh RN  Outcome: Progressing  3/11/2025 1916 by Lanny Terrell RN  Outcome: Progressing     Problem: Safety - Adult  Goal: Free from fall injury  3/12/2025 0730 by Frances Huynh RN  Outcome: Progressing  3/11/2025 1916 by Lanny Terrell, RN  Outcome: Progressing     
  Problem: Chronic Conditions and Co-morbidities  Goal: Patient's chronic conditions and co-morbidity symptoms are monitored and maintained or improved  3/12/2025 1902 by Lanny Terrell RN  Outcome: Progressing  3/12/2025 0730 by Frances Huynh RN  Outcome: Progressing     Problem: Discharge Planning  Goal: Discharge to home or other facility with appropriate resources  3/12/2025 1902 by Lanny Terrell RN  Outcome: Progressing  3/12/2025 0730 by Frances Huynh RN  Outcome: Progressing     Problem: Pain  Goal: Verbalizes/displays adequate comfort level or baseline comfort level  3/12/2025 1902 by aLnny Terrell RN  Outcome: Progressing  3/12/2025 0730 by Frances Huynh RN  Outcome: Progressing     Problem: Safety - Adult  Goal: Free from fall injury  3/12/2025 1902 by Lanny Terrell RN  Outcome: Progressing  3/12/2025 0730 by Frances Huynh RN  Outcome: Progressing     
  Problem: Chronic Conditions and Co-morbidities  Goal: Patient's chronic conditions and co-morbidity symptoms are monitored and maintained or improved  3/13/2025 0739 by Frances Huynh RN  Outcome: Progressing  3/12/2025 1902 by Lanny Terrell RN  Outcome: Progressing     Problem: Discharge Planning  Goal: Discharge to home or other facility with appropriate resources  3/13/2025 0739 by Frances Huynh RN  Outcome: Progressing  3/12/2025 1902 by Lanny Terrell RN  Outcome: Progressing     Problem: Pain  Goal: Verbalizes/displays adequate comfort level or baseline comfort level  3/13/2025 0739 by Frances Huynh RN  Outcome: Progressing  3/12/2025 1902 by Lanny Terrell RN  Outcome: Progressing     Problem: Safety - Adult  Goal: Free from fall injury  3/13/2025 0739 by Frances Huynh RN  Outcome: Progressing  3/12/2025 1902 by Lanny Terrell RN  Outcome: Progressing     Problem: Skin/Tissue Integrity  Goal: Skin integrity remains intact  Description: 1.  Monitor for areas of redness and/or skin breakdown  2.  Assess vascular access sites hourly  3.  Every 4-6 hours minimum:  Change oxygen saturation probe site  4.  Every 4-6 hours:  If on nasal continuous positive airway pressure, respiratory therapy assess nares and determine need for appliance change or resting period  Outcome: Progressing     
  Problem: Chronic Conditions and Co-morbidities  Goal: Patient's chronic conditions and co-morbidity symptoms are monitored and maintained or improved  3/17/2025 2308 by Bull Bright RN  Outcome: Progressing  3/17/2025 1042 by Devang Sarmiento RN  Outcome: Progressing     Problem: Discharge Planning  Goal: Discharge to home or other facility with appropriate resources  3/17/2025 2308 by Bull Bright RN  Outcome: Progressing  3/17/2025 1042 by Devang Sarmiento RN  Outcome: Progressing     Problem: Pain  Goal: Verbalizes/displays adequate comfort level or baseline comfort level  3/17/2025 2308 by Bull Bright RN  Outcome: Progressing  3/17/2025 1042 by Devang Sarmiento RN  Outcome: Progressing     Problem: Safety - Adult  Goal: Free from fall injury  3/17/2025 2308 by Bull Bright RN  Outcome: Progressing  3/17/2025 1042 by Devang Sarmiento RN  Outcome: Progressing     Problem: Skin/Tissue Integrity  Goal: Skin integrity remains intact  Description: 1.  Monitor for areas of redness and/or skin breakdown  2.  Assess vascular access sites hourly  3.  Every 4-6 hours minimum:  Change oxygen saturation probe site  4.  Every 4-6 hours:  If on nasal continuous positive airway pressure, respiratory therapy assess nares and determine need for appliance change or resting period  3/17/2025 2308 by Bull Bright RN  Outcome: Progressing  3/17/2025 1042 by Devang Sarmiento RN  Outcome: Progressing     
  Problem: Chronic Conditions and Co-morbidities  Goal: Patient's chronic conditions and co-morbidity symptoms are monitored and maintained or improved  Outcome: Progressing     Problem: Discharge Planning  Goal: Discharge to home or other facility with appropriate resources  Outcome: Progressing     Problem: Pain  Goal: Verbalizes/displays adequate comfort level or baseline comfort level  Outcome: Progressing     Problem: Safety - Adult  Goal: Free from fall injury  Outcome: Progressing     
  Problem: Chronic Conditions and Co-morbidities  Goal: Patient's chronic conditions and co-morbidity symptoms are monitored and maintained or improved  Outcome: Progressing     Problem: Discharge Planning  Goal: Discharge to home or other facility with appropriate resources  Outcome: Progressing     Problem: Pain  Goal: Verbalizes/displays adequate comfort level or baseline comfort level  Outcome: Progressing     Problem: Safety - Adult  Goal: Free from fall injury  Outcome: Progressing     Problem: Skin/Tissue Integrity  Goal: Skin integrity remains intact  Description: 1.  Monitor for areas of redness and/or skin breakdown  2.  Assess vascular access sites hourly  3.  Every 4-6 hours minimum:  Change oxygen saturation probe site  4.  Every 4-6 hours:  If on nasal continuous positive airway pressure, respiratory therapy assess nares and determine need for appliance change or resting period  Outcome: Progressing     
  Problem: Chronic Conditions and Co-morbidities  Goal: Patient's chronic conditions and co-morbidity symptoms are monitored and maintained or improved  Outcome: Progressing     Problem: Discharge Planning  Goal: Discharge to home or other facility with appropriate resources  Outcome: Progressing     Problem: Pain  Goal: Verbalizes/displays adequate comfort level or baseline comfort level  Outcome: Progressing     Problem: Safety - Adult  Goal: Free from fall injury  Outcome: Progressing     Problem: Skin/Tissue Integrity  Goal: Skin integrity remains intact  Description: 1.  Monitor for areas of redness and/or skin breakdown  2.  Assess vascular access sites hourly  3.  Every 4-6 hours minimum:  Change oxygen saturation probe site  4.  Every 4-6 hours:  If on nasal continuous positive airway pressure, respiratory therapy assess nares and determine need for appliance change or resting period  Outcome: Progressing     
resources:   Identify barriers to discharge with patient and caregiver   Arrange for needed discharge resources and transportation as appropriate   Identify discharge learning needs (meds, wound care, etc)   Refer to discharge planning if patient needs post-hospital services based on physician order or complex needs related to functional status, cognitive ability or social support system     Problem: Pain  Goal: Verbalizes/displays adequate comfort level or baseline comfort level  3/15/2025 0023 by Lilly Dudley RN  Outcome: Progressing  Flowsheets (Taken 3/14/2025 1918)  Verbalizes/displays adequate comfort level or baseline comfort level:   Encourage patient to monitor pain and request assistance   Assess pain using appropriate pain scale   Administer analgesics based on type and severity of pain and evaluate response   Implement non-pharmacological measures as appropriate and evaluate response  3/14/2025 1301 by Brigida Woods RN  Outcome: Progressing     Problem: Safety - Adult  Goal: Free from fall injury  3/15/2025 0023 by Lilly Dudley RN  Outcome: Progressing  3/14/2025 1301 by Brigida Woods RN  Outcome: Progressing     Problem: Skin/Tissue Integrity  Goal: Skin integrity remains intact  Description: 1.  Monitor for areas of redness and/or skin breakdown  2.  Assess vascular access sites hourly  3.  Every 4-6 hours minimum:  Change oxygen saturation probe site  4.  Every 4-6 hours:  If on nasal continuous positive airway pressure, respiratory therapy assess nares and determine need for appliance change or resting period  3/15/2025 0023 by Lilly Dudley, RN  Outcome: Progressing  Flowsheets (Taken 3/14/2025 1918)  Skin Integrity Remains Intact: Monitor for areas of redness and/or skin breakdown  3/14/2025 1301 by Brigida Woods RN  Outcome: Progressing

## 2025-03-19 ENCOUNTER — ANESTHESIA (OUTPATIENT)
Dept: SURGERY | Age: 80
End: 2025-03-19
Payer: MEDICARE

## 2025-03-19 ENCOUNTER — HOSPITAL ENCOUNTER (INPATIENT)
Age: 80
LOS: 1 days | Discharge: HOSPICE | DRG: 330 | End: 2025-03-21
Attending: SURGERY | Admitting: SURGERY
Payer: MEDICARE

## 2025-03-19 ENCOUNTER — ANESTHESIA EVENT (OUTPATIENT)
Dept: SURGERY | Age: 80
End: 2025-03-19
Payer: MEDICARE

## 2025-03-19 VITALS
HEART RATE: 83 BPM | OXYGEN SATURATION: 98 % | SYSTOLIC BLOOD PRESSURE: 159 MMHG | TEMPERATURE: 98.2 F | RESPIRATION RATE: 16 BRPM | BODY MASS INDEX: 43.75 KG/M2 | WEIGHT: 246.91 LBS | HEIGHT: 63 IN | DIASTOLIC BLOOD PRESSURE: 66 MMHG

## 2025-03-19 LAB
ABO + RH BLD: NORMAL
ALBUMIN SERPL-MCNC: 2.1 G/DL (ref 3.2–4.6)
ALBUMIN/GLOB SERPL: 0.6 (ref 1–1.9)
ALP SERPL-CCNC: 78 U/L (ref 35–104)
ALT SERPL-CCNC: 26 U/L (ref 8–45)
ANION GAP SERPL CALC-SCNC: 7 MMOL/L (ref 7–16)
AST SERPL-CCNC: 26 U/L (ref 15–37)
BASOPHILS # BLD: 0.02 K/UL (ref 0–0.2)
BASOPHILS NFR BLD: 0.2 % (ref 0–2)
BILIRUB SERPL-MCNC: 0.3 MG/DL (ref 0–1.2)
BLOOD GROUP ANTIBODIES SERPL: NORMAL
BUN SERPL-MCNC: 15 MG/DL (ref 8–23)
CALCIUM SERPL-MCNC: 8.3 MG/DL (ref 8.8–10.2)
CHLORIDE SERPL-SCNC: 101 MMOL/L (ref 98–107)
CO2 SERPL-SCNC: 30 MMOL/L (ref 20–29)
CREAT SERPL-MCNC: 0.62 MG/DL (ref 0.6–1.1)
DIFFERENTIAL METHOD BLD: ABNORMAL
EOSINOPHIL # BLD: 0.07 K/UL (ref 0–0.8)
EOSINOPHIL NFR BLD: 0.8 % (ref 0.5–7.8)
ERYTHROCYTE [DISTWIDTH] IN BLOOD BY AUTOMATED COUNT: 13.9 % (ref 11.9–14.6)
GLOBULIN SER CALC-MCNC: 3.6 G/DL (ref 2.3–3.5)
GLUCOSE BLD STRIP.AUTO-MCNC: 221 MG/DL (ref 65–100)
GLUCOSE BLD STRIP.AUTO-MCNC: 229 MG/DL (ref 65–100)
GLUCOSE BLD STRIP.AUTO-MCNC: 257 MG/DL (ref 65–100)
GLUCOSE BLD STRIP.AUTO-MCNC: 266 MG/DL (ref 65–100)
GLUCOSE BLD STRIP.AUTO-MCNC: 278 MG/DL (ref 65–100)
GLUCOSE SERPL-MCNC: 286 MG/DL (ref 70–99)
HCT VFR BLD AUTO: 39 % (ref 35.8–46.3)
HGB BLD-MCNC: 11.5 G/DL (ref 11.7–15.4)
IMM GRANULOCYTES # BLD AUTO: 0.08 K/UL (ref 0–0.5)
IMM GRANULOCYTES NFR BLD AUTO: 1 % (ref 0–5)
LYMPHOCYTES # BLD: 1.09 K/UL (ref 0.5–4.6)
LYMPHOCYTES NFR BLD: 13.1 % (ref 13–44)
MAGNESIUM SERPL-MCNC: 2.3 MG/DL (ref 1.8–2.4)
MCH RBC QN AUTO: 25.7 PG (ref 26.1–32.9)
MCHC RBC AUTO-ENTMCNC: 29.5 G/DL (ref 31.4–35)
MCV RBC AUTO: 87.1 FL (ref 82–102)
MONOCYTES # BLD: 0.77 K/UL (ref 0.1–1.3)
MONOCYTES NFR BLD: 9.3 % (ref 4–12)
NEUTS SEG # BLD: 6.29 K/UL (ref 1.7–8.2)
NEUTS SEG NFR BLD: 75.6 % (ref 43–78)
NRBC # BLD: 0 K/UL (ref 0–0.2)
PHOSPHATE SERPL-MCNC: 2.4 MG/DL (ref 2.5–4.5)
PLATELET # BLD AUTO: 319 K/UL (ref 150–450)
PMV BLD AUTO: 9.2 FL (ref 9.4–12.3)
POTASSIUM SERPL-SCNC: 3.7 MMOL/L (ref 3.5–5.1)
PROT SERPL-MCNC: 5.8 G/DL (ref 6.3–8.2)
RBC # BLD AUTO: 4.48 M/UL (ref 4.05–5.2)
SERVICE CMNT-IMP: ABNORMAL
SODIUM SERPL-SCNC: 137 MMOL/L (ref 136–145)
SPECIMEN EXP DATE BLD: NORMAL
UFH PPP CHRO-ACNC: <0.1 IU/ML (ref 0.3–0.7)
WBC # BLD AUTO: 8.3 K/UL (ref 4.3–11.1)

## 2025-03-19 PROCEDURE — 7100000001 HC PACU RECOVERY - ADDTL 15 MIN: Performed by: SURGERY

## 2025-03-19 PROCEDURE — 6360000002 HC RX W HCPCS: Performed by: NURSE ANESTHETIST, CERTIFIED REGISTERED

## 2025-03-19 PROCEDURE — 83735 ASSAY OF MAGNESIUM: CPT

## 2025-03-19 PROCEDURE — 2580000003 HC RX 258: Performed by: SURGERY

## 2025-03-19 PROCEDURE — 7100000000 HC PACU RECOVERY - FIRST 15 MIN: Performed by: SURGERY

## 2025-03-19 PROCEDURE — 86850 RBC ANTIBODY SCREEN: CPT

## 2025-03-19 PROCEDURE — 36415 COLL VENOUS BLD VENIPUNCTURE: CPT

## 2025-03-19 PROCEDURE — 49000 EXPLORATION OF ABDOMEN: CPT | Performed by: SURGERY

## 2025-03-19 PROCEDURE — 6360000002 HC RX W HCPCS: Performed by: INTERNAL MEDICINE

## 2025-03-19 PROCEDURE — 2500000003 HC RX 250 WO HCPCS: Performed by: INTERNAL MEDICINE

## 2025-03-19 PROCEDURE — 0DQ80ZZ REPAIR SMALL INTESTINE, OPEN APPROACH: ICD-10-PCS | Performed by: SURGERY

## 2025-03-19 PROCEDURE — 3600000016 HC SURGERY LEVEL 6 ADDTL 15MIN: Performed by: SURGERY

## 2025-03-19 PROCEDURE — 2720000010 HC SURG SUPPLY STERILE: Performed by: SURGERY

## 2025-03-19 PROCEDURE — 86900 BLOOD TYPING SEROLOGIC ABO: CPT

## 2025-03-19 PROCEDURE — 6370000000 HC RX 637 (ALT 250 FOR IP): Performed by: SURGERY

## 2025-03-19 PROCEDURE — 2500000003 HC RX 250 WO HCPCS: Performed by: HOSPITALIST

## 2025-03-19 PROCEDURE — 3700000000 HC ANESTHESIA ATTENDED CARE: Performed by: SURGERY

## 2025-03-19 PROCEDURE — 6370000000 HC RX 637 (ALT 250 FOR IP)

## 2025-03-19 PROCEDURE — 6370000000 HC RX 637 (ALT 250 FOR IP): Performed by: INTERNAL MEDICINE

## 2025-03-19 PROCEDURE — 8E0W4CZ ROBOTIC ASSISTED PROCEDURE OF TRUNK REGION, PERCUTANEOUS ENDOSCOPIC APPROACH: ICD-10-PCS | Performed by: SURGERY

## 2025-03-19 PROCEDURE — 6360000002 HC RX W HCPCS: Performed by: HOSPITALIST

## 2025-03-19 PROCEDURE — 2500000003 HC RX 250 WO HCPCS: Performed by: NURSE PRACTITIONER

## 2025-03-19 PROCEDURE — 3700000001 HC ADD 15 MINUTES (ANESTHESIA): Performed by: SURGERY

## 2025-03-19 PROCEDURE — 85025 COMPLETE CBC W/AUTO DIFF WBC: CPT

## 2025-03-19 PROCEDURE — 2500000003 HC RX 250 WO HCPCS: Performed by: SURGERY

## 2025-03-19 PROCEDURE — 85520 HEPARIN ASSAY: CPT

## 2025-03-19 PROCEDURE — 2580000003 HC RX 258: Performed by: FAMILY MEDICINE

## 2025-03-19 PROCEDURE — 86901 BLOOD TYPING SEROLOGIC RH(D): CPT

## 2025-03-19 PROCEDURE — 2580000003 HC RX 258: Performed by: NURSE ANESTHETIST, CERTIFIED REGISTERED

## 2025-03-19 PROCEDURE — 3600000006 HC SURGERY LEVEL 6 BASE: Performed by: SURGERY

## 2025-03-19 PROCEDURE — 2500000003 HC RX 250 WO HCPCS

## 2025-03-19 PROCEDURE — 2500000003 HC RX 250 WO HCPCS: Performed by: NURSE ANESTHETIST, CERTIFIED REGISTERED

## 2025-03-19 PROCEDURE — 2580000003 HC RX 258: Performed by: INTERNAL MEDICINE

## 2025-03-19 PROCEDURE — 84100 ASSAY OF PHOSPHORUS: CPT

## 2025-03-19 PROCEDURE — 80053 COMPREHEN METABOLIC PANEL: CPT

## 2025-03-19 PROCEDURE — 6360000002 HC RX W HCPCS: Performed by: SURGERY

## 2025-03-19 PROCEDURE — 6360000002 HC RX W HCPCS: Performed by: FAMILY MEDICINE

## 2025-03-19 PROCEDURE — 82962 GLUCOSE BLOOD TEST: CPT

## 2025-03-19 PROCEDURE — 6360000002 HC RX W HCPCS

## 2025-03-19 PROCEDURE — 2709999900 HC NON-CHARGEABLE SUPPLY: Performed by: SURGERY

## 2025-03-19 RX ORDER — IBUPROFEN 600 MG/1
1 TABLET ORAL PRN
Status: DISCONTINUED | OUTPATIENT
Start: 2025-03-19 | End: 2025-03-19 | Stop reason: HOSPADM

## 2025-03-19 RX ORDER — CLONIDINE 0.1 MG/24H
1 PATCH, EXTENDED RELEASE TRANSDERMAL WEEKLY
Status: CANCELLED | OUTPATIENT
Start: 2025-03-22

## 2025-03-19 RX ORDER — METRONIDAZOLE 500 MG/100ML
500 INJECTION, SOLUTION INTRAVENOUS ONCE
Status: COMPLETED | OUTPATIENT
Start: 2025-03-19 | End: 2025-03-19

## 2025-03-19 RX ORDER — PROPOFOL 10 MG/ML
INJECTION, EMULSION INTRAVENOUS
Status: DISCONTINUED | OUTPATIENT
Start: 2025-03-19 | End: 2025-03-19 | Stop reason: SDUPTHER

## 2025-03-19 RX ORDER — SODIUM CHLORIDE 0.9 % (FLUSH) 0.9 %
5-40 SYRINGE (ML) INJECTION PRN
Status: DISCONTINUED | OUTPATIENT
Start: 2025-03-19 | End: 2025-03-19 | Stop reason: HOSPADM

## 2025-03-19 RX ORDER — SENNOSIDES A AND B 8.6 MG/1
1 TABLET, FILM COATED ORAL NIGHTLY
Status: CANCELLED | OUTPATIENT
Start: 2025-03-19

## 2025-03-19 RX ORDER — NALOXONE HYDROCHLORIDE 0.4 MG/ML
INJECTION, SOLUTION INTRAMUSCULAR; INTRAVENOUS; SUBCUTANEOUS PRN
Status: DISCONTINUED | OUTPATIENT
Start: 2025-03-19 | End: 2025-03-19 | Stop reason: HOSPADM

## 2025-03-19 RX ORDER — INSULIN LISPRO 100 [IU]/ML
0-8 INJECTION, SOLUTION INTRAVENOUS; SUBCUTANEOUS EVERY 6 HOURS SCHEDULED
Status: CANCELLED | OUTPATIENT
Start: 2025-03-19

## 2025-03-19 RX ORDER — METRONIDAZOLE 500 MG/100ML
500 INJECTION, SOLUTION INTRAVENOUS EVERY 8 HOURS
Status: DISCONTINUED | OUTPATIENT
Start: 2025-03-20 | End: 2025-03-20

## 2025-03-19 RX ORDER — SODIUM CHLORIDE 9 MG/ML
INJECTION, SOLUTION INTRAVENOUS PRN
Status: DISCONTINUED | OUTPATIENT
Start: 2025-03-19 | End: 2025-03-19 | Stop reason: HOSPADM

## 2025-03-19 RX ORDER — SODIUM CHLORIDE 0.9 % (FLUSH) 0.9 %
5-40 SYRINGE (ML) INJECTION EVERY 12 HOURS SCHEDULED
Status: DISCONTINUED | OUTPATIENT
Start: 2025-03-19 | End: 2025-03-19 | Stop reason: HOSPADM

## 2025-03-19 RX ORDER — SODIUM CHLORIDE 0.9 % (FLUSH) 0.9 %
5-40 SYRINGE (ML) INJECTION EVERY 12 HOURS SCHEDULED
Status: CANCELLED | OUTPATIENT
Start: 2025-03-19

## 2025-03-19 RX ORDER — HALOPERIDOL 5 MG/ML
1 INJECTION INTRAMUSCULAR
Status: DISCONTINUED | OUTPATIENT
Start: 2025-03-19 | End: 2025-03-19 | Stop reason: HOSPADM

## 2025-03-19 RX ORDER — ONDANSETRON 2 MG/ML
INJECTION INTRAMUSCULAR; INTRAVENOUS
Status: DISCONTINUED | OUTPATIENT
Start: 2025-03-19 | End: 2025-03-19 | Stop reason: SDUPTHER

## 2025-03-19 RX ORDER — POLYETHYLENE GLYCOL 3350 17 G/17G
17 POWDER, FOR SOLUTION ORAL DAILY PRN
Status: CANCELLED | OUTPATIENT
Start: 2025-03-19

## 2025-03-19 RX ORDER — ATORVASTATIN CALCIUM 40 MG/1
40 TABLET, FILM COATED ORAL DAILY
Status: CANCELLED | OUTPATIENT
Start: 2025-03-20

## 2025-03-19 RX ORDER — ASPIRIN 325 MG
325 TABLET ORAL DAILY
Status: CANCELLED | OUTPATIENT
Start: 2025-03-20

## 2025-03-19 RX ORDER — HEPARIN SODIUM 10000 [USP'U]/100ML
5-30 INJECTION, SOLUTION INTRAVENOUS CONTINUOUS
Status: CANCELLED | OUTPATIENT
Start: 2025-03-19

## 2025-03-19 RX ORDER — CIPROFLOXACIN 2 MG/ML
400 INJECTION, SOLUTION INTRAVENOUS EVERY 12 HOURS
Status: DISCONTINUED | OUTPATIENT
Start: 2025-03-19 | End: 2025-03-20

## 2025-03-19 RX ORDER — CITRIC ACID/SODIUM CITRATE 334-500MG
30 SOLUTION, ORAL ORAL ONCE
Status: COMPLETED | OUTPATIENT
Start: 2025-03-19 | End: 2025-03-19

## 2025-03-19 RX ORDER — POTASSIUM CHLORIDE 1500 MG/1
40 TABLET, EXTENDED RELEASE ORAL PRN
Status: CANCELLED | OUTPATIENT
Start: 2025-03-19

## 2025-03-19 RX ORDER — IBUPROFEN 600 MG/1
1 TABLET ORAL PRN
Status: CANCELLED | OUTPATIENT
Start: 2025-03-19 | Stop reason: SDUPTHER

## 2025-03-19 RX ORDER — POTASSIUM CHLORIDE 7.45 MG/ML
10 INJECTION INTRAVENOUS PRN
Status: CANCELLED | OUTPATIENT
Start: 2025-03-19

## 2025-03-19 RX ORDER — EPHEDRINE SULFATE 5 MG/ML
INJECTION INTRAVENOUS
Status: DISCONTINUED | OUTPATIENT
Start: 2025-03-19 | End: 2025-03-19 | Stop reason: SDUPTHER

## 2025-03-19 RX ORDER — DEXTROSE MONOHYDRATE 100 MG/ML
INJECTION, SOLUTION INTRAVENOUS CONTINUOUS
Status: CANCELLED | OUTPATIENT
Start: 2025-03-19

## 2025-03-19 RX ORDER — INSULIN LISPRO 100 [IU]/ML
4 INJECTION, SOLUTION INTRAVENOUS; SUBCUTANEOUS ONCE
Status: COMPLETED | OUTPATIENT
Start: 2025-03-19 | End: 2025-03-19

## 2025-03-19 RX ORDER — INSULIN LISPRO 100 [IU]/ML
0-16 INJECTION, SOLUTION INTRAVENOUS; SUBCUTANEOUS
Status: DISCONTINUED | OUTPATIENT
Start: 2025-03-19 | End: 2025-03-19 | Stop reason: HOSPADM

## 2025-03-19 RX ORDER — MIDAZOLAM HYDROCHLORIDE 1 MG/ML
INJECTION, SOLUTION INTRAMUSCULAR; INTRAVENOUS
Status: DISCONTINUED | OUTPATIENT
Start: 2025-03-19 | End: 2025-03-19 | Stop reason: SDUPTHER

## 2025-03-19 RX ORDER — MORPHINE SULFATE 2 MG/ML
2 INJECTION, SOLUTION INTRAMUSCULAR; INTRAVENOUS ONCE
Status: COMPLETED | OUTPATIENT
Start: 2025-03-19 | End: 2025-03-19

## 2025-03-19 RX ORDER — SODIUM CHLORIDE 9 MG/ML
INJECTION, SOLUTION INTRAVENOUS PRN
Status: CANCELLED | OUTPATIENT
Start: 2025-03-19

## 2025-03-19 RX ORDER — LABETALOL HYDROCHLORIDE 5 MG/ML
10 INJECTION, SOLUTION INTRAVENOUS EVERY 6 HOURS PRN
OUTPATIENT
Start: 2025-03-19

## 2025-03-19 RX ORDER — SODIUM CHLORIDE, SODIUM LACTATE, POTASSIUM CHLORIDE, CALCIUM CHLORIDE 600; 310; 30; 20 MG/100ML; MG/100ML; MG/100ML; MG/100ML
INJECTION, SOLUTION INTRAVENOUS
Status: DISCONTINUED | OUTPATIENT
Start: 2025-03-19 | End: 2025-03-19 | Stop reason: SDUPTHER

## 2025-03-19 RX ORDER — CITRIC ACID/SODIUM CITRATE 334-500MG
30 SOLUTION, ORAL ORAL ONCE
Status: DISCONTINUED | OUTPATIENT
Start: 2025-03-19 | End: 2025-03-19 | Stop reason: HOSPADM

## 2025-03-19 RX ORDER — ONDANSETRON 2 MG/ML
4 INJECTION INTRAMUSCULAR; INTRAVENOUS EVERY 6 HOURS PRN
Status: CANCELLED | OUTPATIENT
Start: 2025-03-19

## 2025-03-19 RX ORDER — LIDOCAINE HYDROCHLORIDE 20 MG/ML
INJECTION, SOLUTION EPIDURAL; INFILTRATION; INTRACAUDAL; PERINEURAL
Status: DISCONTINUED | OUTPATIENT
Start: 2025-03-19 | End: 2025-03-19 | Stop reason: SDUPTHER

## 2025-03-19 RX ORDER — DEXTROSE MONOHYDRATE 100 MG/ML
INJECTION, SOLUTION INTRAVENOUS CONTINUOUS PRN
Status: DISCONTINUED | OUTPATIENT
Start: 2025-03-19 | End: 2025-03-19 | Stop reason: HOSPADM

## 2025-03-19 RX ORDER — MAGNESIUM SULFATE IN WATER 40 MG/ML
2000 INJECTION, SOLUTION INTRAVENOUS PRN
Status: CANCELLED | OUTPATIENT
Start: 2025-03-19

## 2025-03-19 RX ORDER — FENTANYL CITRATE 50 UG/ML
INJECTION, SOLUTION INTRAMUSCULAR; INTRAVENOUS
Status: DISCONTINUED | OUTPATIENT
Start: 2025-03-19 | End: 2025-03-19 | Stop reason: SDUPTHER

## 2025-03-19 RX ORDER — METHOCARBAMOL 100 MG/ML
1000 INJECTION, SOLUTION INTRAMUSCULAR; INTRAVENOUS ONCE AS NEEDED
Status: DISCONTINUED | OUTPATIENT
Start: 2025-03-19 | End: 2025-03-19

## 2025-03-19 RX ORDER — ACETAMINOPHEN 650 MG/1
650 SUPPOSITORY RECTAL EVERY 6 HOURS PRN
Status: CANCELLED | OUTPATIENT
Start: 2025-03-19

## 2025-03-19 RX ORDER — DEXTROSE MONOHYDRATE 100 MG/ML
INJECTION, SOLUTION INTRAVENOUS CONTINUOUS PRN
Status: CANCELLED | OUTPATIENT
Start: 2025-03-19 | Stop reason: SDUPTHER

## 2025-03-19 RX ORDER — IPRATROPIUM BROMIDE AND ALBUTEROL SULFATE 2.5; .5 MG/3ML; MG/3ML
1 SOLUTION RESPIRATORY (INHALATION)
Status: DISCONTINUED | OUTPATIENT
Start: 2025-03-19 | End: 2025-03-19 | Stop reason: HOSPADM

## 2025-03-19 RX ORDER — LEVOTHYROXINE SODIUM 50 UG/1
25 TABLET ORAL
Status: CANCELLED | OUTPATIENT
Start: 2025-03-20

## 2025-03-19 RX ORDER — LABETALOL HYDROCHLORIDE 5 MG/ML
10 INJECTION, SOLUTION INTRAVENOUS
Status: DISCONTINUED | OUTPATIENT
Start: 2025-03-19 | End: 2025-03-19 | Stop reason: HOSPADM

## 2025-03-19 RX ORDER — SODIUM CHLORIDE 0.9 % (FLUSH) 0.9 %
5-40 SYRINGE (ML) INJECTION PRN
Status: CANCELLED | OUTPATIENT
Start: 2025-03-19

## 2025-03-19 RX ORDER — LIDOCAINE HYDROCHLORIDE 10 MG/ML
1 INJECTION, SOLUTION INFILTRATION; PERINEURAL
Status: DISCONTINUED | OUTPATIENT
Start: 2025-03-19 | End: 2025-03-19 | Stop reason: HOSPADM

## 2025-03-19 RX ORDER — ACETAMINOPHEN 325 MG/1
650 TABLET ORAL EVERY 6 HOURS PRN
Status: CANCELLED | OUTPATIENT
Start: 2025-03-19

## 2025-03-19 RX ORDER — ESCITALOPRAM OXALATE 10 MG/1
20 TABLET ORAL DAILY
Status: CANCELLED | OUTPATIENT
Start: 2025-03-20

## 2025-03-19 RX ORDER — LEVOTHYROXINE SODIUM 20 UG/ML
19 INJECTION, SOLUTION INTRAVENOUS DAILY
Status: DISCONTINUED | OUTPATIENT
Start: 2025-03-20 | End: 2025-03-19 | Stop reason: HOSPADM

## 2025-03-19 RX ORDER — PROCHLORPERAZINE EDISYLATE 5 MG/ML
5 INJECTION INTRAMUSCULAR; INTRAVENOUS
Status: DISCONTINUED | OUTPATIENT
Start: 2025-03-19 | End: 2025-03-19 | Stop reason: HOSPADM

## 2025-03-19 RX ORDER — MENTHOL/CAMPHOR/ALLANTOIN/PHE 0.6-0.5-1%
OINTMENT(EA) TOPICAL PRN
Status: CANCELLED | OUTPATIENT
Start: 2025-03-19

## 2025-03-19 RX ORDER — DEXAMETHASONE SODIUM PHOSPHATE 10 MG/ML
INJECTION, SOLUTION INTRA-ARTICULAR; INTRALESIONAL; INTRAMUSCULAR; INTRAVENOUS; SOFT TISSUE
Status: DISCONTINUED | OUTPATIENT
Start: 2025-03-19 | End: 2025-03-19 | Stop reason: SDUPTHER

## 2025-03-19 RX ORDER — OXYCODONE HYDROCHLORIDE 5 MG/1
5 TABLET ORAL
Status: DISCONTINUED | OUTPATIENT
Start: 2025-03-19 | End: 2025-03-19 | Stop reason: HOSPADM

## 2025-03-19 RX ORDER — SODIUM CHLORIDE 9 MG/ML
INJECTION, SOLUTION INTRAVENOUS CONTINUOUS
Status: ACTIVE | OUTPATIENT
Start: 2025-03-19 | End: 2025-03-20

## 2025-03-19 RX ORDER — SODIUM CHLORIDE, SODIUM LACTATE, POTASSIUM CHLORIDE, CALCIUM CHLORIDE 600; 310; 30; 20 MG/100ML; MG/100ML; MG/100ML; MG/100ML
INJECTION, SOLUTION INTRAVENOUS CONTINUOUS
Status: DISCONTINUED | OUTPATIENT
Start: 2025-03-19 | End: 2025-03-19 | Stop reason: HOSPADM

## 2025-03-19 RX ORDER — HYDROMORPHONE HYDROCHLORIDE 1 MG/ML
1 INJECTION, SOLUTION INTRAMUSCULAR; INTRAVENOUS; SUBCUTANEOUS
Status: DISCONTINUED | OUTPATIENT
Start: 2025-03-19 | End: 2025-03-21 | Stop reason: HOSPADM

## 2025-03-19 RX ORDER — HYDRALAZINE HYDROCHLORIDE 20 MG/ML
20 INJECTION INTRAMUSCULAR; INTRAVENOUS EVERY 4 HOURS PRN
Status: CANCELLED | OUTPATIENT
Start: 2025-03-19

## 2025-03-19 RX ORDER — DIATRIZOATE MEGLUMINE AND DIATRIZOATE SODIUM 660; 100 MG/ML; MG/ML
15 SOLUTION ORAL; RECTAL
Status: CANCELLED | OUTPATIENT
Start: 2025-03-19

## 2025-03-19 RX ORDER — KETAMINE HCL IN NACL, ISO-OSM 20 MG/2 ML
SYRINGE (ML) INJECTION
Status: DISCONTINUED | OUTPATIENT
Start: 2025-03-19 | End: 2025-03-19 | Stop reason: SDUPTHER

## 2025-03-19 RX ORDER — HEPARIN SODIUM 1000 [USP'U]/ML
80 INJECTION, SOLUTION INTRAVENOUS; SUBCUTANEOUS PRN
Status: CANCELLED | OUTPATIENT
Start: 2025-03-19

## 2025-03-19 RX ORDER — MORPHINE SULFATE 2 MG/ML
2 INJECTION, SOLUTION INTRAMUSCULAR; INTRAVENOUS EVERY 4 HOURS PRN
Refills: 0 | Status: CANCELLED | OUTPATIENT
Start: 2025-03-19

## 2025-03-19 RX ORDER — ONDANSETRON 4 MG/1
4 TABLET, ORALLY DISINTEGRATING ORAL EVERY 8 HOURS PRN
Status: CANCELLED | OUTPATIENT
Start: 2025-03-19

## 2025-03-19 RX ORDER — LIDOCAINE HYDROCHLORIDE 10 MG/ML
50 INJECTION, SOLUTION EPIDURAL; INFILTRATION; INTRACAUDAL; PERINEURAL ONCE
Status: DISCONTINUED | OUTPATIENT
Start: 2025-03-19 | End: 2025-03-19 | Stop reason: HOSPADM

## 2025-03-19 RX ORDER — INSULIN GLARGINE 100 [IU]/ML
34 INJECTION, SOLUTION SUBCUTANEOUS DAILY
Status: CANCELLED | OUTPATIENT
Start: 2025-03-20

## 2025-03-19 RX ORDER — ONDANSETRON 2 MG/ML
4 INJECTION INTRAMUSCULAR; INTRAVENOUS EVERY 6 HOURS PRN
Status: DISCONTINUED | OUTPATIENT
Start: 2025-03-19 | End: 2025-03-20 | Stop reason: SDUPTHER

## 2025-03-19 RX ORDER — ROCURONIUM BROMIDE 10 MG/ML
INJECTION, SOLUTION INTRAVENOUS
Status: DISCONTINUED | OUTPATIENT
Start: 2025-03-19 | End: 2025-03-19 | Stop reason: SDUPTHER

## 2025-03-19 RX ORDER — HEPARIN SODIUM 1000 [USP'U]/ML
40 INJECTION, SOLUTION INTRAVENOUS; SUBCUTANEOUS PRN
Status: CANCELLED | OUTPATIENT
Start: 2025-03-19

## 2025-03-19 RX ADMIN — CIPROFLOXACIN 400 MG: 400 INJECTION, SOLUTION INTRAVENOUS at 23:40

## 2025-03-19 RX ADMIN — MIDAZOLAM 2 MG: 1 INJECTION INTRAMUSCULAR; INTRAVENOUS at 16:56

## 2025-03-19 RX ADMIN — FAMOTIDINE 20 MG: 10 INJECTION, SOLUTION INTRAVENOUS at 23:46

## 2025-03-19 RX ADMIN — MORPHINE SULFATE 2 MG: 2 INJECTION, SOLUTION INTRAMUSCULAR; INTRAVENOUS at 15:51

## 2025-03-19 RX ADMIN — ROCURONIUM BROMIDE 70 MG: 10 INJECTION, SOLUTION INTRAVENOUS at 17:09

## 2025-03-19 RX ADMIN — CALCIUM GLUCONATE: 98 INJECTION INTRAVENOUS at 11:03

## 2025-03-19 RX ADMIN — HYDROMORPHONE HYDROCHLORIDE 0.5 MG: 1 INJECTION, SOLUTION INTRAMUSCULAR; INTRAVENOUS; SUBCUTANEOUS at 21:03

## 2025-03-19 RX ADMIN — PROPOFOL 50 MG: 10 INJECTION, EMULSION INTRAVENOUS at 20:00

## 2025-03-19 RX ADMIN — MORPHINE SULFATE 2 MG: 2 INJECTION, SOLUTION INTRAMUSCULAR; INTRAVENOUS at 04:49

## 2025-03-19 RX ADMIN — INSULIN LISPRO 4 UNITS: 100 INJECTION, SOLUTION INTRAVENOUS; SUBCUTANEOUS at 06:37

## 2025-03-19 RX ADMIN — SODIUM CHLORIDE, SODIUM LACTATE, POTASSIUM CHLORIDE, AND CALCIUM CHLORIDE: 600; 310; 30; 20 INJECTION, SOLUTION INTRAVENOUS at 13:47

## 2025-03-19 RX ADMIN — SUGAMMADEX 200 MG: 100 INJECTION, SOLUTION INTRAVENOUS at 20:05

## 2025-03-19 RX ADMIN — ONDANSETRON 4 MG: 2 INJECTION INTRAMUSCULAR; INTRAVENOUS at 18:50

## 2025-03-19 RX ADMIN — SODIUM CHLORIDE, PRESERVATIVE FREE 10 ML: 5 INJECTION INTRAVENOUS at 09:55

## 2025-03-19 RX ADMIN — DEXAMETHASONE SODIUM PHOSPHATE 8 MG: 10 INJECTION INTRAMUSCULAR; INTRAVENOUS at 18:50

## 2025-03-19 RX ADMIN — PHENYLEPHRINE HYDROCHLORIDE 100 MCG: 0.1 INJECTION, SOLUTION INTRAVENOUS at 18:22

## 2025-03-19 RX ADMIN — ROCURONIUM BROMIDE 10 MG: 10 INJECTION, SOLUTION INTRAVENOUS at 17:44

## 2025-03-19 RX ADMIN — FENTANYL CITRATE 100 MCG: 50 INJECTION, SOLUTION INTRAMUSCULAR; INTRAVENOUS at 17:35

## 2025-03-19 RX ADMIN — ROCURONIUM BROMIDE 10 MG: 10 INJECTION, SOLUTION INTRAVENOUS at 18:41

## 2025-03-19 RX ADMIN — SODIUM CHLORIDE: 9 INJECTION, SOLUTION INTRAVENOUS at 23:44

## 2025-03-19 RX ADMIN — INSULIN GLARGINE 34 UNITS: 100 INJECTION, SOLUTION SUBCUTANEOUS at 09:54

## 2025-03-19 RX ADMIN — INSULIN LISPRO 4 UNITS: 100 INJECTION, SOLUTION INTRAVENOUS; SUBCUTANEOUS at 21:13

## 2025-03-19 RX ADMIN — SODIUM CHLORIDE 40 MG: 9 INJECTION INTRAMUSCULAR; INTRAVENOUS; SUBCUTANEOUS at 09:55

## 2025-03-19 RX ADMIN — SODIUM CHLORIDE, SODIUM LACTATE, POTASSIUM CHLORIDE, AND CALCIUM CHLORIDE: 600; 310; 30; 20 INJECTION, SOLUTION INTRAVENOUS at 18:42

## 2025-03-19 RX ADMIN — PHENYLEPHRINE HYDROCHLORIDE 50 MCG: 0.1 INJECTION, SOLUTION INTRAVENOUS at 17:09

## 2025-03-19 RX ADMIN — METRONIDAZOLE 500 MG: 500 INJECTION, SOLUTION INTRAVENOUS at 17:03

## 2025-03-19 RX ADMIN — ONDANSETRON 4 MG: 2 INJECTION, SOLUTION INTRAMUSCULAR; INTRAVENOUS at 04:49

## 2025-03-19 RX ADMIN — LORAZEPAM 0.5 MG: 2 INJECTION INTRAMUSCULAR; INTRAVENOUS at 04:49

## 2025-03-19 RX ADMIN — MORPHINE SULFATE 2 MG: 2 INJECTION, SOLUTION INTRAMUSCULAR; INTRAVENOUS at 12:33

## 2025-03-19 RX ADMIN — HYDROMORPHONE HYDROCHLORIDE 0.5 MG: 1 INJECTION, SOLUTION INTRAMUSCULAR; INTRAVENOUS; SUBCUTANEOUS at 20:51

## 2025-03-19 RX ADMIN — PROPOFOL 150 MG: 10 INJECTION, EMULSION INTRAVENOUS at 17:09

## 2025-03-19 RX ADMIN — Medication 2 G: at 17:16

## 2025-03-19 RX ADMIN — INSULIN LISPRO 8 UNITS: 100 INJECTION, SOLUTION INTRAVENOUS; SUBCUTANEOUS at 12:32

## 2025-03-19 RX ADMIN — INSULIN LISPRO 4 UNITS: 100 INJECTION, SOLUTION INTRAVENOUS; SUBCUTANEOUS at 00:15

## 2025-03-19 RX ADMIN — LIDOCAINE HYDROCHLORIDE 100 MG: 20 INJECTION, SOLUTION EPIDURAL; INFILTRATION; INTRACAUDAL; PERINEURAL at 17:09

## 2025-03-19 RX ADMIN — SODIUM CITRATE AND CITRIC ACID MONOHYDRATE 30 ML: 334; 500 SOLUTION ORAL at 15:44

## 2025-03-19 RX ADMIN — ROCURONIUM BROMIDE 10 MG: 10 INJECTION, SOLUTION INTRAVENOUS at 18:52

## 2025-03-19 RX ADMIN — Medication 20 MG: at 17:09

## 2025-03-19 RX ADMIN — LABETALOL HYDROCHLORIDE 10 MG: 5 INJECTION, SOLUTION INTRAVENOUS at 21:20

## 2025-03-19 RX ADMIN — EPHEDRINE SULFATE 10 MG: 5 INJECTION INTRAVENOUS at 19:24

## 2025-03-19 RX ADMIN — EPHEDRINE SULFATE 10 MG: 5 INJECTION INTRAVENOUS at 19:37

## 2025-03-19 RX ADMIN — ROCURONIUM BROMIDE 20 MG: 10 INJECTION, SOLUTION INTRAVENOUS at 19:26

## 2025-03-19 RX ADMIN — SODIUM CHLORIDE 20 MG: 9 INJECTION INTRAMUSCULAR; INTRAVENOUS; SUBCUTANEOUS at 15:54

## 2025-03-19 RX ADMIN — PHENYLEPHRINE HYDROCHLORIDE 100 MCG: 0.1 INJECTION, SOLUTION INTRAVENOUS at 19:38

## 2025-03-19 RX ADMIN — HYDROMORPHONE HYDROCHLORIDE 1 MG: 1 INJECTION, SOLUTION INTRAMUSCULAR; INTRAVENOUS; SUBCUTANEOUS at 23:30

## 2025-03-19 RX ADMIN — SODIUM CHLORIDE, SODIUM LACTATE, POTASSIUM CHLORIDE, AND CALCIUM CHLORIDE: 600; 310; 30; 20 INJECTION, SOLUTION INTRAVENOUS at 17:26

## 2025-03-19 ASSESSMENT — PAIN SCALES - GENERAL
PAINLEVEL_OUTOF10: 9
PAINLEVEL_OUTOF10: 0
PAINLEVEL_OUTOF10: 6
PAINLEVEL_OUTOF10: 10
PAINLEVEL_OUTOF10: 6

## 2025-03-19 ASSESSMENT — PAIN DESCRIPTION - ORIENTATION
ORIENTATION: RIGHT;LEFT;MID
ORIENTATION: LEFT

## 2025-03-19 ASSESSMENT — PAIN DESCRIPTION - LOCATION
LOCATION: ABDOMEN
LOCATION: THROAT;SHOULDER

## 2025-03-19 ASSESSMENT — PAIN DESCRIPTION - DESCRIPTORS
DESCRIPTORS: ACHING
DESCRIPTORS: THROBBING
DESCRIPTORS: ACHING

## 2025-03-19 ASSESSMENT — PAIN - FUNCTIONAL ASSESSMENT: PAIN_FUNCTIONAL_ASSESSMENT: PREVENTS OR INTERFERES SOME ACTIVE ACTIVITIES AND ADLS

## 2025-03-19 ASSESSMENT — ENCOUNTER SYMPTOMS: SHORTNESS OF BREATH: 1

## 2025-03-19 NOTE — ANESTHESIA PRE PROCEDURE
dysfunction, injury to surrounding structures during line placement, arterial line placement, central line placement, blood transfusion, postoperative ICU admission, and post-operative mechanical ventilation     Discussed possible abdominal wall blocks. Regional anesthesia risks discussed: bleeding infection nerve injury damage to surrounding structures adverse reaction to medication / local anesthetic system toxicity      Crow Medina MD   3/19/2025

## 2025-03-19 NOTE — PROGRESS NOTES
79-year-old female admitted with small bowel obstruction etiology; also has a soft tissue mass adjacent to the pancreas, normal bowel she has not shown any resolution secondary to the OR for robotic diagnostic laparoscopy.        HPI: Amber Escobedo is a 79 y.o. female who we are asked by IM to see for SBO. The patient has a PMHx of IDDM, HTN, HLD, thyroid disease, CAD s/p PCI 2 yrs ago on ELIQUIS .  She presented with a several day history of diffuse abdominal distension with pain that radiated to her back, and vomiting. No fever or chills.  She had an \"ileus\" 20 yrs ago that was resolved without surgery.  She admits to regular life long constipation that she manages with \"Herbal Tea 3 days a week.\"   She was admitted on 3/10/2025 for abdominal pain with nausea.      Abdominal surgical Hx: open appendectomy,  section x 2 (transverse and vertical incisions), Hysterectomy, open Cholecystectomy  Last BM: 3/7 (4 days prior to admission)  Last solid food: 3/10  Colonoscopy:  - no polyps  Anticoagulation/antiplatelet: ELIQUIS hold since admission     Imaging: CT A/P reviewed by Dr Diaz as below.   Admission Labs: WBC 14.3, Hgb 14.2, Plts 337  Gllucose 250, Aic 8.1, LFTs unremarkable, Procal 0.05, LA 2.4     Since admission NGT has been placed for 150ml lite green liquid so far.  She is afebrile, tachycardic.      3/12/25: Awake, abdomen essentially unchanged, little flatus, -BM. NGT in place 900 dark liquid. WBC 9.8. VSS/AF     3/13/25: Awake in bed. Has been working with Therapy, abdominal pain cntrolled with medication, little flatus, -BM. NGT in place 225mL dark liquid out last 24 hours. WBC 7.6 VSS/AF     3/13/25 KUB  IMPRESSION:  1.  NG tube in good position.  2.  Stable bowel gas pattern suggestive of small bowel obstruction.     3/14/25: Awake in bed. NG tube remains in place with 50mL output noted for last 24 hours. Pt C/o nausea. Reports -flatus/-BM.   WBC 8.1, VSS/AF     3/15/25: Awake in bed.

## 2025-03-19 NOTE — ANESTHESIA PROCEDURE NOTES
Airway  Date/Time: 3/19/2025 5:11 PM  Urgency: elective      General Information and Staff    Patient location during procedure: OR  Anesthesiologist: Crow Medina MD  Performed: anesthesiologist   Performed by: Crow Medina MD  Authorized by: Crow Medina MD      Indications and Patient Condition  Indications for airway management: anesthesia  Spontaneous Ventilation: absent  Expected sedation level: general anesthesia.  Preoxygenated: yes  Patient position: sniffing  Mask difficulty assessment: not attempted    Final Airway Details  Final airway type: endotracheal airway      Successful airway: ETT  Cuffed: yes   Successful intubation technique: video laryngoscopy  Endotracheal tube insertion site: oral  Blade type: glidescope.  Blade size: #3  ETT size (mm): 7.0  Cormack-Lehane Classification: grade I - full view of glottis  Placement verified by: chest auscultation and capnometry   Number of attempts at approach: 1  Ventilation between attempts: none    Additional Comments  NGT on suction

## 2025-03-19 NOTE — PROGRESS NOTES
TRANSFER - IN REPORT:    Verbal report received from  flavia Escobedo  being received from 6th floor [facility]  for ordered procedure      Report consisted of patient's Situation, Background, Assessment and   Recommendations(SBAR).     Information from the following report(s) Nurse Handoff Report was reviewed with the receiving nurse.    Opportunity for questions and clarification was provided.      Assessment completed upon patient's arrival to unit and care assumed.

## 2025-03-19 NOTE — PROGRESS NOTES
Physical therapy note: Attempted PT this PM. Pt decline at this time. Will continue to treat as pt is able and time/schedule permit.    Swati George, PTA    Rehab Caseload Tracker   
  Physician Progress Note      PATIENT:               LYDIA MICHELLE  CSN #:                  [id number]  :                       1945  ADMIT DATE:       3/10/2025 5:45 PM  DISCH DATE:  RESPONDING  PROVIDER #:        Piotr Hong DO          QUERY TEXT:    Patient admitted with SBO and noted to have WBC 14.3, LA 2.4, HR . If   possible, please document in progress notes and discharge summary if you are   evaluating and/or treating any of the following:    The medical record reflects the following:  Risk Factors: 79 y.o. female admitted with SBO with confusion related to   dilaudid.  Clinical Indicators: WBC 14.3, LA 2.4, HR   3/16 progress note \" -monitor meds closely-toxic metabolic   encephalopathy appears to be related to Dilaudid-changed to lower dose   morphine and observe.\"  Treatment: NGT to LIWS for bowel decompression, NPO w/ ice chips, IVF's, GI &   GS consults, Dilaudid-changed to lower dose morphine  Options provided:  -- SIRS of non-infectious origin due to SBO associated with toxic metabolic   encephalopathy  -- SIRS of non-infectious origin due to SBO without acute organ dysfunction  -- Other - I will add my own diagnosis  -- Disagree - Not applicable / Not valid  -- Disagree - Clinically unable to determine / Unknown  -- Refer to Clinical Documentation Reviewer    PROVIDER RESPONSE TEXT:    This patient has SIRS of non-infectious origin due to SBO associated with   toxic metabolic encephalopathy..    Query created by: Tatyana Rocha on 3/17/2025 1:27 PM      Electronically signed by:  Piotr Hong DO 3/17/2025 2:57 PM          
  Physician Progress Note      PATIENT:               LYDIA MICHELLE  [facility] #:                  [id number]  :                       1945  ADMIT DATE:       3/10/2025 5:45 PM  DISCH DATE:  RESPONDING  PROVIDER #:        Cristi Wu MD          QUERY TEXT:    Patient admitted with BMI 42.5. If possible, please document in progress notes   and discharge summary if you are evaluating and /or treating any of the   following:    The medical record reflects the following:  Risk Factors: 79 years old female with history of obesity, history of diabetes   type 2, hypertension, history of PE and DVT on anticoagulation (last one was   10 years ago) dyslipidemia, hypothyroidism, anxiety/depression  Clinical Indicators: BMI 42.5  Treatment: Currently NPO    Specificity of obesity and morbid obesity should be reported based on   physician documentation, as there are several published classifications and   definitions?  MS-DRG Training Guide. CDC:   https://www.cdc.gov/obesity/basics/adult-defining.html. WHO:   https://www.who.int/news-room/fact-sheets/detail/obesity-and-overweight. NIH:   https://www.nhlbi.nih.gov/health/educational/lose_wt/BMI/bmi_dis.htm  Options provided:  -- Severe obesity  -- Other - I will add my own diagnosis  -- Disagree - Not applicable / Not valid  -- Disagree - Clinically unable to determine / Unknown  -- Refer to Clinical Documentation Reviewer    PROVIDER RESPONSE TEXT:    This patient has severe obesity.    Query created by: Tatyana Rocha on 3/12/2025 1:01 PM      Electronically signed by:  Cristi Wu MD 3/12/2025 1:10 PM          
4 Eyes Skin Assessment     NAME:  Amber Escobedo  YOB: 1945  MEDICAL RECORD NUMBER:  [id number]    The patient is being assessed for  {Reason for Assessment:49036}    I agree that at least one RN has performed a thorough Head to Toe Skin Assessment on the patient. ALL assessment sites listed below have been assessed.      Areas assessed by both nurses:    Head, Face, Ears, Shoulders, Back, Chest, Arms, Elbows, Hands, Sacrum. Buttock, Coccyx, Ischium, Legs. Feet and Heels, and Under Medical Devices         Does the Patient have a Wound? No noted wound(s)       Rogelio Prevention initiated by RN: Yes  Wound Care Orders initiated by RN: No    Pressure Injury (Stage 3,4, Unstageable, DTI, NWPT, and Complex wounds) if present, place Wound referral order by RN under : No    New Ostomies, if present place, Ostomy referral order under : No     Nurse 1 eSignature: Electronically signed by Jonny Castillo RN on 3/11/25 at 12:58 AM EDT    **SHARE this note so that the co-signing nurse can place an eSignature**    Nurse 2 eSignature: {Esignature:[id number]}    
ACUTE OCCUPATIONAL THERAPY GOALS:   (Developed with and agreed upon by patient and/or caregiver.)  1. Pt will toilet with SBA   2. Pt will complete functional mobility for ADLs with SBA   using AD as needed  3. Pt will complete lower body dressing (minus socks) with SBA using AE as needed  4. Pt will complete grooming and hygiene at sink with SBA  5. Pt will tolerate 15 minutes functional activity with min or fewer rest breaks to promote increased endurance for ADLs        Timeframe: 7 visits    OCCUPATIONAL THERAPY: Daily Note AM   OT Visit Days: 2   Time In/Out  OT Charge Capture  Rehab Caseload Tracker  OT Orders    Amber Escobedo is a 79 y.o. female   PRIMARY DIAGNOSIS: Small bowel obstruction (HCC)  Small bowel obstruction (HCC) [K56.609]  Procedure(s) (LRB):  DIAGNOSTIC LAPAROSCOPY ROBOTIC (N/A)  * Day of Surgery *  Inpatient: Payor: MEDICARE / Plan: MEDICARE PART A AND B / Product Type: *No Product type* /     ASSESSMENT:     REHAB RECOMMENDATIONS:   Recommendation to date pending progress:  Setting:  Home Health Therapy    Equipment:    To Be Determined     ASSESSMENT:  Ms. Escobedo remains limited by deficits in activity tolerance, overall strength, mobility, and balance impacting ADLs. Pt required max A for LB ADL, s/u for grooming and min x2 for bed and functional mobility using rolling walker. Pt continues to fatigue quickly with light activity and required several breaks to tolerate ADLs. Pt is progressing towards goals, continue POC.        SUBJECTIVE:     Ms. Escobedo states, \"I want to go to the chair.\"     Social/Functional Lives With: Family  Type of Home: House  Home Layout: One level  Home Access: Ramped entrance  Home Equipment: Rollator  Prior Level of Assist for Ambulation: Independent household ambulator, with or without device  Prior Level of Assist for Transfers: Independent  Additional Comments: Lives w/ family and has good family support. Homebound. uses rollator, 1 fall. WIS, shower 
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   
Alert, oriented.  Taking in scant amount of ice chips.  NG placement confirmed by auscultation; to LIS; no output this shift.  Abd is taut and c/o pain and nausea.  No bowel sounds heard, denies flatus.  IV dilaudid and zofran given.  IVF infusing.  Hypertensive; perfect serve to MD; IV hydralazine given.  Call light in reach.    
Amber Escobedo is 79 y.o. y/o female     This is a 79-year-old female who presented to the hospital with abdominal pain and distention. She reports that she has been having issues with constipation for some time. CT of the abdomen pelvis shows small bowel obstruction and there is a soft tissue mass next to the uncinate process of the pancreas. Currently has NG to place with very little output. Surgical history includes an open appendectomy,  x 2, hysterectomy and open cholecystectomy. Suspect small bowel obstruction is secondary to adhesions. Dr. Owen is out until Thursday. Eventually, an EUS would need to be done to evaluate the soft tissue mass next to the pancreas. This can be done after resolution of small bowel obstruction.     Today: Spoke with Dr. Owen; confirmed SBO will need to resolve before considering EUS. Patient resting in bed; NG with minimal output. Has some abdominal discomfort; passing some gas this AM but no BM.    Labs: TB/LFT unremarkable.     PE:   Vitals:    25 0711   BP: (!) 157/82   Pulse: 88   Resp: 18   Temp: 98.4 °F (36.9 °C)   SpO2: 93%      General:  The patient appears well-nourished, and is in no acute distress.    HEENT:  Normocephalic, atraumatic. No sclerae icterus.   Abdomen: Distended; NG with <50 cc bilious output  Neurologic:  Alert and oriented x3.  Psychiatric: Appropriate mood and affect.    Assessment and Plan:   #Desiree-pancreatic mass: Will eventually need EUS for evaluation, but will need resolution of SBO first per Dr. Owen. No evidence of biliary obstruction currently. GI will sign off for now, please reconsult once SBO has resolved to reconsider EUS.     Electronically signed by Ana Laura Diego PA-C.    
Attempted to see patient this PM for occupational therapy treatment  session. Patient refused. Will follow and re-attempt as schedule permits/patient available. Thank you,    Doris Mullen, OT    Rehab Caseload Tracker       
BG covered per order this shift.  Given pain meds per MAR.  150ml output from NG tube.  IVF running.  Pt in a lot of pain and nauseous this shift, with multiple emesis occurrences, MD aware.  Pt had multiple KUBs during the shift, NG repositioned as needed, MD notified.  Rounds performed throughout shift.  Patient denies needs at this time.  Bed in low position, locked, call light and personal items within reach.  Will give handoff report to AM RN.    
BG covered throughout shift. NG had to be advanced and KUB was done. PRN nausea and pain meds given per MAR. Pt worked with PT/OT and sat in the chair for a couple hours. NGT with 50mL of clear, green output.    Rounds performed throughout shift. Pt denies needs at this time. Bed in low position, locked and call light/personal items within reach.  
General Surgery   Brief Note    Surgery - Robotic Diagnostic Laparoscopy tomorrow at [facility] in afternoon with Dr Diaz scheduled.  Waiting on time.    Consent, continue NPO, NGT  PICC and TPN ordered for today- will hold until after surgery due to complicated orders for patient transferring with TPN.RD planning on PPN until we get her post operatively.   RN and Vacherie aware that transport will be necessary to .     ELLIS MEDEIROS, NOHEMY - CNP    
General Surgery  Brief Note    Surgery rescheduled for today 3/19/25 afternoon with Dr Parrish at [facility].  Transportation arrangement per 6th floor staff.      ELLIS MEDEIROS, APRN - CNP     
H&P/Consult Note/Progress Note/Office Note:   Amber Escobedo  MRN: [id number]  :1945  Age:79 y.o.    HPI: Amber Escobedo is a 79 y.o. female who we are asked by IM to see for SBO. The patient has a PMHx of IDDM, HTN, HLD, thyroid disease, CAD s/p PCI 2 yrs ago on ELIQUIS .  She presented with a several day history of diffuse abdominal distension with pain that radiated to her back, and vomiting. No fever or chills.  She had an \"ileus\" 20 yrs ago that was resolved without surgery.  She admits to regular life long constipation that she manages with \"Herbal Tea 3 days a week.\"   She was admitted on 3/10/2025 for abdominal pain with nausea.     Abdominal surgical Hx: open appendectomy,  section x 2 (transverse and vertical incisions), Hysterectomy, open Cholecystectomy  Last BM: 3/7 (4 days prior to admission)  Last solid food: 3/10  Colonoscopy:  - no polyps  Anticoagulation/antiplatelet: ELIQUIS hold since admission    Imaging: CT A/P reviewed by Dr Diaz as below.   Admission Labs: WBC 14.3, Hgb 14.2, Plts 337  Gllucose 250, Aic 8.1, LFTs unremarkable, Procal 0.05, LA 2.4    Since admission NGT has been placed for 150ml lite green liquid so far.  She is afebrile, tachycardic.     3/12/25: Awake, abdomen essentially unchanged, little flatus, -BM. NGT in place 900 dark liquid. WBC 9.8. VSS/AF        Past Medical History:   Diagnosis Date    Allergic rhinitis     Anemia     Anxiety     Asthma     Cataracts, bilateral     Depression     Diabetes (HCC)     GERD (gastroesophageal reflux disease)     History of DVT (deep vein thrombosis)     History of pulmonary embolus (PE)     Hyperlipidemia     Hypertension     Insomnia     Internal hemorrhoids without mention of complication     Osteoarthritis     Peripheral neuropathy     Personal history of colonic polyps     Rectocele     Stroke (HCC)      Past Surgical History:   Procedure Laterality Date    APPENDECTOMY      OPEN     SECTION      x2 
Hourly rounds performed this shift. Bed lowered and locked. Call light within reach. All needs met at this time.    
Nutrition Assessment  Assessment Type: Initial, Positive nutrition screen  Reason for visit:  NPO or Clear Liquid  Malnutrition Screening Tool Score: 0    Nutrition Intervention:   Food and/or Nutrient Delivery:   If GI function continues to preclude oral diet for greater than 48 hours, parenteral nutrition should be considered for primary needs.  If PN pursued consult nutrition for PN management.       Malnutrition Assessment:  Academy/A.S.P.E.N Clinical Malnutrition Criteria  Malnutrition Status: At risk for malnutrition (altered gi function)  Context: Acute Illness  Findings of clinical characteristics of malnutrition:   Energy Intake:  50% or less of estimated energy requirements for 5 or more days  Weight Loss:  No weight loss     Body Fat Loss:  No body fat loss     Muscle Mass Loss:  No muscle mass loss      Nutrition Assessment:  Food/Nutrition Related History:   Pt is tearful in pain, anxious no po with admission, unable to determine last intake pta.  NV started 3 hrs prior to presentation, last BM 3 days pta.       Do You Have Any Cultural, Rastafarian, or Ethnic Food Preferences?: No   Weight History:   Wt hx per EMR review:   PCP office: 227# 5/2024, 228# 8/15/24, 230# 11/27/24.    Wt trending up per EMR review and current bed scale wt.    Nutrition Background:       PMH remarkable for DM, HTN, PE and DVT, DLP, hypothyroidism, anxiety/depression.  Presented with NV abdominal pain and distention.    Admitted with SBO, pancreatic mass.    Nutrition Monitoring/Evaluation:  Admitted 3/10, regular diet.  Changed to NPO 3/10.     NG placed 3/10, +LIWS output recorded past 24 hours 50 ml.    Surgery following - conservative treatment planned at this time.    GI following - plan EUS once SBO resolved.      NG in placed with minimal output, pt c/o pain, anxiety.  Visit with CLAUDE Allred at bedside.  IVF infusing.  +lantus and SSI.      Current Nutrition Therapies:  Diet NPO    Current Intake:   Average Meal Intake: 
Nutrition Assessment  Assessment Type: Reassess  Reason for visit:  NPO or Clear Liquid  Malnutrition Screening Tool Score: 0    Nutrition Intervention:   Food and/or Nutrient Delivery:   Parenteral Nutrition:  Peripheral parenteral nutrition (Osmolarity 861)   Peripheral line infusion infuse via extended dwell  Continue current infusion started at 1103: Dex 5%, 4.25% AA 2 L (85ml/hr)   [place] [facility] [place]  Ability to resume PPN tonight will be determined after OR given transfer between facilities.    Labs:   Basic Metabolic Panel, Hepatic Function Panel, Magnesium and Phosphorus active per nutrition parameters  Triglyceride  await follow-up value at this time  POC Glucoses/SSI Active  Nutrition Related Medication Management:  Electrolyte Replacement:   Continue prn protocol Magnesium, Potassium, and Phosphorus Phos replacement deferred today as pt did not receive PPN with electrolytes overnight.    Intravenous fluids:  Not applicable  Coordination of Nutrition Care:  Coordination with health care provider Provider, Dr. Reyes and Charge RNJoanne and , Saida   PPN without additives infused 3/18 am as 3/17 bag had been discontinued.  PPN was not started overnight, D10 infused instead as nursing was unable to locate PPN, lipid did not infuse.  At RD encounter, PPN was on counter, notified by charge RN.  Discussed with Norma Pharmacist who cleared for PPN infusion to begin.  PT sleepign at RD CLAUDE woods hung PPN, NG remains to LIWS.      Abdominal Status (last documented by nursing):   Last BM (including prior to admit): 03/06/25 (per pt), GI Symptoms: Distention, Nausea   MENDES recorded past 24 hours: 1000 ml  Pertinent Medications: lantus 34 units daily, SSI, protonix  Continuous: none  IVF: none  Electrolyte Replacement:  prn active  Pertinent administered PRN: zofran 3/19  Pertinent Labs:   Lab Results   Component Value Date/Time     03/19/2025 04:04 AM    K 3.7 03/19/2025 
PRN pain med given once during shift. NGT with 100mL of output. Pt sat in the chair for a couple of hours. Pt tolerating little med cups full of ice per order. IVF running. BG covered throughout shift.    Rounds performed throughout shift. Pt denies needs at this time. Bed in low position, locked and call light/personal items within reach.   
Pain, anxiety and nausea medicated per MAR and partially effective. NGT @ LIS with very minimal output. Hourly rounds completed, all needs met this shift. Bed in L/L with call light in reach. Report to be given to dayshift nurse.       On-call provider made aware of elevated BPs overnight.  
Progress Note/Office Note:   Amber Escobedo  MRN: [id number]  :1945  Age:79 y.o.    HPI: Amber Escobedo is a 79 y.o. female who we are asked by IM to see for SBO. The patient has a PMHx of IDDM, HTN, HLD, thyroid disease, CAD s/p PCI 2 yrs ago on ELIQUIS .  She presented with a several day history of diffuse abdominal distension with pain that radiated to her back, and vomiting. No fever or chills.  She had an \"ileus\" 20 yrs ago that was resolved without surgery.  She admits to regular life long constipation that she manages with \"Herbal Tea 3 days a week.\"   She was admitted on 3/10/2025 for abdominal pain with nausea.     Abdominal surgical Hx: open appendectomy,  section x 2 (transverse and vertical incisions), Hysterectomy, open Cholecystectomy  Last BM: 3/7 (4 days prior to admission)  Last solid food: 3/10  Colonoscopy:  - no polyps  Anticoagulation/antiplatelet: ELIQUIS hold since admission    Imaging: CT A/P reviewed by Dr Diaz as below.   Admission Labs: WBC 14.3, Hgb 14.2, Plts 337  Gllucose 250, Aic 8.1, LFTs unremarkable, Procal 0.05, LA 2.4    Since admission NGT has been placed for 150ml lite green liquid so far.  She is afebrile, tachycardic.     3/12/25: Awake, abdomen essentially unchanged, little flatus, -BM. NGT in place 900 dark liquid. WBC 9.8. VSS/AF    3/13/25: Awake in bed. Has been working with Therapy, abdominal pain cntrolled with medication, little flatus, -BM. NGT in place 225mL dark liquid out last 24 hours. WBC 7.6 VSS/AF    3/13/25 KUB  IMPRESSION:  1.  NG tube in good position.  2.  Stable bowel gas pattern suggestive of small bowel obstruction.    3/14/25: Awake in bed. NG tube remains in place with 50mL output noted for last 24 hours. Pt C/o nausea. Reports -flatus/-BM.   WBC 8.1, VSS/AF     3/15/25: Awake in bed. NG tube remains in place with 0mL output noted for last 24 hours. Pt continues to C/o nausea. Reports passed a small amount of flatus and had a 
Progress Note/Office Note:   Amber Escobedo  MRN: [id number]  :1945  Age:79 y.o.    HPI: Amber Escobedo is a 79 y.o. female who we are asked by IM to see for SBO. The patient has a PMHx of IDDM, HTN, HLD, thyroid disease, CAD s/p PCI 2 yrs ago on ELIQUIS .  She presented with a several day history of diffuse abdominal distension with pain that radiated to her back, and vomiting. No fever or chills.  She had an \"ileus\" 20 yrs ago that was resolved without surgery.  She admits to regular life long constipation that she manages with \"Herbal Tea 3 days a week.\"   She was admitted on 3/10/2025 for abdominal pain with nausea.     Abdominal surgical Hx: open appendectomy,  section x 2 (transverse and vertical incisions), Hysterectomy, open Cholecystectomy  Last BM: 3/7 (4 days prior to admission)  Last solid food: 3/10  Colonoscopy:  - no polyps  Anticoagulation/antiplatelet: ELIQUIS hold since admission    Imaging: CT A/P reviewed by Dr Diaz as below.   Admission Labs: WBC 14.3, Hgb 14.2, Plts 337  Gllucose 250, Aic 8.1, LFTs unremarkable, Procal 0.05, LA 2.4    Since admission NGT has been placed for 150ml lite green liquid so far.  She is afebrile, tachycardic.     3/12/25: Awake, abdomen essentially unchanged, little flatus, -BM. NGT in place 900 dark liquid. WBC 9.8. VSS/AF    3/13/25: Awake in bed. Has been working with Therapy, abdominal pain cntrolled with medication, little flatus, -BM. NGT in place 225mL dark liquid out last 24 hours. WBC 7.6 VSS/AF    3/13/25 KUB  IMPRESSION:  1.  NG tube in good position.  2.  Stable bowel gas pattern suggestive of small bowel obstruction.    3/14/25: Awake in bed. NG tube remains in place with 50mL output noted for last 24 hours. Pt C/o nausea. Reports -flatus/-BM.   WBC 8.1, VSS/AF     3/15/25: Awake in bed. NG tube remains in place with 0mL output noted for last 24 hours. Pt continues to C/o nausea. Reports passed a small amount of flatus and had a 
Progress Note/Office Note:   Amber Escobedo  MRN: [id number]  :1945  Age:79 y.o.    HPI: Amber Escobedo is a 79 y.o. female who we are asked by IM to see for SBO. The patient has a PMHx of IDDM, HTN, HLD, thyroid disease, CAD s/p PCI 2 yrs ago on ELIQUIS .  She presented with a several day history of diffuse abdominal distension with pain that radiated to her back, and vomiting. No fever or chills.  She had an \"ileus\" 20 yrs ago that was resolved without surgery.  She admits to regular life long constipation that she manages with \"Herbal Tea 3 days a week.\"   She was admitted on 3/10/2025 for abdominal pain with nausea.     Abdominal surgical Hx: open appendectomy,  section x 2 (transverse and vertical incisions), Hysterectomy, open Cholecystectomy  Last BM: 3/7 (4 days prior to admission)  Last solid food: 3/10  Colonoscopy:  - no polyps  Anticoagulation/antiplatelet: ELIQUIS hold since admission    Imaging: CT A/P reviewed by Dr Diaz as below.   Admission Labs: WBC 14.3, Hgb 14.2, Plts 337  Gllucose 250, Aic 8.1, LFTs unremarkable, Procal 0.05, LA 2.4    Since admission NGT has been placed for 150ml lite green liquid so far.  She is afebrile, tachycardic.     3/12/25: Awake, abdomen essentially unchanged, little flatus, -BM. NGT in place 900 dark liquid. WBC 9.8. VSS/AF    3/13/25: Awake in bed. Has been working with Therapy, abdominal pain cntrolled with medication, little flatus, -BM. NGT in place 225mL dark liquid out last 24 hours. WBC 7.6 VSS/AF    3/13/25 KUB  IMPRESSION:  1.  NG tube in good position.  2.  Stable bowel gas pattern suggestive of small bowel obstruction.       Past Medical History:   Diagnosis Date    Allergic rhinitis     Anemia     Anxiety     Asthma     Cataracts, bilateral     Depression     Diabetes (HCC)     GERD (gastroesophageal reflux disease)     History of DVT (deep vein thrombosis)     History of pulmonary embolus (PE)     Hyperlipidemia     Hypertension  
Pt BG covered per MAR.  NG reinforced at 2115, KUB ordered and done.  Given ordered pain meds per MAR.  NG tube at 50cm, had 550ml output this shift.  NPO throughout shift.  Rounds performed throughout shift.  Patient denies needs at this time.  Bed in low position, locked, call light and personal items within reach.  Will give handoff report to AM RN.    
Pt c/o SOB with pain between her shoulder blades.  /76 P 107.  She states the pain stops her from breathing good.  HOB elevated.  02 SAT 95% on RA.  She states the Toradol worked last night.  Perfect serve to MD and order Toradol given.    
Pt was strict NPO this shift. She had 350mL of clear/green/brown output of her NGT. NGT is to low-interment suction per order and NGT is measured at 53. She has had pain this shift see MAR for tx.   Pt is resting in bed without any complaints. Hourly rounds completed and all needs met. Bed is low, locked,call light is in reach and pt is encouraged to call for assistance.    
RCP called to bedside for pt evaluation. Patient needed deep suction with yankauer, patient has a good strong productive cough.  
Received report at bedside. Pt is resting comfortably at this time. Pt denies any needs at this time.    0010: Requested clarification from provider in regards to heparin drip. Notes say to discontinue after midnight, MAR indicates to discontinue at 0600.  
Spiritual Health History and Assessment/Progress Note  [facility]    (P) Follow-up,  ,  ,      Name: Amber Escobedo MRN: [id number]    Age: 79 y.o.     Sex: female   Language: English   Yazidism: Taoism   Small bowel obstruction (HCC)     Date: 3/13/2025            Total Time Calculated: (P) 40 min              Spiritual Assessment began in [facility] 6 MED SURG        Referral/Consult From: (P) Patient   Encounter Overview/Reason: (P) Follow-up  Service Provided For: (P) Patient    Camilla, Belief, Meaning:   Patient is connected with a camilla tradition or spiritual practice and has beliefs or practices that help with coping during difficult times  Family/Friends No family/friends present      Importance and Influence:  Patient has spiritual/personal beliefs that influence decisions regarding their health  Family/Friends No family/friends present    Community:  Patient Other: Is connected to camilla practices  Family/Friends No family/friends present    Assessment and Plan of Care:     Patient Interventions include: Facilitated expression of thoughts and feelings, Explored spiritual coping/struggle/distress, Provided sacramental/Taoist ritual, and Facilitated life review and/ or legacy  Family/Friends Interventions include: No family/friends present    Patient Plan of Care: Spiritual Care available upon further referral  Family/Friends Plan of Care: No family/friends present    Electronically signed by GRETCHEN HARRIS on 3/13/2025 at 4:06 PM     Pastoral visit requested by pt, alone in room at time of visit.  Amber has broken many social barriers throughout her lifetime.  Conversation included camilla perspective and life review.  Pt lives with son and his family; daughter is a nurse who lives in [place].  Primary Taoist background is Sabianism, although she identifies with other Scientologist camilla traditions.  Praying the rosary, other forms of prayer and meditation are important spiritual resources.  
Spiritual Health History and Assessment/Progress Note  [facility]    (P) Initial Encounter,  ,  ,      Name: Amber Escobedo MRN: [id number]    Age: 79 y.o.     Sex: female   Language: English   Mormon: Zoroastrian   Small bowel obstruction (HCC)     Date: 3/11/2025            Total Time Calculated: (P) 15 min              Spiritual Assessment began in [facility] 6 MED SURG        Referral/Consult From: (P) Multi-disciplinary team   Encounter Overview/Reason: (P) Initial Encounter  Service Provided For: (P) Patient    Camilla, Belief, Meaning:   Patient is connected with a camilla tradition or spiritual practice and has beliefs or practices that help with coping during difficult times  Family/Friends No family/friends present      Importance and Influence:  Patient has spiritual/personal beliefs that influence decisions regarding their health  Family/Friends No family/friends present    Community:  Patient is connected with a spiritual community and feels well-supported. Support system includes: Children, Camilla Community, Friends, and Extended family  Family/Friends No family/friends present    Assessment and Plan of Care:     Patient Interventions include: Explored spiritual coping/struggle/distress, Affirmed coping skills/support systems, and Provided sacramental/Oriental orthodox ritual  Family/Friends Interventions include: No family/friends present    Patient Plan of Care: Spiritual Care available upon further referral  Family/Friends Plan of Care: No family/friends present    Electronically signed by GRETCHEN HARRIS on 3/11/2025 at 5:26 PM     Consult for advanced care directives.  Pt alert, but physically weak.  She requested that conversation/completion of HCPOA be deferred until another family member was present.   provided pastoral conversation.  Pt supported by family and camilla community.  Prayer, Oriental orthodox rites, camilla community important spiritual resources.  Chaplains will continue to follow as needed.  
Spoke with KEYANA ok to hold off on tpn and run ppn until surgery decides if surgery will be necessary.  Pt currently has 20g EDC in left upper arm and 22g piv in rfa.  Picc ordered discontinued please re-consult if picc needed in future.  
TRANSFER - IN REPORT:    Verbal report received from CLAUDE Sena on Amber Escobedo  being received from ED for routine progression of patient care      Report consisted of patient's Situation, Background, Assessment and   Recommendations(SBAR).     Information from the following report(s) Nurse Handoff Report, ED Encounter Summary, and ED SBAR was reviewed with the receiving nurse.    Opportunity for questions and clarification was provided.      Assessment completed upon patient's arrival to unit and care assumed.    
TRANSFER - OUT REPORT:    Verbal report given to Chloe ARCE on Amber Escobedo  being transferred to PACU for routine progression of patient care       Report consisted of patient's Situation, Background, Assessment and   Recommendations(SBAR).     Information from the following report(s) Nurse Handoff Report was reviewed with the receiving nurse.           Lines:   Peripheral IV 03/15/25 Left Cephalic (Active)   Site Assessment Clean, dry & intact 03/18/25 1500   Line Status Infusing 03/18/25 1500   Line Care Connections checked and tightened 03/18/25 1500   Phlebitis Assessment No symptoms 03/18/25 1500   Infiltration Assessment 0 03/18/25 1500   Alcohol Cap Used Yes 03/18/25 1500   Dressing Status Clean, dry & intact 03/18/25 1500   Dressing Type Transparent 03/18/25 1500       Peripheral IV 03/15/25 Right Forearm (Active)   Site Assessment Clean, dry & intact 03/18/25 1500   Line Status Infusing 03/18/25 1500   Line Care Connections checked and tightened 03/18/25 1500   Phlebitis Assessment No symptoms 03/18/25 1500   Infiltration Assessment 0 03/18/25 1500   Alcohol Cap Used Yes 03/18/25 1500   Dressing Status Clean, dry & intact 03/18/25 1500   Dressing Type Transparent 03/18/25 1500   Dressing Intervention New 03/18/25 0700        Opportunity for questions and clarification was provided.      Patient transported with:  Tech       
Ultrasound was used to find the vein which was compressible and without any ultrasound features of an artery or nerve bundle. Skin was cleaned and disinfected prior to IV puncture.  Under real-time ultrasound guidance peripheral access was obtained in the right forearm using 22 G 1.75\" Peripheral IV catheter after 1 attempt(s). No immediate complications noted. Patient tolerated the procedure well.  Refer to IV flowsheet for further documentation.     
(gastroesophageal reflux disease)     History of DVT (deep vein thrombosis)     History of pulmonary embolus (PE)     Hyperlipidemia     Hypertension     Insomnia     Internal hemorrhoids without mention of complication     Osteoarthritis     Peripheral neuropathy     Personal history of colonic polyps     Rectocele     Stroke (HCC)      Past Surgical History:   Procedure Laterality Date    APPENDECTOMY      OPEN     SECTION      x2    CHOLECYSTECTOMY      OPEN    COLONOSCOPY  13    Neo--no polyps--5 year recall    ORTHOPEDIC SURGERY      BACK X3    PARTIAL HYSTERECTOMY (CERVIX NOT REMOVED)       Current Facility-Administered Medications   Medication Dose Route Frequency    insulin glargine (LANTUS) injection vial 30 Units  30 Units SubCUTAneous Daily    diatrizoate meglumine-sodium (GASTROGRAFIN) 66-10 % solution 15 mL  15 mL Oral ONCE PRN    cloNIDine (CATAPRES) 0.1 MG/24HR 1 patch  1 patch TransDERmal Weekly    labetalol (NORMODYNE;TRANDATE) injection 10 mg  10 mg IntraVENous Q6H PRN    hydrALAZINE (APRESOLINE) injection 10 mg  10 mg IntraVENous Q4H PRN    HYDROmorphone HCl PF (DILAUDID) injection 1 mg  1 mg IntraVENous Q4H PRN    naloxegol (MOVANTIK) tablet 12.5 mg  12.5 mg Oral QAM AC    0.9 % sodium chloride infusion   IntraVENous Continuous    LORazepam (ATIVAN) 0.5 mg in sodium chloride (PF) 0.9 % 10 mL injection  0.5 mg IntraVENous Q4H PRN    benzocaine (HURRICAINE) 20 % oral spray   Mouth/Throat 4x Daily PRN    glucose chewable tablet 16 g  4 tablet Oral PRN    dextrose bolus 10% 125 mL  125 mL IntraVENous PRN    Or    dextrose bolus 10% 250 mL  250 mL IntraVENous PRN    dextrose 10 % infusion   IntraVENous Continuous PRN    insulin lispro (HUMALOG,ADMELOG) injection vial 0-8 Units  0-8 Units SubCUTAneous 4 times per day    medicated lip ointment (BLISTEX)   Topical PRN    phenol 1.4 % mouth spray 1 spray  1 spray Mouth/Throat Q2H PRN    sodium chloride flush 0.9 % injection 5-40 mL  5-40 
8/15/24, 230# 11/27/24.    Wt trending up per EMR review and current bed scale wt.    Nutrition Background:       PMH remarkable for DM, HTN, PE and DVT, DLP, hypothyroidism, anxiety/depression.  Presented with NV abdominal pain and distention.    Admitted with SBO, pancreatic mass.    Nutrition Monitoring/Evaluation:  Admitted 3/10, regular diet.  Changed to NPO 3/10.     NG placed 3/10, +LIWS.  Surgery following - plan 3/16 We will give her another 24 to 48 hours but I think at this point we will likely be going forward with a robotic diagnostic laparoscopy.     GI following - plan EUS once SBO resolved.    + 2 PIV, one extended dwell.  PPN started via extended dwell 2/17 was stopped at 0627 am today.      Unable to resume PPN after bag was stopped, plan to transfer to [facility] today at 1530 for OR unknown at this time if pt will stay at [facility] or return to [facility].      Abdominal Status (last documented by nursing):   Last BM (including prior to admit): 03/06/25 (per pt), GI Symptoms: Distention   MENDES recorded past 24 hours: 650 ml   Pertinent Medications: lantus 30 units daily, SSI, protonix  Continuous: heparin stopped @ 0600  IVF: none  Electrolyte Replacement:  prn active  Pertinent administered PRN: zofran 3/18  Pertinent Labs:   Lab Results   Component Value Date/Time     03/18/2025 07:40 AM    K 4.1 03/18/2025 07:40 AM     03/18/2025 07:40 AM    CO2 25 03/18/2025 07:40 AM    BUN 22 03/18/2025 07:40 AM    CREATININE 0.65 03/18/2025 07:40 AM    GLUCOSE 280 03/18/2025 07:40 AM    CALCIUM 8.6 03/18/2025 07:40 AM    PHOS 2.5 03/18/2025 07:40 AM    MG 2.2 03/18/2025 07:40 AM     Lab Results   Component Value Date/Time    POCGLU 235 03/18/2025 08:16 AM    POCGLU 299 03/18/2025 05:57 AM    POCGLU 219 03/18/2025 12:47 AM    POCGLU 205 03/17/2025 08:05 PM    POCGLU 184 03/17/2025 04:41 PM    POCGLU 202 03/17/2025 11:51 AM     Lab Results   Component Value Date/Time    TRIG 104 03/18/2025 07:40 AM    TRIG 120 
Clicks” Basic Mobility Inpatient Short Form  AM-PAC Basic Mobility - Inpatient   How much help is needed turning from your back to your side while in a flat bed without using bedrails?: A Little  How much help is needed moving from lying on your back to sitting on the side of a flat bed without using bedrails?: A Little  How much help is needed moving to and from a bed to a chair?: A Little  How much help is needed standing up from a chair using your arms?: A Little  How much help is needed walking in hospital room?: A Little  How much help is needed climbing 3-5 steps with a railing?: A Little  AM-[facility] Inpatient Mobility Raw Score : 18  AM-PAC Inpatient T-Scale Score : 43.63  Mobility Inpatient CMS 0-100% Score: 46.58  Mobility Inpatient CMS G-Code Modifier : CK    SUBJECTIVE:   Ms. Escobedo states, \"I haven't slept in 2 days.\"     Social/Functional Lives With: Family  Type of Home: House  Home Layout: One level  Home Access: Ramped entrance  Home Equipment: Rollator  Prior Level of Assist for Ambulation: Independent household ambulator, with or without device  Prior Level of Assist for Transfers: Independent    OBJECTIVE:     PAIN: VITALS / O2: PRECAUTION / LINES / DRAINS:   Pre Treatment:   Pain Assessment: None - Denies Pain      Post Treatment: 0 Vitals        Oxygen      External Catheter, IV, and Nasogastric Tube    RESTRICTIONS/PRECAUTIONS:                    GROSS EVALUATION: LE Intact Impaired (Comments):   AROM []  Generally decreased, functional   PROM []    Strength []  Generally decreased, functional   Balance []     Posture [] Forward Head  Rounded Shoulders   Sensation []  Impaired due to peripheral neuropathy   Coordination [x]      Tone [x]     Edema []    Activity Tolerance [] Patient limited by fatigue, Treatment limited secondary to medical complications (free text) (dyspnea with minimal exertion)    []      COGNITION/  PERCEPTION: Intact Impaired (Comments):     Orientation []  NT but appears 
administered PRN: zofran 3/18  Pertinent Labs:   Lab Results   Component Value Date/Time     03/18/2025 07:40 AM    K 4.1 03/18/2025 07:40 AM     03/18/2025 07:40 AM    CO2 25 03/18/2025 07:40 AM    BUN 22 03/18/2025 07:40 AM    CREATININE 0.65 03/18/2025 07:40 AM    GLUCOSE 280 03/18/2025 07:40 AM    CALCIUM 8.6 03/18/2025 07:40 AM    PHOS 2.5 03/18/2025 07:40 AM    MG 2.2 03/18/2025 07:40 AM     Lab Results   Component Value Date/Time    POCGLU 207 03/18/2025 11:32 AM    POCGLU 235 03/18/2025 08:16 AM    POCGLU 299 03/18/2025 05:57 AM    POCGLU 219 03/18/2025 12:47 AM    POCGLU 205 03/17/2025 08:05 PM    POCGLU 184 03/17/2025 04:41 PM     Lab Results   Component Value Date/Time    TRIG 104 03/18/2025 07:40 AM    TRIG 120 01/18/2024 08:36 AM    TRIG 98 10/17/2023 08:45 AM     Hemoglobin A1C   Date Value Ref Range Status   03/11/2025 8.1 (H) 0 - 5.6 % Final     Comment:     Reference Range  Normal       <5.7%  Prediabetes  5.7-6.4%  Diabetes     >6.4%       Lab Results   Component Value Date/Time    ALKPHOS 74 03/18/2025 07:40 AM    ALKPHOS 112 01/05/2022 02:28 PM    ALT 19 03/18/2025 07:40 AM    AST 21 03/18/2025 07:40 AM    BILITOT 0.3 03/18/2025 07:40 AM    BILIDIR <0.2 03/18/2025 07:40 AM     Remarkable for: Na trending down, K hemolyzed, Mg corrected, glucose elevated consistent with hx and A1C (+lantus and SSI), phos low end normal    AMRIT JURADO, KEYANA    
    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 *      Objective:   Patient Vitals for the past 24 hrs:   Temp Pulse Resp BP SpO2   03/19/25 0704 98.2 °F (36.8 °C) 83 16 (!) 159/66 98 %   03/19/25 0449 -- -- 17 -- --   03/19/25 0351 98.1 °F (36.7 °C) 84 18 (!) 156/61 99 %   03/18/25 2358 -- 91 -- -- 99 %   03/18/25 2211 -- -- 14 -- --   03/18/25 2141 -- -- 17 -- --   03/18/25 2000 -- -- -- (!) 149/62 --   03/18/25 1947 99.7 °F (37.6 °C) 82 16 (!) 168/60 100 %       Oxygen Therapy  SpO2: 98 %  Pulse via Oximetry: 104 beats per minute  Pulse Oximeter Device Mode: Continuous  O2 Device: Nasal cannula  Skin Assessment: Clean, dry, & intact  O2 Flow Rate (L/min): 2 L/min    Estimated body mass index is 43.75 kg/m² as calculated from the following:    Height as of this encounter: 1.6 m (5' 2.99\").    Weight as of this encounter: 112 kg (246 lb 14.6 oz).    Intake/Output Summary (Last 24 hours) at 3/19/2025 1203  Last data filed at 3/19/2025 0641  Gross per 24 hour   Intake --   Output 1000 ml   Net -1000 ml         Physical Exam:   General:    Appears acutely ill, uncomfortable.  Mild distress due to pain.  Head:  Normocephalic, atraumatic  Eyes:  Sclerae appear normal.  Pupils equally round.  ENT:  Nares appear normal.  Dry oral mucosa.  NGT in place draining bilious fluid.  Neck:  No restricted ROM.  Trachea midline   CV:   Heart rate 80s, regular.  No m/r/g.  No jugular venous distension.  Lungs:   Decreased air entry at the bases but no audible 
consulted  Continue to monitor March 14--stop D5 IV fluid changed to saline and if sugars uncontrolled tomorrow will need to titrate Lantus and possible increased fixed dose and sliding Humalog.  March 15--continue current sugar management-insulin titrated as sugars no better with removal of dextrose from IV fluid.  March 16-sugars improved continue current insulin     Prior PE/DVT  Eliquis currently held given possible need for procedure  SCDs for DVT prophylaxis  Will restart Eliquis when safe from surgical standpoint March 14--still NPO  March 15-start heparin drip-Eliquis on hold-March 16-will need to hold heparin when surgery planned     Hypothyroidism  Restart home Synthroid when patient able to take p.o. meds March 14 this is unchanged  March 15-Synthroid on hold but low-dose.  March 16-resume oral meds when able no need for IV given low-dose replacement     Anxiety  Restart home Lexapro when patient able to take p.o. meds  March 14--if develops significant anxiety could attenuate narcotic analgesia and start intravenous benzodiazepine with caution regarding combination narcotic benzodiazepine.  March 15-high risk regimen with IV narcotics and benzodiazepines-monitor closely.  March 16-monitor meds closely-toxic metabolic encephalopathy appears to be related to Dilaudid-changed to lower dose morphine and observe.     Hyperlipidemia  Restart home atorvastatin when patient able to take p.o. meds     Uncontrolled hypertension--clonidine patch increase as needed hydralazine.  Titrate clonidine patch based on clinical course  March 16-improved continue clonidine patch as needed hydralazine        Anticipated Discharge Arrangements:   Therapy has not evaluated yet     PT/OT evals ordered?  Therapy evals ordered  Diet:  Diet NPO  VTE prophylaxis: SCD's   Code status: Full Code            Non-peripheral Lines and Tubes (if present):      NG/OG/NJ/NE Tube Right nostril (Active)       External Urinary Catheter (Active) 
times/week for duration of hospital stay or until stated goals are met, whichever comes first.    TREATMENT:   TREATMENT:   Co-Treatment PT/OT necessary due to patient's decreased overall endurance/tolerance levels, as well as need for high level skilled assistance to complete functional transfers/mobility and functional tasks  Therapeutic Activity (24 Minutes): Therapeutic activity included Rolling, Supine to Sit, Scooting, Transfer Training, Ambulation on level ground, Sitting balance , and Standing balance to improve functional Activity tolerance, Balance, Mobility, and Strength.    TREATMENT GRID:  N/A    AFTER TREATMENT PRECAUTIONS: Alarm Activated, Bed/Chair Locked, Call light within reach, Chair, Needs within reach, and RN notified    INTERDISCIPLINARY COLLABORATION:  RN/ PCT, PT/ PTA, and OT/ CASTILLO    EDUCATION:      TIME IN/OUT:  Time In: 0936  Time Out: 1000  Minutes: 24    TARIQ OVALLES PTA    
  CBC with Auto Differential    Collection Time: 03/10/25  6:19 PM   Result Value Ref Range    WBC 14.3 (H) 4.3 - 11.1 K/uL    RBC 5.59 (H) 4.05 - 5.2 M/uL    Hemoglobin 14.5 11.7 - 15.4 g/dL    Hematocrit 46.3 35.8 - 46.3 %    MCV 82.8 82 - 102 FL    MCH 25.9 (L) 26.1 - 32.9 PG    MCHC 31.3 (L) 31.4 - 35.0 g/dL    RDW 14.0 11.9 - 14.6 %    Platelets 329 150 - 450 K/uL    MPV 8.7 (L) 9.4 - 12.3 FL    nRBC 0.00 0.0 - 0.2 K/uL    Differential Type AUTOMATED      Neutrophils % 88.8 (H) 43.0 - 78.0 %    Lymphocytes % 7.9 (L) 13.0 - 44.0 %    Monocytes % 2.4 (L) 4.0 - 12.0 %    Eosinophils % 0.1 (L) 0.5 - 7.8 %    Basophils % 0.2 0.0 - 2.0 %    Immature Granulocytes % 0.6 0.0 - 5.0 %    Neutrophils Absolute 12.66 (H) 1.70 - 8.20 K/UL    Lymphocytes Absolute 1.13 0.50 - 4.60 K/UL    Monocytes Absolute 0.34 0.10 - 1.30 K/UL    Eosinophils Absolute 0.01 0.00 - 0.80 K/UL    Basophils Absolute 0.03 0.00 - 0.20 K/UL    Immature Granulocytes Absolute 0.08 0.0 - 0.5 K/UL   CMP    Collection Time: 03/10/25  6:19 PM   Result Value Ref Range    Sodium 138 136 - 145 mmol/L    Potassium 4.3 3.5 - 5.1 mmol/L    Chloride 97 (L) 98 - 107 mmol/L    CO2 27 20 - 29 mmol/L    Anion Gap 14 7 - 16 mmol/L    Glucose 250 (H) 70 - 99 mg/dL    BUN 17 8 - 23 MG/DL    Creatinine 0.86 0.60 - 1.10 MG/DL    Est, Glom Filt Rate 69 >60 ml/min/1.73m2    Calcium 9.2 8.8 - 10.2 MG/DL    Total Bilirubin 0.4 0.0 - 1.2 MG/DL    ALT 31 8 - 45 U/L    AST 31 15 - 37 U/L    Alk Phosphatase 108 (H) 35 - 104 U/L    Total Protein 7.3 6.3 - 8.2 g/dL    Albumin 3.5 3.2 - 4.6 g/dL    Globulin 3.8 (H) 2.3 - 3.5 g/dL    Albumin/Globulin Ratio 0.9 (L) 1.0 - 1.9     Blood Culture 1    Collection Time: 03/10/25  6:19 PM    Specimen: Blood   Result Value Ref Range    Special Requests LEFT  FOREARM        Culture NO GROWTH AFTER 12 HOURS     Lactate, Sepsis    Collection Time: 03/10/25  6:19 PM   Result Value Ref Range    Lactic Acid, Sepsis 2.4 (H) 0.5 - 2.0 MMOL/L 
Re-education  Manual Therapy  Education         TREATMENT:     EVALUATION: MODERATE COMPLEXITY: (Untimed Charge)  The initial evaluation charge encompasses clinical chart review, objective assessment, interpretation of assessment, and skilled monitoring of the patient's response to treatment in order to develop a plan of care.     TREATMENT:   Co-Treatment between OT and PT necessary due to patient's decreased overall endurance/tolerance levels, as well as need for high level skilled assistance to complete functional transfers/mobility and functional tasks  Self Care (23 minutes): Patient participated in lower body dressing, grooming, functional mobility, bed mobility, functional transfer, assistive device, and compensatory technique in unsupported sitting and standing with minimal verbal cueing to increase independence, decrease assistance required, and increase activity tolerance. The patient was educated on role of occupational therapy, compensatory technique during ADL , proper use of assistive device, and transfer training and safety and patient verbalized understanding.     TREATMENT GRID:  N/A    AFTER TREATMENT PRECAUTIONS: Alarm Activated, Call light within reach, Chair, Needs within reach, and RN notified    INTERDISCIPLINARY COLLABORATION:  RN/ PCT and PT/ PTA    EDUCATION:  Education Given To: Patient  Education Provided: Plan of Care;Role of Therapy    TOTAL TREATMENT DURATION AND TIME:  Time In: 0855  Time Out: 0930  Minutes: 35    Doris Mullen OT               
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 *      Objective:   Patient Vitals for the past 24 hrs:   Temp Pulse Resp BP SpO2   03/18/25 0817 98.1 °F (36.7 °C) 77 24 135/61 98 %   03/18/25 0404 -- -- 18 -- --   03/18/25 0401 97.9 °F (36.6 °C) 81 18 (!) 143/56 96 %   03/17/25 2204 -- -- 18 -- --   03/17/25 2004 98.1 °F (36.7 °C) 87 18 (!) 140/63 96 %   03/17/25 1512 -- -- 20 -- --   03/17/25 1445 98.4 °F (36.9 °C) 83 16 (!) 134/54 98 %       Oxygen Therapy  SpO2: 98 %  Pulse via Oximetry: 104 beats per minute  Pulse Oximeter Device Mode: Intermittent  O2 Device: None (Room air)  O2 Flow Rate (L/min): 2 L/min    Estimated body mass index is 43.75 kg/m² as calculated from the following:    Height as of this encounter: 1.6 m (5' 2.99\").    Weight as of this encounter: 112 kg (246 lb 14.6 oz).    Intake/Output Summary (Last 24 hours) at 3/18/2025 1315  Last data filed at 3/18/2025 1101  Gross per 24 hour   Intake --   Output 1410 ml   Net -1410 ml         Physical Exam:     General-alert and oriented x3, cooperative and calm-nasogastric right nare  Eyes-conjunctive are clear pupils equal round react to light extraocular muscles intact  Heart-no gross gallop, no JVD or HJR.  Lungs-clear auscultation palpation and percussion, symmetric excursion of the chest wall.  No wheezing    Abdomen-remains distended infrequent bowel sounds-NG tube with dark output.    Extremities-no significant unilateral 
Glucose    Collection Time: 03/12/25 11:07 AM   Result Value Ref Range    POC Glucose 206 (H) 65 - 100 mg/dL    Performed by: Cade    POCT Glucose    Collection Time: 03/12/25  4:09 PM   Result Value Ref Range    POC Glucose 298 (H) 65 - 100 mg/dL    Performed by: Cade    POCT Glucose    Collection Time: 03/12/25  7:59 PM   Result Value Ref Range    POC Glucose 275 (H) 65 - 100 mg/dL    Performed by: Kvng    POCT Glucose    Collection Time: 03/12/25 11:33 PM   Result Value Ref Range    POC Glucose 244 (H) 65 - 100 mg/dL    Performed by: [facility]    Comprehensive Metabolic Panel w/ Reflex to MG    Collection Time: 03/13/25  5:08 AM   Result Value Ref Range    Sodium 138 136 - 145 mmol/L    Potassium 4.4 3.5 - 5.1 mmol/L    Chloride 103 98 - 107 mmol/L    CO2 26 20 - 29 mmol/L    Anion Gap 9 7 - 16 mmol/L    Glucose 322 (H) 70 - 99 mg/dL    BUN 14 8 - 23 MG/DL    Creatinine 0.69 0.60 - 1.10 MG/DL    Est, Glom Filt Rate 89 >60 ml/min/1.73m2    Calcium 8.3 (L) 8.8 - 10.2 MG/DL    Total Bilirubin 0.6 0.0 - 1.2 MG/DL    ALT 23 8 - 45 U/L    AST 29 15 - 37 U/L    Alk Phosphatase 82 35 - 104 U/L    Total Protein 6.2 (L) 6.3 - 8.2 g/dL    Albumin 2.8 (L) 3.2 - 4.6 g/dL    Globulin 3.4 2.3 - 3.5 g/dL    Albumin/Globulin Ratio 0.8 (L) 1.0 - 1.9     CBC with Auto Differential    Collection Time: 03/13/25  5:08 AM   Result Value Ref Range    WBC 7.6 4.3 - 11.1 K/uL    RBC 5.00 4.05 - 5.2 M/uL    Hemoglobin 12.9 11.7 - 15.4 g/dL    Hematocrit 42.8 35.8 - 46.3 %    MCV 85.6 82 - 102 FL    MCH 25.8 (L) 26.1 - 32.9 PG    MCHC 30.1 (L) 31.4 - 35.0 g/dL    RDW 14.1 11.9 - 14.6 %    Platelets 291 150 - 450 K/uL    MPV 9.1 (L) 9.4 - 12.3 FL    nRBC 0.00 0.0 - 0.2 K/uL    Differential Type AUTOMATED      Neutrophils % 78.4 (H) 43.0 - 78.0 %    Lymphocytes % 12.3 (L) 13.0 - 44.0 %    Monocytes % 7.9 4.0 - 12.0 %    Eosinophils % 0.3 (L) 0.5 - 7.8 %    Basophils % 0.4 0.0 - 2.0 %    Immature 
(L) 8.8 - 10.2 MG/DL    Total Bilirubin 0.5 0.0 - 1.2 MG/DL    ALT 23 8 - 45 U/L    AST 25 15 - 37 U/L    Alk Phosphatase 81 35 - 104 U/L    Total Protein 6.4 6.3 - 8.2 g/dL    Albumin 2.7 (L) 3.2 - 4.6 g/dL    Globulin 3.7 (H) 2.3 - 3.5 g/dL    Albumin/Globulin Ratio 0.7 (L) 1.0 - 1.9     CBC with Auto Differential    Collection Time: 03/14/25  7:16 AM   Result Value Ref Range    WBC 8.1 4.3 - 11.1 K/uL    RBC 5.07 4.05 - 5.2 M/uL    Hemoglobin 13.1 11.7 - 15.4 g/dL    Hematocrit 42.9 35.8 - 46.3 %    MCV 84.6 82 - 102 FL    MCH 25.8 (L) 26.1 - 32.9 PG    MCHC 30.5 (L) 31.4 - 35.0 g/dL    RDW 14.1 11.9 - 14.6 %    Platelets 294 150 - 450 K/uL    MPV 9.2 (L) 9.4 - 12.3 FL    nRBC 0.00 0.0 - 0.2 K/uL    Differential Type AUTOMATED      Neutrophils % 82.1 (H) 43.0 - 78.0 %    Lymphocytes % 9.3 (L) 13.0 - 44.0 %    Monocytes % 7.6 4.0 - 12.0 %    Eosinophils % 0.4 (L) 0.5 - 7.8 %    Basophils % 0.2 0.0 - 2.0 %    Immature Granulocytes % 0.4 0.0 - 5.0 %    Neutrophils Absolute 6.67 1.70 - 8.20 K/UL    Lymphocytes Absolute 0.76 0.50 - 4.60 K/UL    Monocytes Absolute 0.62 0.10 - 1.30 K/UL    Eosinophils Absolute 0.03 0.00 - 0.80 K/UL    Basophils Absolute 0.02 0.00 - 0.20 K/UL    Immature Granulocytes Absolute 0.03 0.0 - 0.5 K/UL   POCT Glucose    Collection Time: 03/14/25  7:45 AM   Result Value Ref Range    POC Glucose 267 (H) 65 - 100 mg/dL    Performed by: Sofya    POCT Glucose    Collection Time: 03/14/25 11:11 AM   Result Value Ref Range    POC Glucose 282 (H) 65 - 100 mg/dL    Performed by: Sofya        No results for input(s): \"COVID19\" in the last 72 hours.    Current Meds:  Current Facility-Administered Medications   Medication Dose Route Frequency    labetalol (NORMODYNE;TRANDATE) injection 10 mg  10 mg IntraVENous Q6H PRN    hydrALAZINE (APRESOLINE) injection 10 mg  10 mg IntraVENous Q4H PRN    HYDROmorphone HCl PF (DILAUDID) injection 1 mg  1 mg IntraVENous Q4H PRN    [START ON 
18 --   03/16/25 0307 133/68 98.1 °F (36.7 °C) Oral 90 18 93 %   03/15/25 2108 -- -- -- 90 16 92 %   03/15/25 2038 -- -- -- -- 20 --   03/15/25 1942 (!) 152/75 97.5 °F (36.4 °C) Oral 88 18 94 %       Labs:  Recent Labs     03/15/25  0438 03/15/25  1550 03/16/25  0325   WBC 10.5  --  9.8   HGB 12.8  --  12.7     --  303     --  141   K 4.5  --  4.2     --  106   CO2 23  --  25   BUN 14  --  19   INR  --  1.2  --    APTT  --  26.9  --    ALT 22  --   --        Lab Results   Component Value Date/Time    WBC 9.8 03/16/2025 03:25 AM    HGB 12.7 03/16/2025 03:25 AM     03/16/2025 03:25 AM     03/16/2025 03:25 AM    K 4.2 03/16/2025 03:25 AM     03/16/2025 03:25 AM    CO2 25 03/16/2025 03:25 AM    BUN 19 03/16/2025 03:25 AM    INR 1.2 03/15/2025 03:50 PM    APTT 26.9 03/15/2025 03:50 PM    ALT 22 03/15/2025 04:38 AM       I reviewed recent labs and recent radiologic studies.    I independently reviewed radiology images for studies I described above or studies I have ordered.       Xray Result (most recent):  XR CHEST PORTABLE 03/15/2025    Narrative  Chest X-ray    INDICATION: SOB    COMPARISON: Acute abdominal series with chest 3/10/2025    TECHNIQUE: AP/PA view of the chest was obtained.    FINDINGS: The lungs are clear. There are no infiltrates or effusions.  The heart  size is normal.  The bony thorax is intact. Enteric catheter is in place tip not  well seen likely within the proximal stomach.    Impression  No acute findings in the chest. Enteric catheter in place distal  aspect not well seen likely within the proximal stomach.      Electronically signed by Carlito Logan    CT Result (most recent):  CT ABDOMEN PELVIS W IV CONTRAST 03/10/2025    [facility]  CT OF THE ABDOMEN AND PELVIS    INDICATION: Abdominal pain.    TECHNIQUE: Multiple 2D axial images were obtained through the abdomen and  pelvis.  100mL of Isovue 370 intravenous contrast was used for better evaluation  of solid 
Immature Granulocytes % 0.6 0.0 - 5.0 %    Neutrophils Absolute 8.93 (H) 1.70 - 8.20 K/UL    Lymphocytes Absolute 1.10 0.50 - 4.60 K/UL    Monocytes Absolute 0.47 0.10 - 1.30 K/UL    Eosinophils Absolute 0.00 0.00 - 0.80 K/UL    Basophils Absolute 0.03 0.00 - 0.20 K/UL    Immature Granulocytes Absolute 0.06 0.0 - 0.5 K/UL   Hemoglobin A1c    Collection Time: 03/11/25  3:55 AM   Result Value Ref Range    Hemoglobin A1C 8.1 (H) 0 - 5.6 %    Estimated Avg Glucose 186 mg/dL   POCT Glucose    Collection Time: 03/11/25  7:20 AM   Result Value Ref Range    POC Glucose 212 (H) 65 - 100 mg/dL    Performed by: Bruno    POCT Glucose    Collection Time: 03/11/25 11:58 AM   Result Value Ref Range    POC Glucose 236 (H) 65 - 100 mg/dL    Performed by: Cade    POCT Glucose    Collection Time: 03/11/25  4:17 PM   Result Value Ref Range    POC Glucose 240 (H) 65 - 100 mg/dL    Performed by: Cade    POCT Glucose    Collection Time: 03/11/25  8:29 PM   Result Value Ref Range    POC Glucose 231 (H) 65 - 100 mg/dL    Performed by: Birdie    POCT Glucose    Collection Time: 03/12/25 12:30 AM   Result Value Ref Range    POC Glucose 231 (H) 65 - 100 mg/dL    Performed by: Glenys    Comprehensive Metabolic Panel w/ Reflex to MG    Collection Time: 03/12/25  4:16 AM   Result Value Ref Range    Sodium 140 136 - 145 mmol/L    Potassium 4.8 3.5 - 5.1 mmol/L    Chloride 104 98 - 107 mmol/L    CO2 30 (H) 20 - 29 mmol/L    Anion Gap 7 7 - 16 mmol/L    Glucose 239 (H) 70 - 99 mg/dL    BUN 13 8 - 23 MG/DL    Creatinine 0.73 0.60 - 1.10 MG/DL    Est, Glom Filt Rate 84 >60 ml/min/1.73m2    Calcium 8.7 (L) 8.8 - 10.2 MG/DL    Total Bilirubin 0.4 0.0 - 1.2 MG/DL    ALT 27 8 - 45 U/L    AST 36 15 - 37 U/L    Alk Phosphatase 93 35 - 104 U/L    Total Protein 6.7 6.3 - 8.2 g/dL    Albumin 3.1 (L) 3.2 - 4.6 g/dL    Globulin 3.6 (H) 2.3 - 3.5 g/dL    Albumin/Globulin Ratio 0.9 (L) 1.0 - 1.9     CBC 
Absolute 9.01 (H) 1.70 - 8.20 K/UL    Lymphocytes Absolute 0.57 0.50 - 4.60 K/UL    Monocytes Absolute 0.83 0.10 - 1.30 K/UL    Eosinophils Absolute 0.01 0.00 - 0.80 K/UL    Basophils Absolute 0.01 0.00 - 0.20 K/UL    Immature Granulocytes Absolute 0.06 0.0 - 0.5 K/UL   Troponin    Collection Time: 03/15/25  4:38 AM   Result Value Ref Range    Troponin T 38.0 (H) 0 - 14 ng/L   Comprehensive Metabolic Panel w/ Reflex to MG    Collection Time: 03/15/25  4:38 AM   Result Value Ref Range    Sodium 139 136 - 145 mmol/L    Potassium 4.5 3.5 - 5.1 mmol/L    Chloride 107 98 - 107 mmol/L    CO2 23 20 - 29 mmol/L    Anion Gap 10 7 - 16 mmol/L    Glucose 312 (H) 70 - 99 mg/dL    BUN 14 8 - 23 MG/DL    Creatinine 0.62 0.60 - 1.10 MG/DL    Est, Glom Filt Rate >90 >60 ml/min/1.73m2    Calcium 8.6 (L) 8.8 - 10.2 MG/DL    Total Bilirubin 0.5 0.0 - 1.2 MG/DL    ALT 22 8 - 45 U/L    AST 22 15 - 37 U/L    Alk Phosphatase 83 35 - 104 U/L    Total Protein 6.2 (L) 6.3 - 8.2 g/dL    Albumin 2.5 (L) 3.2 - 4.6 g/dL    Globulin 3.7 (H) 2.3 - 3.5 g/dL    Albumin/Globulin Ratio 0.7 (L) 1.0 - 1.9     POCT Glucose    Collection Time: 03/15/25  5:32 AM   Result Value Ref Range    POC Glucose 253 (H) 65 - 100 mg/dL    Performed by: Carina    Troponin    Collection Time: 03/15/25  6:42 AM   Result Value Ref Range    Troponin T 36.0 (H) 0 - 14 ng/L   POCT Glucose    Collection Time: 03/15/25  7:41 AM   Result Value Ref Range    POC Glucose 282 (H) 65 - 100 mg/dL    Performed by: Roxann    POCT Glucose    Collection Time: 03/15/25  9:49 AM   Result Value Ref Range    POC Glucose 267 (H) 65 - 100 mg/dL    Performed by: Alejandro    POCT Glucose    Collection Time: 03/15/25 12:39 PM   Result Value Ref Range    POC Glucose 302 (H) 65 - 100 mg/dL    Performed by: Khanh)Ricardo        No results for input(s): \"COVID19\" in the last 72 hours.    Current Meds:  Current Facility-Administered Medications   Medication 
dextrose 5% 250 mL (premix) infusion  5-30 Units/kg/hr IntraVENous Continuous    labetalol (NORMODYNE;TRANDATE) injection 10 mg  10 mg IntraVENous Q6H PRN    [Held by provider] naloxegol (MOVANTIK) tablet 12.5 mg  12.5 mg Oral QAM AC    0.9 % sodium chloride infusion   IntraVENous Continuous    LORazepam (ATIVAN) 0.5 mg in sodium chloride (PF) 0.9 % 10 mL injection  0.5 mg IntraVENous Q4H PRN    benzocaine (HURRICAINE) 20 % oral spray   Mouth/Throat 4x Daily PRN    glucose chewable tablet 16 g  4 tablet Oral PRN    dextrose bolus 10% 125 mL  125 mL IntraVENous PRN    Or    dextrose bolus 10% 250 mL  250 mL IntraVENous PRN    dextrose 10 % infusion   IntraVENous Continuous PRN    insulin lispro (HUMALOG,ADMELOG) injection vial 0-8 Units  0-8 Units SubCUTAneous 4 times per day    medicated lip ointment (BLISTEX)   Topical PRN    phenol 1.4 % mouth spray 1 spray  1 spray Mouth/Throat Q2H PRN    sodium chloride flush 0.9 % injection 5-40 mL  5-40 mL IntraVENous 2 times per day    sodium chloride flush 0.9 % injection 5-40 mL  5-40 mL IntraVENous PRN    0.9 % sodium chloride infusion   IntraVENous PRN    potassium chloride (KLOR-CON M) extended release tablet 40 mEq  40 mEq Oral PRN    Or    potassium bicarb-citric acid (EFFER-K) effervescent tablet 40 mEq  40 mEq Oral PRN    Or    potassium chloride 10 mEq/100 mL IVPB (Peripheral Line)  10 mEq IntraVENous PRN    magnesium sulfate 2000 mg in 50 mL IVPB premix  2,000 mg IntraVENous PRN    ondansetron (ZOFRAN-ODT) disintegrating tablet 4 mg  4 mg Oral Q8H PRN    Or    ondansetron (ZOFRAN) injection 4 mg  4 mg IntraVENous Q6H PRN    polyethylene glycol (GLYCOLAX) packet 17 g  17 g Oral Daily PRN    acetaminophen (TYLENOL) tablet 650 mg  650 mg Oral Q6H PRN    Or    acetaminophen (TYLENOL) suppository 650 mg  650 mg Rectal Q6H PRN    Glucagon Emergency KIT 1 mg  1 mg SubCUTAneous PRN    [Held by provider] apixaban (ELIQUIS) tablet 5 mg  5 mg Oral BID    [Held by provider]

## 2025-03-20 ENCOUNTER — CARE COORDINATION (OUTPATIENT)
Dept: CARE COORDINATION | Facility: CLINIC | Age: 80
End: 2025-03-20

## 2025-03-20 ENCOUNTER — APPOINTMENT (OUTPATIENT)
Dept: GENERAL RADIOLOGY | Age: 80
DRG: 330 | End: 2025-03-20
Attending: SURGERY
Payer: MEDICARE

## 2025-03-20 PROBLEM — K56.609 SBO (SMALL BOWEL OBSTRUCTION) (HCC): Status: ACTIVE | Noted: 2025-03-20

## 2025-03-20 LAB
ANION GAP SERPL CALC-SCNC: 9 MMOL/L (ref 7–16)
BASOPHILS # BLD: 0.04 K/UL (ref 0–0.2)
BASOPHILS NFR BLD: 0.3 % (ref 0–2)
BUN SERPL-MCNC: 10 MG/DL (ref 8–23)
CALCIUM SERPL-MCNC: 8.3 MG/DL (ref 8.8–10.2)
CHLORIDE SERPL-SCNC: 100 MMOL/L (ref 98–107)
CO2 SERPL-SCNC: 29 MMOL/L (ref 20–29)
CREAT SERPL-MCNC: 0.68 MG/DL (ref 0.6–1.1)
DIFFERENTIAL METHOD BLD: ABNORMAL
EOSINOPHIL # BLD: 0 K/UL (ref 0–0.8)
EOSINOPHIL NFR BLD: 0 % (ref 0.5–7.8)
ERYTHROCYTE [DISTWIDTH] IN BLOOD BY AUTOMATED COUNT: 13.6 % (ref 11.9–14.6)
GLUCOSE BLD STRIP.AUTO-MCNC: 211 MG/DL (ref 65–100)
GLUCOSE BLD STRIP.AUTO-MCNC: 240 MG/DL (ref 65–100)
GLUCOSE BLD STRIP.AUTO-MCNC: 244 MG/DL (ref 65–100)
GLUCOSE BLD STRIP.AUTO-MCNC: 261 MG/DL (ref 65–100)
GLUCOSE SERPL-MCNC: 286 MG/DL (ref 70–99)
HCT VFR BLD AUTO: 38.7 % (ref 35.8–46.3)
HGB BLD-MCNC: 12 G/DL (ref 11.7–15.4)
IMM GRANULOCYTES # BLD AUTO: 0.08 K/UL (ref 0–0.5)
IMM GRANULOCYTES NFR BLD AUTO: 0.6 % (ref 0–5)
LYMPHOCYTES # BLD: 0.35 K/UL (ref 0.5–4.6)
LYMPHOCYTES NFR BLD: 2.7 % (ref 13–44)
MAGNESIUM SERPL-MCNC: 1.9 MG/DL (ref 1.8–2.4)
MCH RBC QN AUTO: 26.3 PG (ref 26.1–32.9)
MCHC RBC AUTO-ENTMCNC: 31 G/DL (ref 31.4–35)
MCV RBC AUTO: 84.9 FL (ref 82–102)
MONOCYTES # BLD: 0.56 K/UL (ref 0.1–1.3)
MONOCYTES NFR BLD: 4.3 % (ref 4–12)
NEUTS SEG # BLD: 11.96 K/UL (ref 1.7–8.2)
NEUTS SEG NFR BLD: 92.1 % (ref 43–78)
NRBC # BLD: 0 K/UL (ref 0–0.2)
PHOSPHATE SERPL-MCNC: 3.7 MG/DL (ref 2.5–4.5)
PLATELET # BLD AUTO: 371 K/UL (ref 150–450)
PMV BLD AUTO: 8.9 FL (ref 9.4–12.3)
POTASSIUM SERPL-SCNC: 4.7 MMOL/L (ref 3.5–5.1)
RBC # BLD AUTO: 4.56 M/UL (ref 4.05–5.2)
SERVICE CMNT-IMP: ABNORMAL
SODIUM SERPL-SCNC: 138 MMOL/L (ref 136–145)
TRIGL SERPL-MCNC: 76 MG/DL (ref 0–150)
WBC # BLD AUTO: 13 K/UL (ref 4.3–11.1)

## 2025-03-20 PROCEDURE — 83735 ASSAY OF MAGNESIUM: CPT

## 2025-03-20 PROCEDURE — 84100 ASSAY OF PHOSPHORUS: CPT

## 2025-03-20 PROCEDURE — 2500000003 HC RX 250 WO HCPCS

## 2025-03-20 PROCEDURE — 2500000003 HC RX 250 WO HCPCS: Performed by: NURSE PRACTITIONER

## 2025-03-20 PROCEDURE — 1100000000 HC RM PRIVATE

## 2025-03-20 PROCEDURE — 2700000000 HC OXYGEN THERAPY PER DAY

## 2025-03-20 PROCEDURE — 94760 N-INVAS EAR/PLS OXIMETRY 1: CPT

## 2025-03-20 PROCEDURE — 2580000003 HC RX 258: Performed by: FAMILY MEDICINE

## 2025-03-20 PROCEDURE — 2580000003 HC RX 258

## 2025-03-20 PROCEDURE — 2580000003 HC RX 258: Performed by: NURSE PRACTITIONER

## 2025-03-20 PROCEDURE — 6360000002 HC RX W HCPCS: Performed by: FAMILY MEDICINE

## 2025-03-20 PROCEDURE — 6370000000 HC RX 637 (ALT 250 FOR IP): Performed by: NURSE PRACTITIONER

## 2025-03-20 PROCEDURE — 94761 N-INVAS EAR/PLS OXIMETRY MLT: CPT

## 2025-03-20 PROCEDURE — 74018 RADEX ABDOMEN 1 VIEW: CPT

## 2025-03-20 PROCEDURE — 84478 ASSAY OF TRIGLYCERIDES: CPT

## 2025-03-20 PROCEDURE — 6360000002 HC RX W HCPCS

## 2025-03-20 PROCEDURE — 36415 COLL VENOUS BLD VENIPUNCTURE: CPT

## 2025-03-20 PROCEDURE — 6360000002 HC RX W HCPCS: Performed by: NURSE PRACTITIONER

## 2025-03-20 PROCEDURE — 80048 BASIC METABOLIC PNL TOTAL CA: CPT

## 2025-03-20 PROCEDURE — 82962 GLUCOSE BLOOD TEST: CPT

## 2025-03-20 PROCEDURE — 85025 COMPLETE CBC W/AUTO DIFF WBC: CPT

## 2025-03-20 RX ORDER — MENTHOL/CAMPHOR/ALLANTOIN/PHE 0.6-0.5-1%
OINTMENT(EA) TOPICAL PRN
Status: DISCONTINUED | OUTPATIENT
Start: 2025-03-20 | End: 2025-03-21 | Stop reason: HOSPADM

## 2025-03-20 RX ORDER — ACETAMINOPHEN 325 MG/1
650 TABLET ORAL EVERY 6 HOURS PRN
Status: DISCONTINUED | OUTPATIENT
Start: 2025-03-20 | End: 2025-03-21 | Stop reason: HOSPADM

## 2025-03-20 RX ORDER — HYDRALAZINE HYDROCHLORIDE 20 MG/ML
20 INJECTION INTRAMUSCULAR; INTRAVENOUS EVERY 4 HOURS PRN
Status: DISCONTINUED | OUTPATIENT
Start: 2025-03-20 | End: 2025-03-21 | Stop reason: HOSPADM

## 2025-03-20 RX ORDER — SENNOSIDES A AND B 8.6 MG/1
1 TABLET, FILM COATED ORAL NIGHTLY
Status: DISCONTINUED | OUTPATIENT
Start: 2025-03-20 | End: 2025-03-21 | Stop reason: HOSPADM

## 2025-03-20 RX ORDER — POTASSIUM CHLORIDE 7.45 MG/ML
10 INJECTION INTRAVENOUS PRN
Status: DISCONTINUED | OUTPATIENT
Start: 2025-03-20 | End: 2025-03-20

## 2025-03-20 RX ORDER — SODIUM CHLORIDE 0.9 % (FLUSH) 0.9 %
5-40 SYRINGE (ML) INJECTION PRN
Status: DISCONTINUED | OUTPATIENT
Start: 2025-03-20 | End: 2025-03-20 | Stop reason: SDUPTHER

## 2025-03-20 RX ORDER — SODIUM CHLORIDE 0.9 % (FLUSH) 0.9 %
5-40 SYRINGE (ML) INJECTION EVERY 12 HOURS SCHEDULED
Status: DISCONTINUED | OUTPATIENT
Start: 2025-03-20 | End: 2025-03-21 | Stop reason: HOSPADM

## 2025-03-20 RX ORDER — DIATRIZOATE MEGLUMINE AND DIATRIZOATE SODIUM 660; 100 MG/ML; MG/ML
15 SOLUTION ORAL; RECTAL
Status: DISCONTINUED | OUTPATIENT
Start: 2025-03-20 | End: 2025-03-21 | Stop reason: HOSPADM

## 2025-03-20 RX ORDER — POTASSIUM CHLORIDE 7.45 MG/ML
10 INJECTION INTRAVENOUS PRN
Status: DISCONTINUED | OUTPATIENT
Start: 2025-03-20 | End: 2025-03-21 | Stop reason: HOSPADM

## 2025-03-20 RX ORDER — SODIUM CHLORIDE 0.9 % (FLUSH) 0.9 %
5-40 SYRINGE (ML) INJECTION EVERY 12 HOURS SCHEDULED
Status: DISCONTINUED | OUTPATIENT
Start: 2025-03-20 | End: 2025-03-20 | Stop reason: SDUPTHER

## 2025-03-20 RX ORDER — ESCITALOPRAM OXALATE 10 MG/1
20 TABLET ORAL DAILY
Status: DISCONTINUED | OUTPATIENT
Start: 2025-03-20 | End: 2025-03-21 | Stop reason: HOSPADM

## 2025-03-20 RX ORDER — INSULIN GLARGINE 100 [IU]/ML
34 INJECTION, SOLUTION SUBCUTANEOUS DAILY
Status: DISCONTINUED | OUTPATIENT
Start: 2025-03-20 | End: 2025-03-21 | Stop reason: HOSPADM

## 2025-03-20 RX ORDER — MORPHINE SULFATE 2 MG/ML
2 INJECTION, SOLUTION INTRAMUSCULAR; INTRAVENOUS EVERY 4 HOURS PRN
Refills: 0 | Status: DISCONTINUED | OUTPATIENT
Start: 2025-03-20 | End: 2025-03-21 | Stop reason: HOSPADM

## 2025-03-20 RX ORDER — ONDANSETRON 4 MG/1
4 TABLET, ORALLY DISINTEGRATING ORAL EVERY 8 HOURS PRN
Status: DISCONTINUED | OUTPATIENT
Start: 2025-03-20 | End: 2025-03-21 | Stop reason: HOSPADM

## 2025-03-20 RX ORDER — LEVOTHYROXINE SODIUM 50 UG/1
25 TABLET ORAL
Status: DISCONTINUED | OUTPATIENT
Start: 2025-03-20 | End: 2025-03-21 | Stop reason: HOSPADM

## 2025-03-20 RX ORDER — SODIUM CHLORIDE 9 MG/ML
INJECTION, SOLUTION INTRAVENOUS PRN
Status: DISCONTINUED | OUTPATIENT
Start: 2025-03-20 | End: 2025-03-20 | Stop reason: SDUPTHER

## 2025-03-20 RX ORDER — POTASSIUM CHLORIDE 1500 MG/1
40 TABLET, EXTENDED RELEASE ORAL PRN
Status: DISCONTINUED | OUTPATIENT
Start: 2025-03-20 | End: 2025-03-20

## 2025-03-20 RX ORDER — SODIUM CHLORIDE 9 MG/ML
INJECTION, SOLUTION INTRAVENOUS PRN
Status: DISCONTINUED | OUTPATIENT
Start: 2025-03-20 | End: 2025-03-21 | Stop reason: HOSPADM

## 2025-03-20 RX ORDER — SODIUM CHLORIDE 0.9 % (FLUSH) 0.9 %
5-40 SYRINGE (ML) INJECTION PRN
Status: DISCONTINUED | OUTPATIENT
Start: 2025-03-20 | End: 2025-03-21 | Stop reason: HOSPADM

## 2025-03-20 RX ORDER — HEPARIN SODIUM 1000 [USP'U]/ML
80 INJECTION, SOLUTION INTRAVENOUS; SUBCUTANEOUS PRN
Status: DISCONTINUED | OUTPATIENT
Start: 2025-03-20 | End: 2025-03-21 | Stop reason: HOSPADM

## 2025-03-20 RX ORDER — ACETAMINOPHEN 650 MG/1
650 SUPPOSITORY RECTAL EVERY 6 HOURS PRN
Status: DISCONTINUED | OUTPATIENT
Start: 2025-03-20 | End: 2025-03-21 | Stop reason: HOSPADM

## 2025-03-20 RX ORDER — POTASSIUM CHLORIDE 29.8 MG/ML
20 INJECTION INTRAVENOUS PRN
Status: DISCONTINUED | OUTPATIENT
Start: 2025-03-20 | End: 2025-03-21 | Stop reason: HOSPADM

## 2025-03-20 RX ORDER — CLONIDINE 0.1 MG/24H
1 PATCH, EXTENDED RELEASE TRANSDERMAL WEEKLY
Status: DISCONTINUED | OUTPATIENT
Start: 2025-03-22 | End: 2025-03-21 | Stop reason: HOSPADM

## 2025-03-20 RX ORDER — HEPARIN SODIUM 1000 [USP'U]/ML
40 INJECTION, SOLUTION INTRAVENOUS; SUBCUTANEOUS PRN
Status: DISCONTINUED | OUTPATIENT
Start: 2025-03-20 | End: 2025-03-21 | Stop reason: HOSPADM

## 2025-03-20 RX ORDER — MAGNESIUM SULFATE IN WATER 40 MG/ML
2000 INJECTION, SOLUTION INTRAVENOUS PRN
Status: DISCONTINUED | OUTPATIENT
Start: 2025-03-20 | End: 2025-03-21 | Stop reason: HOSPADM

## 2025-03-20 RX ORDER — ATORVASTATIN CALCIUM 40 MG/1
40 TABLET, FILM COATED ORAL DAILY
Status: DISCONTINUED | OUTPATIENT
Start: 2025-03-20 | End: 2025-03-21 | Stop reason: HOSPADM

## 2025-03-20 RX ORDER — POLYETHYLENE GLYCOL 3350 17 G/17G
17 POWDER, FOR SOLUTION ORAL DAILY PRN
Status: DISCONTINUED | OUTPATIENT
Start: 2025-03-20 | End: 2025-03-21 | Stop reason: HOSPADM

## 2025-03-20 RX ORDER — ASPIRIN 325 MG
325 TABLET ORAL DAILY
Status: DISCONTINUED | OUTPATIENT
Start: 2025-03-20 | End: 2025-03-21 | Stop reason: HOSPADM

## 2025-03-20 RX ORDER — DEXTROSE MONOHYDRATE 100 MG/ML
INJECTION, SOLUTION INTRAVENOUS CONTINUOUS
Status: DISCONTINUED | OUTPATIENT
Start: 2025-03-20 | End: 2025-03-20

## 2025-03-20 RX ORDER — ONDANSETRON 2 MG/ML
4 INJECTION INTRAMUSCULAR; INTRAVENOUS EVERY 6 HOURS PRN
Status: DISCONTINUED | OUTPATIENT
Start: 2025-03-20 | End: 2025-03-21 | Stop reason: HOSPADM

## 2025-03-20 RX ORDER — HEPARIN SODIUM 10000 [USP'U]/100ML
5-30 INJECTION, SOLUTION INTRAVENOUS CONTINUOUS
Status: DISCONTINUED | OUTPATIENT
Start: 2025-03-20 | End: 2025-03-21 | Stop reason: HOSPADM

## 2025-03-20 RX ADMIN — METRONIDAZOLE 500 MG: 500 INJECTION, SOLUTION INTRAVENOUS at 11:02

## 2025-03-20 RX ADMIN — HYDROMORPHONE HYDROCHLORIDE 1 MG: 1 INJECTION, SOLUTION INTRAMUSCULAR; INTRAVENOUS; SUBCUTANEOUS at 13:17

## 2025-03-20 RX ADMIN — SODIUM CHLORIDE, PRESERVATIVE FREE 0.5 MG: 5 INJECTION INTRAVENOUS at 09:51

## 2025-03-20 RX ADMIN — SODIUM CHLORIDE, PRESERVATIVE FREE 10 ML: 5 INJECTION INTRAVENOUS at 09:51

## 2025-03-20 RX ADMIN — HYDROMORPHONE HYDROCHLORIDE 1 MG: 1 INJECTION, SOLUTION INTRAMUSCULAR; INTRAVENOUS; SUBCUTANEOUS at 03:51

## 2025-03-20 RX ADMIN — METRONIDAZOLE 500 MG: 500 INJECTION, SOLUTION INTRAVENOUS at 15:56

## 2025-03-20 RX ADMIN — HYDROMORPHONE HYDROCHLORIDE 1 MG: 1 INJECTION, SOLUTION INTRAMUSCULAR; INTRAVENOUS; SUBCUTANEOUS at 21:42

## 2025-03-20 RX ADMIN — SODIUM CHLORIDE: 9 INJECTION, SOLUTION INTRAVENOUS at 07:17

## 2025-03-20 RX ADMIN — SODIUM CHLORIDE, PRESERVATIVE FREE 0.5 MG: 5 INJECTION INTRAVENOUS at 05:46

## 2025-03-20 RX ADMIN — ONDANSETRON 4 MG: 2 INJECTION, SOLUTION INTRAMUSCULAR; INTRAVENOUS at 21:43

## 2025-03-20 RX ADMIN — POTASSIUM PHOSPHATE, MONOBASIC POTASSIUM PHOSPHATE, DIBASIC: 224; 236 INJECTION, SOLUTION, CONCENTRATE INTRAVENOUS at 17:05

## 2025-03-20 RX ADMIN — SODIUM CHLORIDE, PRESERVATIVE FREE 0.5 MG: 5 INJECTION INTRAVENOUS at 21:43

## 2025-03-20 RX ADMIN — INSULIN GLARGINE 34 UNITS: 100 INJECTION, SOLUTION SUBCUTANEOUS at 09:54

## 2025-03-20 RX ADMIN — SODIUM CHLORIDE: 9 INJECTION, SOLUTION INTRAVENOUS at 15:44

## 2025-03-20 RX ADMIN — HYDROMORPHONE HYDROCHLORIDE 1 MG: 1 INJECTION, SOLUTION INTRAMUSCULAR; INTRAVENOUS; SUBCUTANEOUS at 09:49

## 2025-03-20 RX ADMIN — SODIUM CHLORIDE 40 MG: 9 INJECTION, SOLUTION INTRAMUSCULAR; INTRAVENOUS; SUBCUTANEOUS at 09:55

## 2025-03-20 RX ADMIN — METRONIDAZOLE 500 MG: 500 INJECTION, SOLUTION INTRAVENOUS at 00:43

## 2025-03-20 RX ADMIN — CIPROFLOXACIN 400 MG: 400 INJECTION, SOLUTION INTRAVENOUS at 09:58

## 2025-03-20 RX ADMIN — I.V. FAT EMULSION 250 ML: 20 EMULSION INTRAVENOUS at 17:09

## 2025-03-20 ASSESSMENT — PAIN DESCRIPTION - LOCATION
LOCATION: ABDOMEN

## 2025-03-20 ASSESSMENT — PAIN SCALES - GENERAL
PAINLEVEL_OUTOF10: 0
PAINLEVEL_OUTOF10: 10
PAINLEVEL_OUTOF10: 0
PAINLEVEL_OUTOF10: 8
PAINLEVEL_OUTOF10: 8
PAINLEVEL_OUTOF10: 0
PAINLEVEL_OUTOF10: 6
PAINLEVEL_OUTOF10: 6
PAINLEVEL_OUTOF10: 7

## 2025-03-20 ASSESSMENT — PAIN DESCRIPTION - DESCRIPTORS
DESCRIPTORS: ACHING;THROBBING
DESCRIPTORS: THROBBING;STABBING
DESCRIPTORS: ACHING

## 2025-03-20 ASSESSMENT — PAIN DESCRIPTION - ORIENTATION
ORIENTATION: RIGHT;LEFT;MID
ORIENTATION: MID
ORIENTATION: RIGHT;LEFT;MID;LOWER;UPPER

## 2025-03-20 ASSESSMENT — PAIN - FUNCTIONAL ASSESSMENT
PAIN_FUNCTIONAL_ASSESSMENT: PREVENTS OR INTERFERES SOME ACTIVE ACTIVITIES AND ADLS
PAIN_FUNCTIONAL_ASSESSMENT: PREVENTS OR INTERFERES SOME ACTIVE ACTIVITIES AND ADLS

## 2025-03-20 NOTE — PERIOP NOTE
I spoke with patient family member Filomena and updated her on the patient's changed admission status.   Per Filomena, please call her at [phone] to provide updates.

## 2025-03-20 NOTE — PROGRESS NOTES
Patient family still waiting to speak with MD. Kimberly Frommel, NP notified and requested to see if MD could speak with family. While family waiting to speak with MD, they were having discussions regarding possibility of comfort measures/hospice. This RN called  to speak with family. Family stated that they are wanting to make patient comfort measures. Kimberly Frommel notified. NP responded Dr Diaz would be up to speak with family once he was out of surgery.

## 2025-03-20 NOTE — PERIOP NOTE
Rachel, RN  for [address] [place] [address] (patients previous room) aware that patient will be staying at [facility] ICU. She is to call report to new RN at .

## 2025-03-20 NOTE — PERIOP NOTE
TRANSFER - OUT REPORT:    Verbal report given to CLAUDE cherry on Amber Escobedo  being transferred to [facility] for routine progression of patient care       Report consisted of patient's Situation, Background, Assessment and   Recommendations(SBAR).     Information from the following report(s) Nurse Handoff Report, Index, Adult Overview, Surgery Report, MAR, and Recent Results was reviewed with the receiving nurse.           Lines:   Peripheral IV 03/15/25 Left Cephalic (Active)   Site Assessment Clean, dry & intact 03/19/25 2033   Line Status Infusing 03/19/25 2033   Line Care Connections checked and tightened 03/19/25 2033   Phlebitis Assessment No symptoms 03/19/25 2033   Infiltration Assessment 0 03/19/25 2033   Alcohol Cap Used Yes 03/19/25 2033   Dressing Status Clean, dry & intact 03/19/25 2033   Dressing Type Transparent 03/19/25 2033       Peripheral IV 03/15/25 Right Forearm (Active)   Site Assessment Clean, dry & intact 03/19/25 2033   Line Status Capped 03/19/25 2033   Line Care Connections checked and tightened 03/19/25 2033   Phlebitis Assessment No symptoms 03/19/25 2033   Infiltration Assessment 0 03/19/25 2033   Alcohol Cap Used Yes 03/19/25 2033   Dressing Status Clean, dry & intact 03/19/25 2033   Dressing Type Transparent 03/19/25 0314   Dressing Intervention New 03/18/25 0700       Peripheral IV 03/19/25 Right Forearm (Active)   Site Assessment Clean, dry & intact 03/19/25 2033   Line Status Blood return noted 03/19/25 2033   Phlebitis Assessment No symptoms 03/19/25 2033   Infiltration Assessment 0 03/19/25 2033   Alcohol Cap Used Yes 03/19/25 2033   Dressing Status New dressing applied 03/19/25 2033   Dressing Type Transparent 03/19/25 2033   Dressing Intervention New 03/19/25 1512       Peripheral IV 03/19/25 Left;Lateral Forearm (Active)        Opportunity for questions and clarification was provided.      Patient transported with:  Monitor, O2 @ 10lpm, and Registered Nurse

## 2025-03-20 NOTE — PROGRESS NOTES
Patient family arrived to bedside. Per PA request, Isaac Birmingham notified of family arrival and family requesting to speak with MD.

## 2025-03-20 NOTE — CONSULTS
Nutrition Assessment  Assessment Type: Initial, Consult  Reason for visit:  Parenteral Nutrition Management (Surgery)  Malnutrition Screening Tool Score:      Nutrition Intervention:   Food and/or Nutrient Delivery:   Parenteral Nutrition:  Peripheral parenteral nutrition (Osmolarity 757)   Peripheral line infusion  Initiate: Dex 5%, 4.25% AA 2 L (85ml/hr)   Initiate 250 ml 20% lipids daily  Electrolytes:   Sodium ( 30 mEq/L sodium chloride), Potassium ( 5 mmol/L potassium phosphate), Calcium gluconate (4.5 mEq/L), Magnesium sulfate 8 mEq/L   Additives:   Adult multivitamin with vit K  Trace Elements  Thiamine 100 mg daily x 7 days (EOT 3/26)  Folic Acid 1 mg daily x 7 days (EOT 3/26)  PN regimen provides per 24 hours: 1180 kcal/day (69% of needs), 85 grams of protein/day (94% of needs), 100 grams of CHO/day and ~2250 ml of total volume/day.   Above regimen: Unable to meet calorie and protein goal secondary to PPN  Labs:   Basic Metabolic Panel, Hepatic Function Panel, Magnesium and Phosphorus active per nutrition parameters  Triglyceride weekly on Thursday  POC Glucoses/SSI Active  Nutrition Related Medication Management:  Electrolyte Replacement:   Modify for peripheral line access  prn protocol Potassium   Continue prn protocol for Magnesium & Phosphorus  Intravenous fluids:  Discontinue at 1800 with PN start   Coordination of Nutrition Care:  Coordination with health care provider Provider, ALINE Espinosa and RNFelicita       Malnutrition Assessment:  [facility] Clinical Malnutrition Criteria  Malnutrition Status: At risk for malnutrition (poor oral intake prior to admit, altered GI Function, NPO with minimal nutrition since admit 3/10)  Nutrition Focused Physical Exam: Unremarkable     Nutrition Assessment:  Food/Nutrition Related History:   Pt is tearful in pain, anxious no po with admission, unable to determine last intake pta. NV started 3 hrs prior to presentation, last BM 3 days pta.

## 2025-03-20 NOTE — PLAN OF CARE
Problem: Safety - Adult  Goal: Free from fall injury  Outcome: Progressing     Problem: Pain  Goal: Verbalizes/displays adequate comfort level or baseline comfort level  Outcome: Progressing     Problem: Chronic Conditions and Co-morbidities  Goal: Patient's chronic conditions and co-morbidity symptoms are monitored and maintained or improved  Outcome: Progressing  Flowsheets (Taken 3/19/2025 2200)  Care Plan - Patient's Chronic Conditions and Co-Morbidity Symptoms are Monitored and Maintained or Improved: Monitor and assess patient's chronic conditions and comorbid symptoms for stability, deterioration, or improvement     Problem: Discharge Planning  Goal: Discharge to home or other facility with appropriate resources  Outcome: Progressing  Flowsheets (Taken 3/19/2025 2200)  Discharge to home or other facility with appropriate resources: Identify barriers to discharge with patient and caregiver

## 2025-03-20 NOTE — CARE COORDINATION
Pt chart reviewed for initial CM assessment. [facility]. Surgery primary. Pt admitted with SBO. Per MD, pt with frozen abdomen and recommending hospice vs transfer to [facility] if [place] willing and able to operate. CM observed pt sleeping with non rebreather. No family at bedside. Per chart review, pt lives with family and requires assistance with all ADLs x 1. Pt not an active  and family provides transportation. Pt insured with Medicare and [facility] Medicare Supplemental. PCP Britt Saxena, last visit 11/27/24.     DC/POC pending clinical course.     CM team to continue to follow for any needs that may arise.     Sangita Tejeda, MSW, LBSW    [facility]     03/20/25 0087   Service Assessment   Patient Orientation Other (see comment)  (patient sleeping with 02 via non rebreather)   Cognition Other (see comment)  (sleeping)   History Provided By Medical Record   Primary Caregiver Self   Accompanied By/Relationship N/A   Support Systems Family Members   Patient's Healthcare Decision Maker is: Legal Next of Kin   PCP Verified by CM Yes  (Britt Saxena)   Last Visit to PCP Within last 6 months  (11/27/2024)   Prior Functional Level Assistance with the following:   Current Functional Level Assistance with the following:   Can patient return to prior living arrangement Unknown at present   Ability to make needs known: Fair   Family able to assist with home care needs: Yes   Would you like for me to discuss the discharge plan with any other family members/significant others, and if so, who? Yes  (family)   Financial Resources Medicare;Other (Comment)  ([facility] supplemental (Secondary))   Community Resources None   CM/SW Referral Other (see comment)  (discharge planning)   Social/Functional History   Lives With Family   Type of Home House   Home Layout One level   Home Access Ramped entrance   Home Equipment Rollator   Prior Level of

## 2025-03-20 NOTE — PERIOP NOTE
Spoke with Dr. Diaz regarding patients status and concern that she may need higher level of care. Telephone order for patient to be transferred to ICU here at .

## 2025-03-20 NOTE — OP NOTE
Operative Note      Patient: Amber Escobedo  YOB: 1945  MRN: [id number]    Date of Procedure: 3/19/2025    Pre-Op Diagnosis Codes:      * SBO (small bowel obstruction) ([facility]) [K56.609]     * Mass of abdomen [R19.00]    Post-Op Diagnosis: Same       Procedure(s):  DIAGNOSTIC LAPAROSCOPY ROBOTIC/CONVERTED TO OPEN EXPLORATORY LAPAROTOMY    Surgeon(s):  Carlos Diaz MD    Assistant:   First Assistant: Lizzy Flores    Anesthesia: General    Estimated Blood Loss (mL): Minimal    Complications: None    Specimens:   * No specimens in log *    Implants:  * No implants in log *      Drains:   Closed/Suction Drain RUQ Bulb (Active)       NG/OG/NJ/NE Tube Right nostril (Active)   Surrounding Skin Clean, dry & intact 03/19/25 0641   Securement device Tape 03/19/25 0641   Status Suction-low intermittent 03/19/25 0641   Placement Verified External Catheter Length 03/18/25 1652   NG/OG/NJ/NE External Measurement (cm) 60 cm 03/19/25 0641   Drainage Appearance Green 03/19/25 0641   Output (mL) 500 ml 03/19/25 0641   Action Taken Placement verified (comment) 03/13/25 1934       Urinary Catheter 03/19/25 Petty (Active)       REMOVED what I external Urinary Catheter (Removed)   Site Assessment Clean,dry & intact 03/15/25 2000   Placement Replaced 03/13/25 1133   Catheter Care Catheter/Wick replaced;Suction Canister/Tubing changed 03/13/25 1133   Perineal Care Yes 03/13/25 1133   Suction 40 mmgHg continuous 03/16/25 2000   Urine Color Angela 03/16/25 2000   Urine Appearance Cloudy 03/16/25 2000   Output (mL) 250 mL 03/17/25 1608       REMOVED external Urinary Catheter (Removed)   Site Assessment Clean,dry & intact 03/18/25 0705   Placement Replaced 03/17/25 2230   Securement Method Other (Comment) 03/18/25 0705   Catheter Care Catheter/Wick replaced 03/17/25 2230   Perineal Care Yes 03/17/25 2230   Suction 40 mmgHg continuous 03/18/25 0705   Urine Color Yellow 03/18/25 0705   Urine Appearance Cloudy 03/18/25  mesh from the prior ventral hernia repair and the small bowel was all fused and adherent to the mesh.  The tissue was also very friable and the small bowel actually tore a small enterotomy from finger retraction requiring suction of spilled enteral contents.  The enterotomy was closed in 2 layers with 3-0 Vicryl sutures and appeared to be sufficient.  I essentially try dissecting in all directions and was able to get nowhere.  I began to become concerned about creating more and more injuries to this patient.  I do not feel like I could continue and not create more damage to her abdomen.  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  At this point I felt like I should not continue.  I attempted to call the patient's daughter who is listed on her contacts and explained the situation but unfortunately was unable to get them on the phone.  I did speak to one of my practice mates, Dr. Desmond So, and explained the situation seeking his opinion.  He essentially agree completely and recommended stopping the procedure as well.  I think at this point the conversation needs to be addressed from a potential hospice standpoint or transfer to a tertiary level center like [facility] or even [facility].  We will offer both facilitate is much as possible.  After irrigating the incision the fascia was closed with a running 0 looped PDS and the skin with staples.  A 15 Bulgarian round drain was placed in the right upper quadrant and secured with a 2-0 silk suture.  The sponge, needle and instrument count were correct during the case once    Electronically signed by Carlos Diaz MD on 3/19/2025 at 8:16 PM

## 2025-03-20 NOTE — PLAN OF CARE
Problem: Safety - Adult  Goal: Free from fall injury  3/20/2025 1129 by Felicita An RN  Outcome: Progressing  3/20/2025 0626 by Emily Castaneda RN  Outcome: Progressing     Problem: Pain  Goal: Verbalizes/displays adequate comfort level or baseline comfort level  3/20/2025 1129 by Felicita An RN  Outcome: Progressing  Flowsheets (Taken 3/20/2025 0722)  Verbalizes/displays adequate comfort level or baseline comfort level:   Encourage patient to monitor pain and request assistance   Assess pain using appropriate pain scale   Administer analgesics based on type and severity of pain and evaluate response   Implement non-pharmacological measures as appropriate and evaluate response   Consider cultural and social influences on pain and pain management   Notify Licensed Independent Practitioner if interventions unsuccessful or patient reports new pain  3/20/2025 0626 by Emily Castaneda RN  Outcome: Progressing     Problem: Chronic Conditions and Co-morbidities  Goal: Patient's chronic conditions and co-morbidity symptoms are monitored and maintained or improved  3/20/2025 1129 by Felicita An RN  Outcome: Progressing  Flowsheets (Taken 3/20/2025 0745)  Care Plan - Patient's Chronic Conditions and Co-Morbidity Symptoms are Monitored and Maintained or Improved:   Monitor and assess patient's chronic conditions and comorbid symptoms for stability, deterioration, or improvement   Collaborate with multidisciplinary team to address chronic and comorbid conditions and prevent exacerbation or deterioration  3/20/2025 0626 by Emily Castaneda RN  Outcome: Progressing  Flowsheets (Taken 3/19/2025 2200)  Care Plan - Patient's Chronic Conditions and Co-Morbidity Symptoms are Monitored and Maintained or Improved: Monitor and assess patient's chronic conditions and comorbid symptoms for stability, deterioration, or improvement     Problem: Discharge Planning  Goal: Discharge to home or other facility with appropriate

## 2025-03-20 NOTE — ANESTHESIA POSTPROCEDURE EVALUATION
Department of Anesthesiology  Postprocedure Note    Patient: Amber Escobedo  MRN: [id number]  YOB: 1945  Date of evaluation: 3/19/2025    Procedure Summary       Date: 03/19/25 Room / Location: [facility] MAIN OR  / [facility] MAIN OR    Anesthesia Start: 1653 Anesthesia Stop: 2034    Procedure: DIAGNOSTIC LAPAROSCOPY ROBOTIC/CONVERTED TO OPEN EXPLORATORY LAPAROTOMY (Abdomen) Diagnosis:       SBO (small bowel obstruction) (HCC)      Mass of abdomen      (SBO (small bowel obstruction) (HCC) [K56.609])      (Mass of abdomen [R19.00])    Surgeons: Carlos Diaz MD Responsible Provider: Crow Medina MD    Anesthesia Type: General ASA Status: 3            Anesthesia Type: General    Cintia Phase I: Cintia Score: 9    Cintia Phase II:      Anesthesia Post Evaluation    Patient location during evaluation: PACU  Patient participation: complete - patient participated  Level of consciousness: awake and alert  Airway patency: patent  Nausea & Vomiting: no nausea and no vomiting  Cardiovascular status: hemodynamically stable  Respiratory status: acceptable, nonlabored ventilation and spontaneous ventilation  Hydration status: euvolemic  Comments: BP (!) 183/71   Pulse 97   Temp 98.1 °F (36.7 °C) (Infrared)   Resp 19   SpO2 97%     Attempted abdominal wall block in PACU but unable to see facial planes due to edema, so block aborted prior to injection of any local anesthetic. Glucose 221 in pacu, given 4 U lispro    Multimodal analgesia pain management approach  Pain management: adequate and satisfactory to patient      No notable events documented.

## 2025-03-20 NOTE — PROGRESS NOTES
Admit Date: 3/19/2025    POD 1 Day Post-Op    Procedure:  Procedure(s):  DIAGNOSTIC LAPAROSCOPY ROBOTIC/CONVERTED TO OPEN EXPLORATORY LAPAROTOMY    Subjective:     Patient is currently sleeping with oxygen via nonrebreather.  After speaking with the nurse, she reports that when she is awake she is complaining of severe abdominal pain.  There is no family at bedside.  No additional history was obtained.    Objective:       Vitals:    25 1019 25 1111 25 1214 25 1317   BP:  (!) 156/64     Pulse:  93     Resp: 12 13  16   Temp:  99.2 °F (37.3 °C)     TempSrc:  Axillary     SpO2:  96%     Weight:       Height:   1.6 m (5' 2.99\")        Temp (24hrs), Av.8 °F (37.1 °C), Min:97.7 °F (36.5 °C), Max:99.3 °F (37.4 °C)  .  I&O reviewed as documented.    [unfilled]     Physical Exam:   Constitutional: Alert, oriented, cooperative patient in no acute distress; appears stated age BP (!) 156/64   Pulse 93   Temp 99.2 °F (37.3 °C) (Axillary)   Resp 16   Ht 1.6 m (5' 2.99\")   Wt 113.7 kg (250 lb 9.6 oz)   SpO2 96%   BMI 44.40 kg/m²   Eyes:Sclera are clear. EOMs intact  ENMT: no external lesions' gross hearing normal; no obvious neck masses, no ear or lip lesions, nares normal  CV: RRR. Normal perfusion  Resp: No JVD.  Breathing is  non-labored; no audible wheezing.    GI: soft and non-distended.  Reactive to palpation to abdomen.  Musculoskeletal: unremarkable with normal function. No embolic signs or cyanosis.   Neuro:  Oriented; moves all 4; no focal deficits  Psychiatric: normal affect and mood, no memory impairment     Labs:   Recent Results (from the past 24 hours)   TYPE AND SCREEN    Collection Time: 25  3:10 PM   Result Value Ref Range    Crossmatch expiration date 2025,2359     ABO/Rh O POSITIVE     Antibody Screen NEG    POCT Glucose    Collection Time: 25  7:48 PM   Result Value Ref Range    POC Glucose 229 (H) 65 - 100 mg/dL    Performed by:

## 2025-03-20 NOTE — PROGRESS NOTES
responded to call from RN requesting support for patient and family.  Patient had completed surgery and is considering hospice/comfort care, but is awaiting news before the final decision.  Patient is surrounded by loving family, including adult children.  Patient was slow to talk, but expressed comprehension.  Patient told her family that she knows that she is dying and going to heaven and is comfortable with that, stating that she was crying tears of carter.  Family is calm and expresses normal grief.   provided pastoral presence, prayer and empathetic listening.  Patient and family expressed comfort and gratitude in having prayer together.    Peace be with you,  Signed by  HARSHAD AndujarDiv.   [phone]

## 2025-03-20 NOTE — PROGRESS NOTES
Critical Care Interdisciplinary Rounds with staff.     Lorelei Perez M.Div, [facility] / / Bereavement Coordinator  [facility] Care Department   c: [phone]/ [phone] / [email or web address]  [email or web address]

## 2025-03-20 NOTE — PROGRESS NOTES
MD Dr Diaz came to bedside and spoke with patient and family. Family and patient requesting comfort measures. MD gave verbal order to remove NGT. Orders entered and implemented. MD stated he would place orders for comfort measures/hospice etc. Will cont to monitor patient.

## 2025-03-20 NOTE — PERIOP NOTE
Spoke with Dr. Mosquera who is aware of patient and that she will be going to the ICU here at [facility].

## 2025-03-20 NOTE — PROGRESS NOTES
Patient kept at [facility] for close monitoring. Continue pain control. Gentle IVF. IV Cipro/Flagyl to cover any intrabdominal infectious component. Tachycardic from pain, but otherwise, hemodynamically stable. Will continue to follow with primary team and offer recommendations.

## 2025-03-20 NOTE — PROGRESS NOTES
4 Eyes Skin Assessment     NAME:  Amber Escobedo  YOB: 1945  MEDICAL RECORD NUMBER:  [id number]    The patient is being assessed for  Admission    I agree that at least one RN has performed a thorough Head to Toe Skin Assessment on the patient. ALL assessment sites listed below have been assessed.      Areas assessed by both nurses:    Head, Face, Ears, Shoulders, Back, Chest, Arms, Elbows, Hands, Sacrum. Buttock, Coccyx, Ischium, Legs. Feet and Heels, and Under Medical Devices         Does the Patient have a Wound? Yes wound(s) were present on assessment. LDA wound assessment was Initiated and completed by RN. Midline incision; 5 abdominal lap sites; blisters/bruising to suprapubic area; purple/redness to bilateral buttocks       Rogelio Prevention initiated by RN: Yes  Wound Care Orders initiated by RN: No    Pressure Injury (Stage 3,4, Unstageable, DTI, NWPT, and Complex wounds) if present, place Wound referral order by RN under : No    New Ostomies, if present place, Ostomy referral order under : No     Nurse 1 eSignature: Electronically signed by Emily Castaneda RN on 3/20/25 at 6:22 AM EDT    **SHARE this note so that the co-signing nurse can place an eSignature**    Nurse 2 eSignature: Electronically signed by Desmond Dawn RN on 3/20/25 at 6:25 AM EDT

## 2025-03-21 ENCOUNTER — TELEPHONE (OUTPATIENT)
Dept: PRIMARY CARE CLINIC | Facility: CLINIC | Age: 80
End: 2025-03-21

## 2025-03-21 VITALS
DIASTOLIC BLOOD PRESSURE: 63 MMHG | RESPIRATION RATE: 15 BRPM | TEMPERATURE: 98.4 F | WEIGHT: 250.6 LBS | HEART RATE: 89 BPM | SYSTOLIC BLOOD PRESSURE: 168 MMHG | BODY MASS INDEX: 44.4 KG/M2 | HEIGHT: 63 IN | OXYGEN SATURATION: 97 %

## 2025-03-21 LAB
GLUCOSE BLD STRIP.AUTO-MCNC: 299 MG/DL (ref 65–100)
SERVICE CMNT-IMP: ABNORMAL

## 2025-03-21 PROCEDURE — 6360000002 HC RX W HCPCS: Performed by: NURSE PRACTITIONER

## 2025-03-21 PROCEDURE — 82962 GLUCOSE BLOOD TEST: CPT

## 2025-03-21 PROCEDURE — 6360000002 HC RX W HCPCS: Performed by: FAMILY MEDICINE

## 2025-03-21 PROCEDURE — 2580000003 HC RX 258: Performed by: NURSE PRACTITIONER

## 2025-03-21 RX ADMIN — HYDROMORPHONE HYDROCHLORIDE 1 MG: 1 INJECTION, SOLUTION INTRAMUSCULAR; INTRAVENOUS; SUBCUTANEOUS at 02:12

## 2025-03-21 RX ADMIN — HYDROMORPHONE HYDROCHLORIDE 1 MG: 1 INJECTION, SOLUTION INTRAMUSCULAR; INTRAVENOUS; SUBCUTANEOUS at 15:49

## 2025-03-21 RX ADMIN — SODIUM CHLORIDE, PRESERVATIVE FREE 0.5 MG: 5 INJECTION INTRAVENOUS at 02:03

## 2025-03-21 RX ADMIN — HYDROMORPHONE HYDROCHLORIDE 1 MG: 1 INJECTION, SOLUTION INTRAMUSCULAR; INTRAVENOUS; SUBCUTANEOUS at 08:08

## 2025-03-21 RX ADMIN — SODIUM CHLORIDE, PRESERVATIVE FREE 0.5 MG: 5 INJECTION INTRAVENOUS at 17:25

## 2025-03-21 RX ADMIN — HYDROMORPHONE HYDROCHLORIDE 1 MG: 1 INJECTION, SOLUTION INTRAMUSCULAR; INTRAVENOUS; SUBCUTANEOUS at 11:41

## 2025-03-21 ASSESSMENT — PAIN SCALES - GENERAL
PAINLEVEL_OUTOF10: 7
PAINLEVEL_OUTOF10: 7
PAINLEVEL_OUTOF10: 3

## 2025-03-21 ASSESSMENT — PAIN DESCRIPTION - LOCATION
LOCATION: LEG
LOCATION: ABDOMEN
LOCATION: ABDOMEN

## 2025-03-21 NOTE — DISCHARGE SUMMARY
[facility]     Discharge Summary     Amber Escobedo  MRN: [id number]     : 1945     Age: 79 y.o.                          Admit date: 3/19/2025     Discharge date:    Attending Physician: Carlos Diaz MD, FACS  Consults:   Primary Discharge Diagnosis:   Principal Problem:    SBO (small bowel obstruction) ([facility])  Resolved Problems:    * No resolved hospital problems. *    Primary Operations or Procedures Performed :  Procedure(s):  DIAGNOSTIC LAPAROSCOPY ROBOTIC/CONVERTED TO OPEN EXPLORATORY LAPAROTOMY     Brief History and Reason for Admission: Amber Escobedo was admitted with the following history of present illness.            Hospital Course: Patient presented to the emergency department on 3/10/20/2025 with complaints of abdominal pain.  Patient had a CT scan that was concerning for a bowel obstruction.  Patient was ultimately admitted and tended to in the hospital with attempts to resolve the small bowel obstruction without surgery.  She had a complex abdominal surgery history including an open appendectomy, 2 C-sections, hysterectomy, and an open cholecystectomy.  Her last bowel movement was 4 days prior to admission.  Her last colonoscopy was .  She was on Eliquis and the Eliquis was held at admission.  Patient blood work was relatively benign, however slightly elevated white blood cell count.  She had an NG tube in place with good output.  Over the next couple of days she continued to not have bowel function.  She was having dark liquid from her NG tube.  She did have a x-ray and the NG tube was in good position.  The x-ray also showed stable bowel gas pattern that was suggestive of a small bowel obstruction.  Days went on and she continued to not have any bowel function.  She had a repeat CT on 3/15 that showed a persistent high-grade small bowel obstruction with dilated jejunal ileal with suspected persistent transition point.  She was also noted to have a persistent soft  mouth daily      senna (SENOKOT) 8.6 MG tablet Take 1 tablet by mouth 3-4 times a week      Lancets MISC Check blood sugars fours times daily.      fluticasone (FLONASE) 50 MCG/ACT nasal spray 1 spray by Nasal route daily as needed for Rhinitis 1 puff in each nostril twice daily               Disposition/Discharge Instructions/Follow-up Care:        - Patient to be transferred to hospice care.   - DC all meds.  - Perfectserve Dr. Diaz with any additional questions or concerns.      Signed:  ALINE Hodges,  3/21/2025  1:30 PM

## 2025-03-21 NOTE — TELEPHONE ENCOUNTER
Care Transitions Initial Follow Up Call    Outreach made within 2 business days of discharge: ?    Patient: Amber Escobedo Patient : 1945   MRN: [id number]  Reason for Admission: SBO (small bowel obstruction) ([facility])   Discharge Date: 3/19/25       Spoke with: ?    Discharge department/facility: [facility]    TCM Interactive Patient Contact:  Was patient able to fill all prescriptions: ?  Was patient instructed to bring all medications to the follow-up visit: ?  Is patient taking all medications as directed in the discharge summary? ?  Does patient understand their discharge instructions: ?  Does patient have questions or concerns that need addressed prior to 7-14 day follow up office visit: ?    Additional needs identified to be addressed with provider  ?           Scheduled appointment with PCP within 7-14 days    Follow Up  Future Appointments   Date Time Provider Department Center   2025 11:00 AM Juventino Harris MD [facility]

## 2025-03-21 NOTE — PROGRESS NOTES
Critical Care Interdisciplinary Rounds with staff.   not present.  Chaplains remain available for support.     HARSHAD RedmanP[facility]isc[facility], [facility] / / Bereavement Coordinator  Spiritual Care Department   c: [phone]/ [phone] / [email or web address]  [email or web address]

## 2025-03-21 NOTE — CARE COORDINATION
Pt is for discharge today to [facility] by [facility]) as planned.  Transport via [employer] around 1600.  Packet prepared to go with pt to facility.  Family, facility, and RN updated on anticipated transport time.      Packet placed at RN station.    Please notify CM for any needs that may arise.    SUHAIL Knapp, [facility]    [facility]     03/21/25 8500   Service Assessment   Patient's Healthcare Decision Maker is: Legal Next of Kin   Social/Functional History   Lives With Family   Type of Home House   Home Layout One level   Home Access Ramped entrance   Home Equipment Rollator   Prior Level of Assist for Transfers Independent   Occupation Retired   Services At/After Discharge   Transition of Care Consult (CM Consult) Discharge Planning;Hospice House;Transportation Assistance   Services At/After Discharge Transport;In ambulance;Hospice    Resource Information Provided? No   Mode of Transport at Discharge [facility]   Hospital Transport Time of Discharge 1600   Confirm Follow Up Transport Other (see comment)  ([employer])   Condition of Participation: Discharge Planning   The Plan for Transition of Care is related to the following treatment goals: Patient to hospice house for end-of-life care and pain management to improve quality of life.   The Patient and/or Patient Representative was provided with a Choice of Provider? Patient Representative   Name of the Patient Representative who was provided with the Choice of Provider and agrees with the Discharge Plan?  arin Escobedo   The Patient and/Or Patient Representative agree with the Discharge Plan? Yes   Freedom of Choice list was provided with basic dialogue that supports the patient's individualized plan of care/goals, treatment preferences, and shares the quality data associated with the providers?  Yes

## 2025-03-21 NOTE — PROGRESS NOTES
Nutrition Note     Noted pt now on comfort care measures only, diet order and nutrition supplements to continue for pt pleasure. If goals of care change and nutrition intervention desired, please consult Nutrition Services.     PPN infusing at 85 ml/hr. On full liquid diet for comfort po. Discussed with RN to cut rate in half and disconnect TPN when ready for discharge.     Urvashi Nelson RD

## 2025-03-21 NOTE — PLAN OF CARE
Problem: Safety - Adult  Goal: Free from fall injury  Outcome: Progressing     Problem: Pain  Goal: Verbalizes/displays adequate comfort level or baseline comfort level  Outcome: Progressing  Flowsheets (Taken 3/20/2025 2300)  Verbalizes/displays adequate comfort level or baseline comfort level:   Encourage patient to monitor pain and request assistance   Assess pain using appropriate pain scale   Administer analgesics based on type and severity of pain and evaluate response   Consider cultural and social influences on pain and pain management   Implement non-pharmacological measures as appropriate and evaluate response   Notify Licensed Independent Practitioner if interventions unsuccessful or patient reports new pain     Problem: Chronic Conditions and Co-morbidities  Goal: Patient's chronic conditions and co-morbidity symptoms are monitored and maintained or improved  Outcome: Progressing  Flowsheets (Taken 3/20/2025 2200)  Care Plan - Patient's Chronic Conditions and Co-Morbidity Symptoms are Monitored and Maintained or Improved: Monitor and assess patient's chronic conditions and comorbid symptoms for stability, deterioration, or improvement     Problem: Discharge Planning  Goal: Discharge to home or other facility with appropriate resources  Outcome: Progressing  Flowsheets (Taken 3/20/2025 2200)  Discharge to home or other facility with appropriate resources:   Identify barriers to discharge with patient and caregiver   Arrange for needed discharge resources and transportation as appropriate   Identify discharge learning needs (meds, wound care, etc)   Arrange for interpreters to assist at discharge as needed   Refer to discharge planning if patient needs post-hospital services based on physician order or complex needs related to functional status, cognitive ability or social support system     Problem: Skin/Tissue Integrity  Goal: Skin integrity remains intact  Description: 1.  Monitor for areas of redness

## 2025-03-21 NOTE — CARE COORDINATION
Pt chart reviewed. LOS 1 day. Pt admitted with SBO and mass of abdomen. Per MD, pt transitioned to comfort care and code status changed to DNR; requiring pain medication; hospice consult placed.    RIMA YOST met with pt, daughter, and daughter-in-law at bedside to discuss hospice options. Family resides in [facility]. Family requested [facility] by [employer].     Referral placed to [facility] and CM contacted hospice liaison to come out as soon as possible. CM ensured family able to bring pt's small dog to Hospice House.     Awaiting hospice house to review referral and liaison to meet with pt and family.     Addendum:  Pt approved for [facility] by [employer] and requested transport time for 1600.     CM scheduled transport at approximately 1600.    CM team will continue to follow for potential discharge needs that may arise.     SUHAIL Knapp, [facility]  ICU   [facility]

## 2025-03-24 ENCOUNTER — CARE COORDINATION (OUTPATIENT)
Dept: CARE COORDINATION | Facility: CLINIC | Age: 80
End: 2025-03-24

## 2025-06-25 NOTE — ASSESSMENT & PLAN NOTE
Normal BP today. Reviewed recent CMP. Will continue to follow.
Patient with insulin dependent diabetes with peripheral neuropathy. Last A1C was 9.3 four months ago. Has been having hypoglycemic episodes with her current insulin regimen. Will increase her basal Lantus to 55 units at bedtime and will decrease her Novolog 70/30 insulin to 35 units twice a day. Discussed writing down her blood sugars and what she is eating and returning for recheck in one month.
DC instructions

## 2025-06-27 ENCOUNTER — TELEPHONE (OUTPATIENT)
Dept: PRIMARY CARE CLINIC | Facility: CLINIC | Age: 80
End: 2025-06-27

## 2025-06-27 NOTE — TELEPHONE ENCOUNTER
MA attempted to contact pt at provided phone number; number was out of service. MA then contacted alternate number on chart, daughter.              Daughter advised pt passed away in 03/2025.              MA forwarded message to  and PCP for further actions.

## (undated) DEVICE — NEEDLE HYPO 25GA L1.5IN BLU POLYPR HUB S STL REG BVL STR

## (undated) DEVICE — RADIFOCUS OPTITORQUE ANGIOGRAPHIC CATHETER: Brand: OPTITORQUE

## (undated) DEVICE — SOLUTION ANTIFOG VIS SYS CLEARIFY LAPSCP

## (undated) DEVICE — ARM DRAPE

## (undated) DEVICE — DEVICE COMPR REG 24 CM VASC BND

## (undated) DEVICE — DISPOSABLE GRASPER CARTRIDGE: Brand: DIRECT DRIVE REPOSABLE GRASPERS

## (undated) DEVICE — BLADELESS OBTURATOR: Brand: WECK VISTA

## (undated) DEVICE — SUTURE ABSORBABLE BRAIDED 2-0 CT-1 27 IN UD VICRYL J259H

## (undated) DEVICE — DRAIN SURG 15FR SIL RND CHN W/ TRCR FULL FLUT DBL WRP TRAD

## (undated) DEVICE — SUTURE MONOCRYL SZ 4-0 L27IN ABSRB UD L19MM PS-2 1/2 CIR PRIM Y426H

## (undated) DEVICE — SEALER ENDOSCP NANO COAT OPN DIV CRV L JAW LIGASURE IMPACT

## (undated) DEVICE — COLUMN DRAPE

## (undated) DEVICE — GUIDEWIRE 035IN 210CM PTFE COAT FIX COR J TIP 15MM FIRM BODY

## (undated) DEVICE — GENERAL LAPAROSCOPY: Brand: MEDLINE INDUSTRIES, INC.

## (undated) DEVICE — CANISTER, RIGID, 2000CC: Brand: MEDLINE INDUSTRIES, INC.

## (undated) DEVICE — GOWN SURG 2XL 49 IN AAMI LEVEL 3 ORBIS

## (undated) DEVICE — APPLICATOR MEDICATED 26 CC SOLUTION HI LT ORNG CHLORAPREP

## (undated) DEVICE — NC TREK™ CORONARY DILATATION CATHETER 2.25 MM X 15 MM / RAPID-EXCHANGE: Brand: NC TREK™

## (undated) DEVICE — STAPLER SHEATH: Brand: ENDOWRIST

## (undated) DEVICE — SOL IRR SOD CHL 0.9% TITAN XL CNTNR 3000ML

## (undated) DEVICE — CATHETER DIAG AD 5FR L110CM 145DEG COR GRY HYDRPHLC NYL

## (undated) DEVICE — Device

## (undated) DEVICE — SUTURE V-LOC 180 SZ 2-0 L12IN ABSRB VLT GS-21 L37MM 1/2 CIR VLOCM0315

## (undated) DEVICE — 1LYRTR 16FR10ML 100%SILI SNAP: Brand: MEDLINE INDUSTRIES, INC.

## (undated) DEVICE — GLOVE SURG SZ 7 L11.33IN FNGR THK9.8MIL STRW LTX POLYMER

## (undated) DEVICE — SUTURE PERMAHAND SZ 2-0 L30IN NONABSORBABLE BLK L17MM BB K883H

## (undated) DEVICE — Device: Brand: PROWATER

## (undated) DEVICE — BLADE ES L6IN ELASTOMERIC COAT EXT DURABLE BEND UPTO 90DEG

## (undated) DEVICE — SUTURE VICRYL + SZ 0 L27IN ABSRB VLT L26MM UR-6 5/8 CIR VCP603H

## (undated) DEVICE — SUTURE VICRYL + SZ 3-0 L27IN ABSRB UD L26MM SH 1/2 CIR VCP416H

## (undated) DEVICE — DRAPE TOWEL: Brand: CONVERTORS

## (undated) DEVICE — GLIDESHEATH SLENDER STAINLESS STEEL KIT: Brand: GLIDESHEATH SLENDER

## (undated) DEVICE — RESERVOIR,SUCTION,100CC,SILICONE: Brand: MEDLINE

## (undated) DEVICE — CATHETER GUID AD 6FR L100CM COR PERIPH GRN NYL PTFE XB L 3

## (undated) DEVICE — TROCAR: Brand: KII FIOS FIRST ENTRY

## (undated) DEVICE — SUTURE PERMA-HAND SZ 2-0 L30IN NONABSORBABLE BLK L26MM SH K833H